# Patient Record
Sex: MALE | Race: WHITE | NOT HISPANIC OR LATINO | Employment: OTHER | URBAN - METROPOLITAN AREA
[De-identification: names, ages, dates, MRNs, and addresses within clinical notes are randomized per-mention and may not be internally consistent; named-entity substitution may affect disease eponyms.]

---

## 2017-05-28 ENCOUNTER — HOSPITAL ENCOUNTER (EMERGENCY)
Facility: HOSPITAL | Age: 67
Discharge: HOME | End: 2017-05-28
Attending: EMERGENCY MEDICINE | Admitting: EMERGENCY MEDICINE
Payer: MEDICARE

## 2017-05-28 VITALS
TEMPERATURE: 99 F | SYSTOLIC BLOOD PRESSURE: 160 MMHG | OXYGEN SATURATION: 99 % | BODY MASS INDEX: 34.16 KG/M2 | HEART RATE: 81 BPM | RESPIRATION RATE: 19 BRPM | WEIGHT: 205 LBS | HEIGHT: 65 IN | DIASTOLIC BLOOD PRESSURE: 73 MMHG

## 2017-05-28 DIAGNOSIS — Z01.30 ENCOUNTER FOR EXAMINATION OF BLOOD PRESSURE WITHOUT ABNORMAL FINDINGS: ICD-10-CM

## 2017-05-28 DIAGNOSIS — Z71.1 NO PROBLEM, FEARED COMPLAINT UNFOUNDED: Primary | ICD-10-CM

## 2017-05-28 PROCEDURE — 99283 EMERGENCY DEPT VISIT LOW MDM: CPT | Mod: 25

## 2017-05-28 PROCEDURE — 99283 EMERGENCY DEPT VISIT LOW MDM: CPT | Mod: ,,, | Performed by: EMERGENCY MEDICINE

## 2017-05-28 PROCEDURE — 93010 ELECTROCARDIOGRAM REPORT: CPT | Mod: ,,, | Performed by: INTERNAL MEDICINE

## 2017-05-28 PROCEDURE — 93005 ELECTROCARDIOGRAM TRACING: CPT

## 2017-05-28 RX ORDER — DONEPEZIL HYDROCHLORIDE 5 MG/1
10 TABLET, FILM COATED ORAL NIGHTLY
Status: ON HOLD | COMMUNITY
End: 2020-12-19 | Stop reason: HOSPADM

## 2017-05-28 RX ORDER — ALLOPURINOL 100 MG/1
100 TABLET ORAL 2 TIMES DAILY
Status: ON HOLD | COMMUNITY
End: 2020-12-07 | Stop reason: HOSPADM

## 2017-05-29 NOTE — ED PROVIDER NOTES
Encounter Date: 5/28/2017    SCRIBE #1 NOTE: I, Saadia Celaya, am scribing for, and in the presence of, Dr. Jha.       History     Chief Complaint   Patient presents with    Hypotension     pt's wife worried about diastolic BP in the 40s at home     Review of patient's allergies indicates:   Allergen Reactions    Amoxicillin Hives     Time patient was seen by the provider: 9:44 PM      The patient is a 67 y.o. male w/ hx of RCC s/p nephrectomy that presents to the ED with a complaint of hypotension at home. Pt reports diastolic of 40 with BP taken at home. He reports /38 yesterday after cutting grass. He denies palpitations or feeling like he would pass out. He has no additional complaints and denies fever, pain, lightheadedness, fatigue.             Past Medical History:   Diagnosis Date    Bladder cancer     2 times    BPH (benign prostatic hypertrophy)     Cancer of kidney     1/2    Colon polyp     Coronary artery disease     GERD (gastroesophageal reflux disease)     PAD (peripheral artery disease)     Peripheral vascular disease      Past Surgical History:   Procedure Laterality Date    abdominal aorta bypass by fem      BLADDER SURGERY      CYSTOSCOPY      multiple angiosplastia      NEPHRECTOMY      partial nephrectomy left     Family History   Problem Relation Age of Onset    Cancer Father     Cancer Maternal Uncle      Social History   Substance Use Topics    Smoking status: Former Smoker     Packs/day: 1.50     Years: 40.00     Types: Cigarettes     Quit date: 2/9/2008    Smokeless tobacco: Not on file    Alcohol use 1.2 oz/week     2 Cans of beer per week      Comment: daily     Review of Systems   Constitutional: Negative for fatigue and fever.   Cardiovascular: Negative for chest pain and palpitations.   Neurological: Negative for light-headedness.       Physical Exam     Initial Vitals [05/28/17 1916]   BP Pulse Resp Temp SpO2   (!) 127/59 77 19 97.5 °F (36.4 °C) 97 %      Physical Exam    Nursing note and vitals reviewed.  Constitutional: He appears well-developed and well-nourished. He is not diaphoretic. No distress.   HENT:   Head: Normocephalic and atraumatic.   Eyes: EOM are normal.   Neck: Normal range of motion. Neck supple.   Cardiovascular: Normal rate, regular rhythm, normal heart sounds and intact distal pulses.   Pulmonary/Chest: Breath sounds normal. No stridor. No respiratory distress. He has no wheezes. He has no rhonchi. He has no rales.   Abdominal: Soft. Bowel sounds are normal. He exhibits no distension. There is no tenderness. There is no rebound and no guarding.   Musculoskeletal: Normal range of motion. He exhibits no edema.   Neurological: He is alert and oriented to person, place, and time.   Skin: Skin is warm and dry.   Psychiatric: He has a normal mood and affect. His behavior is normal. Judgment and thought content normal.         ED Course   Procedures  Labs Reviewed - No data to display  EKG Readings: (Independently Interpreted)   NSR, 79. No ST. QT and QRS intervals normal.          Medical Decision Making:   History:   Old Medical Records: I decided to obtain old medical records.  Initial Assessment:   The pt is a 67 y.o. male that presents with low diastolic BP reading. Pt denies taking any BPs of concerning values to me as all in 110's systolic and 50's-60's on multiple readings given to me. Regardless, pt asymptomatic. BP was taken simply for monitoring purposes. BP reassuring today in ED. Will evlauate for arrythmia and if EKG is reassuring, will d/c and recommend PCP f/u.  Independently Interpreted Test(s):   I have ordered and independently interpreted EKG Reading(s) - see prior notes  Clinical Tests:   Medical Tests: Reviewed and Ordered            Scribe Attestation:   Scribe #1: I performed the above scribed service and the documentation accurately describes the services I performed. I attest to the accuracy of the note.    Attending  Attestation:           Physician Attestation for Scribe:  Physician Attestation Statement for Scribe #1: I, Dr. Jha, reviewed documentation, as scribed by Saadia Celaya in my presence, and it is both accurate and complete.                 ED Course     Clinical Impression:   The encounter diagnosis was Encounter for examination of blood pressure without abnormal findings.    Disposition:   Disposition: Discharged  Condition: Stable       Deshawn Jha MD  05/29/17 0781

## 2017-05-29 NOTE — ED NOTES
PT PRESENTS TO ED WITH WIFE. PT REPORTS HYPOTENSION AT HOME OF  110S / 50S. WIFE REPORTS SIMILAR EPISODES OF BP DIFFERENT IN RIGHT AND LEFT ARM WITH PREVIOUS INTERNAL BLEEDING EPISODE. PT DENIES PAIN, CP, SOB, H/A, N/V/D, BLOOD IN URINE AND STOOL, WEAKNESS,  LIGHTHEADEDNESS, AND DIZZINESS.

## 2017-05-29 NOTE — PROVIDER PROGRESS NOTES - EMERGENCY DEPT.
Encounter Date: 5/28/2017    ED Physician Progress Notes         EKG - STEMI Decision  Initial Reading: No STEMI present.

## 2017-06-16 ENCOUNTER — TELEPHONE (OUTPATIENT)
Dept: NEUROLOGY | Facility: CLINIC | Age: 67
End: 2017-06-16

## 2017-06-16 NOTE — TELEPHONE ENCOUNTER
----- Message from Niels Watkins sent at 6/15/2017 11:49 AM CDT -----  Contact: wife- Ele- [phone]  Patient's wife needs to schedule an appointment for the patient.  Thanks!

## 2017-06-19 ENCOUNTER — TELEPHONE (OUTPATIENT)
Dept: NEUROLOGY | Facility: CLINIC | Age: 67
End: 2017-06-19

## 2017-06-19 NOTE — TELEPHONE ENCOUNTER
Appointment scheduled with Aruna.  Patient to bring CT of brain with written report and clinic notes from prior neurologist.

## 2017-06-19 NOTE — TELEPHONE ENCOUNTER
----- Message from Lisa Lutz sent at 6/16/2017  4:46 PM CDT -----  Contact: wife  Pt wife states the dr called them and told them to schedule an appointment for the pt to come in for memory loss,would like to come to the [place] office..[phone] (home)

## 2017-07-06 ENCOUNTER — TELEPHONE (OUTPATIENT)
Dept: NEUROLOGY | Facility: CLINIC | Age: 67
End: 2017-07-06

## 2017-07-06 NOTE — TELEPHONE ENCOUNTER
----- Message from Kelly Arrieta sent at 7/6/2017 10:33 AM CDT -----  Patient has appointment for 7/10/17 that she needs to cancel. They just found out that the doctor is not on their insurance plan. Please call if there are any other questions at [phone].

## 2018-12-04 PROBLEM — E83.42 HYPOMAGNESEMIA: Status: ACTIVE | Noted: 2018-12-04

## 2018-12-05 PROBLEM — Z71.3 ENCOUNTER FOR DIETARY CONSULTATION: Chronic | Status: ACTIVE | Noted: 2018-12-05

## 2018-12-06 PROBLEM — K90.9 DIARRHEA DUE TO MALABSORPTION: Status: ACTIVE | Noted: 2018-12-06

## 2018-12-06 PROBLEM — R19.7 DIARRHEA DUE TO MALABSORPTION: Status: ACTIVE | Noted: 2018-12-06

## 2019-01-12 ENCOUNTER — HOSPITAL ENCOUNTER (INPATIENT)
Facility: HOSPITAL | Age: 69
LOS: 2 days | Discharge: HOME | DRG: 862 | End: 2019-01-14
Attending: EMERGENCY MEDICINE | Admitting: SURGERY
Payer: MEDICARE

## 2019-01-12 DIAGNOSIS — R19.7 DIARRHEA, UNSPECIFIED TYPE: ICD-10-CM

## 2019-01-12 DIAGNOSIS — E83.42 HYPOMAGNESEMIA: ICD-10-CM

## 2019-01-12 DIAGNOSIS — K91.89 ANASTOMOTIC LEAK OF INTESTINE: ICD-10-CM

## 2019-01-12 DIAGNOSIS — B99.9 INTRA-ABDOMINAL INFECTION: Primary | ICD-10-CM

## 2019-01-12 DIAGNOSIS — L02.91 PHLEGMON: ICD-10-CM

## 2019-01-12 LAB
ALBUMIN SERPL BCP-MCNC: 2.4 G/DL
ALP SERPL-CCNC: 94 U/L
ALT SERPL W/O P-5'-P-CCNC: 11 U/L
ANION GAP SERPL CALC-SCNC: 13 MMOL/L
AST SERPL-CCNC: 14 U/L
BASOPHILS # BLD AUTO: 0.07 K/UL
BASOPHILS NFR BLD: 0.4 %
BILIRUB SERPL-MCNC: 0.4 MG/DL
BILIRUB UR QL STRIP: NEGATIVE
BUN SERPL-MCNC: 8 MG/DL
CALCIUM SERPL-MCNC: 7.4 MG/DL
CHLORIDE SERPL-SCNC: 104 MMOL/L
CLARITY UR REFRACT.AUTO: CLEAR
CO2 SERPL-SCNC: 21 MMOL/L
COLOR UR AUTO: YELLOW
CREAT SERPL-MCNC: 1.2 MG/DL
CRP SERPL-MCNC: 15.2 MG/L
DIFFERENTIAL METHOD: ABNORMAL
EOSINOPHIL # BLD AUTO: 0.3 K/UL
EOSINOPHIL NFR BLD: 1.3 %
ERYTHROCYTE [DISTWIDTH] IN BLOOD BY AUTOMATED COUNT: 17.6 %
ERYTHROCYTE [SEDIMENTATION RATE] IN BLOOD BY WESTERGREN METHOD: 41 MM/HR
EST. GFR  (AFRICAN AMERICAN): >60 ML/MIN/1.73 M^2
EST. GFR  (NON AFRICAN AMERICAN): >60 ML/MIN/1.73 M^2
ESTIMATED AVG GLUCOSE: 114 MG/DL
GLUCOSE SERPL-MCNC: 160 MG/DL
GLUCOSE UR QL STRIP: NEGATIVE
HBA1C MFR BLD HPLC: 5.6 %
HCT VFR BLD AUTO: 31.5 %
HGB BLD-MCNC: 10.1 G/DL
HGB UR QL STRIP: NEGATIVE
IMM GRANULOCYTES # BLD AUTO: 0.2 K/UL
IMM GRANULOCYTES NFR BLD AUTO: 1 %
INR PPP: 1.1
KETONES UR QL STRIP: NEGATIVE
LACTATE SERPL-SCNC: 2.6 MMOL/L
LEUKOCYTE ESTERASE UR QL STRIP: NEGATIVE
LIPASE SERPL-CCNC: 20 U/L
LYMPHOCYTES # BLD AUTO: 1.3 K/UL
LYMPHOCYTES NFR BLD: 6.7 %
MAGNESIUM SERPL-MCNC: 0.7 MG/DL
MCH RBC QN AUTO: 30.6 PG
MCHC RBC AUTO-ENTMCNC: 32.1 G/DL
MCV RBC AUTO: 96 FL
MONOCYTES # BLD AUTO: 1.1 K/UL
MONOCYTES NFR BLD: 5.5 %
NEUTROPHILS # BLD AUTO: 16.3 K/UL
NEUTROPHILS NFR BLD: 85.1 %
NITRITE UR QL STRIP: NEGATIVE
NRBC BLD-RTO: 0 /100 WBC
PH UR STRIP: 5 [PH] (ref 5–8)
PHOSPHATE SERPL-MCNC: 3.8 MG/DL
PLATELET # BLD AUTO: 474 K/UL
PMV BLD AUTO: 9.2 FL
POCT GLUCOSE: 147 MG/DL (ref 70–110)
POTASSIUM SERPL-SCNC: 3.2 MMOL/L
PROT SERPL-MCNC: 6.3 G/DL
PROT UR QL STRIP: NEGATIVE
PROTHROMBIN TIME: 11.7 SEC
RBC # BLD AUTO: 3.3 M/UL
SODIUM SERPL-SCNC: 138 MMOL/L
SP GR UR STRIP: >=1.03 (ref 1–1.03)
TSH SERPL DL<=0.005 MIU/L-ACNC: 0.55 UIU/ML
URN SPEC COLLECT METH UR: ABNORMAL
WBC # BLD AUTO: 19.14 K/UL

## 2019-01-12 PROCEDURE — 84443 ASSAY THYROID STIM HORMONE: CPT

## 2019-01-12 PROCEDURE — 25500020 PHARM REV CODE 255: Performed by: EMERGENCY MEDICINE

## 2019-01-12 PROCEDURE — 81003 URINALYSIS AUTO W/O SCOPE: CPT

## 2019-01-12 PROCEDURE — 99223 PR INITIAL HOSPITAL CARE,LEVL III: ICD-10-PCS | Mod: AI,,, | Performed by: SURGERY

## 2019-01-12 PROCEDURE — G0378 HOSPITAL OBSERVATION PER HR: HCPCS

## 2019-01-12 PROCEDURE — 25000003 PHARM REV CODE 250: Performed by: EMERGENCY MEDICINE

## 2019-01-12 PROCEDURE — 86140 C-REACTIVE PROTEIN: CPT

## 2019-01-12 PROCEDURE — 25000003 PHARM REV CODE 250: Performed by: STUDENT IN AN ORGANIZED HEALTH CARE EDUCATION/TRAINING PROGRAM

## 2019-01-12 PROCEDURE — 99285 EMERGENCY DEPT VISIT HI MDM: CPT | Mod: ,,, | Performed by: EMERGENCY MEDICINE

## 2019-01-12 PROCEDURE — 85652 RBC SED RATE AUTOMATED: CPT

## 2019-01-12 PROCEDURE — 96367 TX/PROPH/DG ADDL SEQ IV INF: CPT

## 2019-01-12 PROCEDURE — 12000002 HC ACUTE/MED SURGE SEMI-PRIVATE ROOM

## 2019-01-12 PROCEDURE — 63600175 PHARM REV CODE 636 W HCPCS: Performed by: STUDENT IN AN ORGANIZED HEALTH CARE EDUCATION/TRAINING PROGRAM

## 2019-01-12 PROCEDURE — 84100 ASSAY OF PHOSPHORUS: CPT

## 2019-01-12 PROCEDURE — 99223 1ST HOSP IP/OBS HIGH 75: CPT | Mod: AI,,, | Performed by: SURGERY

## 2019-01-12 PROCEDURE — 99285 PR EMERGENCY DEPT VISIT,LEVEL V: ICD-10-PCS | Mod: ,,, | Performed by: EMERGENCY MEDICINE

## 2019-01-12 PROCEDURE — 83690 ASSAY OF LIPASE: CPT

## 2019-01-12 PROCEDURE — 96375 TX/PRO/DX INJ NEW DRUG ADDON: CPT

## 2019-01-12 PROCEDURE — 63600175 PHARM REV CODE 636 W HCPCS: Performed by: EMERGENCY MEDICINE

## 2019-01-12 PROCEDURE — 96368 THER/DIAG CONCURRENT INF: CPT

## 2019-01-12 PROCEDURE — 85025 COMPLETE CBC W/AUTO DIFF WBC: CPT

## 2019-01-12 PROCEDURE — 99285 EMERGENCY DEPT VISIT HI MDM: CPT | Mod: 25

## 2019-01-12 PROCEDURE — 87040 BLOOD CULTURE FOR BACTERIA: CPT

## 2019-01-12 PROCEDURE — 96365 THER/PROPH/DIAG IV INF INIT: CPT

## 2019-01-12 PROCEDURE — 83605 ASSAY OF LACTIC ACID: CPT

## 2019-01-12 PROCEDURE — 85610 PROTHROMBIN TIME: CPT

## 2019-01-12 PROCEDURE — 80053 COMPREHEN METABOLIC PANEL: CPT

## 2019-01-12 PROCEDURE — 96366 THER/PROPH/DIAG IV INF ADDON: CPT

## 2019-01-12 PROCEDURE — 83036 HEMOGLOBIN GLYCOSYLATED A1C: CPT

## 2019-01-12 PROCEDURE — 63600175 PHARM REV CODE 636 W HCPCS: Performed by: NURSE PRACTITIONER

## 2019-01-12 PROCEDURE — 83735 ASSAY OF MAGNESIUM: CPT

## 2019-01-12 RX ORDER — LIDOCAINE HYDROCHLORIDE 10 MG/ML
1 INJECTION, SOLUTION EPIDURAL; INFILTRATION; INTRACAUDAL; PERINEURAL ONCE
Status: DISCONTINUED | OUTPATIENT
Start: 2019-01-13 | End: 2019-01-14 | Stop reason: HOSPADM

## 2019-01-12 RX ORDER — METRONIDAZOLE 500 MG/100ML
500 INJECTION, SOLUTION INTRAVENOUS
Status: DISCONTINUED | OUTPATIENT
Start: 2019-01-13 | End: 2019-01-14

## 2019-01-12 RX ORDER — GABAPENTIN 300 MG/1
300 CAPSULE ORAL NIGHTLY
Status: DISCONTINUED | OUTPATIENT
Start: 2019-01-13 | End: 2019-01-14 | Stop reason: HOSPADM

## 2019-01-12 RX ORDER — ONDANSETRON 8 MG/1
8 TABLET, ORALLY DISINTEGRATING ORAL EVERY 8 HOURS PRN
Status: DISCONTINUED | OUTPATIENT
Start: 2019-01-13 | End: 2019-01-14 | Stop reason: HOSPADM

## 2019-01-12 RX ORDER — LANOLIN ALCOHOL/MO/W.PET/CERES
400 CREAM (GRAM) TOPICAL DAILY
Status: DISCONTINUED | OUTPATIENT
Start: 2019-01-12 | End: 2019-01-14 | Stop reason: HOSPADM

## 2019-01-12 RX ORDER — HYDROCHLOROTHIAZIDE 25 MG/1
25 TABLET ORAL EVERY MORNING
Status: DISCONTINUED | OUTPATIENT
Start: 2019-01-13 | End: 2019-01-14 | Stop reason: HOSPADM

## 2019-01-12 RX ORDER — ONDANSETRON 4 MG/1
4 TABLET, FILM COATED ORAL EVERY 8 HOURS PRN
Status: ON HOLD | COMMUNITY
End: 2020-12-07 | Stop reason: HOSPADM

## 2019-01-12 RX ORDER — HYDROCODONE BITARTRATE AND ACETAMINOPHEN 10; 325 MG/1; MG/1
1 TABLET ORAL EVERY 4 HOURS PRN
Status: DISCONTINUED | OUTPATIENT
Start: 2019-01-13 | End: 2019-01-14 | Stop reason: HOSPADM

## 2019-01-12 RX ORDER — METOPROLOL TARTRATE 50 MG/1
50 TABLET ORAL 2 TIMES DAILY
Status: DISCONTINUED | OUTPATIENT
Start: 2019-01-13 | End: 2019-01-14 | Stop reason: HOSPADM

## 2019-01-12 RX ORDER — CLOPIDOGREL BISULFATE 75 MG/1
75 TABLET ORAL NIGHTLY
Status: DISCONTINUED | OUTPATIENT
Start: 2019-01-13 | End: 2019-01-14 | Stop reason: HOSPADM

## 2019-01-12 RX ORDER — SODIUM CHLORIDE, SODIUM LACTATE, POTASSIUM CHLORIDE, CALCIUM CHLORIDE 600; 310; 30; 20 MG/100ML; MG/100ML; MG/100ML; MG/100ML
INJECTION, SOLUTION INTRAVENOUS CONTINUOUS
Status: DISCONTINUED | OUTPATIENT
Start: 2019-01-13 | End: 2019-01-13

## 2019-01-12 RX ORDER — VALSARTAN 160 MG/1
320 TABLET ORAL DAILY
Status: DISCONTINUED | OUTPATIENT
Start: 2019-01-13 | End: 2019-01-13

## 2019-01-12 RX ORDER — ASPIRIN 81 MG/1
81 TABLET ORAL DAILY
Status: DISCONTINUED | OUTPATIENT
Start: 2019-01-13 | End: 2019-01-14 | Stop reason: HOSPADM

## 2019-01-12 RX ORDER — DICYCLOMINE HYDROCHLORIDE 20 MG/1
20 TABLET ORAL EVERY 6 HOURS
Status: ON HOLD | COMMUNITY
End: 2021-09-02 | Stop reason: SDUPTHER

## 2019-01-12 RX ORDER — HYDROMORPHONE HYDROCHLORIDE 1 MG/ML
1 INJECTION, SOLUTION INTRAMUSCULAR; INTRAVENOUS; SUBCUTANEOUS
Status: DISCONTINUED | OUTPATIENT
Start: 2019-01-12 | End: 2019-01-12

## 2019-01-12 RX ORDER — VANCOMYCIN HCL IN 5 % DEXTROSE 1G/250ML
1000 PLASTIC BAG, INJECTION (ML) INTRAVENOUS
Status: COMPLETED | OUTPATIENT
Start: 2019-01-12 | End: 2019-01-12

## 2019-01-12 RX ORDER — AMLODIPINE BESYLATE 10 MG/1
10 TABLET ORAL DAILY
Status: DISCONTINUED | OUTPATIENT
Start: 2019-01-13 | End: 2019-01-14 | Stop reason: HOSPADM

## 2019-01-12 RX ORDER — HYDROMORPHONE HYDROCHLORIDE 1 MG/ML
1 INJECTION, SOLUTION INTRAMUSCULAR; INTRAVENOUS; SUBCUTANEOUS
Status: COMPLETED | OUTPATIENT
Start: 2019-01-12 | End: 2019-01-12

## 2019-01-12 RX ORDER — RAMELTEON 8 MG/1
8 TABLET ORAL NIGHTLY PRN
Status: DISCONTINUED | OUTPATIENT
Start: 2019-01-13 | End: 2019-01-14 | Stop reason: HOSPADM

## 2019-01-12 RX ORDER — ALLOPURINOL 100 MG/1
100 TABLET ORAL 2 TIMES DAILY
Status: DISCONTINUED | OUTPATIENT
Start: 2019-01-13 | End: 2019-01-14 | Stop reason: HOSPADM

## 2019-01-12 RX ORDER — ACETAMINOPHEN 325 MG/1
650 TABLET ORAL EVERY 8 HOURS PRN
Status: DISCONTINUED | OUTPATIENT
Start: 2019-01-13 | End: 2019-01-14 | Stop reason: HOSPADM

## 2019-01-12 RX ORDER — LOPERAMIDE HYDROCHLORIDE 2 MG/1
2 CAPSULE ORAL 4 TIMES DAILY PRN
Status: ON HOLD | COMMUNITY
End: 2021-09-02 | Stop reason: SDUPTHER

## 2019-01-12 RX ORDER — LEVOFLOXACIN 500 MG/1
500 TABLET, FILM COATED ORAL DAILY
Status: ON HOLD | COMMUNITY
End: 2019-01-14 | Stop reason: HOSPADM

## 2019-01-12 RX ORDER — INSULIN ASPART 100 [IU]/ML
1-10 INJECTION, SOLUTION INTRAVENOUS; SUBCUTANEOUS
Status: DISCONTINUED | OUTPATIENT
Start: 2019-01-13 | End: 2019-01-14 | Stop reason: HOSPADM

## 2019-01-12 RX ORDER — GLUCAGON 1 MG
1 KIT INJECTION
Status: DISCONTINUED | OUTPATIENT
Start: 2019-01-13 | End: 2019-01-14 | Stop reason: HOSPADM

## 2019-01-12 RX ORDER — CIPROFLOXACIN 2 MG/ML
400 INJECTION, SOLUTION INTRAVENOUS
Status: DISCONTINUED | OUTPATIENT
Start: 2019-01-13 | End: 2019-01-14

## 2019-01-12 RX ORDER — LOPERAMIDE HYDROCHLORIDE 2 MG/1
2 CAPSULE ORAL 4 TIMES DAILY PRN
Status: DISCONTINUED | OUTPATIENT
Start: 2019-01-13 | End: 2019-01-14 | Stop reason: HOSPADM

## 2019-01-12 RX ORDER — IBUPROFEN 200 MG
24 TABLET ORAL
Status: DISCONTINUED | OUTPATIENT
Start: 2019-01-13 | End: 2019-01-14 | Stop reason: HOSPADM

## 2019-01-12 RX ORDER — HYDROCODONE BITARTRATE AND ACETAMINOPHEN 5; 325 MG/1; MG/1
1 TABLET ORAL EVERY 4 HOURS PRN
Status: DISCONTINUED | OUTPATIENT
Start: 2019-01-13 | End: 2019-01-14 | Stop reason: HOSPADM

## 2019-01-12 RX ORDER — PANTOPRAZOLE SODIUM 40 MG/1
40 TABLET, DELAYED RELEASE ORAL DAILY
Status: DISCONTINUED | OUTPATIENT
Start: 2019-01-13 | End: 2019-01-14 | Stop reason: HOSPADM

## 2019-01-12 RX ORDER — IBUPROFEN 200 MG
16 TABLET ORAL
Status: DISCONTINUED | OUTPATIENT
Start: 2019-01-13 | End: 2019-01-14 | Stop reason: HOSPADM

## 2019-01-12 RX ORDER — ENOXAPARIN SODIUM 100 MG/ML
40 INJECTION SUBCUTANEOUS EVERY 24 HOURS
Status: DISCONTINUED | OUTPATIENT
Start: 2019-01-13 | End: 2019-01-14 | Stop reason: HOSPADM

## 2019-01-12 RX ORDER — DONEPEZIL HYDROCHLORIDE 5 MG/1
10 TABLET, FILM COATED ORAL NIGHTLY
Status: DISCONTINUED | OUTPATIENT
Start: 2019-01-13 | End: 2019-01-14 | Stop reason: HOSPADM

## 2019-01-12 RX ORDER — SODIUM CHLORIDE 0.9 % (FLUSH) 0.9 %
3 SYRINGE (ML) INJECTION
Status: DISCONTINUED | OUTPATIENT
Start: 2019-01-13 | End: 2019-01-14 | Stop reason: HOSPADM

## 2019-01-12 RX ORDER — ATORVASTATIN CALCIUM 20 MG/1
80 TABLET, FILM COATED ORAL DAILY
Status: DISCONTINUED | OUTPATIENT
Start: 2019-01-13 | End: 2019-01-14 | Stop reason: HOSPADM

## 2019-01-12 RX ADMIN — SODIUM CHLORIDE, SODIUM LACTATE, POTASSIUM CHLORIDE, AND CALCIUM CHLORIDE 1000 ML: .6; .31; .03; .02 INJECTION, SOLUTION INTRAVENOUS at 10:01

## 2019-01-12 RX ADMIN — IOHEXOL 75 ML: 350 INJECTION, SOLUTION INTRAVENOUS at 09:01

## 2019-01-12 RX ADMIN — VANCOMYCIN HYDROCHLORIDE 1000 MG: 1 INJECTION, POWDER, FOR SOLUTION INTRAVENOUS at 09:01

## 2019-01-12 RX ADMIN — HYDROMORPHONE HYDROCHLORIDE 1 MG: 1 INJECTION, SOLUTION INTRAMUSCULAR; INTRAVENOUS; SUBCUTANEOUS at 09:01

## 2019-01-12 RX ADMIN — MAGNESIUM SULFATE HEPTAHYDRATE 3 G: 500 INJECTION, SOLUTION INTRAMUSCULAR; INTRAVENOUS at 09:01

## 2019-01-12 RX ADMIN — MAGNESIUM OXIDE TAB 400 MG (241.3 MG ELEMENTAL MG) 400 MG: 400 (241.3 MG) TAB at 11:01

## 2019-01-12 RX ADMIN — SODIUM CHLORIDE 1000 ML: 0.9 INJECTION, SOLUTION INTRAVENOUS at 08:01

## 2019-01-12 RX ADMIN — PIPERACILLIN AND TAZOBACTAM 4.5 G: 4; .5 INJECTION, POWDER, LYOPHILIZED, FOR SOLUTION INTRAVENOUS; PARENTERAL at 08:01

## 2019-01-13 PROBLEM — B99.9 INTRA-ABDOMINAL INFECTION: Status: ACTIVE | Noted: 2019-01-13

## 2019-01-13 LAB
ALBUMIN SERPL BCP-MCNC: 1.9 G/DL
ALP SERPL-CCNC: 80 U/L
ALT SERPL W/O P-5'-P-CCNC: 9 U/L
ANION GAP SERPL CALC-SCNC: 10 MMOL/L
AST SERPL-CCNC: 10 U/L
BASOPHILS # BLD AUTO: 0.06 K/UL
BASOPHILS NFR BLD: 0.4 %
BILIRUB SERPL-MCNC: 0.5 MG/DL
BUN SERPL-MCNC: 7 MG/DL
C DIFF GDH STL QL: NEGATIVE
C DIFF TOX A+B STL QL IA: NEGATIVE
CALCIUM SERPL-MCNC: 7.4 MG/DL
CHLORIDE SERPL-SCNC: 101 MMOL/L
CO2 SERPL-SCNC: 21 MMOL/L
CREAT SERPL-MCNC: 0.8 MG/DL
DIFFERENTIAL METHOD: ABNORMAL
EOSINOPHIL # BLD AUTO: 0.2 K/UL
EOSINOPHIL NFR BLD: 1.4 %
ERYTHROCYTE [DISTWIDTH] IN BLOOD BY AUTOMATED COUNT: 17.6 %
EST. GFR  (AFRICAN AMERICAN): >60 ML/MIN/1.73 M^2
EST. GFR  (NON AFRICAN AMERICAN): >60 ML/MIN/1.73 M^2
GLUCOSE SERPL-MCNC: 149 MG/DL
HCT VFR BLD AUTO: 27 %
HGB BLD-MCNC: 8.7 G/DL
IMM GRANULOCYTES # BLD AUTO: 0.18 K/UL
IMM GRANULOCYTES NFR BLD AUTO: 1.2 %
LACTATE SERPL-SCNC: 2.4 MMOL/L
LYMPHOCYTES # BLD AUTO: 1.6 K/UL
LYMPHOCYTES NFR BLD: 11 %
MAGNESIUM SERPL-MCNC: 1.2 MG/DL
MAGNESIUM SERPL-MCNC: 1.6 MG/DL
MAGNESIUM SERPL-MCNC: 2 MG/DL
MCH RBC QN AUTO: 30.9 PG
MCHC RBC AUTO-ENTMCNC: 32.2 G/DL
MCV RBC AUTO: 96 FL
MONOCYTES # BLD AUTO: 1.1 K/UL
MONOCYTES NFR BLD: 7.7 %
NEUTROPHILS # BLD AUTO: 11.4 K/UL
NEUTROPHILS NFR BLD: 78.3 %
NRBC BLD-RTO: 0 /100 WBC
PHOSPHATE SERPL-MCNC: 3.2 MG/DL
PLATELET # BLD AUTO: 349 K/UL
PMV BLD AUTO: 9 FL
POCT GLUCOSE: 119 MG/DL (ref 70–110)
POCT GLUCOSE: 136 MG/DL (ref 70–110)
POCT GLUCOSE: 147 MG/DL (ref 70–110)
POCT GLUCOSE: 155 MG/DL (ref 70–110)
POTASSIUM SERPL-SCNC: 4 MMOL/L
PROT SERPL-MCNC: 5.2 G/DL
RBC # BLD AUTO: 2.82 M/UL
SODIUM SERPL-SCNC: 132 MMOL/L
WBC # BLD AUTO: 14.61 K/UL

## 2019-01-13 PROCEDURE — 99232 PR SUBSEQUENT HOSPITAL CARE,LEVL II: ICD-10-PCS | Mod: ,,, | Performed by: SURGERY

## 2019-01-13 PROCEDURE — 36415 COLL VENOUS BLD VENIPUNCTURE: CPT

## 2019-01-13 PROCEDURE — 99223 1ST HOSP IP/OBS HIGH 75: CPT | Mod: GC,,, | Performed by: INTERNAL MEDICINE

## 2019-01-13 PROCEDURE — 25000003 PHARM REV CODE 250: Performed by: STUDENT IN AN ORGANIZED HEALTH CARE EDUCATION/TRAINING PROGRAM

## 2019-01-13 PROCEDURE — 63600175 PHARM REV CODE 636 W HCPCS: Performed by: STUDENT IN AN ORGANIZED HEALTH CARE EDUCATION/TRAINING PROGRAM

## 2019-01-13 PROCEDURE — 83735 ASSAY OF MAGNESIUM: CPT

## 2019-01-13 PROCEDURE — 83735 ASSAY OF MAGNESIUM: CPT | Mod: 91

## 2019-01-13 PROCEDURE — 25000003 PHARM REV CODE 250: Performed by: SURGERY

## 2019-01-13 PROCEDURE — 20600001 HC STEP DOWN PRIVATE ROOM

## 2019-01-13 PROCEDURE — S0030 INJECTION, METRONIDAZOLE: HCPCS | Performed by: STUDENT IN AN ORGANIZED HEALTH CARE EDUCATION/TRAINING PROGRAM

## 2019-01-13 PROCEDURE — 99232 SBSQ HOSP IP/OBS MODERATE 35: CPT | Mod: ,,, | Performed by: SURGERY

## 2019-01-13 PROCEDURE — 87449 NOS EACH ORGANISM AG IA: CPT

## 2019-01-13 PROCEDURE — 83605 ASSAY OF LACTIC ACID: CPT

## 2019-01-13 PROCEDURE — 85025 COMPLETE CBC W/AUTO DIFF WBC: CPT

## 2019-01-13 PROCEDURE — 96366 THER/PROPH/DIAG IV INF ADDON: CPT

## 2019-01-13 PROCEDURE — 84100 ASSAY OF PHOSPHORUS: CPT

## 2019-01-13 PROCEDURE — 80053 COMPREHEN METABOLIC PANEL: CPT

## 2019-01-13 PROCEDURE — 99223 PR INITIAL HOSPITAL CARE,LEVL III: ICD-10-PCS | Mod: GC,,, | Performed by: INTERNAL MEDICINE

## 2019-01-13 RX ORDER — LOSARTAN POTASSIUM 50 MG/1
100 TABLET ORAL DAILY
Status: DISCONTINUED | OUTPATIENT
Start: 2019-01-13 | End: 2019-01-14 | Stop reason: HOSPADM

## 2019-01-13 RX ORDER — MAGNESIUM SULFATE HEPTAHYDRATE 40 MG/ML
2 INJECTION, SOLUTION INTRAVENOUS ONCE
Status: COMPLETED | OUTPATIENT
Start: 2019-01-13 | End: 2019-01-13

## 2019-01-13 RX ORDER — CHOLESTYRAMINE 4 G/9G
1 POWDER, FOR SUSPENSION ORAL 2 TIMES DAILY
Status: DISCONTINUED | OUTPATIENT
Start: 2019-01-13 | End: 2019-01-14 | Stop reason: HOSPADM

## 2019-01-13 RX ADMIN — ENOXAPARIN SODIUM 40 MG: 100 INJECTION SUBCUTANEOUS at 05:01

## 2019-01-13 RX ADMIN — PANTOPRAZOLE SODIUM 40 MG: 40 TABLET, DELAYED RELEASE ORAL at 08:01

## 2019-01-13 RX ADMIN — CLOPIDOGREL 75 MG: 75 TABLET, FILM COATED ORAL at 09:01

## 2019-01-13 RX ADMIN — METRONIDAZOLE 500 MG: 500 SOLUTION INTRAVENOUS at 04:01

## 2019-01-13 RX ADMIN — LOSARTAN POTASSIUM 100 MG: 50 TABLET, FILM COATED ORAL at 11:01

## 2019-01-13 RX ADMIN — CIPROFLOXACIN 400 MG: 2 INJECTION, SOLUTION INTRAVENOUS at 12:01

## 2019-01-13 RX ADMIN — HYDROCODONE BITARTRATE AND ACETAMINOPHEN 1 TABLET: 10; 325 TABLET ORAL at 09:01

## 2019-01-13 RX ADMIN — SODIUM CHLORIDE, SODIUM LACTATE, POTASSIUM CHLORIDE, AND CALCIUM CHLORIDE: .6; .31; .03; .02 INJECTION, SOLUTION INTRAVENOUS at 12:01

## 2019-01-13 RX ADMIN — SODIUM CHLORIDE, SODIUM LACTATE, POTASSIUM CHLORIDE, AND CALCIUM CHLORIDE 1000 ML: .6; .31; .03; .02 INJECTION, SOLUTION INTRAVENOUS at 06:01

## 2019-01-13 RX ADMIN — ASPIRIN 81 MG: 81 TABLET, COATED ORAL at 08:01

## 2019-01-13 RX ADMIN — RAMELTEON 8 MG: 8 TABLET, FILM COATED ORAL at 10:01

## 2019-01-13 RX ADMIN — AMLODIPINE BESYLATE 10 MG: 10 TABLET ORAL at 08:01

## 2019-01-13 RX ADMIN — MAGNESIUM SULFATE IN WATER 2 G: 40 INJECTION, SOLUTION INTRAVENOUS at 06:01

## 2019-01-13 RX ADMIN — METRONIDAZOLE 500 MG: 500 SOLUTION INTRAVENOUS at 08:01

## 2019-01-13 RX ADMIN — ALLOPURINOL 100 MG: 100 TABLET ORAL at 09:01

## 2019-01-13 RX ADMIN — HYDROCODONE BITARTRATE AND ACETAMINOPHEN 1 TABLET: 10; 325 TABLET ORAL at 06:01

## 2019-01-13 RX ADMIN — HYDROCODONE BITARTRATE AND ACETAMINOPHEN 1 TABLET: 10; 325 TABLET ORAL at 01:01

## 2019-01-13 RX ADMIN — DONEPEZIL HYDROCHLORIDE 10 MG: 5 TABLET, FILM COATED ORAL at 09:01

## 2019-01-13 RX ADMIN — ALLOPURINOL 100 MG: 100 TABLET ORAL at 08:01

## 2019-01-13 RX ADMIN — METRONIDAZOLE 500 MG: 500 SOLUTION INTRAVENOUS at 12:01

## 2019-01-13 RX ADMIN — GABAPENTIN 300 MG: 300 CAPSULE ORAL at 09:01

## 2019-01-13 RX ADMIN — ATORVASTATIN CALCIUM 80 MG: 20 TABLET, FILM COATED ORAL at 08:01

## 2019-01-13 RX ADMIN — HYDROCODONE BITARTRATE AND ACETAMINOPHEN 1 TABLET: 10; 325 TABLET ORAL at 03:01

## 2019-01-13 RX ADMIN — MAGNESIUM OXIDE TAB 400 MG (241.3 MG ELEMENTAL MG) 400 MG: 400 (241.3 MG) TAB at 08:01

## 2019-01-13 RX ADMIN — METOPROLOL TARTRATE 50 MG: 50 TABLET ORAL at 08:01

## 2019-01-13 RX ADMIN — CHOLESTYRAMINE 4 G: 4 POWDER, FOR SUSPENSION ORAL at 09:01

## 2019-01-13 RX ADMIN — INSULIN ASPART 2 UNITS: 100 INJECTION, SOLUTION INTRAVENOUS; SUBCUTANEOUS at 06:01

## 2019-01-13 RX ADMIN — METOPROLOL TARTRATE 50 MG: 50 TABLET ORAL at 09:01

## 2019-01-13 RX ADMIN — HYDROCHLOROTHIAZIDE 25 MG: 25 TABLET ORAL at 07:01

## 2019-01-13 RX ADMIN — CIPROFLOXACIN 400 MG: 2 INJECTION, SOLUTION INTRAVENOUS at 11:01

## 2019-01-13 NOTE — ED NOTES
Telemetry Verification   Patient placed on Telemetry Box  Verified with War Room  Box # [id number]   Monitor Tech Leanne   Rate 86   Rhythm NSR

## 2019-01-13 NOTE — PROGRESS NOTES
[facility]  General Surgery  Progress Note    Subjective:     History of Present Illness:  No notes on file    Post-Op Info:  * No surgery found *         Interval History: Feeling fine, no complaints. Denies nausea currently  Reports persistent diarrhea     Medications:  Continuous Infusions:   lactated ringers 150 mL/hr at 01/13/19 0008     Scheduled Meds:   allopurinol  100 mg Oral BID    amLODIPine  10 mg Oral Daily    aspirin  81 mg Oral Daily    atorvastatin  80 mg Oral Daily    ciprofloxacin  400 mg Intravenous Q12H    clopidogrel  75 mg Oral QHS    donepezil  10 mg Oral QHS    enoxaparin  40 mg Subcutaneous Daily    gabapentin  300 mg Oral QHS    hydroCHLOROthiazide  25 mg Oral QAM    lactated ringers  1,000 mL Intravenous Once    lidocaine (PF) 10 mg/ml (1%)  1 mL Other Once    magnesium oxide  400 mg Oral Daily    magnesium sulfate IVPB  2 g Intravenous Once    metoprolol tartrate  50 mg Oral BID    metronidazole  500 mg Intravenous Q8H    pantoprazole  40 mg Oral Daily    valsartan  320 mg Oral Daily     PRN Meds:acetaminophen, dextrose 50%, dextrose 50%, glucagon (human recombinant), glucose, glucose, HYDROcodone-acetaminophen, HYDROcodone-acetaminophen, insulin aspart U-100, loperamide, ondansetron, promethazine (PHENERGAN) IVPB, ramelteon, sodium chloride 0.9%     Review of patient's allergies indicates:   Allergen Reactions    Amoxicillin Hives    Zolpidem tartrate Hallucinations     Objective:     Vital Signs (Most Recent):  Temp: 97.6 °F (36.4 °C) (01/13/19 0202)  Pulse: 67 (01/13/19 0704)  Resp: 16 (01/13/19 0202)  BP: (!) 141/62 (01/13/19 0202)  SpO2: 99 % (01/13/19 0202) Vital Signs (24h Range):  Temp:  [97.6 °F (36.4 °C)-98.4 °F (36.9 °C)] 97.6 °F (36.4 °C)  Pulse:  [] 67  Resp:  [16-20] 16  SpO2:  [96 %-100 %] 99 %  BP: (125-144)/(57-80) 141/62     Weight: 73.9 kg (163 lb)  Body mass index is 27.12 kg/m².    Intake/Output - Last 3 Shifts        01/11 0700 - 01/12 0659 01/12 0700 - 01/13 0659 01/13 0700 - 01/14 0659    P.O.  400     IV Piggyback  2650     Total Intake(mL/kg)  3050 (41.3)     Urine (mL/kg/hr)  300     Stool  0     Total Output  300     Net  +2750            Urine Occurrence  1 x     Stool Occurrence  1 x           Physical Exam  A&O, NAD  RRR  No Resp Distress  ABD: s/nd, minimally;y TTP    Significant Labs:  CBC:   Recent Labs   Lab 01/12/19 1925   WBC 19.14*   RBC 3.30*   HGB 10.1*   HCT 31.5*   *   MCV 96   MCH 30.6   MCHC 32.1     CMP:   Recent Labs   Lab 01/12/19 1925   *   CALCIUM 7.4*   ALBUMIN 2.4*   PROT 6.3      K 3.2*   CO2 21*      BUN 8   CREATININE 1.2   ALKPHOS 94   ALT 11   AST 14   BILITOT 0.4       Significant Diagnostics:  I have reviewed all pertinent imaging results/findings within the past 24 hours.    Assessment/Plan:     * Phlegmon    69 yo male with multiple co morbidities and probable IBD-S with recent history of gangrenous bowel s/p R hemicolectomy on 10/31/18 now presents with post surgical phlegmon and electrolyte abnormalities due to excessive diarrhea. CT scan reviewed. Believe extraluminal air due to recent removal of drain as patient not peritonitic.oral contrast reaching small bowel without any extravasation seen. Ongoing post op leak in the differential.      Consult GI for help with IBS symptoms vs. Rule out other etiology of diarrhea, possible infectious or IBD?     Clear liquids okay  IV cipro/flagyl. Received vanc and zosyn in the ER  Magnesium q 8 hours. Receiving 3g of IV mag now.   Trend lactate  IVF @ 150  norco PRN for pain and gabapentin nightly   Home BP meds- lopressor, HCTZ, norvasc, valsartan   Restart anti platelets - asa and plavix   PRN imodium   Home protonix for GERD  Lovenox, SCDs     Plan: IV antibiotics, GI consult today             Sean Baez MD  General Surgery  [facility]

## 2019-01-13 NOTE — NURSING
Pt is compliant with measuring stool and urine output but is having small incontinent stools to brief about every 15 minutes. Wife assists with changing brief and is marking the occurrences by time and number. WCTM.

## 2019-01-13 NOTE — CONSULTS
Consult Note  General Surgery    CC:   Chief Complaint   Patient presents with    Abdominal Pain     abd infection, states wound drain removed yesterday, pain worsening today     HPI: 69 yo male with with hx of partial left nephrectomy for renal cancer, previous bladder cancer x2, CAD, TIA, DM (on metformin), GERD, PAD, HTN, PVD (on asa and plavix) s/p PTA L SFA and PTA and stent placement of left iliac artery, aorto-bifem bypass done in 2008 and ischemic colitis with bowel perforation s/p R colectomy by Dr. Guajardo at [facility] on 10/31/18. His course was complicated by respiratory failure, prolonged intubation and long ICU stay. He presents to the hospital today for continued diarrhea and abdominal pain. He has been to many different hospitals in the last several years including, vascular physician in [place], [facility], .     Admitted last month for electrolyte abnormalities and diarrhea at [facility]. Just recently released 5 days ago from  for diarrhea and electrolyte abnormalities as well. Also noted during that admission to have leukocytosis and IR drain placed on right side of the abdomen without return of purulence.. Received approximately 1 week of antibiotics but was discontinued on Friday along with the drain. Underwent colonoscopy on 12/06/18 for this which was unremarkable. He had a drain removed yesterday. He has been checked for c. Diff several times and been treated with flagyl.     His history of diarrhea is longstanding. He states that it initially started 2 years ago prior to his bowel perforation. He was referred to Dr. Byrne at [facility] who did extensive work up and was unable to find source, possible ulcerative colitis vs. IBS-D. He has tried may medications including his current medications - imodium. He has also been on xifaxin previously. The past several days, his diarrhea has become worse, where he is wearing a diaper and constantly having accidents and unable to function. His  rectum has become very raw requiring ale's paste and vaseline. He admits to some nausea and sporadic vomiting. No blood in stool, hematemesis, CP, SOB, lower leg swelling, dysuria, hematuria, foul smelling urine.     Denies any history of Afib. Having frequent PVCs. No previous MI.     PMH:   Past Medical History:   Diagnosis Date    Bladder cancer     2 times    BPH (benign prostatic hypertrophy)     Cancer of kidney     1/2    Colon polyp     Coronary artery disease     GERD (gastroesophageal reflux disease)     Hypertension     PAD (peripheral artery disease)     Peripheral vascular disease        PSH:   Past Surgical History:   Procedure Laterality Date    abdominal aorta bypass by fem      BIOPSY, BLADDER N/A 6/18/2013    Performed by Shyam Bucio MD at [facility] OR [address] [place] [address]    BLADDER SURGERY      COLONOSCOPY  12/06/2018    COLONOSCOPY N/A 12/6/2018    Per[facility] at [facility] ENDO    CYSTOSCOPY      CYSTOSCOPY N/A 4/17/2014    [facility]    CYSTOSCOPY N/A 6/18/2013    [facility]    DILATION, URETHRA N/A 4/17/2014    [facility]    FULGURATION, BLADDER  6/18/2013    Performed by Shyam Bucio MD at [facility] OR [address] [place] [address]    multiple angiosplastia      NEPHRECTOMY      partial nephrectomy left    PTA, PERIPHERAL VESSEL Bilateral 8/21/2018    Performed by [facility] at [facility] CATH    PYELOGRAM, RETROGRADE Bilateral 6/18/2013    Performed by Shyam Bucio MD at [facility] OR [address] [place] [address]    TURP, USING GREEN LIGHT LASER N/A 4/17/2014    Performed by Andrea Mcknight MD at [facility] OR [address] [place] [address]       Allergies:   Review of patient's allergies indicates:   Allergen Reactions    Amoxicillin Hives    Zolpidem tartrate Hallucinations     Meds:      Medication List      ASK your doctor about these medications    ACCU-CHEK CEASAR CONTROL SOLN  Soln  Generic drug:  blood glucose control high,low  USE ONE TIME DAILY     ACCU-CHEK CEASAR PLUS TEST STRP Strp  Generic drug:  blood sugar diagnostic  TEST TWO TIMES DAILY     allopurinol 100 MG tablet  Commonly known as:  ZYLOPRIM     amLODIPine 10 MG tablet  Commonly known as:  NORVASC  TAKE 1 TABLET ONE TIME DAILY     aspirin 81 MG EC tablet  Commonly known as:  ECOTRIN     atorvastatin 80 MG tablet  Commonly known as:  LIPITOR     b complex vitamins tablet     BD ALCOHOL SWABS Padm  Generic drug:  alcohol swabs  TEST TWO TIMES DAILY     clopidogrel 75 mg tablet  Commonly known as:  PLAVIX  TAKE 1 TABLET EVERY EVENING     donepezil 5 MG tablet  Commonly known as:  ARICEPT     fish oil-omega-3 fatty acids 300-1,000 mg capsule     gabapentin 300 MG capsule  Commonly known as:  NEURONTIN     hydroCHLOROthiazide 25 MG tablet  Commonly known as:  HYDRODIURIL  TAKE 1 TABLET EVERY MORNING     * lancets 33 gauge Misc  Commonly known as:  ONETOUCH DELICA LANCETS  2 lancets by Misc.(Non-Drug; Combo Route) route 2 (two) times daily.     * ACCU-CHEK SOFTCLIX LANCETS Misc  Generic drug:  lancets  TEST TWO TIMES DAILY     metFORMIN 1000 MG tablet  Commonly known as:  GLUCOPHAGE  Take 1 tablet (1,000 mg total) by mouth 2 (two) times daily with meals.     metoprolol tartrate 50 MG tablet  Commonly known as:  LOPRESSOR  TAKE 1 TABLET TWICE DAILY     mv,Ca,min-iron-FA-lycopene 8 mg iron- 200 mcg-600 mcg Tab     pantoprazole 40 MG tablet  Commonly known as:  PROTONIX     valsartan 320 MG tablet  Commonly known as:  DIOVAN         * This list has 2 medication(s) that are the same as other medications prescribed for you. Read the directions carefully, and ask your doctor or other care provider to review them with you.                Family History   Problem Relation Age of Onset    Cancer Father     Cancer Maternal Uncle        Review of Systems - For pertinent positives please see HPI   Constitutional: Negative for fever and  chills.   HENT: Negative for congestion and ear pain.   Respiratory: Negative for cough and shortness of breath.   Cardiovascular: Negative for chest pain and palpitations.   Gastrointestinal:See HPI   Genitourinary: Negative for dysuria and hematuria.   Musculoskeletal: Negative for myalgias and arthralgias.   Skin: Negative for rash and wound.   Neurological: Negative for weakness and numbness.   Psychiatric/Behavioral: Negative for confusion and dysphoric mood    OBJECTIVE:     Vital Signs (Most Recent)  Temp: 98.4 °F (36.9 °C) (01/12/19 1808)  Pulse: 66 (01/12/19 2002)  Resp: 20 (01/12/19 2003)  BP: (!) 144/64 (01/12/19 2002)  SpO2: 97 % (01/12/19 2002)    Vital Signs Range (Last 24H):  Temp:  [98.4 °F (36.9 °C)]   Pulse:  [66-80]   Resp:  [16-20]   BP: (125-144)/(57-70)   SpO2:  [97 %-98 %]     I & O (Last 24H):    Intake/Output Summary (Last 24 hours) at 1/12/2019 2157  Last data filed at 1/12/2019 2101  Gross per 24 hour   Intake 100 ml   Output --   Net 100 ml       Physical Exam:  General: well developed, well nourished, no distress  Lungs:  clear to auscultation bilaterally and normal respiratory effort  Heart: regular rate and rhythm, S1, S2 normal, no murmur, rub or gallop  Abdomen: Midline incision well healed. No hernia. Tender to palpation in RUQ and RLQ. No rebound or guarding. Old drain site is non infected, no drainage.   Bilateral groin incisions are well healed.   2+ Dp pulses bilaterally. No lower extremity edema  Neuro: A&O x3, no focal deficits       Laboratory:  CBC:   Recent Labs   Lab 01/12/19 1925   WBC 19.14*   RBC 3.30*   HGB 10.1*   HCT 31.5*   *   MCV 96   MCH 30.6   MCHC 32.1     CMP:   Recent Labs   Lab 01/12/19 1925   *   CALCIUM 7.4*   ALBUMIN 2.4*   PROT 6.3      K 3.2*   CO2 21*      BUN 8   CREATININE 1.2   ALKPHOS 94   ALT 11   AST 14   BILITOT 0.4       UA negative   TSH .553  Lactate 2.6  CRP 15.2  Sed rate 41  Lipase 20     Blood cultures pending    Labs within the past 24 hours have been reviewed.    CT abdomen/pelvis with IV and PO contrast:  Chest:  Base of the neck: There is a 0.9 cm low low-attenuation lesion in the right thyroid lobe.  This does not meet size criteria for follow-up.  Heart and great vessels: No aneurysm.  There is prominent calcification in the aortic arch and thoracic aorta.  No pericardial fluid.  Dense multi-vessel coronary artery calcification.  Adenopathy: None demonstrated.  Esophagus: Within normal limits.  Airways: Trachea and proximal airways are patent.  Lungs: Lungs are symmetrically aerated.  Mild bilateral dependent lower lung zone atelectatic changes.  Abdomen:  Liver: Normal in size without any focal lesions.  Hepatic and portal veins are patent.  Gallbladder: Gallbladder is incompletely distended without significant wall thickening.  No calcified gallstones.  Biliary system: Non-dilated.  Spleen: Within normal limits.  Pancreas: Unremarkable without focal lesion or inflammatory changes.  Adrenals: There are bilateral adrenal nodules that are stable in size when compared to CT abdomen pelvis 02/14/2014, favoring a benign process.  Kidneys: Normal in size and position.  There are scattered bilateral renal vascular calcifications as well as calcifications in the bilateral renal stanley that may represent nonobstructing nephrolithiasis.  Irregular contour of the right renal cortex, likely persistent fetal renal lobulation.  There are postoperative changes of the left kidney in this patient with a history of kidney cancer with heterogeneous fat containing and peripherally calcified exophytic lesion, likely postoperative in nature.  This is stable when compared to CT 02/14/2014.  no enhancing lesion in the operative bed.  The ureters are nondilated.  Bowel: Stomach and proximal small bowel are unremarkable.  There are postoperative changes of partial colectomy with anastomotic sutures visualized in the right hemiabdomen.  Around  this area of the anastomosis there is significant fat stranding fat stranding and mesenteric edema surrounding a hyperenhancing portion of distal small bowel.  There is several extraluminal foci of air, noting patient had a peritoneal drainage catheter removed within the last 3 days, as discussed with ordering provider.  There is no evidence of obstruction.  Liquid stool is noted in the proximal colon.  Remaining distal colon appears unremarkable with a few scattered diverticuli.  Peritoneum: No significant intraperitoneal fluid.  Abdominal Adenopathy: None.  Vasculature: Prominent dense calcified noncalcified atherosclerosis throughout the thoracic and abdominal aorta and its major branches.  There is dense calcification throughout the major branch vessels including the celiac and SMA.  There is an aortofemoral bypass graft present bilaterally, noting occlusion of the left aortofemoral graft.  The native left iliac artery is narrowed but patent.  Left femoral artery beyond the bypass graft is patent as well.  Please note that the right aortofemoral bypass graft extends beyond the field of view.  There several areas of luminal narrowing in the external iliac and femoral artery without evidence of occlusion.  Pelvis:  Urinary bladder: Decompressed, and not well evaluated.  Male reproductive: Within normal limits.  Pelvis adenopathy: None.  Bones: No acute findings.  Miscellaneous: There is an area of abnormal soft tissue density at the anterior aspect of the left thigh which has improved when compared with the prior CTU from 2013, and may represent postoperative change.  There are postoperative changes in the mid abdominal wall as well as foci of subcutaneous air overlying the right teressa abdominal wall.  There is a lipoma in the right hip soft tissues which is similar to prior exams.  Impression       Postoperative changes of partial colectomy with significant inflammatory changes and bowel wall thickening around a  portion of the proximal colon and distal small bowel.  No definite fluid collection, although there are scattered extraluminal foci of air, noting recent removal of intraperitoneal drainage catheter.  No evidence of obstruction.  Findings may represent a nonspecific enteritis, including infectious/inflammatory or ischemic etiologies.  Bowel perforation cannot entirely be excluded given the presence of extraluminal gas.    Postoperative changes of bilateral aortofemoral graft with occlusion of the left aortofemoral graft.  The bilateral native iliofemoral arteries appear grossly patent but demonstrate significant areas of stenosis.    Postoperative changes in the left kidney in this patient with a history of renal cancer.    Bilateral adrenal nodule stable when compared to CT abdomen pelvis 02/14/2014, favoring a benign process.    Nonspecific soft tissue focus in the left anterior thigh, decreased in conspicuity when compared with prior CT urogram from 2013, and likely represents postoperative change.         ASSESSMENT/PLAN:     67 yo male with multiple co morbidities and probable IBD-S with recent history of gangrenous bowel s/p R hemicolectomy on 10/31/18 now presents with post surgical phlegmon and electrolyte abnormalities due to excessive diarrhea    Admit to general surgery - Dr. Borrego secondary to post op infection, hypomagnesium and elevated wbc/lactate   CT scan reviewed. Believe extraluminal air due to recent removal of drain as patient not peritonitic.oral contrast reaching small bowel without any extravasation seen. Ongoing post op leak in the differential.   Clear liquids okay  IV cipro/flagyl. Received vanc and zosyn in the ER  Magnesium q 8 hours. Receiving 3g of IV mag now.   IV @ 150  norco PRN for pain and gabapentin nightly   Home BP meds- lopressor, HCTZ, norvasc, valsartan   Restart anti platelets - asa and plavix   Consult GI for help with IBS symptoms vs. Rule out other etiology of  diarrhea, possible infectious or IBD?  Will restart imodium   Home protonix for GERD  Lovenox, SCDs    Plan: IV antibiotics, GI consult in AM, recheck lactate, fluid resuscitate and correct magnesium     Martin Pierre MD

## 2019-01-13 NOTE — CONSULTS
[facility]  Gastroenterology  Consult Note    Patient Name: Jose English  MRN: [id number]  Admission Date: 1/12/2019  Hospital Length of Stay: 0 days  Code Status: Full Code   Attending Provider: Dwight Borrego MD   Consulting Provider: Renzo Lai MD  Primary Care Physician: Jarret Calle MD  Principal Problem:Phlegmon    Inpatient consult to Gastroenterology  Consult performed by: Renzo Lai MD  Consult ordered by: Tim Hallman MD        Subjective:     HPI:  Mr English is a 68 year old male with history of renal cancer (s/p partial nephrectomy), bladder cancer, CAD, TIA, GERD, HTN, PVD on DAPT s/p multiple stents, ischemic colitis s/p right collection (10/31/18) who is presenting to the hospital with abdominal pain and ?abdominal collection; GI consulted due to chronic diarrhea.    He reports that he has had chronic diarrhea for ~2 years. Prior to 2 years ago he would move his bowels ~2/day formed. He notes that 2 years ago however his bowels changed to 4-5/day watery bowel movements, non-bloody, non-mucus, painless. He noted he tried various things including dietary elimination without improvement. He had a workup including Egd/colonoscopy/VCE (reportedly with iron deficiency) which was negative.    He notes that on halloween (2018) he developped severe mid-epigastric pain with associated nausea and vomiting. He was seen at OSH ([facility]) and required right colectomy due to ischemia (no records available). Since then he has developed increasing diarrhea.    He reports that his current bowel frequency is between 7 and 15 bowel movements. He generally passes little amount of stool, predominantly watery, non-bloody. He notes that he has no nocturnal bowel movements. Will not have bowel movement if he does not eat. Denies any clear correlation with PO intake. He is very distressed by this change in bowel habits noting that he has lost ~45 lbs since 10/2018 and is unable to  leave the house due to frequency of bowel movements. He has been seen by Dr. Byrne (GI) for evaluation but reportedly has not improved. He reports that he has tried multiple agents including imodium, lomotil, tincture, and cholestyramine, rifaximin without effect. His last colonoscopy was 12/2018 which was negative (no biopsies taken) due to diarrhea while hospitalized at [facility].    He was recently admitted at [facility] due to leucocytosis with concern for abscess at the site of his anastomosis for which an IR drain was placed (removed 1/11). He notes that he presented to the hospital on 1/12 due to acute worsening of RLQ abdominal pain and nausea and vomiting.    On admission, workup notable for leukocytosis (19), CRP elevated (15.2), lactate 2.6. CTAP  With partial colectomy with fat stranding and edema of anastamosis and distal small bowel. Sevearl foci of extraluminal air (recent IR drain). No obstruction.    Prior Endo hx  - only one colonoscopy in [facility]    Colonoscopy. (12/6/18) Provider: Dr. BARB Dickson. Indication: Diarrhea. Extent: Ileocolonic anastamosis. Preparation:unknown.  - normal examination  - no biopsies taken      Past Medical History:   Diagnosis Date    Bladder cancer     2 times    BPH (benign prostatic hypertrophy)     Cancer of kidney     1/2    Colon polyp     Coronary artery disease     GERD (gastroesophageal reflux disease)     Hypertension     PAD (peripheral artery disease)     Peripheral vascular disease        Past Surgical History:   Procedure Laterality Date    abdominal aorta bypass by fem      BIOPSY, BLADDER N/A 6/18/2013    Performed by Shyam Bucio MD at [facility] OR [address] [place] [address]    BLADDER SURGERY      COLONOSCOPY  12/06/2018    COLONOSCOPY N/A 12/6/2018    Per[facility] ENDO    CYSTOSCOPY      CYSTOSCOPY N/A 4/17/2014    Performed by Andrea Mcknight MD at [facility] OR [facility] FLR    CYSTOSCOPY N/A 6/18/2013    Performed by Shyam GARCIA  MD Fortun[facility] at [facility] OR [facility] FLR    DILATION, URETHRA N/A 4/17/2014    Performed by Andrea Mcknight MD at [facility] OR [facility]LR    FULGURATION, BLADDER  6/18/2013    Performed by Shyam Bucio MD at [facility] OR [facility]LR    multiple angiosplastia      NEPHRECTOMY      partial nephrectomy left    PTA, PERIPHERAL VESSEL Bilateral 8/21/2018    Per[facility] by [facility] at [facility] CATH    PYELOGRAM, RETROGRADE Bilateral 6/18/2013    Performed by Shyam Bucio MD at [facility] OR [facility]LR    TURP, USING GREEN LIGHT LASER N/A 4/17/2014    Performed by Andrea Mcknight MD at [facility] OR [facility] FLR       Review of patient's allergies indicates:   Allergen Reactions    Amoxicillin Hives    Zolpidem tartrate Hallucinations     Family History     Problem Relation (Age of Onset)    Cancer Father, Maternal Uncle        Tobacco Use    Smoking status: Former Smoker     Packs/day: 1.50     Years: 40.00     Pack years: 60.00     Types: Cigarettes     Last attempt to quit: 2/9/2008     Years since quitting: 10.9   Substance and Sexual Activity    Alcohol use: Yes     Alcohol/week: 1.2 oz     Types: 2 Cans of beer per week     Comment: daily    Drug use: Not on file    Sexual activity: Yes     Partners: Female     Review of Systems   Constitutional: Positive for activity change. Negative for appetite change, chills, fatigue, fever and unexpected weight change.   HENT: Negative for sore throat and trouble swallowing.    Eyes: Negative for visual disturbance.   Respiratory: Negative for cough and shortness of breath.    Cardiovascular: Negative for chest pain.   Gastrointestinal: Positive for abdominal pain (RLQ) and diarrhea. Negative for anal bleeding, blood in stool, constipation, nausea (improved) and vomiting.   Genitourinary: Negative for dysuria.   Musculoskeletal: Negative for arthralgias and myalgias.   Skin: Negative for rash.   Neurological: Negative for dizziness, weakness and light-headedness.    Psychiatric/Behavioral: Negative for agitation and confusion.     Objective:     Vital Signs (Most Recent):  Temp: 98 °F (36.7 °C) (01/13/19 1550)  Pulse: 63 (01/13/19 1550)  Resp: 10 (01/13/19 1550)  BP: 135/62 (01/13/19 1550)  SpO2: 99 % (01/13/19 1550) Vital Signs (24h Range):  Temp:  [97.6 °F (36.4 °C)-98.4 °F (36.9 °C)] 98 °F (36.7 °C)  Pulse:  [] 63  Resp:  [10-20] 10  SpO2:  [96 %-100 %] 99 %  BP: (125-144)/(57-80) 135/62     Weight: 73.9 kg (163 lb) (01/12/19 1808)  Body mass index is 27.12 kg/m².      Intake/Output Summary (Last 24 hours) at 1/13/2019 1720  Last data filed at 1/13/2019 1707  Gross per 24 hour   Intake 7151.25 ml   Output 1115 ml   Net 6036.25 ml       Lines/Drains/Airways     Airway                 Airway - Non-Surgical 12/06/18 1134 Nasal Cannula 38 days          Peripheral Intravenous Line                 Peripheral IV - Single Lumen 01/12/19 1851 Left Antecubital less than 1 day                Physical Exam   Constitutional: He is oriented to person, place, and time. He appears well-developed and well-nourished. No distress.   Well appearing, no distress, lying in bed watching tv. Wife at bedside.   HENT:   Head: Normocephalic and atraumatic.   Mouth/Throat: Oropharynx is clear and moist. No oropharyngeal exudate.   Eyes: Conjunctivae are normal. No scleral icterus.   Cardiovascular: Normal rate, regular rhythm, normal heart sounds and intact distal pulses.   Pulmonary/Chest: Effort normal and breath sounds normal. No respiratory distress.   Abdominal: Soft. Bowel sounds are normal. He exhibits no distension and no mass. There is tenderness (mild RLQ tenderness). There is no rebound and no guarding.   Musculoskeletal: He exhibits no edema or tenderness.   Lymphadenopathy:     He has no cervical adenopathy.   Neurological: He is alert and oriented to person, place, and time.   Skin: Skin is warm. Capillary refill takes less than 2 seconds. He is not diaphoretic.   Psychiatric: He  has a normal mood and affect. His behavior is normal. Judgment and thought content normal.   Nursing note and vitals reviewed.      Significant Labs:  CBC:   Recent Labs   Lab 01/12/19 1925 01/13/19  1154   WBC 19.14* 14.61*   HGB 10.1* 8.7*   HCT 31.5* 27.0*   * 349     CMP:   Recent Labs   Lab 01/13/19  1153   *   CALCIUM 7.4*   ALBUMIN 1.9*   PROT 5.2*   *   K 4.0   CO2 21*      BUN 7*   CREATININE 0.8   ALKPHOS 80   ALT 9*   AST 10   BILITOT 0.5     Coagulation:   Recent Labs   Lab 01/12/19 1925   INR 1.1       Significant Imaging:  Imaging results within the past 24 hours have been reviewed.    Assessment/Plan:     Diarrhea    Mr English is a 68 year old male with history of renal cancer (s/p partial nephrectomy), bladder cancer, CAD, TIA, GERD, HTN, PVD on DAPT s/p multiple stents, ischemic colitis s/p right collection (10/31/18) who is presenting to the hospital with abdominal pain and ?abdominal collection; GI consulted due to chronic diarrhea.    Unclear the etiology his diarrhea and it is difficult to accurately assess currently in the setting of an acute process. He has baseline chronic diarrhea for 2 years (unclear full workup at this time but reportedly negative E/C/VCE) with acute worsening after partial right colectomy for ?ischemia. Given right colectomy and the green watery appearance of the bowel movements concerning for bile acid diarrhea, though would have expected some improvement with cholestyramine. He has imaging concerning for inflammation and ?bowel leak at site of his anastomosis so unclear if there is a super imposed element to his diarrhea due to this inflammatory process. He had a negative (reported) colonoscopy ~1 month ago though no biopsies were taken and no photos available.    Plan  - check stool studies: c.diff (negative), stool culture, O&P, giardia  - check celiac panel  - agree with antibiotics (management per surgery team)  - please obtain records  of prior Egd/colon/VCE at [facility]  - consider CT with PO contrast or rectal contrast to evaluate anastamosis ?leak as etiology to recent collection. Further evaluation and management per surgery team  - given concern for leak and inflammation on CT will defer colonoscopy to outpatient setting along with EGD to exclude celiac  - start cholestyramine 4g QID. If this fails would optomized imodium followed by lomotil (would not recommend anti-diarrheal medications at this time if concern for leak)  - will plan for outpatient follow-up to continue to evaluate diarrhea  - recommendations discussed with primary team         Thank you for your consult. I will sign off. Please contact us if you have any additional questions.    Renzo Lai MD  Gastroenterology  [facility]

## 2019-01-13 NOTE — ASSESSMENT & PLAN NOTE
Mr English is a 68 year old male with history of renal cancer (s/p partial nephrectomy), bladder cancer, CAD, TIA, GERD, HTN, PVD on DAPT s/p multiple stents, ischemic colitis s/p right collection (10/31/18) who is presenting to the hospital with abdominal pain and ?abdominal collection; GI consulted due to chronic diarrhea.    Unclear the etiology his diarrhea and it is difficult to accurately assess currently in the setting of an acute process. He has baseline chronic diarrhea for 2 years (unclear full workup at this time but reportedly negative E/C/VCE) with acute worsening after partial right colectomy for ?ischemia. Given right colectomy and the green watery appearance of the bowel movements concerning for bile acid diarrhea, though would have expected some improvement with cholestyramine. He has imaging concerning for inflammation and ?bowel leak at site of his anastomosis so unclear if there is a super imposed element to his diarrhea due to this inflammatory process. He had a negative (reported) colonoscopy ~1 month ago though no biopsies were taken and no photos available.    Plan  - check stool studies: c.diff (negative), stool culture, O&P, giardia  - check celiac panel  - agree with antibiotics (management per surgery team)  - please obtain records of prior Egd/colon/VCE at [facility]  - consider CT with PO contrast or rectal contrast to evaluate anastamosis ?leak as etiology to recent colleciton  - given concern for leak and inflammation on CT will defer colonoscopy to outpatient setting along with EGD to exclude celiac  - start cholestyramine 4g QID. If this fails would optomized imodium followed by lomotil (would not recommend anti-diarrheal medications at this time if concern for leak)  - will plan for outpatient follow-up to continue to evaluate diarrhea

## 2019-01-13 NOTE — ED TRIAGE NOTES
PT to the ED with complaints of abdominal pain. Pt states wound drain removed yesterday, pain worsening today. Hx of bowel perforation and abdominal surgery October 2018

## 2019-01-13 NOTE — ED PROVIDER NOTES
Renee Date: 1/12/2019       History     Chief Complaint   Patient presents with    Abdominal Pain     abd infection, states wound drain removed yesterday, pain worsening today       68M status post right colectomy approximately 11-12 weeks ago for ischemic colitis, Bladder Cancer (x2), Renal Cancer, HTN, BPH CAD, PAD and PVD presents to ED with Abdominal pain. Patient has 10 on 10 abdominal pain. He states that going progressively worse since yesterday.  Patient normally gets his care at [facility].  Patient has had diarrhea for a year and a half and has had extensive workup.  Has been tested for C diff multiple times including last week, which was negative. Patient has nearly a foot removed of his bowel.  Denies any current fevers chills. Had LRQ drain removed yesterday that was still producing blood per wife. They were told that the drain fluid did not look infected, and the insertion site itself did not look infected.  Patient had a difficult time waking up from a surgery the last time.  He was intubated for a couple weeks.  Now also reports difficulty swallowing, likely associated with the intubation.  Has difficulty swallowing more than 1 pill at a time.  Also reports occasional bright red blood on toilet paper that he associates with consult wiping multiple times with the secondary to diarrhea.  Denies black stool denies murmur in stool.  Has known internal hemorrhoids.  Had colonoscopy 2 weeks ago that was unremarkable. Patient denies any fevers, night sweats, n/v//c, abd pain, dysuria, hematuria, cough, wheezing, SOB, CP, leg swelling, HA, dizziness, weakness at this time.            Review of patient's allergies indicates:   Allergen Reactions    Amoxicillin Hives    Zolpidem tartrate Hallucinations     Past Medical History:   Diagnosis Date    Bladder cancer     2 times    BPH (benign prostatic hypertrophy)     Cancer of kidney     1/2    Colon polyp     Coronary artery disease     GERD  (gastroesophageal reflux disease)     Hypertension     PAD (peripheral artery disease)     Peripheral vascular disease      Past Surgical History:   Procedure Laterality Date    abdominal aorta bypass by fem      BIOPSY, BLADDER N/A 6/18/2013    Performed by [facility]am Bucio [facility] at [facility] OR [address] [place] [address]    BLADDER SURGERY      COLONOSCOPY  12/06/2018    COLONOSCOPY N/A 12/6/2018    [facility] at [facility] ENDO    CYSTOSCOPY      CYSTOSCOPY N/A 4/17/2014    [facility]    CYSTOSCOPY N/A 6/18/2013    Performed by [facility]am Bucio MD at [facility] OR [address] [place] [address]    DILATION, URETHRA N/A 4/17/2014    Performed by Andrea Mcknight MD at [facility] OR [address] [place] [address]    FULGURATION, BLADDER  6/18/2013    Performed by Sh[facility]am Bucio MD at [facility] OR [address] [place] [address]    multiple angiosplastia      NEPHRECTOMY      partial nephrectomy left    PTA, PERIPHERAL VESSEL Bilateral 8/21/2018    [facility] CATH    PYELOGRAM, RETROGRADE Bilateral 6/18/2013    Performed by Shyam Bucio MD at [facility] OR [address] [place] [address]    TURP, USING GREEN LIGHT LASER N/A 4/17/2014    Performed by Andrea Mcknight MD at [facility] OR [address] [place] [address]     Family History   Problem Relation Age of Onset    Cancer Father     Cancer Maternal Uncle      Social History     Tobacco Use    Smoking status: Former Smoker     Packs/day: 1.50     Years: 40.00     Pack years: 60.00     Types: Cigarettes     Last attempt to quit: 2/9/2008     Years since quitting: 10.9   Substance Use Topics    Alcohol use: Yes     Alcohol/week: 1.2 oz     Types: 2 Cans of beer per week     Comment: daily    Drug use: Not on file     Review of Systems   Constitutional: Negative for activity change, fever and unexpected weight change.   HENT: Negative for trouble swallowing and voice change.    Eyes: Negative for visual disturbance.   Respiratory: Negative for cough and shortness of breath.    Cardiovascular: Negative  "for chest pain, palpitations and leg swelling.   Gastrointestinal: Positive for abdominal distention ("tightening") and abdominal pain. Negative for constipation, diarrhea, nausea and vomiting.   Endocrine: Negative for polyphagia and polyuria.   Genitourinary: Negative for difficulty urinating, dysuria and hematuria.   Musculoskeletal: Negative for back pain and gait problem.   Skin: Positive for wound (Recently removed drain). Negative for rash.   Allergic/Immunologic: Negative for immunocompromised state.   Neurological: Negative for seizures, syncope, facial asymmetry and weakness.   Psychiatric/Behavioral: Negative for agitation, behavioral problems and confusion.       Physical Exam     Initial Vitals [01/12/19 1808]   BP Pulse Resp Temp SpO2   135/70 77 16 98.4 °F (36.9 °C) 98 %      MAP       --         Physical Exam    Nursing note and vitals reviewed.  Constitutional: He appears well-developed and well-nourished. He is not diaphoretic.  Non-toxic appearance. He does not appear ill.   HENT:   Head: Normocephalic and atraumatic.   Eyes: Pupils are equal, round, and reactive to light.   Cardiovascular: Normal rate and regular rhythm.   No murmur heard.  Pulmonary/Chest: Effort normal and breath sounds normal. No stridor. He has no rales.   Abdominal: Soft. He exhibits no distension, no ascites and no mass. Bowel sounds are increased. There is tenderness (R sided tenderness to palpation ) in the right upper quadrant and right lower quadrant. There is negative Woo's sign.   Large vertical scar down center of abdomen    Neurological: He is alert and oriented to person, place, and time.   Skin: Skin is warm. Capillary refill takes less than 2 seconds.   Psychiatric: He has a normal mood and affect. His behavior is normal.         ED Course   Procedures  Labs Reviewed   LACTIC ACID, PLASMA - Abnormal; Notable for the following components:       Result Value    Lactate (Lactic Acid) 2.6 (*)     All other " components within normal limits   C-REACTIVE PROTEIN - Abnormal; Notable for the following components:    CRP 15.2 (*)     All other components within normal limits   SEDIMENTATION RATE - Abnormal; Notable for the following components:    Sed Rate 41 (*)     All other components within normal limits   URINALYSIS, REFLEX TO URINE CULTURE - Abnormal; Notable for the following components:    Specific Gravity, UA >=1.030 (*)     All other components within normal limits    Narrative:     Preferred Collection Type->Urine, Clean Catch   CBC W/ AUTO DIFFERENTIAL - Abnormal; Notable for the following components:    WBC 19.14 (*)     RBC 3.30 (*)     Hemoglobin 10.1 (*)     Hematocrit 31.5 (*)     RDW 17.6 (*)     Platelets 474 (*)     Immature Granulocytes 1.0 (*)     Gran # (ANC) 16.3 (*)     Immature Grans (Abs) 0.20 (*)     Mono # 1.1 (*)     Gran% 85.1 (*)     Lymph% 6.7 (*)     All other components within normal limits   COMPREHENSIVE METABOLIC PANEL - Abnormal; Notable for the following components:    Potassium 3.2 (*)     CO2 21 (*)     Glucose 160 (*)     Calcium 7.4 (*)     Albumin 2.4 (*)     All other components within normal limits   MAGNESIUM - Abnormal; Notable for the following components:    Magnesium 0.7 (*)     All other components within normal limits   MAGNESIUM - Abnormal; Notable for the following components:    Magnesium 1.2 (*)     All other components within normal limits   LACTIC ACID, PLASMA - Abnormal; Notable for the following components:    Lactate (Lactic Acid) 2.4 (*)     All other components within normal limits   POCT GLUCOSE - Abnormal; Notable for the following components:    POCT Glucose 147 (*)     All other components within normal limits   LIPASE   PHOSPHORUS   PROTIME-INR   TSH   TSH    Narrative:     ADD-ON TSH #[id number] PER SHELL GALVAN MD 21:08 01/12/2019    HEMOGLOBIN A1C   HEMOGLOBIN A1C    Narrative:     ADD-ON TSH #[id number] PER SHELL GALVAN MD 21:08 01/12/2019    [id number]  Add on [facility] per David Ramirez MD @ 23:34  01/12/2019    STOOL EXAM-OVA,CYSTS,PARASITES   COMPREHENSIVE METABOLIC PANEL   MAGNESIUM   PHOSPHORUS   CBC W/ AUTO DIFFERENTIAL   LACTIC ACID, PLASMA   POCT GLUCOSE MONITORING CONTINUOUS          Imaging Results          CT Chest Abdoment Pelvis With Contrast (Final result)     Abnormal  Result time 01/12/19 22:51:14    Final result by Carlos Dumont MD (01/12/19 22:51:14)                 Impression:      Postoperative changes of partial colectomy with significant inflammatory changes and bowel wall thickening around a portion of the proximal colon and distal small bowel.  No definite fluid collection, although there are scattered extraluminal foci of air, noting recent removal of intraperitoneal drainage catheter.  No evidence of obstruction.  Findings may represent a nonspecific enteritis, including infectious/inflammatory or ischemic etiologies.  Bowel perforation cannot entirely be excluded given the presence of extraluminal gas.    Postoperative changes of bilateral aortofemoral graft with occlusion of the left aortofemoral graft.  The bilateral native iliofemoral arteries appear grossly patent but demonstrate significant areas of stenosis.    Postoperative changes in the left kidney in this patient with a history of renal cancer.    Bilateral adrenal nodule stable when compared to CT abdomen pelvis 02/14/2014, favoring a benign process.    Nonspecific soft tissue focus in the left anterior thigh, decreased in conspicuity when compared with prior CT urogram from 2013, and likely represents postoperative change.    This report was flagged in Epic as abnormal.    Important findings were discussed with Concepción Rodriguez, ER nurse practitioner, at 21:42 on 01/12/2019 by Dr. Uribe by telephone.    Electronically signed by resident: Renzo Uribe  Date:    01/12/2019  Time:    21:39    Electronically signed by: Carlos Dumont  MD  Date:    01/12/2019  Time:    22:51             Narrative:    EXAMINATION:  CT CHEST ABDOMEN PELVIS WITH CONTRAST (XPD)    CLINICAL HISTORY:  Acute abdominal pain    TECHNIQUE:  Low dose axial images, sagittal and coronal reformations were obtained from the thoracic inlet to the pubic symphysis following the IV administration of 75 mL of Omnipaque 350 .  Oral contrast was not administered.  Delayed phase images also obtained through the abdomen and pelvis.    COMPARISON:  CTA chest 12/04/2018.  CT abdomen pelvis 02/14/2014.    FINDINGS:  Chest:    Base of the neck: There is a 0.9 cm low low-attenuation lesion in the right thyroid lobe.  This does not meet size criteria for follow-up.    Heart and great vessels: No aneurysm.  There is prominent calcification in the aortic arch and thoracic aorta.  No pericardial fluid.  Dense multi-vessel coronary artery calcification.    Adenopathy: None demonstrated.    Esophagus: Within normal limits.    Airways: Trachea and proximal airways are patent.    Lungs: Lungs are symmetrically aerated.  Mild bilateral dependent lower lung zone atelectatic changes.    Abdomen:    Liver: Normal in size without any focal lesions.  Hepatic and portal veins are patent.    Gallbladder: Gallbladder is incompletely distended without significant wall thickening.  No calcified gallstones.    Biliary system: Non-dilated.    Spleen: Within normal limits.    Pancreas: Unremarkable without focal lesion or inflammatory changes.    Adrenals: There are bilateral adrenal nodules that are stable in size when compared to CT abdomen pelvis 02/14/2014, favoring a benign process.    Kidneys: Normal in size and position.  There are scattered bilateral renal vascular calcifications as well as calcifications in the bilateral renal stanley that may represent nonobstructing nephrolithiasis.  Irregular contour of the right renal cortex, likely persistent fetal renal lobulation.  There are postoperative changes of  the left kidney in this patient with a history of kidney cancer with heterogeneous fat containing and peripherally calcified exophytic lesion, likely postoperative in nature.  This is stable when compared to CT 02/14/2014.  no enhancing lesion in the operative bed.  The ureters are nondilated.    Bowel: Stomach and proximal small bowel are unremarkable.  There are postoperative changes of partial colectomy with anastomotic sutures visualized in the right hemiabdomen.  Around this area of the anastomosis there is significant fat stranding fat stranding and mesenteric edema surrounding a hyperenhancing portion of distal small bowel.  There is several extraluminal foci of air, noting patient had a peritoneal drainage catheter removed within the last 3 days, as discussed with ordering provider.  There is no evidence of obstruction.  Liquid stool is noted in the proximal colon.  Remaining distal colon appears unremarkable with a few scattered diverticuli.    Peritoneum: No significant intraperitoneal fluid.    Abdominal Adenopathy: None.    Vasculature: Prominent dense calcified noncalcified atherosclerosis throughout the thoracic and abdominal aorta and its major branches.  There is dense calcification throughout the major branch vessels including the celiac and SMA.  There is an aortofemoral bypass graft present bilaterally, noting occlusion of the left aortofemoral graft.  The native left iliac artery is narrowed but patent.  Left femoral artery beyond the bypass graft is patent as well.  Please note that the right aortofemoral bypass graft extends beyond the field of view.  There several areas of luminal narrowing in the external iliac and femoral artery without evidence of occlusion.    Pelvis:    Urinary bladder: Decompressed, and not well evaluated.    Male reproductive: Within normal limits.    Pelvis adenopathy: None.    Bones: No acute findings.    Miscellaneous: There is an area of abnormal soft tissue density  "at the anterior aspect of the left thigh which has improved when compared with the prior CTU from 2013, and may represent postoperative change.  There are postoperative changes in the mid abdominal wall as well as foci of subcutaneous air overlying the right teressa abdominal wall.  There is a lipoma in the right hip soft tissues which is similar to prior exams.                                 Medical Decision Making:   Initial Assessment:   Ischemic colitis   Differential Diagnosis:   Ischemic colitis, SBP, infectious colitis, Drain infection, Hemorrhage, Budd-Chiari        APC / Resident Notes:       @1900 Patient was assessed  @1915 CT abdomen and labs ordered  @2000 Patient was found to have WBC 20k and LA 2.6, . Started on Vanc and Zosyn  @2045 Patient was found to have Magnesium of 0.7, giving 3g IV mag  @2100 Patient to CT, will be admitted to Hospital medicine vs Gen sx depending on results         Attending Attestation:   Physician Attestation Statement for Resident:  As the supervising MD   Physician Attestation Statement: I have personally seen and examined this patient.   I agree with the above history. -:   As the supervising MD I agree with the above PE.    As the supervising MD I agree with the above treatment, course, plan, and disposition.  I have reviewed and agree with the residents interpretation of the following: lab data and CT scans.            Attending ED Notes:   Saw patient personally, here for abd pain. Intra abdominal infection with draingae tube removed yesterday at [facility]. Not on abx, concern for worsening pain. Will check cat scan abd pelvis to rule out abscess.             Clinical Impression:   The primary encounter diagnosis was Intra-abdominal infection. Diagnoses of Hypomagnesemia and Phlegmon were also pertinent to this visit.    Patient with abdominal pain and "tightening" with WBC 20k,  and LA 2.6. Started on broad spectrum antibiotics. Magnesium severely " low at 0.7. Given 3 mg IV mag and monitor on tele                         Andi Tao MD  Resident  01/12/19 6206       David Ramirez MD  01/13/19 0307

## 2019-01-13 NOTE — SUBJECTIVE & OBJECTIVE
Past Medical History:   Diagnosis Date    Bladder cancer     2 times    BPH (benign prostatic hypertrophy)     Cancer of kidney     1/2    Colon polyp     Coronary artery disease     GERD (gastroesophageal reflux disease)     Hypertension     PAD (peripheral artery disease)     Peripheral vascular disease        Past Surgical History:   Procedure Laterality Date    abdominal aorta bypass by fem      BIOPSY, BLADDER N/A 6/18/2013    Performed by [facility]am Bucio MD at [facility] OR [address] [place] [address]    BLADDER SURGERY      COLONOSCOPY  12/06/2018    COLONOSCOPY N/A 12/6/2018    Performed by [facility]ickson [facility] at [facility] ENDO    CYSTOSCOPY      CYSTOSCOPY N/A 4/17/2014    [facility]    CYSTOSCOPY N/A 6/18/2013    Performed by Shyam Bucio MD at [facility] OR [address] [place] [address]    DILATION, URETHRA N/A 4/17/2014    Performed by Andrea Mcknight MD at [facility] OR [address] [place] [address]    FULGURATION, BLADDER  6/18/2013    Performed by Shyam Bucio MD at [facility] OR [address] [place] [address]    multiple angiosplastia      NEPHRECTOMY      partial nephrectomy left    PTA, PERIPHERAL VESSEL Bilateral 8/21/2018    Per[facility] by [facility] CATH    PYELOGRAM, RETROGRADE Bilateral 6/18/2013    Performed by Shyam Bucio MD at [facility] OR [address] [place] [address]    TURP, USING GREEN LIGHT LASER N/A 4/17/2014    Performed by Andrea Mcknight MD at [facility] OR [address] [place] [address]       Review of patient's allergies indicates:   Allergen Reactions    Amoxicillin Hives    Zolpidem tartrate Hallucinations     Family History     Problem Relation (Age of Onset)    Cancer Father, Maternal Uncle        Tobacco Use    Smoking status: Former Smoker     Packs/day: 1.50     Years: 40.00     Pack years: 60.00     Types: Cigarettes     Last attempt to quit: 2/9/2008     Years since quitting: 10.9   Substance and Sexual Activity    Alcohol use: Yes     Alcohol/week: 1.2 oz     Types: 2 Cans of beer per week     Comment: daily    Drug use:  Not on file    Sexual activity: Yes     Partners: Female     Review of Systems   Constitutional: Positive for activity change. Negative for appetite change, chills, fatigue, fever and unexpected weight change.   HENT: Negative for sore throat and trouble swallowing.    Eyes: Negative for visual disturbance.   Respiratory: Negative for cough and shortness of breath.    Cardiovascular: Negative for chest pain.   Gastrointestinal: Positive for abdominal pain (RLQ) and diarrhea. Negative for anal bleeding, blood in stool, constipation, nausea (improved) and vomiting.   Genitourinary: Negative for dysuria.   Musculoskeletal: Negative for arthralgias and myalgias.   Skin: Negative for rash.   Neurological: Negative for dizziness, weakness and light-headedness.   Psychiatric/Behavioral: Negative for agitation and confusion.     Objective:     Vital Signs (Most Recent):  Temp: 98 °F (36.7 °C) (01/13/19 1550)  Pulse: 63 (01/13/19 1550)  Resp: 10 (01/13/19 1550)  BP: 135/62 (01/13/19 1550)  SpO2: 99 % (01/13/19 1550) Vital Signs (24h Range):  Temp:  [97.6 °F (36.4 °C)-98.4 °F (36.9 °C)] 98 °F (36.7 °C)  Pulse:  [] 63  Resp:  [10-20] 10  SpO2:  [96 %-100 %] 99 %  BP: (125-144)/(57-80) 135/62     Weight: 73.9 kg (163 lb) (01/12/19 1808)  Body mass index is 27.12 kg/m².      Intake/Output Summary (Last 24 hours) at 1/13/2019 1720  Last data filed at 1/13/2019 1707  Gross per 24 hour   Intake 7151.25 ml   Output 1115 ml   Net 6036.25 ml       Lines/Drains/Airways     Airway                 Airway - Non-Surgical 12/06/18 1134 Nasal Cannula 38 days          Peripheral Intravenous Line                 Peripheral IV - Single Lumen 01/12/19 1851 Left Antecubital less than 1 day                Physical Exam   Constitutional: He is oriented to person, place, and time. He appears well-developed and well-nourished. No distress.   Well appearing, no distress, lying in bed watching tv. Wife at bedside.   HENT:   Head: Normocephalic  and atraumatic.   Mouth/Throat: Oropharynx is clear and moist. No oropharyngeal exudate.   Eyes: Conjunctivae are normal. No scleral icterus.   Cardiovascular: Normal rate, regular rhythm, normal heart sounds and intact distal pulses.   Pulmonary/Chest: Effort normal and breath sounds normal. No respiratory distress.   Abdominal: Soft. Bowel sounds are normal. He exhibits no distension and no mass. There is tenderness (mild RLQ tenderness). There is no rebound and no guarding.   Musculoskeletal: He exhibits no edema or tenderness.   Lymphadenopathy:     He has no cervical adenopathy.   Neurological: He is alert and oriented to person, place, and time.   Skin: Skin is warm. Capillary refill takes less than 2 seconds. He is not diaphoretic.   Psychiatric: He has a normal mood and affect. His behavior is normal. Judgment and thought content normal.   Nursing note and vitals reviewed.      Significant Labs:  CBC:   Recent Labs   Lab 01/12/19 1925 01/13/19  1154   WBC 19.14* 14.61*   HGB 10.1* 8.7*   HCT 31.5* 27.0*   * 349     CMP:   Recent Labs   Lab 01/13/19  1153   *   CALCIUM 7.4*   ALBUMIN 1.9*   PROT 5.2*   *   K 4.0   CO2 21*      BUN 7*   CREATININE 0.8   ALKPHOS 80   ALT 9*   AST 10   BILITOT 0.5     Coagulation:   Recent Labs   Lab 01/12/19 1925   INR 1.1       Significant Imaging:  Imaging results within the past 24 hours have been reviewed.

## 2019-01-13 NOTE — PLAN OF CARE
Problem: Adult Inpatient Plan of Care  Goal: Plan of Care Review  Outcome: Ongoing (interventions implemented as appropriate)  POC reviewed and understood by patient. Patient's AAOx4. Pain managed by prn pain meds per md order. IV remained intact. Patient ambulated to BR. Patient had BM. Stool sample collected. Patient remains on C.diff precautions until results are finalized. Patient's VSS. Family at bedside. Call light WNR. Bed in lowest position. No acute events at this time. GURPREET.

## 2019-01-13 NOTE — HPI
Mr English is a 68 year old male with history of renal cancer (s/p partial nephrectomy), bladder cancer, CAD, TIA, GERD, HTN, PVD on DAPT s/p multiple stents, ischemic colitis s/p right collection (10/31/18) who is presenting to the hospital with abdominal pain and ?abdominal collection; GI consulted due to chronic diarrhea.    He reports that he has had chronic diarrhea for ~2 years. Prior to 2 years ago he would move his bowels ~2/day formed. He notes that 2 years ago however his bowels changed to 4-5/day watery bowel movements, non-bloody, non-mucus, painless. He noted he tried various things including dietary elimination without improvement. He had a workup including Egd/colonoscopy/VCE (reportedly with iron deficiency) which was negative.    He notes that on halloween (2018) he developped severe mid-epigastric pain with associated nausea and vomiting. He was seen at OSH ([facility]) and required right colectomy due to ischemia (no records available). Since then he has developed increasing diarrhea.    He reports that his current bowel frequency is between 7 and 15 bowel movements. He generally passes little amount of stool, predominantly watery, non-bloody. He notes that he has no nocturnal bowel movements. Will not have bowel movement if he does not eat. Denies any clear correlation with PO intake. He is very distressed by this change in bowel habits noting that he has lost ~45 lbs since 10/2018 and is unable to leave the house due to frequency of bowel movements. He has been seen by Dr. Byrne (GI) for evaluation but reportedly has not improved. He reports that he has tried multiple agents including imodium, lomotil, tincture, and cholestyramine, rifaximin without effect. His last colonoscopy was 12/2018 which was negative (no biopsies taken) due to diarrhea while hospitalized at [facility].    He was recently admitted at [facility] due to leucocytosis with concern for abscess at the site of his anastomosis for which an  IR drain was placed (removed 1/11). He notes that he presented to the hospital on 1/12 due to acute worsening of RLQ abdominal pain and nausea and vomiting.    On admission, workup notable for leukocytosis (19), CRP elevated (15.2), lactate 2.6. CTAP  With partial colectomy with fat stranding and edema of anastamosis and distal small bowel. Sevearl foci of extraluminal air (recent IR drain). No obstruction.    Prior Endo hx  - only one colonoscopy in [facility] system    Colonoscopy. (12/6/18) Provider: Dr. BARB Dickson. Indication: Diarrhea. Extent: Ileocolonic anastamosis. Preparation:unknown.  - normal examination  - no biopsies taken

## 2019-01-13 NOTE — SUBJECTIVE & OBJECTIVE
Interval History: Feeling fine, no complaints. Denies nausea currently  Reports persistent diarrhea     Medications:  Continuous Infusions:   lactated ringers 150 mL/hr at 01/13/19 0008     Scheduled Meds:   allopurinol  100 mg Oral BID    amLODIPine  10 mg Oral Daily    aspirin  81 mg Oral Daily    atorvastatin  80 mg Oral Daily    ciprofloxacin  400 mg Intravenous Q12H    clopidogrel  75 mg Oral QHS    donepezil  10 mg Oral QHS    enoxaparin  40 mg Subcutaneous Daily    gabapentin  300 mg Oral QHS    hydroCHLOROthiazide  25 mg Oral QAM    lactated ringers  1,000 mL Intravenous Once    lidocaine (PF) 10 mg/ml (1%)  1 mL Other Once    magnesium oxide  400 mg Oral Daily    magnesium sulfate IVPB  2 g Intravenous Once    metoprolol tartrate  50 mg Oral BID    metronidazole  500 mg Intravenous Q8H    pantoprazole  40 mg Oral Daily    valsartan  320 mg Oral Daily     PRN Meds:acetaminophen, dextrose 50%, dextrose 50%, glucagon (human recombinant), glucose, glucose, HYDROcodone-acetaminophen, HYDROcodone-acetaminophen, insulin aspart U-100, loperamide, ondansetron, promethazine (PHENERGAN) IVPB, ramelteon, sodium chloride 0.9%     Review of patient's allergies indicates:   Allergen Reactions    Amoxicillin Hives    Zolpidem tartrate Hallucinations     Objective:     Vital Signs (Most Recent):  Temp: 97.6 °F (36.4 °C) (01/13/19 0202)  Pulse: 67 (01/13/19 0704)  Resp: 16 (01/13/19 0202)  BP: (!) 141/62 (01/13/19 0202)  SpO2: 99 % (01/13/19 0202) Vital Signs (24h Range):  Temp:  [97.6 °F (36.4 °C)-98.4 °F (36.9 °C)] 97.6 °F (36.4 °C)  Pulse:  [] 67  Resp:  [16-20] 16  SpO2:  [96 %-100 %] 99 %  BP: (125-144)/(57-80) 141/62     Weight: 73.9 kg (163 lb)  Body mass index is 27.12 kg/m².    Intake/Output - Last 3 Shifts       01/11 0700 - 01/12 0659 01/12 0700 - 01/13 0659 01/13 0700 - 01/14 0659    P.O.  400     IV Piggyback  2650     Total Intake(mL/kg)  3050 (41.3)     Urine (mL/kg/hr)  300      Stool  0     Total Output  300     Net  +2750            Urine Occurrence  1 x     Stool Occurrence  1 x           Physical Exam  A&O, NAD  RRR  No Resp Distress  ABD: s/nd, minimally;y TTP    Significant Labs:  CBC:   Recent Labs   Lab 01/12/19 1925   WBC 19.14*   RBC 3.30*   HGB 10.1*   HCT 31.5*   *   MCV 96   MCH 30.6   MCHC 32.1     CMP:   Recent Labs   Lab 01/12/19 1925   *   CALCIUM 7.4*   ALBUMIN 2.4*   PROT 6.3      K 3.2*   CO2 21*      BUN 8   CREATININE 1.2   ALKPHOS 94   ALT 11   AST 14   BILITOT 0.4       Significant Diagnostics:  I have reviewed all pertinent imaging results/findings within the past 24 hours.

## 2019-01-13 NOTE — PROVIDER PROGRESS NOTES - EMERGENCY DEPT.
Encounter Date: 1/12/2019    ED Physician Progress Notes        Physician Note:   I assumed care of patient from off-going EDDr. Andi Tao. Briefly, Patient is a 67 yo male presenting to the ED for increased abdominal pain, distension..  At the time of signout plan was pending a CT read in surgical evaluation.  At bedside to introduce self to pt.  Updated given  Will continue to monitor.      Pt given the following medications with in the ED:     Medications  vancomycin in dextrose 5 % 1 gram/250 mL IVPB 1,000 mg (1,000 mg Intravenous New Bag 1/12/19 2142)  lactated ringers bolus 1,000 mL (not administered)  magnesium sulfate 3 g in dextrose 5 % 250 mL IVPB (3 g Intravenous New Bag 1/12/19 2142)  sodium chloride 0.9% bolus 1,000 mL (1,000 mLs Intravenous New Bag 1/12/19 2031)  piperacillin-tazobactam 4.5 g in sodium chloride 0.9% 100 mL IVPB (ready to mix system) (0 g Intravenous Stopped 1/12/19 2101)  omnipaque 350 iohexol 75 mL (75 mLs Intravenous Given 1/12/19 2132)  HYDROmorphone injection 1 mg (1 mg Intravenous Given 1/12/19 2150)    CT shows postoperative changes of partial colectomy with significant inflammatory changes and bowel wall thickening around a portion of the proximal colon and distal small bowel.  No definite fluid collection, although there are scattered extraluminal foci of air, noting recent removal of intraperitoneal drainage catheter.  No evidence of obstruction.  Findings may represent a nonspecific enteritis, including infectious/inflammatory or ischemic etiologies.  Bowel perforation cannot entirely be excluded given the presence of extraluminal gas.    A General surgery consulted.  Will be down to see patient.    General surgery to admit patient to their service.  Patient and family updated on plan of care.  All questions and concerns addressed.

## 2019-01-13 NOTE — ASSESSMENT & PLAN NOTE
67 yo male with multiple co morbidities and probable IBD-S with recent history of gangrenous bowel s/p R hemicolectomy on 10/31/18 now presents with post surgical phlegmon and electrolyte abnormalities due to excessive diarrhea. CT scan reviewed. Believe extraluminal air due to recent removal of drain as patient not peritonitic.oral contrast reaching small bowel without any extravasation seen. Ongoing post op leak in the differential.      Consult GI for help with IBS symptoms vs. Rule out other etiology of diarrhea, possible infectious or IBD?     Clear liquids okay  IV cipro/flagyl. Received vanc and zosyn in the ER  Magnesium q 8 hours. Receiving 3g of IV mag now.   Trend lactate  IVF @ 150  norco PRN for pain and gabapentin nightly   Home BP meds- lopressor, HCTZ, norvasc, valsartan   Restart anti platelets - asa and plavix   PRN imodium   Home protonix for GERD  Lovenox, SCDs     Plan: IV antibiotics, GI consult today

## 2019-01-14 ENCOUNTER — TELEPHONE (OUTPATIENT)
Dept: ENDOSCOPY | Facility: HOSPITAL | Age: 69
End: 2019-01-14

## 2019-01-14 VITALS
HEIGHT: 65 IN | RESPIRATION RATE: 18 BRPM | BODY MASS INDEX: 27.16 KG/M2 | DIASTOLIC BLOOD PRESSURE: 57 MMHG | OXYGEN SATURATION: 96 % | HEART RATE: 62 BPM | SYSTOLIC BLOOD PRESSURE: 121 MMHG | TEMPERATURE: 98 F | WEIGHT: 163 LBS

## 2019-01-14 PROBLEM — D62 ACUTE BLOOD LOSS ANEMIA: Status: ACTIVE | Noted: 2019-01-14

## 2019-01-14 PROBLEM — M10.9 GOUT: Status: ACTIVE | Noted: 2019-01-14

## 2019-01-14 PROBLEM — E87.6 HYPOKALEMIA: Status: ACTIVE | Noted: 2019-01-14

## 2019-01-14 PROBLEM — F03.90 DEMENTIA: Status: ACTIVE | Noted: 2019-01-14

## 2019-01-14 LAB
ALBUMIN SERPL BCP-MCNC: 1.7 G/DL
ALP SERPL-CCNC: 79 U/L
ALT SERPL W/O P-5'-P-CCNC: 9 U/L
ANION GAP SERPL CALC-SCNC: 9 MMOL/L
AST SERPL-CCNC: 13 U/L
BASOPHILS # BLD AUTO: 0.05 K/UL
BASOPHILS # BLD AUTO: 0.05 K/UL
BASOPHILS NFR BLD: 0.5 %
BASOPHILS NFR BLD: 0.5 %
BILIRUB SERPL-MCNC: 0.4 MG/DL
BUN SERPL-MCNC: 7 MG/DL
CALCIUM SERPL-MCNC: 7.3 MG/DL
CHLORIDE SERPL-SCNC: 101 MMOL/L
CO2 SERPL-SCNC: 23 MMOL/L
CREAT SERPL-MCNC: 0.7 MG/DL
DIFFERENTIAL METHOD: ABNORMAL
DIFFERENTIAL METHOD: ABNORMAL
EOSINOPHIL # BLD AUTO: 0.3 K/UL
EOSINOPHIL # BLD AUTO: 0.5 K/UL
EOSINOPHIL NFR BLD: 2.5 %
EOSINOPHIL NFR BLD: 4.3 %
ERYTHROCYTE [DISTWIDTH] IN BLOOD BY AUTOMATED COUNT: 17.6 %
ERYTHROCYTE [DISTWIDTH] IN BLOOD BY AUTOMATED COUNT: 17.8 %
EST. GFR  (AFRICAN AMERICAN): >60 ML/MIN/1.73 M^2
EST. GFR  (NON AFRICAN AMERICAN): >60 ML/MIN/1.73 M^2
GLUCOSE SERPL-MCNC: 112 MG/DL
HCT VFR BLD AUTO: 24.5 %
HCT VFR BLD AUTO: 25.3 %
HGB BLD-MCNC: 7.6 G/DL
HGB BLD-MCNC: 8 G/DL
IMM GRANULOCYTES # BLD AUTO: 0.11 K/UL
IMM GRANULOCYTES # BLD AUTO: 0.12 K/UL
IMM GRANULOCYTES NFR BLD AUTO: 1 %
IMM GRANULOCYTES NFR BLD AUTO: 1.2 %
LYMPHOCYTES # BLD AUTO: 0.9 K/UL
LYMPHOCYTES # BLD AUTO: 1.2 K/UL
LYMPHOCYTES NFR BLD: 10.8 %
LYMPHOCYTES NFR BLD: 8.8 %
MAGNESIUM SERPL-MCNC: 1.1 MG/DL
MAGNESIUM SERPL-MCNC: 1.2 MG/DL
MAGNESIUM SERPL-MCNC: 1.5 MG/DL
MCH RBC QN AUTO: 29.8 PG
MCH RBC QN AUTO: 30.4 PG
MCHC RBC AUTO-ENTMCNC: 31 G/DL
MCHC RBC AUTO-ENTMCNC: 31.6 G/DL
MCV RBC AUTO: 96 FL
MCV RBC AUTO: 96 FL
MONOCYTES # BLD AUTO: 0.9 K/UL
MONOCYTES # BLD AUTO: 0.9 K/UL
MONOCYTES NFR BLD: 8.5 %
MONOCYTES NFR BLD: 8.6 %
NEUTROPHILS # BLD AUTO: 8 K/UL
NEUTROPHILS # BLD AUTO: 8.3 K/UL
NEUTROPHILS NFR BLD: 76.6 %
NEUTROPHILS NFR BLD: 76.7 %
NRBC BLD-RTO: 0 /100 WBC
NRBC BLD-RTO: 0 /100 WBC
PHOSPHATE SERPL-MCNC: 3.2 MG/DL
PLATELET # BLD AUTO: 297 K/UL
PLATELET # BLD AUTO: 322 K/UL
PMV BLD AUTO: 9.4 FL
PMV BLD AUTO: 9.6 FL
POCT GLUCOSE: 129 MG/DL (ref 70–110)
POTASSIUM SERPL-SCNC: 2.9 MMOL/L
PROT SERPL-MCNC: 4.7 G/DL
RBC # BLD AUTO: 2.55 M/UL
RBC # BLD AUTO: 2.63 M/UL
SODIUM SERPL-SCNC: 133 MMOL/L
WBC # BLD AUTO: 10.4 K/UL
WBC # BLD AUTO: 10.84 K/UL

## 2019-01-14 PROCEDURE — 99232 SBSQ HOSP IP/OBS MODERATE 35: CPT | Mod: ,,, | Performed by: SURGERY

## 2019-01-14 PROCEDURE — 85025 COMPLETE CBC W/AUTO DIFF WBC: CPT | Mod: 91

## 2019-01-14 PROCEDURE — 25000003 PHARM REV CODE 250: Performed by: SURGERY

## 2019-01-14 PROCEDURE — 25000003 PHARM REV CODE 250: Performed by: STUDENT IN AN ORGANIZED HEALTH CARE EDUCATION/TRAINING PROGRAM

## 2019-01-14 PROCEDURE — 83735 ASSAY OF MAGNESIUM: CPT | Mod: 91

## 2019-01-14 PROCEDURE — 25000003 PHARM REV CODE 250: Performed by: PHYSICIAN ASSISTANT

## 2019-01-14 PROCEDURE — 36415 COLL VENOUS BLD VENIPUNCTURE: CPT

## 2019-01-14 PROCEDURE — 99232 PR SUBSEQUENT HOSPITAL CARE,LEVL II: ICD-10-PCS | Mod: ,,, | Performed by: SURGERY

## 2019-01-14 PROCEDURE — 84100 ASSAY OF PHOSPHORUS: CPT

## 2019-01-14 PROCEDURE — 63600175 PHARM REV CODE 636 W HCPCS: Performed by: STUDENT IN AN ORGANIZED HEALTH CARE EDUCATION/TRAINING PROGRAM

## 2019-01-14 PROCEDURE — 63600175 PHARM REV CODE 636 W HCPCS: Performed by: SURGERY

## 2019-01-14 PROCEDURE — 80053 COMPREHEN METABOLIC PANEL: CPT

## 2019-01-14 PROCEDURE — S0030 INJECTION, METRONIDAZOLE: HCPCS | Performed by: STUDENT IN AN ORGANIZED HEALTH CARE EDUCATION/TRAINING PROGRAM

## 2019-01-14 RX ORDER — CHOLESTYRAMINE 4 G/9G
1 POWDER, FOR SUSPENSION ORAL 2 TIMES DAILY
Qty: 180 PACKET | Refills: 3 | Status: ON HOLD | OUTPATIENT
Start: 2019-01-14 | End: 2020-12-07 | Stop reason: HOSPADM

## 2019-01-14 RX ORDER — POTASSIUM CHLORIDE 20 MEQ/1
40 TABLET, EXTENDED RELEASE ORAL ONCE
Status: COMPLETED | OUTPATIENT
Start: 2019-01-14 | End: 2019-01-14

## 2019-01-14 RX ORDER — SODIUM,POTASSIUM PHOSPHATES 280-250MG
2 POWDER IN PACKET (EA) ORAL ONCE
Status: COMPLETED | OUTPATIENT
Start: 2019-01-14 | End: 2019-01-14

## 2019-01-14 RX ORDER — HYDROCODONE BITARTRATE AND ACETAMINOPHEN 5; 325 MG/1; MG/1
1 TABLET ORAL EVERY 6 HOURS PRN
Qty: 28 TABLET | Refills: 0 | Status: SHIPPED | OUTPATIENT
Start: 2019-01-14 | End: 2020-12-02

## 2019-01-14 RX ORDER — METRONIDAZOLE 500 MG/1
500 TABLET ORAL EVERY 8 HOURS
Qty: 21 TABLET | Refills: 0 | Status: SHIPPED | OUTPATIENT
Start: 2019-01-14 | End: 2019-01-21

## 2019-01-14 RX ORDER — HYDROCODONE BITARTRATE AND ACETAMINOPHEN 5; 325 MG/1; MG/1
1 TABLET ORAL EVERY 4 HOURS PRN
Qty: 28 TABLET | Refills: 0 | Status: SHIPPED | OUTPATIENT
Start: 2019-01-14 | End: 2020-12-02 | Stop reason: HOSPADM

## 2019-01-14 RX ORDER — MAGNESIUM SULFATE HEPTAHYDRATE 40 MG/ML
2 INJECTION, SOLUTION INTRAVENOUS ONCE
Status: COMPLETED | OUTPATIENT
Start: 2019-01-14 | End: 2019-01-14

## 2019-01-14 RX ORDER — METRONIDAZOLE 500 MG/1
500 TABLET ORAL EVERY 8 HOURS
Status: DISCONTINUED | OUTPATIENT
Start: 2019-01-14 | End: 2019-01-14 | Stop reason: HOSPADM

## 2019-01-14 RX ORDER — CIPROFLOXACIN 500 MG/1
500 TABLET ORAL EVERY 12 HOURS
Status: DISCONTINUED | OUTPATIENT
Start: 2019-01-14 | End: 2019-01-14 | Stop reason: HOSPADM

## 2019-01-14 RX ORDER — MAGNESIUM SULFATE HEPTAHYDRATE 40 MG/ML
2 INJECTION, SOLUTION INTRAVENOUS
Status: DISCONTINUED | OUTPATIENT
Start: 2019-01-14 | End: 2019-01-14 | Stop reason: HOSPADM

## 2019-01-14 RX ORDER — POTASSIUM CHLORIDE 20 MEQ/1
40 TABLET, EXTENDED RELEASE ORAL
Status: DISPENSED | OUTPATIENT
Start: 2019-01-14 | End: 2019-01-14

## 2019-01-14 RX ORDER — CIPROFLOXACIN 500 MG/1
500 TABLET ORAL EVERY 12 HOURS
Qty: 14 TABLET | Refills: 0 | Status: SHIPPED | OUTPATIENT
Start: 2019-01-14 | End: 2019-01-21

## 2019-01-14 RX ADMIN — ATORVASTATIN CALCIUM 80 MG: 20 TABLET, FILM COATED ORAL at 09:01

## 2019-01-14 RX ADMIN — POTASSIUM CHLORIDE 40 MEQ: 1500 TABLET, EXTENDED RELEASE ORAL at 09:01

## 2019-01-14 RX ADMIN — MAGNESIUM OXIDE TAB 400 MG (241.3 MG ELEMENTAL MG) 400 MG: 400 (241.3 MG) TAB at 09:01

## 2019-01-14 RX ADMIN — CIPROFLOXACIN HYDROCHLORIDE 500 MG: 500 TABLET, FILM COATED ORAL at 09:01

## 2019-01-14 RX ADMIN — POTASSIUM & SODIUM PHOSPHATES POWDER PACK 280-160-250 MG 2 PACKET: 280-160-250 PACK at 09:01

## 2019-01-14 RX ADMIN — METRONIDAZOLE 500 MG: 500 TABLET ORAL at 01:01

## 2019-01-14 RX ADMIN — ALLOPURINOL 100 MG: 100 TABLET ORAL at 09:01

## 2019-01-14 RX ADMIN — PANTOPRAZOLE SODIUM 40 MG: 40 TABLET, DELAYED RELEASE ORAL at 09:01

## 2019-01-14 RX ADMIN — HYDROCHLOROTHIAZIDE 25 MG: 25 TABLET ORAL at 06:01

## 2019-01-14 RX ADMIN — ASPIRIN 81 MG: 81 TABLET, COATED ORAL at 09:01

## 2019-01-14 RX ADMIN — LOSARTAN POTASSIUM 100 MG: 50 TABLET, FILM COATED ORAL at 09:01

## 2019-01-14 RX ADMIN — METRONIDAZOLE 500 MG: 500 SOLUTION INTRAVENOUS at 01:01

## 2019-01-14 RX ADMIN — HYDROCODONE BITARTRATE AND ACETAMINOPHEN 1 TABLET: 10; 325 TABLET ORAL at 06:01

## 2019-01-14 RX ADMIN — CHOLESTYRAMINE 4 G: 4 POWDER, FOR SUSPENSION ORAL at 09:01

## 2019-01-14 RX ADMIN — MAGNESIUM SULFATE IN WATER 2 G: 40 INJECTION, SOLUTION INTRAVENOUS at 06:01

## 2019-01-14 RX ADMIN — MAGNESIUM SULFATE IN WATER 2 G: 40 INJECTION, SOLUTION INTRAVENOUS at 09:01

## 2019-01-14 RX ADMIN — AMLODIPINE BESYLATE 10 MG: 10 TABLET ORAL at 09:01

## 2019-01-14 RX ADMIN — METOPROLOL TARTRATE 50 MG: 50 TABLET ORAL at 09:01

## 2019-01-14 RX ADMIN — HYDROCODONE BITARTRATE AND ACETAMINOPHEN 1 TABLET: 10; 325 TABLET ORAL at 01:01

## 2019-01-14 RX ADMIN — CIPROFLOXACIN 400 MG: 2 INJECTION, SOLUTION INTRAVENOUS at 01:01

## 2019-01-14 NOTE — PLAN OF CARE
Patient lives in a 1 story house w/spouse. Spouse at . Patient is independent & agile. No needs determined.     [facility] Packet given to patient after informed about it;patient verbalized their understanding.        01/14/19 1400   Discharge Assessment   Assessment Type Discharge Planning Assessment   Confirmed/corrected address and phone number on facesheet? Yes   Assessment information obtained from? Patient;Medical Record   Expected Length of Stay (days) (2)   Communicated expected length of stay with patient/caregiver (Per MD)   Prior to hospitilization cognitive status: Alert/Oriented;No Deficits   Prior to hospitalization functional status: Independent   Current cognitive status: Alert/Oriented;No Deficits   Current Functional Status: Independent   Facility Arrived From: (N/A)   Lives With spouse   Able to Return to Prior Arrangements yes   Is patient able to care for self after discharge? Yes   Who are your caregiver(s) and their phone number(s)? (Ele English Spouse     [phone]   )   Patient's perception of discharge disposition home or selfcare   Readmission Within the Last 30 Days no previous admission in last 30 days   Patient currently being followed by outpatient case management? No   Patient currently receives any other outside agency services? No   Equipment Currently Used at Home bath bench  (not using now)   Do you have any problems affording any of your prescribed medications? No   Is the patient taking medications as prescribed? yes   Does the patient have transportation home? Yes   Transportation Anticipated family or friend will provide   Dialysis Name and Scheduled days (N/A)   Does the patient receive services at the [facility]mad[facility] Clinic? No   Discharge Plan A Home with family   Discharge Plan B Home with family   DME Needed Upon Discharge  none   Patient/Family in Agreement with Plan yes

## 2019-01-14 NOTE — PROGRESS NOTES
[facility]  General Surgery  Progress Note    Subjective:     Post-Op Info:  * No surgery found *         Interval History:   Patient seen and examined, no acute events overnight  Denies N/V; tolerating minimal diet  Denies further episodes of diarrhea  Denies abdominal pain   afebrile/VSS    Medications:  Continuous Infusions:  Scheduled Meds:   allopurinol  100 mg Oral BID    amLODIPine  10 mg Oral Daily    aspirin  81 mg Oral Daily    atorvastatin  80 mg Oral Daily    cholestyramine  1 packet Oral BID    ciprofloxacin  400 mg Intravenous Q12H    clopidogrel  75 mg Oral QHS    donepezil  10 mg Oral QHS    enoxaparin  40 mg Subcutaneous Daily    gabapentin  300 mg Oral QHS    hydroCHLOROthiazide  25 mg Oral QAM    lidocaine (PF) 10 mg/ml (1%)  1 mL Other Once    losartan  100 mg Oral Daily    magnesium oxide  400 mg Oral Daily    magnesium sulfate IVPB  2 g Intravenous Once    metoprolol tartrate  50 mg Oral BID    metronidazole  500 mg Intravenous Q8H    pantoprazole  40 mg Oral Daily     PRN Meds:acetaminophen, dextrose 50%, dextrose 50%, glucagon (human recombinant), glucose, glucose, HYDROcodone-acetaminophen, HYDROcodone-acetaminophen, insulin aspart U-100, loperamide, ondansetron, promethazine (PHENERGAN) IVPB, ramelteon, sodium chloride 0.9%     Review of patient's allergies indicates:   Allergen Reactions    Amoxicillin Hives    Zolpidem tartrate Hallucinations     Objective:     Vital Signs (Most Recent):  Temp: 96.1 °F (35.6 °C) (01/14/19 0446)  Pulse: 69 (01/14/19 0718)  Resp: 18 (01/14/19 0446)  BP: 131/61 (01/14/19 0446)  SpO2: 95 % (01/13/19 2331) Vital Signs (24h Range):  Temp:  [96.1 °F (35.6 °C)-98 °F (36.7 °C)] 96.1 °F (35.6 °C)  Pulse:  [55-69] 69  Resp:  [10-18] 18  SpO2:  [95 %-100 %] 95 %  BP: (107-138)/(54-64) 131/61     Weight: 73.9 kg (163 lb)  Body mass index is 27.12 kg/m².    Intake/Output - Last 3 Shifts       01/12 0700 - 01/13 0659 01/13 0700 -  01/14 0659 01/14 0700 - 01/15 0659    P.O. 400 640     I.V. (mL/kg)  2671.3 (36.1)     IV Piggyback 2650 1700     Total Intake(mL/kg) 3050 (41.3) 5011.3 (67.8)     Urine (mL/kg/hr) 300 1090 (0.6)     Stool 0 475     Total Output 300 1565     Net +2750 +3446.3            Urine Occurrence 1 x      Stool Occurrence 1 x 4 x           Physical Exam   Constitutional: He appears well-developed and well-nourished. No distress.   HENT:   Head: Normocephalic and atraumatic.   Cardiovascular: Normal rate and regular rhythm.   Pulmonary/Chest: Effort normal. No respiratory distress.   Abdominal:   Soft, mild TTP on right  Minimal distension       Significant Labs:  CBC:   Recent Labs   Lab 01/14/19  0453   WBC 10.40   RBC 2.55*   HGB 7.6*   HCT 24.5*      MCV 96   MCH 29.8   MCHC 31.0*     BMP:   Recent Labs   Lab 01/14/19  0453   *   *   K 2.9*      CO2 23   BUN 7*   CREATININE 0.7   CALCIUM 7.3*   MG 1.1*     CMP:   Recent Labs   Lab 01/14/19  0453   *   CALCIUM 7.3*   ALBUMIN 1.7*   PROT 4.7*   *   K 2.9*   CO2 23      BUN 7*   CREATININE 0.7   ALKPHOS 79   ALT 9*   AST 13   BILITOT 0.4     LFTs:   Recent Labs   Lab 01/14/19  0453   ALT 9*   AST 13   ALKPHOS 79   BILITOT 0.4   PROT 4.7*   ALBUMIN 1.7*     Coagulation:   Recent Labs   Lab 01/12/19  1925   LABPROT 11.7   INR 1.1     Assessment/Plan:     * Phlegmon    67 yo male with multiple co morbidities and probable IBD-S with recent history of gangrenous bowel s/p R hemicolectomy on 10/31/18 now presents with post surgical phlegmon and electrolyte abnormalities due to excessive diarrhea. CT scan reviewed. Believe extraluminal air due to recent removal of drain as patient not peritonitic.oral contrast reaching small bowel without any extravasation seen. Ongoing post op leak in the differential.     Low fiber diet  PO cipro/flagyl - end date 1/21  PRN pain/nasuea meds    PRN imodium  appreciate GI recs - started questran   DVT/GI  prophylaxis  F/u CBC  Plan for dc today vs tomorrow              Acute blood loss anemia    Recheck CBC at 10     Hypokalemia    Replace      Dementia    aricept      Gout    Allopurinol      Hypomagnesemia    Replace      PAD (peripheral artery disease)    asa and plavix      HTN (hypertension)    Home meds  -lopressor, HCTZ, norvasc, valsartan          Azucena Garcia PA-C   [id number]  General Surgery  [facility]

## 2019-01-14 NOTE — SUBJECTIVE & OBJECTIVE
Interval History:   Patient seen and examined, no acute events overnight  Denies N/V; tolerating minimal diet  Denies further episodes of diarrhea  Denies abdominal pain   afebrile/VSS    Medications:  Continuous Infusions:  Scheduled Meds:   allopurinol  100 mg Oral BID    amLODIPine  10 mg Oral Daily    aspirin  81 mg Oral Daily    atorvastatin  80 mg Oral Daily    cholestyramine  1 packet Oral BID    ciprofloxacin  400 mg Intravenous Q12H    clopidogrel  75 mg Oral QHS    donepezil  10 mg Oral QHS    enoxaparin  40 mg Subcutaneous Daily    gabapentin  300 mg Oral QHS    hydroCHLOROthiazide  25 mg Oral QAM    lidocaine (PF) 10 mg/ml (1%)  1 mL Other Once    losartan  100 mg Oral Daily    magnesium oxide  400 mg Oral Daily    magnesium sulfate IVPB  2 g Intravenous Once    metoprolol tartrate  50 mg Oral BID    metronidazole  500 mg Intravenous Q8H    pantoprazole  40 mg Oral Daily     PRN Meds:acetaminophen, dextrose 50%, dextrose 50%, glucagon (human recombinant), glucose, glucose, HYDROcodone-acetaminophen, HYDROcodone-acetaminophen, insulin aspart U-100, loperamide, ondansetron, promethazine (PHENERGAN) IVPB, ramelteon, sodium chloride 0.9%     Review of patient's allergies indicates:   Allergen Reactions    Amoxicillin Hives    Zolpidem tartrate Hallucinations     Objective:     Vital Signs (Most Recent):  Temp: 96.1 °F (35.6 °C) (01/14/19 0446)  Pulse: 69 (01/14/19 0718)  Resp: 18 (01/14/19 0446)  BP: 131/61 (01/14/19 0446)  SpO2: 95 % (01/13/19 2331) Vital Signs (24h Range):  Temp:  [96.1 °F (35.6 °C)-98 °F (36.7 °C)] 96.1 °F (35.6 °C)  Pulse:  [55-69] 69  Resp:  [10-18] 18  SpO2:  [95 %-100 %] 95 %  BP: (107-138)/(54-64) 131/61     Weight: 73.9 kg (163 lb)  Body mass index is 27.12 kg/m².    Intake/Output - Last 3 Shifts       01/12 0700 - 01/13 0659 01/13 0700 - 01/14 0659 01/14 0700 - 01/15 0659    P.O. 400 640     I.V. (mL/kg)  2671.3 (36.1)     IV Piggyback 2650 1700     Total  Intake(mL/kg) 3050 (41.3) 5011.3 (67.8)     Urine (mL/kg/hr) 300 1090 (0.6)     Stool 0 475     Total Output 300 1565     Net +2750 +3446.3            Urine Occurrence 1 x      Stool Occurrence 1 x 4 x           Physical Exam   Constitutional: He appears well-developed and well-nourished. No distress.   HENT:   Head: Normocephalic and atraumatic.   Cardiovascular: Normal rate and regular rhythm.   Pulmonary/Chest: Effort normal. No respiratory distress.   Abdominal:   Soft, mild TTP on right  Minimal distension       Significant Labs:  CBC:   Recent Labs   Lab 01/14/19 0453   WBC 10.40   RBC 2.55*   HGB 7.6*   HCT 24.5*      MCV 96   MCH 29.8   MCHC 31.0*     BMP:   Recent Labs   Lab 01/14/19 0453   *   *   K 2.9*      CO2 23   BUN 7*   CREATININE 0.7   CALCIUM 7.3*   MG 1.1*     CMP:   Recent Labs   Lab 01/14/19 0453   *   CALCIUM 7.3*   ALBUMIN 1.7*   PROT 4.7*   *   K 2.9*   CO2 23      BUN 7*   CREATININE 0.7   ALKPHOS 79   ALT 9*   AST 13   BILITOT 0.4     LFTs:   Recent Labs   Lab 01/14/19 0453   ALT 9*   AST 13   ALKPHOS 79   BILITOT 0.4   PROT 4.7*   ALBUMIN 1.7*     Coagulation:   Recent Labs   Lab 01/12/19  1925   LABPROT 11.7   INR 1.1

## 2019-01-14 NOTE — TELEPHONE ENCOUNTER
----- Message from Emani Mello sent at 1/14/2019 12:10 PM CST -----  Contact: [employer]- [phone]  Kathe called to schedule a 2 week hospital discharge f/u appt for the pt- this would be for ibd-s- please contact pt at [phone]

## 2019-01-14 NOTE — ASSESSMENT & PLAN NOTE
69 yo male with multiple co morbidities and probable IBD-S with recent history of gangrenous bowel s/p R hemicolectomy on 10/31/18 now presents with post surgical phlegmon and electrolyte abnormalities due to excessive diarrhea. CT scan reviewed. Believe extraluminal air due to recent removal of drain as patient not peritonitic.oral contrast reaching small bowel without any extravasation seen. Ongoing post op leak in the differential.     Low fiber diet  PO cipro/flagyl - end date 1/21  PRN pain/nasuea meds    PRN imodium  appreciate GI recs - started questran   DVT/GI prophylaxis  F/u CBC  Plan for dc today vs tomorrow

## 2019-01-14 NOTE — PLAN OF CARE
Problem: Adult Inpatient Plan of Care  Goal: Plan of Care Review  Outcome: Ongoing (interventions implemented as appropriate)  POC reviewed and understood by patient. Patient's AAOx4. Pain managed by prn pain meds per md order. IV remained intact. Patient ambulated to BR. Patient's VSS. Family at bedside. Call light WNR. Bed in lowest position. No acute events at this time. WCTM.

## 2019-01-14 NOTE — DISCHARGE SUMMARY
[facility]  General Surgery  Discharge Summary      Patient Name: Jose English  MRN: [id number]  Admission Date: 1/12/2019  Hospital Length of Stay: 1 days  Discharge Date and Time:  01/14/2019 2:27 PM  Attending Physician: Dwight Borrego MD   Discharging Provider: Katty Moreau MD  Primary Care Provider: Jarret Calle MD     HPI: 69 yo male with with hx of partial left nephrectomy for renal cancer, previous bladder cancer x2, CAD, TIA, DM (on metformin), GERD, PAD, HTN, PVD (on asa and plavix) s/p PTA L SFA and PTA and stent placement of left iliac artery, aorto-bifem bypass done in 2008 and ischemic colitis with bowel perforation s/p R colectomy by Dr. Guajardo at [facility] on 10/31/18. His course was complicated by respiratory failure, prolonged intubation and long ICU stay. He presents to the hospital today for continued diarrhea and abdominal pain. 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      Admitted last month for electrolyte abnormalities and diarrhea at [facility]. Just recently released 5 days ago from  for diarrhea and electrolyte abnormalities as well. Also noted during that admission to have leukocytosis and IR drain placed on right side of the abdomen without return of purulence.. Received approximately 1 week of antibiotics but was discontinued on Friday along with the drain. Underwent colonoscopy on 12/06/18 for this which was unremarkable. He had a drain removed yesterday. He has been checked for c. Diff several times and been treated with flagyl.      His history of diarrhea is longstanding. He states that it initially started 2 years ago prior to his bowel perforation. He was referred to Dr. Byrne at [facility] who did extensive work up and was unable to find source, possible ulcerative colitis vs. IBS-D. He has tried may medications including his current medications - imodium.  He has also been on xifaxin previously. The past several days, his diarrhea has become worse, where he is wearing a diaper and constantly having accidents and unable to function. His rectum has become very raw requiring ale's paste and vaseline. He admits to some nausea and sporadic vomiting. No blood in stool, hematemesis, CP, SOB, lower leg swelling, dysuria, hematuria, foul smelling urine.      Denies any history of Afib. Having frequent PVCs. No previous MI.       * No surgery found *     Hospital Course: The patient had leukocytosis which resolved with cipro/flagyl. He tolerated a diet. His diarrhea had improved at least transiently prior to discharge with cholestyramine. GI was consulted and deferred any inpatient colonoscopy, he will follow up with them as an outpatient. He had minimal pain on palpation. He was discharged to home.     Consults:   Consults (From admission, onward)        Status Ordering Provider     Inpatient consult to Gastroenterology  Once     Provider:  (Not yet assigned)    Completed ANAMARIA PIERCE     Inpatient consult to General surgery  Once     Provider:  (Not yet assigned)    Completed APOLINAR GONZALEZ          Significant Diagnostic Studies: Labs:   CBC   Recent Labs   Lab 01/13/19  1154 01/14/19  0453 01/14/19  0947   WBC 14.61* 10.40 10.84   HGB 8.7* 7.6* 8.0*   HCT 27.0* 24.5* 25.3*    297 322       Pending Diagnostic Studies:     Procedure Component Value Units Date/Time    Magnesium [[id number]]     Order Status:  Sent Lab Status:  No result     Specimen:  Blood         Final Active Diagnoses:    Diagnosis Date Noted POA    PRINCIPAL PROBLEM:  Phlegmon [L02.91] 01/12/2019 Yes    Gout [M10.9] 01/14/2019 Yes    Dementia [F03.90] 01/14/2019 Yes    Hypokalemia [E87.6] 01/14/2019 Yes    Acute blood loss anemia [D62] 01/14/2019 No    Intra-abdominal infection [B99.9] 01/13/2019 Yes    Anastomotic leak of intestine [K91.89]  Yes    Diarrhea [R19.7] 12/06/2018  Yes    Hypomagnesemia [E83.42] 12/04/2018 Yes    HTN (hypertension) [I10] 04/10/2014 Yes    PAD (peripheral artery disease) [I73.9] 04/10/2014 Yes      Problems Resolved During this Admission:      Discharged Condition: stable    Disposition: Home or Self Care    Follow Up:  Follow-up Information     [facility] GASTROENTEROLOGY In 2 weeks.    Specialty:  Gastroenterology  Why:  hospital follow up/Actual phone # [phone]: Office to [facility] & notify you.  Dr. Dang 2/7/19 at 1:00 PM.   Contact information:  [address]  [facility] [address]  [phone]           Dwight Borrego MD.    Specialty:  General Surgery  Why:  As needed  Contact information:  [address]  [facility] [address]  [phone]                 Patient Instructions:      Diet Adult Regular   Order Comments: Low residue, no lactose     Notify your health care provider if you experience any of the following:  temperature >100.4     Notify your health care provider if you experience any of the following:  persistent nausea and vomiting or diarrhea     Notify your health care provider if you experience any of the following:  severe uncontrolled pain     Notify your health care provider if you experience any of the following:  worsening rash     Notify your health care provider if you experience any of the following:  persistent dizziness, light-headedness, or visual disturbances     Notify your health care provider if you experience any of the following:  increased confusion or weakness     Activity as tolerated     Medications:  Reconciled Home Medications:      Medication List      START taking these medications    cholestyramine 4 gram packet  Commonly known as:  QUESTRAN  Take 1 packet (4 g total) by mouth 2 (two) times daily.     ciprofloxacin HCl 500 MG tablet  Commonly known as:  CIPRO  Take 1 tablet (500 mg total) by mouth every 12 (twelve) hours. for 7 days     HYDROcodone-acetaminophen 5-325 mg per  tablet  Commonly known as:  NORCO  Take 1 tablet by mouth every 6 (six) hours as needed.     metroNIDAZOLE 500 MG tablet  Commonly known as:  FLAGYL  Take 1 tablet (500 mg total) by mouth every 8 (eight) hours. for 7 days        CONTINUE taking these medications    ACCU-CHEK CEASAR CONTROL SOLN Soln  Generic drug:  blood glucose control high,low  USE ONE TIME DAILY     ACCU-CHEK CEASAR PLUS TEST STRP Strp  Generic drug:  blood sugar diagnostic  TEST TWO TIMES DAILY     allopurinol 100 MG tablet  Commonly known as:  ZYLOPRIM  Take 100 mg by mouth 2 (two) times daily.     amLODIPine 10 MG tablet  Commonly known as:  NORVASC  TAKE 1 TABLET ONE TIME DAILY     aspirin 81 MG EC tablet  Commonly known as:  ECOTRIN  Take 81 mg by mouth once daily.     atorvastatin 80 MG tablet  Commonly known as:  LIPITOR  Take 80 mg by mouth once daily.     b complex vitamins tablet  Take 1 tablet by mouth once daily.     BD ALCOHOL SWABS Padm  Generic drug:  alcohol swabs  TEST TWO TIMES DAILY     clopidogrel 75 mg tablet  Commonly known as:  PLAVIX  TAKE 1 TABLET EVERY EVENING     CREON ORAL  Take by mouth.     dicyclomine 20 mg tablet  Commonly known as:  BENTYL  Take 20 mg by mouth every 6 (six) hours.     donepezil 5 MG tablet  Commonly known as:  ARICEPT  Take 10 mg by mouth every evening.     fish oil-omega-3 fatty acids 300-1,000 mg capsule  Take 2 g by mouth once daily.     gabapentin 300 MG capsule  Commonly known as:  NEURONTIN  Take 300 mg by mouth every evening.     hydroCHLOROthiazide 25 MG tablet  Commonly known as:  HYDRODIURIL  TAKE 1 TABLET EVERY MORNING     KAOPECTATE (BISMUTH SUBSALICY) 262 mg Tab  Generic drug:  bismuth subsalicylate  Take by mouth.     * lancets 33 gauge Misc  Commonly known as:  ONETOUCH DELICA LANCETS  2 lancets by Misc.(Non-Drug; Combo Route) route 2 (two) times daily.     * ACCU-CHEK SOFTCLIX LANCETS Misc  Generic drug:  lancets  TEST TWO TIMES DAILY     loperamide 2 mg capsule  Commonly known as:   IMODIUM  Take 2 mg by mouth 4 (four) times daily as needed for Diarrhea.     metFORMIN 1000 MG tablet  Commonly known as:  GLUCOPHAGE  Take 1 tablet (1,000 mg total) by mouth 2 (two) times daily with meals.     metoprolol tartrate 50 MG tablet  Commonly known as:  LOPRESSOR  TAKE 1 TABLET TWICE DAILY     mv,Ca,min-iron-FA-lycopene 8 mg iron- 200 mcg-600 mcg Tab  Take 1 capsule by mouth once daily.     ondansetron 4 MG tablet  Commonly known as:  ZOFRAN  Take 4 mg by mouth every 8 (eight) hours as needed for Nausea.     pantoprazole 40 MG tablet  Commonly known as:  PROTONIX  Take 40 mg by mouth once daily.     valsartan 320 MG tablet  Commonly known as:  DIOVAN  Take 320 mg by mouth once daily.         * This list has 2 medication(s) that are the same as other medications prescribed for you. Read the directions carefully, and ask your doctor or other care provider to review them with you.            STOP taking these medications    levoFLOXacin 500 MG tablet  Commonly known as:  ISAI Moreau MD  General Surgery  [facility]

## 2019-01-14 NOTE — PLAN OF CARE
01/14/19 1405   Final Note   Assessment Type Final Discharge Note   Anticipated Discharge Disposition Home   What phone number can be called within the next 1-3 days to see how you are doing after discharge? ([phone])   Hospital Follow Up  Appt(s) scheduled? Yes   Discharge plans and expectations educations in teach back method with documentation complete? Yes   Right Care Referral Info   Post Acute Recommendation No Care

## 2019-01-16 ENCOUNTER — PATIENT OUTREACH (OUTPATIENT)
Dept: ADMINISTRATIVE | Facility: CLINIC | Age: 69
End: 2019-01-16

## 2019-01-16 NOTE — PATIENT INSTRUCTIONS
Discharge Instructions for Cellulitis  You have been diagnosed with cellulitis. This is an infection in the deepest layer of the skin. In some cases, the infection also affects the muscle. Cellulitis is caused by bacteria. The bacteria can enter the body through broken skin. This can happen with a cut, scratch, animal bite, or an insect bite that has been scratched. You may have been treated in the hospital with antibiotics and fluids. You will likely be given a prescription for antibiotics to take at home. This sheet will help you take care of yourself at home.  Home care  When you are home:  · Take the prescribed antibiotic medicine you are given as directed until it is gone. Take it even if you feel better. It treats the infection and stops it from returning. Not taking all the medicine can make future infections hard to treat.  · Keep the infected area clean.  · When possible, raise the infected area above the level of your heart. This helps keep swelling down.  · Talk with your healthcare provider if you are in pain. Ask what kind of over-the-counter medicine you can take for pain.  · Apply clean bandages as advised.  · Take your temperature once a day for a week.  · Wash your hands often to prevent spreading the infection.  In the future, wash your hands before and after you touch cuts, scratches, or bandages. This will help prevent infection.   When to call your healthcare provider  Call your healthcare provider immediately if you have any of the following:  · Difficulty or pain when moving the joints above or below the infected area  · Discharge or pus draining from the area  · Fever of 100.4°F (38°C) or higher, or as directed by your healthcare provider  · Pain that gets worse in or around the infected   · Redness that gets worse in or around the infected area, particularly if the area of redness expands to a wider area  · Shaking chills  · Swelling of the infected area  · Vomiting   Date Last Reviewed:  8/1/2016  © 5081-2948 [employer] [address] [place] [address], [place] [address]. All rights reserved. This information is not intended as a substitute for professional medical care. Always follow your healthcare professional's instructions.

## 2019-01-17 LAB
BACTERIA BLD CULT: NORMAL
BACTERIA BLD CULT: NORMAL

## 2019-01-18 NOTE — PHYSICIAN QUERY
PT Name: Jose English  MR #: [id number]     Physician Query Form - Documentation Clarification      CDS/: Mary Mittal               Contact information: [email or web address]    This form is a permanent document in the medical record.     Query Date: January 18, 2019    By submitting this query, we are merely seeking further clarification of documentation. Please utilize your independent clinical judgment when addressing the question(s) below.    The Medical record reflects the following:    Supporting Clinical Findings Location in Medical Record   Noted during that admission to have leukocytosis and IR drain placed on right side of the abdomen without return of purulence.. Received approximately 1 week of antibiotics but was discontinued on Friday along with the drain.     Admit to general surgery - Dr. Borrego secondary to post op infection, hypomagnesium and elevated wbc/lactate     IV cipro/flagyl. Received vanc and zosyn in the ER   General Surgery Consult   Phlegmon   Multiple co morbidities and probable IBD-S with recent history of gangrenous bowel s/p R hemicolectomy on 10/31/18 now presents with post surgical phlegmon and electrolyte abnormalities due to excessive diarrhea. CT scan reviewed. Believe extraluminal air due to recent removal of drain as patient not peritonitic. Oral contrast reaching small bowel without any extravasation seen. Ongoing post op leak in the differential.  PO cipro/flagyl - end date 1/21   General Surgery PN 01/14   The patient  hadleukocytosis which resolved with cipro/flagyl. He tolerated a diet. His diarrhea had improved at least transiently prior to discharge with cholestyramine.     Final Active Diagnoses:   Phlegmon  Intra-abdominal infection  Anastomotic leak of intestine   Discharge Summary   WBC= 19.14   Labs, CBC 01/12                                                                            Doctor, Please specify diagnosis or diagnoses associated with  above clinical findings.    Provider Use Only    Please clarify which of the following diagnoses were found to be active this current admission:    [ X ] Phlegmon    [  ] Intra-abdominal infection    [  ] Anastomotic leak of intestine                                                                                                               [  ] Clinically Undetermined

## 2019-01-18 NOTE — PHYSICIAN QUERY
PT Name: Jose English  MR #: [id number]     Physician Query Form - Etiology of Condition Clarification      CDS/: Mary Mittal               Contact information: [email or web address]  This form is a permanent document in the medical record.     Query Date: January 18, 2019    By submitting this query, we are merely seeking further clarification of documentation.  Please utilize your independent clinical judgment when addressing the question(s) below.     The medical record contains the following:    Findings Supporting Clinical Information Location in Medical Record    He presents to the hospital today for continued diarrhea and abdominal pain.                                     Multiple co morbidities and probable IBD-S with recent history of gangrenous bowel s/p R hemicolectomy on 10/31/18 now presents with post surgical phlegmon and electrolyte abnormalities due to excessive diarrhea     CT scan reviewed. Believe extraluminal air due to recent removal of drain as patient not peritonitic.oral contrast reaching small bowel without any extravasation seen. Ongoing post op leak in the differential.     Consult GI for help with IBS symptoms vs. Rule out other etiology of diarrhea, possible infectious or IBD?      Needs EGD/colon but unable to perform at this time due to concern for perforation.   Use cholestyramine for diarrhea  Fu in Gi clinic.    Unclear the etiology his diarrhea and it is difficult to accurately assess currently in the setting of an acute process. He has baseline chronic diarrhea for 2 years       Leukocytosis which resolved with cipro/flagyl. He tolerated a diet. His diarrhea had improved at least transiently prior to discharge with cholestyramine.    C. Diff Negative General Surgery Consult                           GI Consult Attestation          GI Consult          Discharge Summary        Stool Labs     Please document your best medical opinion regarding the etiology of  _Diarrhea and Abdominal Pain_ for which treatment is/was directed.     Provider use only  Diarrhea- clinically undetermined  Abdominal pain likely related to post-operative intra-abdominal phlegmon                 [  ] Clinically Undetermined     Please document in your progress notes daily for the duration of treatment, until resolved, and include in your discharge summary.

## 2019-01-23 ENCOUNTER — HOSPITAL ENCOUNTER (EMERGENCY)
Facility: HOSPITAL | Age: 69
Discharge: HOME | End: 2019-01-23
Attending: EMERGENCY MEDICINE
Payer: MEDICARE

## 2019-01-23 VITALS
WEIGHT: 160 LBS | SYSTOLIC BLOOD PRESSURE: 157 MMHG | HEIGHT: 65 IN | BODY MASS INDEX: 26.66 KG/M2 | TEMPERATURE: 98 F | RESPIRATION RATE: 18 BRPM | HEART RATE: 64 BPM | DIASTOLIC BLOOD PRESSURE: 68 MMHG | OXYGEN SATURATION: 99 %

## 2019-01-23 DIAGNOSIS — R19.7 DIARRHEA, UNSPECIFIED TYPE: Primary | ICD-10-CM

## 2019-01-23 DIAGNOSIS — R74.8 ELEVATED LIPASE: ICD-10-CM

## 2019-01-23 DIAGNOSIS — K55.1 MESENTERIC ARTERY STENOSIS: ICD-10-CM

## 2019-01-23 LAB
ALBUMIN SERPL BCP-MCNC: 2.1 G/DL
ALP SERPL-CCNC: 114 U/L
ALT SERPL W/O P-5'-P-CCNC: 17 U/L
ANION GAP SERPL CALC-SCNC: 10 MMOL/L
APTT BLDCRRT: 26.1 SEC
AST SERPL-CCNC: 22 U/L
BASOPHILS # BLD AUTO: 0.08 K/UL
BASOPHILS NFR BLD: 0.6 %
BILIRUB SERPL-MCNC: 0.3 MG/DL
BILIRUB UR QL STRIP: NEGATIVE
BUN SERPL-MCNC: 21 MG/DL
C DIFF GDH STL QL: NEGATIVE
C DIFF TOX A+B STL QL IA: NEGATIVE
CALCIUM SERPL-MCNC: 8.5 MG/DL
CHLORIDE SERPL-SCNC: 102 MMOL/L
CLARITY UR REFRACT.AUTO: ABNORMAL
CO2 SERPL-SCNC: 25 MMOL/L
COLOR UR AUTO: ABNORMAL
CREAT SERPL-MCNC: 1.2 MG/DL
DIFFERENTIAL METHOD: ABNORMAL
EOSINOPHIL # BLD AUTO: 0.3 K/UL
EOSINOPHIL NFR BLD: 2.2 %
ERYTHROCYTE [DISTWIDTH] IN BLOOD BY AUTOMATED COUNT: 16.6 %
EST. GFR  (AFRICAN AMERICAN): >60 ML/MIN/1.73 M^2
EST. GFR  (NON AFRICAN AMERICAN): >60 ML/MIN/1.73 M^2
GLUCOSE SERPL-MCNC: 116 MG/DL
GLUCOSE UR QL STRIP: NEGATIVE
HCT VFR BLD AUTO: 28.7 %
HGB BLD-MCNC: 9.1 G/DL
HGB UR QL STRIP: NEGATIVE
IMM GRANULOCYTES # BLD AUTO: 0.22 K/UL
IMM GRANULOCYTES NFR BLD AUTO: 1.8 %
INR PPP: 1
KETONES UR QL STRIP: NEGATIVE
LACTATE SERPL-SCNC: 2.2 MMOL/L
LEUKOCYTE ESTERASE UR QL STRIP: NEGATIVE
LIPASE SERPL-CCNC: 143 U/L
LYMPHOCYTES # BLD AUTO: 1.3 K/UL
LYMPHOCYTES NFR BLD: 10.9 %
MAGNESIUM SERPL-MCNC: 1.2 MG/DL
MCH RBC QN AUTO: 29.9 PG
MCHC RBC AUTO-ENTMCNC: 31.7 G/DL
MCV RBC AUTO: 94 FL
MONOCYTES # BLD AUTO: 0.8 K/UL
MONOCYTES NFR BLD: 6.7 %
NEUTROPHILS # BLD AUTO: 9.6 K/UL
NEUTROPHILS NFR BLD: 77.8 %
NITRITE UR QL STRIP: NEGATIVE
NRBC BLD-RTO: 0 /100 WBC
PH UR STRIP: 5 [PH] (ref 5–8)
PLATELET # BLD AUTO: 476 K/UL
PMV BLD AUTO: 8.8 FL
POTASSIUM SERPL-SCNC: 3.9 MMOL/L
PROT SERPL-MCNC: 6.4 G/DL
PROT UR QL STRIP: NEGATIVE
PROTHROMBIN TIME: 10.4 SEC
RBC # BLD AUTO: 3.04 M/UL
SODIUM SERPL-SCNC: 137 MMOL/L
SP GR UR STRIP: >=1.03 (ref 1–1.03)
URN SPEC COLLECT METH UR: ABNORMAL
WBC # BLD AUTO: 12.32 K/UL

## 2019-01-23 PROCEDURE — 85025 COMPLETE CBC W/AUTO DIFF WBC: CPT

## 2019-01-23 PROCEDURE — 25500020 PHARM REV CODE 255: Performed by: EMERGENCY MEDICINE

## 2019-01-23 PROCEDURE — 99285 EMERGENCY DEPT VISIT HI MDM: CPT | Mod: 25

## 2019-01-23 PROCEDURE — 87449 NOS EACH ORGANISM AG IA: CPT

## 2019-01-23 PROCEDURE — 80053 COMPREHEN METABOLIC PANEL: CPT

## 2019-01-23 PROCEDURE — 85610 PROTHROMBIN TIME: CPT

## 2019-01-23 PROCEDURE — 99285 EMERGENCY DEPT VISIT HI MDM: CPT | Mod: ,,, | Performed by: EMERGENCY MEDICINE

## 2019-01-23 PROCEDURE — 81003 URINALYSIS AUTO W/O SCOPE: CPT

## 2019-01-23 PROCEDURE — 25000003 PHARM REV CODE 250: Performed by: EMERGENCY MEDICINE

## 2019-01-23 PROCEDURE — 99284 EMERGENCY DEPT VISIT MOD MDM: CPT | Mod: GC,,, | Performed by: INTERNAL MEDICINE

## 2019-01-23 PROCEDURE — 83690 ASSAY OF LIPASE: CPT

## 2019-01-23 PROCEDURE — 85730 THROMBOPLASTIN TIME PARTIAL: CPT

## 2019-01-23 PROCEDURE — 83605 ASSAY OF LACTIC ACID: CPT

## 2019-01-23 PROCEDURE — 99285 PR EMERGENCY DEPT VISIT,LEVEL V: ICD-10-PCS | Mod: ,,, | Performed by: EMERGENCY MEDICINE

## 2019-01-23 PROCEDURE — 96361 HYDRATE IV INFUSION ADD-ON: CPT

## 2019-01-23 PROCEDURE — 83735 ASSAY OF MAGNESIUM: CPT

## 2019-01-23 PROCEDURE — 99284 PR EMERGENCY DEPT VISIT,LEVEL IV: ICD-10-PCS | Mod: GC,,, | Performed by: INTERNAL MEDICINE

## 2019-01-23 PROCEDURE — 96360 HYDRATION IV INFUSION INIT: CPT

## 2019-01-23 RX ADMIN — SODIUM CHLORIDE 500 ML: 0.9 INJECTION, SOLUTION INTRAVENOUS at 12:01

## 2019-01-23 RX ADMIN — SODIUM CHLORIDE 1000 ML: 0.9 INJECTION, SOLUTION INTRAVENOUS at 04:01

## 2019-01-23 RX ADMIN — IOHEXOL 75 ML: 350 INJECTION, SOLUTION INTRAVENOUS at 12:01

## 2019-01-23 NOTE — ASSESSMENT & PLAN NOTE
-Patient with known phlegmon that seems to be improved from his last imaging. Continue with antibiotics. May have worsened symptoms to non-surgical issues. Seems to be hydrated based on labs. Ok to keep follow up with both surgery and GI. Continue with anti-diarrheal medications  -May need tramadol for intermittent pain that is he getting to cause nausea and emesis  -No acute surgical intervention for now  -Surgery will sign off. Discussed with ED and general surgery staff

## 2019-01-23 NOTE — ED PROVIDER NOTES
"Encounter Date: 1/23/2019    SCRIBE #1 NOTE: I, Son Yancy, am scribing for, and in the presence of,  Dr. Pringle. I have scribed the following portions of the note - Other sections scribed: HPI ROS PE .       History     Chief Complaint   Patient presents with    Multiple Complaints     dc'd last week, having  hallucinations, blurry vision, dizziness all started on sunday, abod pain     Time patient was seen by the provider: 10:39 AM      The patient is a 68 y.o. male with multiple medical problems s/p right-sided colectomy for ischemic colitis 10/31/2018 s/p percutaneous drainage by IR early January was seen in emergency department & admitted on 1/12 for possible phlegmon presents today with diarrhea that started Sunday. Reports of multiple episodes of diarrhea which he describes as "watery." No blood. Last episode was this morning. His symptoms is associated with right lower quadrant abdominal pain. Endorses weight-loss. Patient was recently seen and evaluated for similar symptoms last week. No fever.       The history is provided by the patient and medical records.     Review of patient's allergies indicates:   Allergen Reactions    Amoxicillin Hives    Zolpidem tartrate Hallucinations     Past Medical History:   Diagnosis Date    Bladder cancer     2 times    BPH (benign prostatic hypertrophy)     Cancer of kidney     1/2    Colon polyp     Coronary artery disease     GERD (gastroesophageal reflux disease)     Hypertension     PAD (peripheral artery disease)     Peripheral vascular disease      Past Surgical History:   Procedure Laterality Date    abdominal aorta bypass by fem      BIOPSY, BLADDER N/A 6/18/2013    Performed by [facility]io [facility] at [facility] OR [address] [place] [address]    BLADDER SURGERY      COLONOSCOPY  12/06/2018    COLONOSCOPY N/A 12/6/2018    Performed by [facility]ilia [facility]on [facility] at [facility] ENDO    CYSTOSCOPY      CYSTOSCOPY N/A 4/17/2014    Performed by [facility]night [facility] at [facility]    CYSTOSCOPY N/A 6/18/2013    [facility]formed by [facility] at [facility] OR [facility]    DILATION, URETHRA N/A 4/17/2014    Performed by Andrea [facility]cknight [facility] at [facility] OR [facility]    FULGURATION, BLADDER  6/18/2013    Performed by [facility] at [facility] OR [facility]    multiple angiosplastia      NEPHRECTOMY      partial nephrectomy left    PTA, PERIPHERAL VESSEL Bilateral 8/21/2018    [facility]    PYELOGRAM, RETROGRADE Bilateral 6/18/2013    Performed by [facility] at [facility] OR [facility]    TURP, USING GREEN LIGHT LASER N/A 4/17/2014    Performed by Andrea Mcknight [facility] at [facility] OR [facility]     Family History   Problem Relation Age of Onset    Cancer Father     Cancer Maternal Uncle      Social History     Tobacco Use    Smoking status: Former Smoker     Packs/day: 1.50     Years: 40.00     Pack years: 60.00     Types: Cigarettes     Last attempt to quit: 2/9/2008     Years since quitting: 10.9   Substance Use Topics    Alcohol use: Yes     Alcohol/week: 1.2 oz     Types: 2 Cans of beer per week     Comment: daily    Drug use: Not on file     Review of Systems   Constitutional: Negative for chills and fever.   HENT: Negative for congestion.    Eyes: Negative for pain.   Respiratory: Negative for shortness of breath.    Cardiovascular: Negative for chest pain.   Gastrointestinal: Positive for abdominal pain and diarrhea.   Genitourinary: Negative for dysuria.   Musculoskeletal: Negative for back pain.   Skin: Negative for rash.   Neurological: Negative for headaches.       Physical Exam     Initial Vitals [01/23/19 1029]   BP Pulse Resp Temp SpO2   107/68 68 18 98 °F (36.7 °C) 99 %      MAP       --         Physical Exam    Nursing note and vitals reviewed.  Constitutional: He appears well-developed and well-nourished.   HENT:   Head: Normocephalic and atraumatic.   Eyes: EOM are normal. Pupils are equal, round, and reactive to light.   Neck: Normal range  of motion.   Cardiovascular: Normal rate, regular rhythm and normal heart sounds.   Pulmonary/Chest: Breath sounds normal. No respiratory distress.   Abdominal: Soft. There is tenderness. There is guarding. There is no rebound.   Right lower quadrant tenderness, good bowel sounds, mild guarding, no rebound.   Musculoskeletal: Normal range of motion.   Neurological: He is alert.   Skin: Skin is warm.         ED Course   Procedures  Labs Reviewed   CBC W/ AUTO DIFFERENTIAL - Abnormal; Notable for the following components:       Result Value    RBC 3.04 (*)     Hemoglobin 9.1 (*)     Hematocrit 28.7 (*)     MCHC 31.7 (*)     RDW 16.6 (*)     Platelets 476 (*)     MPV 8.8 (*)     Immature Granulocytes 1.8 (*)     Gran # (ANC) 9.6 (*)     Immature Grans (Abs) 0.22 (*)     Gran% 77.8 (*)     Lymph% 10.9 (*)     All other components within normal limits   COMPREHENSIVE METABOLIC PANEL - Abnormal; Notable for the following components:    Glucose 116 (*)     Calcium 8.5 (*)     Albumin 2.1 (*)     All other components within normal limits   LIPASE - Abnormal; Notable for the following components:    Lipase 143 (*)     All other components within normal limits   MAGNESIUM - Abnormal; Notable for the following components:    Magnesium 1.2 (*)     All other components within normal limits   URINALYSIS, REFLEX TO URINE CULTURE - Abnormal; Notable for the following components:    Appearance, UA Hazy (*)     Specific Gravity, UA >=1.030 (*)     All other components within normal limits    Narrative:     Preferred Collection Type->Urine, Clean Catch   CLOSTRIDIUM DIFFICILE   APTT   LACTIC ACID, PLASMA   PROTIME-INR          Imaging Results          CT Abdomen Pelvis With Contrast (Final result)  Result time 01/23/19 13:19:35    Final result by Nader Hodge MD (01/23/19 13:19:35)                 Impression:      1. Continued inflammation about the right colonic anastomosis without obstruction.  There has been some interval  resolution of punctate foci of air in the region, no findings to suggest abscess.  Continued follow-up is advised.  Please see above for complete description.  2. Findings suggesting hepatic steatosis, correlation with LFTs advised.  3. Stable bilateral adrenal nodules.  4. Stable left renal lesion.  5. Surgical changes of aortic bypass, noting suspected occlusion of the distal left graft, similar to the previous exam.  6. Several additional findings above.      Electronically signed by: Nader Hodge MD  Date:    01/23/2019  Time:    13:19             Narrative:    EXAMINATION:  CT ABDOMEN PELVIS WITH CONTRAST    CLINICAL HISTORY:  Abd pain, fever, abscess suspected;    TECHNIQUE:  Low dose axial images, sagittal and coronal reformations were obtained from the lung bases to the pubic symphysis following the IV administration of Omnipaque 350 .  Oral contrast was not given.    COMPARISON:  01/12/2019    FINDINGS:  Images of the lower thorax are remarkable for dependent atelectasis.  There is calcification in the distribution of the coronary arteries.  No significant pericardial effusion.    The liver is hypoattenuating, suggesting steatosis, correlation with LFTs recommended.  There is a punctate hypoattenuating lesion within the left hepatic dome, too small for characterization.  The spleen, pancreas, and gallbladder are grossly unremarkable noting the gallbladder is decompressed.  There is no biliary dilation or abdominal ascites.  The pancreatic duct is not dilated.  There are bilateral adrenal nodules, measuring 1.7 cm on the right and 2.0 cm on the left, grossly stable.  The portal vein, splenic vein, and SMV are patent.  There is significant atherosclerotic calcification of the aorta and its branches, noting probable stenosis at the origin of the celiac artery and SMA, similar to the previous examinations.  Aortic bypass graft noted bilaterally.  Overall configuration is stable as compared to the previous  examination noting possible partial occlusion of the distal left graft.  Postsurgical changes are noted of the left groin, and right groin, similar in appearance to the previous examination.    The kidneys enhance symmetrically and excrete contrast appropriately without hydronephrosis or nephrolithiasis noting renal vascular calcification.  There is an exophytic focus projecting from the lower pole of the left kidney, with mixed density including fat, soft tissue, and it calcification measuring 3.7 x 2.3 cm, unchanged since the previous examination.  The bilateral ureters are unremarkable without calculi seen.  The urinary bladder is decompressed limiting evaluation.  The prostate is not enlarged.    There is moderate stool in the distal colon.  There are scattered colonic diverticula.  Postsurgical changes are noted of partial right colectomy.  The anastomosis appears patent.  Again, there is diffuse inflammation about the ileal anastomosis loops, with some stricturing however no findings to suggest high-grade obstruction.  There are a few punctate foci of air in the operative bed, however this has improved somewhat since the previous exam suggesting resolution of air or possibly related to contained perforation or fistula as several bowel loops are tethered in the region.  No convincing discrete focal organized fluid collection in the region.  The small bowel is otherwise grossly unremarkable.  Postsurgical changes are noted of the anterior abdominal wall.    Degenerative changes are noted of the sacroiliac joints and spine.  No new acute osseous abnormality.                                 Medical Decision Making:   History:   Old Medical Records: I decided to obtain old medical records.  Clinical Tests:   Lab Tests: Ordered and Reviewed  Radiological Study: Ordered and Reviewed            Scribe Attestation:   Scribe #1: I performed the above scribed service and the documentation accurately describes the  services I performed. I attest to the accuracy of the note.    Attending Attestation:             Attending ED Notes:   I, Dr. Vasquez Pringle, personally performed the services described in this documentation. All medical record entries made by the scribe were at my direction and in my presence.  I have reviewed the chart and agree that the record reflects my personal performance and is accurate and complete. Vasquez Pringle MD.  4:46 PM 01/23/2019    General surgical and gastroenterology consult general surgery has signed off no immediate operative intervention GI residence went to the bedside again discuss with General staff patient is already on multiple anti diarrheal medications no evidence of dehydration             Clinical Impression:   The encounter diagnosis was Diarrhea, unspecified type.      Disposition:   Disposition: Discharged  Condition: Stable                        Vasquez Bhardwaj MD  02/05/19 1517       Vasquez Bhardwaj MD  02/05/19 1517

## 2019-01-23 NOTE — ED TRIAGE NOTES
Pt arrived to ED with CC of N/V and right lower abdominal pain 8/10 with diarrhea 7-15 times a day since Sunday. Denies blood in stool. Denies fever and chills. Pt. Reports was on Flagyl  And the diarrhea was before the antibiotic.     Patient identifiers verified and correct for Jose English.    LOC: The patient is awake, alert and oriented x 4. Pt is speaking appropriately, no slurred speech.  APPEARANCE: Patient resting comfortably and in no acute distress. Pt is clean and well groomed. No JVD visible. Pt reports pain level of 8/10.  SKIN: Skin is warm dry and intact, and color is consistent with ethnicity. No tenting observed and capillary refill <3 seconds. No clubbing noted to nail beds. No breakdown or brusing visible and mucus membranes moist and acyanotic.  MUSCULOSKELETAL: Full range of motion present in all extremities. Hand  equal and leg strength strong +5 bilaterally.  RESPIRATORY: Airway is open and patent. Respirations-unlabored, regular rate, equal bilaterally on inspiration and expiration. No accessory muscle use noted. Lungs clear to auscultation in all fields bilaterally anterior and posterior.   CARDIAC: Patient has regular heart rate and rhythm.  No peripheral edema noted, and patient has no c/o chest pain.  ABDOMEN: Soft and tender to RLQ with palpation with no distention noted. Normoactive bowel sounds X4 quadrants. Pt has  complaints of abnormal bowel movements. Pt reports abnormal appetite.  Surgical scar to abdominal area intact.   NEUROLOGIC: Eyes open spontaneously and facial expression symmetrical. Pt behavior appropriate to situation, and pt follows commands.  Pt reports sensation present in all extremities when touched with a finger. NPERRLA  : No complaints of frequency, burning, urgency or blood in the urine.

## 2019-01-23 NOTE — SUBJECTIVE & OBJECTIVE
Current Facility-Administered Medications on File Prior to Encounter   Medication    lidocaine HCl 2% urojet     Current Outpatient Medications on File Prior to Encounter   Medication Sig    ACCU-CHEK CEASAR CONTROL SOLN Soln USE ONE TIME DAILY    ACCU-CHEK CEASAR PLUS TEST STRP Strp TEST TWO TIMES DAILY    ACCU-CHEK SOFTCLIX LANCETS Misc TEST TWO TIMES DAILY    allopurinol (ZYLOPRIM) 100 MG tablet Take 100 mg by mouth 2 (two) times daily.    amlodipine (NORVASC) 10 MG tablet TAKE 1 TABLET ONE TIME DAILY    aspirin (ECOTRIN) 81 MG EC tablet Take 81 mg by mouth once daily.    atorvastatin (LIPITOR) 80 MG tablet Take 80 mg by mouth once daily.    b complex vitamins tablet Take 1 tablet by mouth once daily.    BD ALCOHOL SWABS PadM TEST TWO TIMES DAILY    bismuth subsalicylate (KAOPECTATE, BISMUTH SUBSALICY,) 262 mg Tab Take by mouth.    cholestyramine (QUESTRAN) 4 gram packet Take 1 packet (4 g total) by mouth 2 (two) times daily.    clopidogrel (PLAVIX) 75 mg tablet TAKE 1 TABLET EVERY EVENING    dicyclomine (BENTYL) 20 mg tablet Take 20 mg by mouth every 6 (six) hours.    donepezil (ARICEPT) 5 MG tablet Take 10 mg by mouth every evening.    fish oil-omega-3 fatty acids 300-1,000 mg capsule Take 2 g by mouth once daily.    gabapentin (NEURONTIN) 300 MG capsule Take 300 mg by mouth every evening.    hydrochlorothiazide (HYDRODIURIL) 25 MG tablet TAKE 1 TABLET EVERY MORNING    HYDROcodone-acetaminophen (NORCO) 5-325 mg per tablet Take 1 tablet by mouth every 6 (six) hours as needed.    HYDROcodone-acetaminophen (NORCO) 5-325 mg per tablet Take 1 tablet by mouth every 4 (four) hours as needed for Pain.    lancets (ONE TOUCH DELICA LANCETS) 33 gauge Misc 2 lancets by Misc.(Non-Drug; Combo Route) route 2 (two) times daily.    lipase/protease/amylase (CREON ORAL) Take by mouth once daily.     loperamide (IMODIUM) 2 mg capsule Take 2 mg by mouth 4 (four) times daily as needed for Diarrhea.    metFORMIN  (GLUCOPHAGE) 1000 MG tablet Take 1 tablet (1,000 mg total) by mouth 2 (two) times daily with meals.    metoprolol tartrate (LOPRESSOR) 50 MG tablet TAKE 1 TABLET TWICE DAILY    mv,Ca,min-iron-FA-lycopene 8 mg iron- 200 mcg-600 mcg Tab Take 1 capsule by mouth once daily.    ondansetron (ZOFRAN) 4 MG tablet Take 4 mg by mouth every 8 (eight) hours as needed for Nausea.    pantoprazole (PROTONIX) 40 MG tablet Take 40 mg by mouth once daily.    valsartan (DIOVAN) 320 MG tablet Take 320 mg by mouth once daily.       Review of patient's allergies indicates:   Allergen Reactions    Amoxicillin Hives    Zolpidem tartrate Hallucinations       Past Medical History:   Diagnosis Date    Bladder cancer     2 times    BPH (benign prostatic hypertrophy)     Cancer of kidney     1/2    Colon polyp     Coronary artery disease     GERD (gastroesophageal reflux disease)     Hypertension     PAD (peripheral artery disease)     Peripheral vascular disease      Past Surgical History:   Procedure Laterality Date    abdominal aorta bypass by fem      BIOPSY, BLADDER N/A 6/18/2013    Performed by [facility] at [facility] OR [address] [place] [address]    BLADDER SURGERY      COLONOSCOPY  12/06/2018    COLONOSCOPY N/A 12/6/2018    [facility]    CYSTOSCOPY      CYSTOSCOPY N/A 4/17/2014    [facility]    CYSTOSCOPY N/A 6/18/2013    [facility]formed by [facility] at [facility] OR [address] [place] [address]    DILATION, URETHRA N/A 4/17/2014    [facility]    FULGURATION, BLADDER  6/18/2013    [facility]    multiple angiosplastia      NEPHRECTOMY      partial nephrectomy left    PTA, PERIPHERAL VESSEL Bilateral 8/21/2018    [facility] CATH    PYELOGRAM, RETROGRADE Bilateral 6/18/2013    [facility]    TURP, USING  GREEN LIGHT LASER N/A 4/17/2014    Performed by [facility] at [facility] OR [address] [place] [address]     Family History     Problem Relation (Age of Onset)    Cancer Father, Maternal Uncle        Tobacco Use    Smoking status: Former Smoker     Packs/day: 1.50     Years: 40.00     Pack years: 60.00     Types: Cigarettes     Last attempt to quit: 2/9/2008     Years since quitting: 10.9   Substance and Sexual Activity    Alcohol use: Yes     Alcohol/week: 1.2 oz     Types: 2 Cans of beer per week     Comment: daily    Drug use: Not on file    Sexual activity: Yes     Partners: Female     Review of Systems   Constitutional: Negative for activity change, appetite change, fever and unexpected weight change.   Respiratory: Negative.    Gastrointestinal: Positive for abdominal pain, diarrhea, nausea and vomiting. Negative for abdominal distention and blood in stool.   Genitourinary: Negative.    Musculoskeletal: Negative.    Skin: Negative.    Neurological: Positive for dizziness. Negative for light-headedness and numbness.   Hematological: Negative for adenopathy. Does not bruise/bleed easily.   Psychiatric/Behavioral: Negative.      Objective:     Vital Signs (Most Recent):  Temp: 98 °F (36.7 °C) (01/23/19 1029)  Pulse: 64 (01/23/19 1424)  Resp: 18 (01/23/19 1424)  BP: (!) 146/66 (01/23/19 1424)  SpO2: 100 % (01/23/19 1424) Vital Signs (24h Range):  Temp:  [98 °F (36.7 °C)] 98 °F (36.7 °C)  Pulse:  [62-68] 64  Resp:  [18-20] 18  SpO2:  [97 %-100 %] 100 %  BP: (107-146)/(60-68) 146/66     Weight: 72.6 kg (160 lb)  Body mass index is 26.63 kg/m².    Physical Exam   Constitutional: He is oriented to person, place, and time. He appears well-developed and well-nourished. No distress.   HENT:   Head: Normocephalic and atraumatic.   Neck: Normal range of motion. Neck supple.   Cardiovascular: Normal rate and regular rhythm.   Pulmonary/Chest: Effort normal and breath sounds normal.   Abdominal: Soft. Bowel sounds are normal. He  exhibits no distension. There is tenderness in the right upper quadrant and right lower quadrant.       Well-healed midline incision   Musculoskeletal: Normal range of motion. He exhibits no edema.   Neurological: He is alert and oriented to person, place, and time.   Skin: Skin is warm and dry. He is not diaphoretic.       Significant Labs:  CBC:   Recent Labs   Lab 01/23/19  1052   WBC 12.32   RBC 3.04*   HGB 9.1*   HCT 28.7*   *   MCV 94   MCH 29.9   MCHC 31.7*     CMP:   Recent Labs   Lab 01/23/19  1052   *   CALCIUM 8.5*   ALBUMIN 2.1*   PROT 6.4      K 3.9   CO2 25      BUN 21   CREATININE 1.2   ALKPHOS 114   ALT 17   AST 22   BILITOT 0.3       Significant Diagnostics:  CT abd/pelvis 1/23/19:     1. Continued inflammation about the right colonic anastomosis without obstruction.  There has been some interval resolution of punctate foci of air in the region, no findings to suggest abscess.  Continued follow-up is advised.  Please see above for complete description.  2. Findings suggesting hepatic steatosis, correlation with LFTs advised.  3. Stable bilateral adrenal nodules.  4. Stable left renal lesion.  5. Surgical changes of aortic bypass, noting suspected occlusion of the distal left graft, similar to the previous exam.  6. Several additional findings above.

## 2019-01-23 NOTE — HPI
Mr English is a 68 year old male with history of renal cancer (s/p partial nephrectomy), bladder cancer, CAD, TIA, DM GERD, HTN, PVD on DAPT s/p multiple stents, ischemic colitis s/p right colectomy (10/31/18 at [facility]) who is presenting to the hospital with abdominal pain and diarrhea. He reports that he has had chronic diarrhea for ~2 years. Prior to 2 years ago he would move his bowels ~2/day formed. He notes that 2 years ago however his bowels changed to 4-5/day watery bowel movements, non-bloody, non-mucus, painless. He noted he tried various things including dietary elimination without improvement. He had a workup including Egd/colonoscopy/VCE (reportedly with iron deficiency) which was negative.He notes that on halloween (2018) he developped severe mid-epigastric pain with associated nausea and vomiting. He was seen at OSH ([facility]) and required right colectomy due to ischemia which was followed by a prolonged ICU stay with prolonged intubation for respiratory failure. Since then he has developed increasing diarrhea.     He reports that his current bowel frequency is between ~10 bowel movements/24 hours. He generally passes little amount of stool, predominantly watery, non-bloody. He notes that he has no nocturnal bowel movements. Will not have bowel movement if he does not eat but denies any clear correlation with PO intake. He is very distressed by this change in bowel habits noting that he has lost ~45 lbs since 10/2018 and is unable to leave the house due to frequency of bowel movements. He has been seen by Dr. Byrne (GI) for evaluation but reportedly has not improved. He reports that he has tried multiple agents including imodium, lomotil, tincture, and cholestyramine, rifaximin without effect. His last colonoscopy was 12/2018 which was negative (no biopsies taken) due to diarrhea while hospitalized at [facility].     He was recently admitted at [facility] due to leucocytosis with concern for abscess at the site  of his anastomosis for which an IR drain was placed (removed 1/11). He notes that he presented to the hospital on 1/12 due to acute worsening of RLQ abdominal pain and nausea and vomiting. He was admitted to general surgery's service on 1/12 and discharged 1/14 with 7 day course of cipro/flagyl. He was additionally seen by GI at that time for his diarrhea and cholestyramine was added which has not shown any benefit. Plan was for GI follow up and has appointment in [facility] - GI suggested CT in 10-12 weeks and then EGD for duodenal bx, colonoscopy with R/I/TI bx if CT negative.     At the time of consult labs were remarkable for Hb 9.1, WBC 12, Cr 1.2 from 0.7, low mag, lipase 143 and Lactic 2.2.     Prior Endo hx  - only one colonoscopy in [facility]     Colonoscopy. (12/6/18) Provider: Dr. BARB Dickson. Indication: Diarrhea. Extent: Ileocolonic anastamosis. Preparation:unknown.  - normal examination  - no biopsies taken

## 2019-01-23 NOTE — SUBJECTIVE & OBJECTIVE
Past Medical History:   Diagnosis Date    Bladder cancer     2 times    BPH (benign prostatic hypertrophy)     Cancer of kidney     1/2    Colon polyp     Coronary artery disease     GERD (gastroesophageal reflux disease)     Hypertension     PAD (peripheral artery disease)     Peripheral vascular disease        Past Surgical History:   Procedure Laterality Date    abdominal aorta bypass by fem      BIOPSY, BLADDER N/A 6/18/2013    Performed by Shyam Bucio MD at [facility] OR [address] [place] [address]    BLADDER SURGERY      COLONOSCOPY  12/06/2018    COLONOSCOPY N/A 12/6/2018    Per[facility] by [facility] at [facility] ENDO    CYSTOSCOPY      CYSTOSCOPY N/A 4/17/2014    [facility]    CYSTOSCOPY N/A 6/18/2013    Performed by Shyam Bucio MD at [facility] OR [address] [place] [address]    DILATION, URETHRA N/A 4/17/2014    Performed by Andrea Mcknight MD at [facility] OR [address] [place] [address]    FULGURATION, BLADDER  6/18/2013    Performed by Shyam Bucio MD at [facility] OR [address] [place] [address]    multiple angiosplastia      NEPHRECTOMY      partial nephrectomy left    PTA, PERIPHERAL VESSEL Bilateral 8/21/2018    [facility] CATH    PYELOGRAM, RETROGRADE Bilateral 6/18/2013    Performed by Shyam Bucio MD at [facility] OR [address] [place] [address]    TURP, USING GREEN LIGHT LASER N/A 4/17/2014    Performed by Andrea Mcknight MD at [facility] OR [address] [place] [address]       Review of patient's allergies indicates:   Allergen Reactions    Amoxicillin Hives    Zolpidem tartrate Hallucinations     Family History     Problem Relation (Age of Onset)    Cancer Father, Maternal Uncle        Tobacco Use    Smoking status: Former Smoker     Packs/day: 1.50     Years: 40.00     Pack years: 60.00     Types: Cigarettes     Last attempt to quit: 2/9/2008     Years since quitting: 10.9   Substance and Sexual Activity    Alcohol use: Yes     Alcohol/week: 1.2 oz     Types: 2 Cans of beer per week     Comment: daily    Drug use:  Not on file    Sexual activity: Yes     Partners: Female     Review of Systems   Constitutional: Positive for fatigue. Negative for chills and fever.   HENT: Negative for postnasal drip and sore throat.         Difficulty swallowing solids and liquids (since extubation in November) with regurgitation   Eyes: Negative.    Respiratory: Negative for chest tightness and shortness of breath.    Cardiovascular: Negative for chest pain, palpitations and leg swelling.   Gastrointestinal: Positive for abdominal pain, diarrhea and nausea. Negative for blood in stool and constipation.   Endocrine: Negative.    Genitourinary: Negative for dysuria.   Musculoskeletal: Negative for arthralgias, back pain and joint swelling.   Skin: Negative.    Allergic/Immunologic: Negative.    Neurological: Negative for light-headedness and headaches.   Psychiatric/Behavioral: Positive for confusion and hallucinations. Negative for dysphoric mood. The patient is not nervous/anxious and is not hyperactive.      Objective:     Vital Signs (Most Recent):  Temp: 98 °F (36.7 °C) (01/23/19 1029)  Pulse: 64 (01/23/19 1424)  Resp: 18 (01/23/19 1424)  BP: (!) 146/66 (01/23/19 1424)  SpO2: 100 % (01/23/19 1424) Vital Signs (24h Range):  Temp:  [98 °F (36.7 °C)] 98 °F (36.7 °C)  Pulse:  [62-68] 64  Resp:  [18-20] 18  SpO2:  [97 %-100 %] 100 %  BP: (107-146)/(60-68) 146/66     Weight: 72.6 kg (160 lb) (01/23/19 1029)  Body mass index is 26.63 kg/m².      Intake/Output Summary (Last 24 hours) at 1/23/2019 1547  Last data filed at 1/23/2019 1326  Gross per 24 hour   Intake 500 ml   Output --   Net 500 ml       Lines/Drains/Airways     Airway                 Airway - Non-Surgical 12/06/18 1134 Nasal Cannula 48 days          Peripheral Intravenous Line                 Peripheral IV - Single Lumen 01/23/19 1050 Left Antecubital less than 1 day                Physical Exam   Constitutional: He is oriented to person, place, and time. Vital signs are normal. He  appears well-developed and well-nourished.   HENT:   Head: Normocephalic and atraumatic.   Mouth/Throat: Abnormal dentition.   Eyes: EOM are normal. Pupils are equal, round, and reactive to light.   Neck:   Scars from bilateral CEA   Cardiovascular: Normal rate, S1 normal, S2 normal, normal heart sounds and intact distal pulses.   No murmur heard.  Pulses:       Radial pulses are 2+ on the right side, and 2+ on the left side.        Dorsalis pedis pulses are 2+ on the right side, and 2+ on the left side.   Pulmonary/Chest: Effort normal and breath sounds normal.   Abdominal: Soft. Bowel sounds are normal. There is tenderness in the right upper quadrant and right lower quadrant.       Musculoskeletal: Normal range of motion. He exhibits no edema.   Lymphadenopathy:        Head (right side): No submental, no submandibular and no tonsillar adenopathy present.        Head (left side): No submental, no submandibular and no tonsillar adenopathy present.   Neurological: He is alert and oriented to person, place, and time. He has normal strength.   Skin: Skin is warm and dry.   Psychiatric: He has a normal mood and affect. His speech is normal.   Nursing note and vitals reviewed.      Significant Labs:  All pertinent lab results from the last 24 hours have been reviewed.    Significant Imaging:  Imaging results within the past 24 hours have been reviewed.

## 2019-01-23 NOTE — CONSULTS
[facility]  General Surgery  Consult Note    Patient Name: Jose English  MRN: [id number]  Code Status: Prior  Admission Date: 1/23/2019  Hospital Length of Stay: 0 days  Attending Physician: Vasquez Bhardwaj, *  Primary Care Provider: Jarret Calle MD    Patient information was obtained from patient, spouse/SO, past medical records and ER records.     Inpatient consult to General surgery  Consult performed by: Tim Hallman MD  Consult ordered by: Vasquez Bhardwaj MD        Subjective:     Principal Problem: <principal problem not specified>    History of Present Illness: 69 yo male with with hx of partial left nephrectomy for renal cancer, previous bladder cancer x2, CAD, TIA, DM (on metformin), GERD, PAD, HTN, PVD (on asa and plavix) s/p PTA L SFA and PTA and stent placement of left iliac artery, aorto-bifem bypass done in 2008 and ischemic colitis with bowel perforation s/p R colectomy by Dr. Guajardo at [facility] on 10/31/18. His course was complicated by respiratory failure, prolonged intubation and long ICU stay. He presented to the hospital 2 weeks ago for continued diarrhea and abdominal pain. 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      He was admitted about a month ago for electrolyte abnormalities and diarrhea at [facility]. Just recently released 5 days ago from  for diarrhea and electrolyte abnormalities as well. Also noted during that admission to have leukocytosis and IR drain placed on right side of the abdomen without return of purulence. Received approximately 1 week of antibiotics but was discontinued on Friday along with the drain. Underwent colonoscopy on 12/06/18 for this which was unremarkable. He had a drain removed yesterday. He has been checked for c diff several times and been treated with flagyl.      His history of diarrhea is longstanding. He states that it  initially started 2 years ago prior to his bowel perforation. He was referred to Dr. Byrne at [facility] who did extensive work up and was unable to find source, possible ulcerative colitis vs. IBS-D. He has tried may medications including his current medications - imodium. He has also been on xifaxin previously.  At the time, he denied any history of Afib. Having frequent PVCs. No previous MI.    He was admitted on 1/13/19 after abdominal CT revealed a phlegmon in the R mid abdomen where is anastomosis is. He was started on IV antibiotics and a diet. His pain and symptoms improved. GI saw him and set up outpatient follow up. He was also started on cholestyramine. He presents today with worsened symptoms starting Sunday. He has noticed intermittent sharp pain on the R lateral abdomen that is not associated with meals. When the pain becomes severe, he becomes nauseated and has non-bloody and non-bilious emesis. He has not had any new food nor has had any sick contacts. He denies fevers, chills, dysuria, chest pain or dyspnea. He is still on antibiotics.    Current Facility-Administered Medications on File Prior to Encounter   Medication    lidocaine HCl 2% urojet     Current Outpatient Medications on File Prior to Encounter   Medication Sig    ACCU-CHEK CEASAR CONTROL SOLN Soln USE ONE TIME DAILY    ACCU-CHEK CEASAR PLUS TEST STRP Strp TEST TWO TIMES DAILY    ACCU-CHEK SOFTCLIX LANCETS Misc TEST TWO TIMES DAILY    allopurinol (ZYLOPRIM) 100 MG tablet Take 100 mg by mouth 2 (two) times daily.    amlodipine (NORVASC) 10 MG tablet TAKE 1 TABLET ONE TIME DAILY    aspirin (ECOTRIN) 81 MG EC tablet Take 81 mg by mouth once daily.    atorvastatin (LIPITOR) 80 MG tablet Take 80 mg by mouth once daily.    b complex vitamins tablet Take 1 tablet by mouth once daily.    BD ALCOHOL SWABS PadM TEST TWO TIMES DAILY    bismuth subsalicylate (KAOPECTATE, BISMUTH SUBSALICY,) 262 mg Tab Take by mouth.    cholestyramine  (QUESTRAN) 4 gram packet Take 1 packet (4 g total) by mouth 2 (two) times daily.    clopidogrel (PLAVIX) 75 mg tablet TAKE 1 TABLET EVERY EVENING    dicyclomine (BENTYL) 20 mg tablet Take 20 mg by mouth every 6 (six) hours.    donepezil (ARICEPT) 5 MG tablet Take 10 mg by mouth every evening.    fish oil-omega-3 fatty acids 300-1,000 mg capsule Take 2 g by mouth once daily.    gabapentin (NEURONTIN) 300 MG capsule Take 300 mg by mouth every evening.    hydrochlorothiazide (HYDRODIURIL) 25 MG tablet TAKE 1 TABLET EVERY MORNING    HYDROcodone-acetaminophen (NORCO) 5-325 mg per tablet Take 1 tablet by mouth every 6 (six) hours as needed.    HYDROcodone-acetaminophen (NORCO) 5-325 mg per tablet Take 1 tablet by mouth every 4 (four) hours as needed for Pain.    lancets (ONE TOUCH DELICA LANCETS) 33 gauge Misc 2 lancets by Misc.(Non-Drug; Combo Route) route 2 (two) times daily.    lipase/protease/amylase (CREON ORAL) Take by mouth once daily.     loperamide (IMODIUM) 2 mg capsule Take 2 mg by mouth 4 (four) times daily as needed for Diarrhea.    metFORMIN (GLUCOPHAGE) 1000 MG tablet Take 1 tablet (1,000 mg total) by mouth 2 (two) times daily with meals.    metoprolol tartrate (LOPRESSOR) 50 MG tablet TAKE 1 TABLET TWICE DAILY    mv,Ca,min-iron-FA-lycopene 8 mg iron- 200 mcg-600 mcg Tab Take 1 capsule by mouth once daily.    ondansetron (ZOFRAN) 4 MG tablet Take 4 mg by mouth every 8 (eight) hours as needed for Nausea.    pantoprazole (PROTONIX) 40 MG tablet Take 40 mg by mouth once daily.    valsartan (DIOVAN) 320 MG tablet Take 320 mg by mouth once daily.       Review of patient's allergies indicates:   Allergen Reactions    Amoxicillin Hives    Zolpidem tartrate Hallucinations       Past Medical History:   Diagnosis Date    Bladder cancer     2 times    BPH (benign prostatic hypertrophy)     Cancer of kidney     1/2    Colon polyp     Coronary artery disease     GERD (gastroesophageal reflux  disease)     Hypertension     PAD (peripheral artery disease)     Peripheral vascular disease      Past Surgical History:   Procedure Laterality Date    abdominal aorta bypass by fem      BIOPSY, BLADDER N/A 6/18/2013    Performed by Shyam Bucio MD at [facility] OR [address] [place] [address]    BLADDER SURGERY      COLONOSCOPY  12/06/2018    COLONOSCOPY N/A 12/6/2018    [facility] ENDO    CYSTOSCOPY      CYSTOSCOPY N/A 4/17/2014    [facility]    CYSTOSCOPY N/A 6/18/2013    Performed by Shyam Bucio MD at [facility] OR [address] [place] [address]    DILATION, URETHRA N/A 4/17/2014    Performed by Andrea Mcknight MD at [facility] OR [address] [place] [address]    FULGURATION, BLADDER  6/18/2013    Performed by Shyam Bucio MD at [facility] OR [address] [place] [address]    multiple angiosplastia      NEPHRECTOMY      partial nephrectomy left    PTA, PERIPHERAL VESSEL Bilateral 8/21/2018    [facility] CATH    PYELOGRAM, RETROGRADE Bilateral 6/18/2013    Performed by Shyam Bucio MD at [facility] OR [address] [place] [address]    TURP, USING GREEN LIGHT LASER N/A 4/17/2014    Performed by Andrea Mcknight MD at [facility] OR [address] [place] [address]     Family History     Problem Relation (Age of Onset)    Cancer Father, Maternal Uncle        Tobacco Use    Smoking status: Former Smoker     Packs/day: 1.50     Years: 40.00     Pack years: 60.00     Types: Cigarettes     Last attempt to quit: 2/9/2008     Years since quitting: 10.9   Substance and Sexual Activity    Alcohol use: Yes     Alcohol/week: 1.2 oz     Types: 2 Cans of beer per week     Comment: daily    Drug use: Not on file    Sexual activity: Yes     Partners: Female     Review of Systems   Constitutional: Negative for activity change, appetite change, fever and unexpected weight change.   Respiratory: Negative.    Gastrointestinal: Positive for abdominal pain, diarrhea, nausea and vomiting. Negative for abdominal distention and blood in stool.    Genitourinary: Negative.    Musculoskeletal: Negative.    Skin: Negative.    Neurological: Positive for dizziness. Negative for light-headedness and numbness.   Hematological: Negative for adenopathy. Does not bruise/bleed easily.   Psychiatric/Behavioral: Negative.      Objective:     Vital Signs (Most Recent):  Temp: 98 °F (36.7 °C) (01/23/19 1029)  Pulse: 64 (01/23/19 1424)  Resp: 18 (01/23/19 1424)  BP: (!) 146/66 (01/23/19 1424)  SpO2: 100 % (01/23/19 1424) Vital Signs (24h Range):  Temp:  [98 °F (36.7 °C)] 98 °F (36.7 °C)  Pulse:  [62-68] 64  Resp:  [18-20] 18  SpO2:  [97 %-100 %] 100 %  BP: (107-146)/(60-68) 146/66     Weight: 72.6 kg (160 lb)  Body mass index is 26.63 kg/m².    Physical Exam   Constitutional: He is oriented to person, place, and time. He appears well-developed and well-nourished. No distress.   HENT:   Head: Normocephalic and atraumatic.   Neck: Normal range of motion. Neck supple.   Cardiovascular: Normal rate and regular rhythm.   Pulmonary/Chest: Effort normal and breath sounds normal.   Abdominal: Soft. Bowel sounds are normal. He exhibits no distension. There is tenderness in the right upper quadrant and right lower quadrant.       Well-healed midline incision   Musculoskeletal: Normal range of motion. He exhibits no edema.   Neurological: He is alert and oriented to person, place, and time.   Skin: Skin is warm and dry. He is not diaphoretic.       Significant Labs:  CBC:   Recent Labs   Lab 01/23/19  1052   WBC 12.32   RBC 3.04*   HGB 9.1*   HCT 28.7*   *   MCV 94   MCH 29.9   MCHC 31.7*     CMP:   Recent Labs   Lab 01/23/19  1052   *   CALCIUM 8.5*   ALBUMIN 2.1*   PROT 6.4      K 3.9   CO2 25      BUN 21   CREATININE 1.2   ALKPHOS 114   ALT 17   AST 22   BILITOT 0.3       Significant Diagnostics:  CT abd/pelvis 1/23/19:     1. Continued inflammation about the right colonic anastomosis without obstruction.  There has been some interval resolution of  punctate foci of air in the region, no findings to suggest abscess.  Continued follow-up is advised.  Please see above for complete description.  2. Findings suggesting hepatic steatosis, correlation with LFTs advised.  3. Stable bilateral adrenal nodules.  4. Stable left renal lesion.  5. Surgical changes of aortic bypass, noting suspected occlusion of the distal left graft, similar to the previous exam.  6. Several additional findings above.    Assessment/Plan:     Phlegmon    -Patient with known phlegmon that seems to be improved from his last imaging. Continue with antibiotics. May have worsened symptoms to non-surgical issues. Seems to be hydrated based on labs. Ok to keep follow up with both surgery and GI. Continue with anti-diarrheal medications  -May need tramadol for intermittent pain that is he getting to cause nausea and emesis  -No acute surgical intervention for now  -Surgery will sign off. Discussed with ED and general surgery staff       VTE Risk Mitigation (From admission, onward)    None          Thank you for your consult. I will sign off. Please contact us if you have any additional questions.    Tim Hallman MD  General Surgery  [facility]

## 2019-01-23 NOTE — CONSULTS
[facility]  Gastroenterology  Consult Note    Patient Name: Jose English  MRN: [id number]  Admission Date: 1/23/2019  Hospital Length of Stay: 0 days  Code Status: Prior   Attending Provider: Vasquez Bhardwaj, *   Consulting Provider: Oswaldo Marion MD  Primary Care Physician: Jarret Calle MD  Principal Problem:<principal problem not specified>    Consults  Subjective:     HPI:  Mr English is a 68 year old male with history of renal cancer (s/p partial nephrectomy), bladder cancer, CAD, TIA, DM GERD, HTN, PVD on DAPT s/p multiple stents, ischemic colitis s/p right colectomy (10/31/18 at [facility]) who is presenting to the hospital with abdominal pain and diarrhea. He reports that he has had chronic diarrhea for ~2 years. Prior to 2 years ago he would move his bowels ~2/day formed. He notes that 2 years ago however his bowels changed to 4-5/day watery bowel movements, non-bloody, non-mucus, painless. He noted he tried various things including dietary elimination without improvement. He had a workup including Egd/colonoscopy/VCE (reportedly with iron deficiency) which was negative.He notes that on halloween (2018) he developped severe mid-epigastric pain with associated nausea and vomiting. He was seen at OSH ([facility]) and required right colectomy due to ischemia which was followed by a prolonged ICU stay with prolonged intubation for respiratory failure. Since then he has developed increasing diarrhea.     He reports that his current bowel frequency is between ~10 bowel movements/24 hours. He generally passes little amount of stool, predominantly watery, non-bloody. He notes that he has no nocturnal bowel movements. Will not have bowel movement if he does not eat but denies any clear correlation with PO intake. He is very distressed by this change in bowel habits noting that he has lost ~45 lbs since 10/2018 and is unable to leave the house due to frequency of bowel movements. He has been seen  by Dr. Byrne (GI) for evaluation but reportedly has not improved. He reports that he has tried multiple agents including imodium, lomotil, tincture, and cholestyramine, rifaximin without effect. His last colonoscopy was 12/2018 which was negative (no biopsies taken) due to diarrhea while hospitalized at [facility].     He was recently admitted at [facility] due to leucocytosis with concern for abscess at the site of his anastomosis for which an IR drain was placed (removed 1/11). He notes that he presented to the hospital on 1/12 due to acute worsening of RLQ abdominal pain and nausea and vomiting. He was admitted to general surgery's service on 1/12 and discharged 1/14 with 7 day course of cipro/flagyl. He was additionally seen by GI at that time for his diarrhea and cholestyramine was added which has not shown any benefit. Plan was for GI follow up and has appointment in [facility] - GI suggested CT in 10-12 weeks and then EGD for duodenal bx, colonoscopy with R/I/TI bx if CT negative.     At the time of consult labs were remarkable for Hb 9.1, WBC 12, Cr 1.2 from 0.7, low mag, lipase 143 and Lactic 2.2.     Prior Endo hx  - [facility] one colonoscopy [facility]     Colonoscopy. (12/6/18) Provider: Dr. BARB Dickson. Indication: Diarrhea. Extent: Ileocolonic anastamosis. Preparation:unknown.  - normal examination  - no biopsies taken             Past Medical History:   Diagnosis Date    Bladder cancer     2 times    BPH (benign prostatic hypertrophy)     Cancer of kidney     1/2    Colon polyp     Coronary artery disease     GERD (gastroesophageal reflux disease)     Hypertension     PAD (peripheral artery disease)     Peripheral vascular disease        Past Surgical History:   Procedure Laterality Date    abdominal aorta bypass by fem      BIOPSY, BLADDER N/A 6/18/2013    Per[facility] at [facility] OR [address] [place] [address]    BLADDER SURGERY      COLONOSCOPY  12/06/2018    COLONOSCOPY N/A 12/6/2018    Performed  [facility]on [facility] at [facility] ENDO    CYSTOSCOPY      CYSTOSCOPY N/A 4/17/2014    Performed by [facility] at [facility] OR [facility]    CYSTOSCOPY N/A 6/18/2013    [facility]    DILATION, URETHRA N/A 4/17/2014    [facility]    FULGURATION, BLADDER  6/18/2013    Performed by Sh[facility]am Bucio MD at [facility] OR [facility]    multiple angiosplastia      NEPHRECTOMY      partial nephrectomy left    PTA, PERIPHERAL VESSEL Bilateral 8/21/2018    Per[facility] at [facility] CATH    PYELOGRAM, RETROGRADE Bilateral 6/18/2013    Performed by Shyam Bucio MD at [facility] OR [address] [place] [address]    TURP, USING GREEN LIGHT LASER N/A 4/17/2014    Performed by Andrea Mcknight MD at [facility] OR [facility]       Review of patient's allergies indicates:   Allergen Reactions    Amoxicillin Hives    Zolpidem tartrate Hallucinations     Family History     Problem Relation (Age of Onset)    Cancer Father, Maternal Uncle        Tobacco Use    Smoking status: Former Smoker     Packs/day: 1.50     Years: 40.00     Pack years: 60.00     Types: Cigarettes     Last attempt to quit: 2/9/2008     Years since quitting: 10.9   Substance and Sexual Activity    Alcohol use: Yes     Alcohol/week: 1.2 oz     Types: 2 Cans of beer per week     Comment: daily    Drug use: Not on file    Sexual activity: Yes     Partners: Female     Review of Systems   Constitutional: Positive for fatigue. Negative for chills and fever.   HENT: Negative for postnasal drip and sore throat.         Difficulty swallowing solids and liquids (since extubation in November) with regurgitation   Eyes: Negative.    Respiratory: Negative for chest tightness and shortness of breath.    Cardiovascular: Negative for chest pain, palpitations and leg swelling.   Gastrointestinal: Positive for abdominal pain, diarrhea and nausea. Negative for blood in stool and constipation.   Endocrine:  Negative.    Genitourinary: Negative for dysuria.   Musculoskeletal: Negative for arthralgias, back pain and joint swelling.   Skin: Negative.    Allergic/Immunologic: Negative.    Neurological: Negative for light-headedness and headaches.   Psychiatric/Behavioral: Positive for confusion and hallucinations. Negative for dysphoric mood. The patient is not nervous/anxious and is not hyperactive.      Objective:     Vital Signs (Most Recent):  Temp: 98 °F (36.7 °C) (01/23/19 1029)  Pulse: 64 (01/23/19 1424)  Resp: 18 (01/23/19 1424)  BP: (!) 146/66 (01/23/19 1424)  SpO2: 100 % (01/23/19 1424) Vital Signs (24h Range):  Temp:  [98 °F (36.7 °C)] 98 °F (36.7 °C)  Pulse:  [62-68] 64  Resp:  [18-20] 18  SpO2:  [97 %-100 %] 100 %  BP: (107-146)/(60-68) 146/66     Weight: 72.6 kg (160 lb) (01/23/19 1029)  Body mass index is 26.63 kg/m².      Intake/Output Summary (Last 24 hours) at 1/23/2019 1547  Last data filed at 1/23/2019 1326  Gross per 24 hour   Intake 500 ml   Output --   Net 500 ml       Lines/Drains/Airways     Airway                 Airway - Non-Surgical 12/06/18 1134 Nasal Cannula 48 days          Peripheral Intravenous Line                 Peripheral IV - Single Lumen 01/23/19 1050 Left Antecubital less than 1 day                Physical Exam   Constitutional: He is oriented to person, place, and time. Vital signs are normal. He appears well-developed and well-nourished.   HENT:   Head: Normocephalic and atraumatic.   Mouth/Throat: Abnormal dentition.   Eyes: EOM are normal. Pupils are equal, round, and reactive to light.   Neck:   Scars from bilateral CEA   Cardiovascular: Normal rate, S1 normal, S2 normal, normal heart sounds and intact distal pulses.   No murmur heard.  Pulses:       Radial pulses are 2+ on the right side, and 2+ on the left side.        Dorsalis pedis pulses are 2+ on the right side, and 2+ on the left side.   Pulmonary/Chest: Effort normal and breath sounds normal.   Abdominal: Soft. Bowel  sounds are normal. There is tenderness in the right upper quadrant and right lower quadrant.       Musculoskeletal: Normal range of motion. He exhibits no edema.   Lymphadenopathy:        Head (right side): No submental, no submandibular and no tonsillar adenopathy present.        Head (left side): No submental, no submandibular and no tonsillar adenopathy present.   Neurological: He is alert and oriented to person, place, and time. He has normal strength.   Skin: Skin is warm and dry.   Psychiatric: He has a normal mood and affect. His speech is normal.   Nursing note and vitals reviewed.      Significant Labs:  All pertinent lab results from the last 24 hours have been reviewed.    Significant Imaging:  Imaging results within the past 24 hours have been reviewed.    Assessment/Plan:     Diarrhea    68 year old male with severe vascular disease (PAD/PVD/CAD/TIA) s/p PTA L SFA and PTA and stent placement of left iliac artery, aorto-bifem bypass in 2008 and ischemic colitis with bowel perf s/p R colectomy at  10/31/2018 c/b phlegmon s/p IR drain and recurrent antibiotics. Prior to these more recent issues he has had chronic diarrhea (2 years +) and has had workup with Dr. Byrne at [facility] having tried and had minimal response to imodium, rifaximin, lomotil, cholestyramine - his diarrhea has worsened since his colectomy to ~10 BM/day. Patient had EGD/colon, VCE in 2017 but is not clear on results. Had c-scope 12/2018 from the rectum to the ileocolonic anastomosis with the entire examined colon appeared normal.       Plan:    · Maintain GI follow up 2/7 with Dr. Dang - order placed for US abdo limited for evaluation of biliary disease in the setting of elevated lipase, mesenteric US in vascular lab for ischemic evaluation  · EGD outpatient  · Labs placed for further evaluation of diarrhea - GI to follow as part of outpatient evaluation. Patient will be instructed to  container for stool sample for additional  stool studies.   · Maintain proper hydration and electrolyte replacement - if patient needs suggest PCP to manage close lab follow up and infusions for electrolytes   · No current inpatient evaluation necessary     Case discussed with Dr. Boyce and Dr. Dang.          Thank you for your consult. I will sign off. Please contact us if you have any additional questions.    Oswadlo Marion MD  Gastroenterology  [facility]

## 2019-01-23 NOTE — HPI
67 yo male with with hx of partial left nephrectomy for renal cancer, previous bladder cancer x2, CAD, TIA, DM (on metformin), GERD, PAD, HTN, PVD (on asa and plavix) s/p PTA L SFA and PTA and stent placement of left iliac artery, aorto-bifem bypass done in 2008 and ischemic colitis with bowel perforation s/p R colectomy by Dr. Guajardo at [facility] on 10/31/18. His course was complicated by respiratory failure, prolonged intubation and long ICU stay. He presented to the hospital 2 weeks ago for continued diarrhea and abdominal pain. He has been to many different hospitals in the last several years including, vascular physician in [place], [place], .      He was admitted about a month ago for electrolyte abnormalities and diarrhea at [place]. Just recently released 5 days ago from  for diarrhea and electrolyte abnormalities as well. Also noted during that admission to have leukocytosis and IR drain placed on right side of the abdomen without return of purulence. Received approximately 1 week of antibiotics but was discontinued on Friday along with the drain. Underwent colonoscopy on 12/06/18 for this which was unremarkable. He had a drain removed yesterday. He has been checked for c diff several times and been treated with flagyl.      His history of diarrhea is longstanding. He states that it initially started 2 years ago prior to his bowel perforation. He was referred to Dr. Byrne at [facility] who did extensive work up and was unable to find source, possible ulcerative colitis vs. IBS-D. He has tried may medications including his current medications - imodium. He has also been on xifaxin previously.  At the time, he denied any history of Afib. Having frequent PVCs. No previous MI.    He was admitted on 1/13/19 after abdominal CT revealed a phlegmon in the R mid abdomen where is anastomosis is. He was started on IV antibiotics and a diet. His pain and symptoms improved. GI saw him and set up outpatient  follow up. He was also started on cholestyramine. He presents today with worsened symptoms starting Sunday. He has noticed intermittent sharp pain on the R lateral abdomen that is not associated with meals. When the pain becomes severe, he becomes nauseated and has non-bloody and non-bilious emesis. He has not had any new food nor has had any sick contacts. He denies fevers, chills, dysuria, chest pain or dyspnea. He is still on antibiotics.

## 2019-01-23 NOTE — ASSESSMENT & PLAN NOTE
68 year old male with severe vascular disease (PAD/PVD/CAD/TIA) s/p PTA L SFA and PTA and stent placement of left iliac artery, aorto-bifem bypass in 2008 and ischemic colitis with bowel perf s/p R colectomy at  10/31/2018 c/b phlegmon s/p IR drain and recurrent antibiotics. Prior to these more recent issues he has had chronic diarrhea (2 years +) and has had workup with Dr. Byrne at [facility] having tried and had minimal response to imodium, rifaximin, lomotil, cholestyramine - his diarrhea has worsened since his colectomy to ~10 BM/day. Patient had EGD/colon, VCE in 2017 but is not clear on results. Had c-scope 12/2018 from the rectum to the ileocolonic anastomosis with the entire examined colon appeared normal.       Plan:    · Maintain GI follow up 2/7 with Dr. Dang - order placed for US abdo limited for evaluation of biliary disease in the setting of elevated lipase, mesenteric US in vascular lab for ischemic evaluation  · EGD outpatient  · Labs placed for further evaluation of diarrhea - GI to follow. Patient will be instructed to  container for stool sample for additional stool studies.   · Maintain proper hydration and electrolyte replacement - if patient needs suggest PCP to manage close lab follow up and infusions for electrolytes   · No current inpatient evaluation necessary     Case discussed with Dr. Boyce and Dr. Dang.

## 2019-01-24 ENCOUNTER — TELEPHONE (OUTPATIENT)
Dept: GASTROENTEROLOGY | Facility: CLINIC | Age: 69
End: 2019-01-24

## 2019-01-24 NOTE — TELEPHONE ENCOUNTER
----- Message from Emani Riaz sent at 1/24/2019  9:20 AM CST -----  Contact: Wife- Ele- [phone]  Laurence- pts wife called to discuss getting stool sample for the pt- was seen by Dr. Dang in the hospital yesterday and was told to call in- please contact Ele at [phone]

## 2019-01-28 ENCOUNTER — TELEPHONE (OUTPATIENT)
Dept: ENDOSCOPY | Facility: HOSPITAL | Age: 69
End: 2019-01-28

## 2019-01-28 NOTE — TELEPHONE ENCOUNTER
"Attempted contact to schedule EGD.  Phone just rang-no voicemail available.  "Unable to reach" letter sent to address in chart.    "

## 2019-02-01 ENCOUNTER — HOSPITAL ENCOUNTER (OUTPATIENT)
Dept: VASCULAR SURGERY | Facility: CLINIC | Age: 69
Discharge: HOME | End: 2019-02-01
Attending: STUDENT IN AN ORGANIZED HEALTH CARE EDUCATION/TRAINING PROGRAM
Payer: MEDICARE

## 2019-02-01 DIAGNOSIS — R19.7 DIARRHEA, UNSPECIFIED TYPE: ICD-10-CM

## 2019-02-01 PROCEDURE — 93975 PR DUPLEX ABD/PEL VASC STUDY,COMPLETE: ICD-10-PCS | Mod: S$GLB,,, | Performed by: SURGERY

## 2019-02-01 PROCEDURE — 93975 VASCULAR STUDY: CPT | Mod: S$GLB,,, | Performed by: SURGERY

## 2019-02-06 ENCOUNTER — OFFICE VISIT (OUTPATIENT)
Dept: SURGERY | Facility: CLINIC | Age: 69
End: 2019-02-06
Payer: MEDICARE

## 2019-02-06 VITALS
WEIGHT: 159.06 LBS | SYSTOLIC BLOOD PRESSURE: 116 MMHG | BODY MASS INDEX: 26.5 KG/M2 | HEIGHT: 65 IN | TEMPERATURE: 98 F | HEART RATE: 72 BPM | DIASTOLIC BLOOD PRESSURE: 57 MMHG

## 2019-02-06 DIAGNOSIS — R19.7 DIARRHEA, UNSPECIFIED TYPE: Primary | ICD-10-CM

## 2019-02-06 PROCEDURE — 99999 PR PBB SHADOW E&M-EST. PATIENT-LVL III: ICD-10-PCS | Mod: PBBFAC,,, | Performed by: SURGERY

## 2019-02-06 PROCEDURE — 99212 OFFICE O/P EST SF 10 MIN: CPT | Mod: S$GLB,,, | Performed by: SURGERY

## 2019-02-06 PROCEDURE — 99999 PR PBB SHADOW E&M-EST. PATIENT-LVL III: CPT | Mod: PBBFAC,,, | Performed by: SURGERY

## 2019-02-06 PROCEDURE — 3078F DIAST BP <80 MM HG: CPT | Mod: CPTII,S$GLB,, | Performed by: SURGERY

## 2019-02-06 PROCEDURE — 3078F PR MOST RECENT DIASTOLIC BLOOD PRESSURE < 80 MM HG: ICD-10-PCS | Mod: CPTII,S$GLB,, | Performed by: SURGERY

## 2019-02-06 PROCEDURE — 99212 PR OFFICE/OUTPT VISIT, EST, LEVL II, 10-19 MIN: ICD-10-PCS | Mod: S$GLB,,, | Performed by: SURGERY

## 2019-02-06 PROCEDURE — 3074F PR MOST RECENT SYSTOLIC BLOOD PRESSURE < 130 MM HG: ICD-10-PCS | Mod: CPTII,S$GLB,, | Performed by: SURGERY

## 2019-02-06 PROCEDURE — 3074F SYST BP LT 130 MM HG: CPT | Mod: CPTII,S$GLB,, | Performed by: SURGERY

## 2019-02-06 NOTE — PROGRESS NOTES
Patient improving    Has completed abx course    Abdomen soft and non-tender    Will observe for now, as diarrhea and cramps are improving

## 2019-02-07 ENCOUNTER — OFFICE VISIT (OUTPATIENT)
Dept: GASTROENTEROLOGY | Facility: CLINIC | Age: 69
End: 2019-02-07
Payer: MEDICARE

## 2019-02-07 VITALS
DIASTOLIC BLOOD PRESSURE: 62 MMHG | WEIGHT: 159.19 LBS | HEART RATE: 71 BPM | HEIGHT: 65 IN | BODY MASS INDEX: 26.52 KG/M2 | SYSTOLIC BLOOD PRESSURE: 108 MMHG

## 2019-02-07 DIAGNOSIS — K86.0 ALCOHOL-INDUCED CHRONIC PANCREATITIS: Primary | ICD-10-CM

## 2019-02-07 PROCEDURE — 3074F PR MOST RECENT SYSTOLIC BLOOD PRESSURE < 130 MM HG: ICD-10-PCS | Mod: CPTII,GC,S$GLB, | Performed by: STUDENT IN AN ORGANIZED HEALTH CARE EDUCATION/TRAINING PROGRAM

## 2019-02-07 PROCEDURE — 3078F PR MOST RECENT DIASTOLIC BLOOD PRESSURE < 80 MM HG: ICD-10-PCS | Mod: CPTII,GC,S$GLB, | Performed by: STUDENT IN AN ORGANIZED HEALTH CARE EDUCATION/TRAINING PROGRAM

## 2019-02-07 PROCEDURE — 3078F DIAST BP <80 MM HG: CPT | Mod: CPTII,GC,S$GLB, | Performed by: STUDENT IN AN ORGANIZED HEALTH CARE EDUCATION/TRAINING PROGRAM

## 2019-02-07 PROCEDURE — 99213 PR OFFICE/OUTPT VISIT, EST, LEVL III, 20-29 MIN: ICD-10-PCS | Mod: GC,S$GLB,, | Performed by: STUDENT IN AN ORGANIZED HEALTH CARE EDUCATION/TRAINING PROGRAM

## 2019-02-07 PROCEDURE — 99999 PR PBB SHADOW E&M-EST. PATIENT-LVL II: CPT | Mod: PBBFAC,GC,, | Performed by: STUDENT IN AN ORGANIZED HEALTH CARE EDUCATION/TRAINING PROGRAM

## 2019-02-07 PROCEDURE — 99213 OFFICE O/P EST LOW 20 MIN: CPT | Mod: GC,S$GLB,, | Performed by: STUDENT IN AN ORGANIZED HEALTH CARE EDUCATION/TRAINING PROGRAM

## 2019-02-07 PROCEDURE — 3074F SYST BP LT 130 MM HG: CPT | Mod: CPTII,GC,S$GLB, | Performed by: STUDENT IN AN ORGANIZED HEALTH CARE EDUCATION/TRAINING PROGRAM

## 2019-02-07 PROCEDURE — 99999 PR PBB SHADOW E&M-EST. PATIENT-LVL II: ICD-10-PCS | Mod: PBBFAC,GC,, | Performed by: STUDENT IN AN ORGANIZED HEALTH CARE EDUCATION/TRAINING PROGRAM

## 2019-02-07 NOTE — PROGRESS NOTES
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  Referring Provider/PCP: Jarret Calle MD    History of Present Illness:  Jose English is a 69 y.o. male with a history of history of renal cancer (s/p partial nephrectomy), bladder cancer, CAD, TIA, DM GERD, HTN, PVD on DAPT s/p multiple stents, ischemic colitis s/p right colectomy (10/31/18 at [facility]) who is presenting for follow-up evaluation of diarrhea    The patient presented to the ED on 1/23 due to increased diarrhea and progressive weight loss. The patient reports that he had chronic diarrhea for ~2 years. He notes that for the past 2 years he had ~5BMs daily, watery, non-bloody with floating stools that looked pale yellow. He did not have significant abdominal pain initially, but he progressively had worsening lower abdominal pain that is non-radiating. He denies blood associated with BM. He was followed at OSH for this and underwent and EGD, report is not available at this time,and unclear if he was evaluated for celiac disease. Patient also reports having a colonoscopy, and a VCE, all unrevealing for cause of diarrhea. Multiple treatments including imodium, Lomotil, questran and rifaximin were tried with no benefit. Also he tried altering diet, and eating more healthy, but with no benefit.    Patient presented around end of Oct 2018 with acute right lower abdominal pain to [facility]. He was diagnosed with ischemic colitis, and underwent a right teressa-colectomy, with a complicated inpatient course. Following recovery, his bowel movements increased to ~10BM daily with the same characteristic, and described as small amounts of stool at the bottom of the toilet, and "greasy" pale looking stool floating in the toilet bowel. He states that this is intermittent, and seems to follow meals. Also since then, he noted significantintermittent RLQ abdominal pain. Post-prandially, he had significant " lower abdominal sharp pain. He notes intermittent nausea and vomiting. Reports weight loss of ~50lbs since October 2018 surgery. He endorses a good appetite.  In 12/2018, patient underwent a colonoscopy, reported as normal, with no biopsies obtained.  Beginning of January, he presented to [facility] with leukocytosis and concern for abscess at site of anastomosis, underwent IR drainage.     Of note, patient states that he is an ex-smoker for ~40 years, and quit ~10-15 years ago. He endorses using alcohol for a long time (>50 years), an average of a case of beer/week, and occasionally drinks more than that. He drinks hard liquor as well, but more occasional.    When seen in the ED on 1/23, patient underwent a CT scan with IV contrast which was unrevealing. Lab workup was unrevealing. Lab and imaging studies were obtained since then.       Review of Systems:   Constitutional: no fever, chills. Weight loss. General fatigue  Eyes: no visual changes   ENT: no sore throat or dysphagia  Respiratory: no cough or shortness of breath   Cardiovascular: no chest pain or palpitations   Gastrointestinal: as per HPI  Hematologic/Lymphatic: no easy bruising or lymphadenopathy   Musculoskeletal: no arthralgias or myalgias   Neurological: no change in mental status  Behavioral/Psych: no change in mood    Medical History:  Past Medical History:   Diagnosis Date    Bladder cancer     2 times    BPH (benign prostatic hypertrophy)     Cancer of kidney     1/2    Colon polyp     Coronary artery disease     GERD (gastroesophageal reflux disease)     Hypertension     PAD (peripheral artery disease)     Peripheral vascular disease        Past Surgical History:   Procedure Laterality Date    abdominal aorta bypass by fem      BIOPSY, BLADDER N/A 6/18/2013    Performed by [facility] at [facility] OR [address] [place] [address]    BLADDER SURGERY      COLONOSCOPY  12/06/2018    COLONOSCOPY N/A 12/6/2018    Performed by [facility]on [facility] at [facility]  "ENDO    CYSTOSCOPY      CYSTOSCOPY N/A 2014    [facility] at [facility] OR [facility]    CYSTOSCOPY N/A 2013    [facility]    DILATION, URETHRA N/A 2014    [facility]    FULGURATION, BLADDER  2013    [facility]    multiple angiosplastia      NEPHRECTOMY      partial nephrectomy left    PTA, PERIPHERAL VESSEL Bilateral 2018    [facility] CATH    PYELOGRAM, RETROGRADE Bilateral 2013    Performed by [facility]am Bucio MD at [facility] OR [address] [place] [address]    TURP, USING GREEN LIGHT LASER N/A 2014    Performed by Andrea Mcknight MD at [facility] OR [address] [place] [address]       Family History   Problem Relation Age of Onset    Cancer Father     Cancer Maternal Uncle        Social History     Socioeconomic History    Marital status:      Spouse name: Not on file    Number of children: Not on file    Years of education: Not on file    Highest education level: Not on file   Social Needs    Financial resource strain: Not on file    Food insecurity - worry: Not on file    Food insecurity - inability: Not on file    Transportation needs - medical: Not on file    Transportation needs - non-medical: Not on file   Occupational History    Not on file   Tobacco Use    Smoking status: Former Smoker     Packs/day: 1.50     Years: 40.00     Pack years: 60.00     Types: Cigarettes     Last attempt to quit: 2008     Years since quittin.0   Substance and Sexual Activity    Alcohol use: Yes     Alcohol/week: 1.2 oz     Types: 2 Cans of beer per week     Comment: daily    Drug use: Not on file    Sexual activity: Yes     Partners: Female   Other Topics Concern    Not on file   Social History Narrative    SOCIAL HISTORY:    And he goes by "Jorge".    2014.    The patient lives with his wife.  Recently his daughter moved " in with her 4 children, 2 of whom have pervasive developmental delay and it seems as though his daughter will not be able to reconcile with her .    He is taking the children to school in the morning, picking him up from school in the afternoon.    He quit cigarette smoking in 2008 when he came down with bladder cancer.    He likes to drink beer.       Current Outpatient Medications on File Prior to Visit   Medication Sig Dispense Refill    ACCU-CHEK CEASAR CONTROL SOLN Soln USE ONE TIME DAILY 1 each 3    ACCU-CHEK CEASAR PLUS TEST STRP Strp TEST TWO TIMES DAILY 200 strip 3    ACCU-CHEK SOFTCLIX LANCETS Misc TEST TWO TIMES DAILY 200 each 3    allopurinol (ZYLOPRIM) 100 MG tablet Take 100 mg by mouth 2 (two) times daily.      amlodipine (NORVASC) 10 MG tablet TAKE 1 TABLET ONE TIME DAILY 90 tablet 3    aspirin (ECOTRIN) 81 MG EC tablet Take 81 mg by mouth once daily.      atorvastatin (LIPITOR) 80 MG tablet Take 80 mg by mouth once daily.      b complex vitamins tablet Take 1 tablet by mouth once daily.      BD ALCOHOL SWABS PadM TEST TWO TIMES DAILY 200 each 4    bismuth subsalicylate (KAOPECTATE, BISMUTH SUBSALICY,) 262 mg Tab Take by mouth.      cholestyramine (QUESTRAN) 4 gram packet Take 1 packet (4 g total) by mouth 2 (two) times daily. 180 packet 3    clopidogrel (PLAVIX) 75 mg tablet TAKE 1 TABLET EVERY EVENING 90 tablet 3    dicyclomine (BENTYL) 20 mg tablet Take 20 mg by mouth every 6 (six) hours.      donepezil (ARICEPT) 5 MG tablet Take 10 mg by mouth every evening.      fish oil-omega-3 fatty acids 300-1,000 mg capsule Take 2 g by mouth once daily.      gabapentin (NEURONTIN) 300 MG capsule Take 300 mg by mouth every evening.      hydrochlorothiazide (HYDRODIURIL) 25 MG tablet TAKE 1 TABLET EVERY MORNING 90 tablet 3    HYDROcodone-acetaminophen (NORCO) 5-325 mg per tablet Take 1 tablet by mouth every 6 (six) hours as needed. 28 tablet 0    HYDROcodone-acetaminophen (NORCO) 5-325  mg per tablet Take 1 tablet by mouth every 4 (four) hours as needed for Pain. 28 tablet 0    lancets (ONE TOUCH DELICA LANCETS) 33 gauge Misc 2 lancets by Misc.(Non-Drug; Combo Route) route 2 (two) times daily. 600 each 4    lipase/protease/amylase (CREON ORAL) Take by mouth once daily.       loperamide (IMODIUM) 2 mg capsule Take 2 mg by mouth 4 (four) times daily as needed for Diarrhea.      metFORMIN (GLUCOPHAGE) 1000 MG tablet Take 1 tablet (1,000 mg total) by mouth 2 (two) times daily with meals. 180 tablet 1    metoprolol tartrate (LOPRESSOR) 50 MG tablet TAKE 1 TABLET TWICE DAILY 180 tablet 3    mv,Ca,min-iron-FA-lycopene 8 mg iron- 200 mcg-600 mcg Tab Take 1 capsule by mouth once daily.      ondansetron (ZOFRAN) 4 MG tablet Take 4 mg by mouth every 8 (eight) hours as needed for Nausea.      pantoprazole (PROTONIX) 40 MG tablet Take 40 mg by mouth once daily.      valsartan (DIOVAN) 320 MG tablet Take 320 mg by mouth once daily.       Current Facility-Administered Medications on File Prior to Visit   Medication Dose Route Frequency Provider Last Rate Last Dose    lidocaine HCl 2% urojet   Urethral Once Shyam Bucio MD           Review of patient's allergies indicates:   Allergen Reactions    Amoxicillin Hives    Zolpidem tartrate Hallucinations       Physical Exam:  General: Alert and Oriented x3, no distress   Vitals:    02/07/19 1306   BP: 108/62   Pulse: 71     HEENT: Normocephalic, Atraumatic. No scleral icterus.  Lymph: No cervical lymphadenopathy  Resp: Good air entry bilaterally, no adventitious sounds.  Cardiac: S1 and S2 normal.  Abdomen: Normoactive bowel sounds. Non-distended. Normal tympany. Soft. Tender to palpation mild over the lower abdomen and RLQ. No peritoneal signs.  Extremities: No peripheral edema. Normal bilateral pedal and radial pulses.  Neurologic: No gross neurological Deficits  Psych: Calm, cooperative. Normal mood and affect.    Laboratory:  Lab Results    Component Value Date     01/23/2019    K 3.9 01/23/2019     01/23/2019    CO2 25 01/23/2019    BUN 21 01/23/2019    CREATININE 1.2 01/23/2019    CALCIUM 8.5 (L) 01/23/2019    ANIONGAP 10 01/23/2019    ESTGFRAFRICA >60.0 01/23/2019    EGFRNONAA >60.0 01/23/2019       Lab Results   Component Value Date    ALT 17 01/23/2019    AST 22 01/23/2019    ALKPHOS 114 01/23/2019    BILITOT 0.3 01/23/2019       Lab Results   Component Value Date    WBC 12.32 01/23/2019    HGB 9.1 (L) 01/23/2019    HCT 28.7 (L) 01/23/2019    MCV 94 01/23/2019     (H) 01/23/2019       Microbiology:  No Pertinent Microbiology    Imaging:  as in HPI    Assessment:  Jose English is a 69 y.o. male who is presenting for follow-up evaluation of diarrhea    Patient's clinical history of chronic alcohol use, post-prandial abdominal pain, diarrhea with steatorrhea, weight loss with mildly elevated lipase, and U/S findings suggestive of pancreatic edema/pancreatitis, all are concerning for chronic pancreatitis. CT scan with IV contrast with no findings to suggest pancreatitis. Fecal elastase is pending, but based on suggestive clinical presentation, will start treatment for chronic pancreatitis. Given significant weight loss, and tobacco use history, would recommend evaluation with EUS to rule out underlying malignancy. The patient was scheduled to obtain an EGD for duodenal biopsies, will request adding an EUS with advanced endoscopy.  Patient educated that with diagnosis of chronic pancreatitis, alcohol should be strictly avoided to decrease progression. Explained the nature of the disease. We will initiate a trial of Creon and assess for symptoms.      Plan:  1. Schedule EGD for duodenal biopsies to r/o celiac, with EUS to evaluate underlying pancreatic pathology/subtle lesions.  2. Patient instructed to avoid alcoholic beverages.  3. Initiate Creon 3 tablet (72,000 units) with every meal (and not to take if not planning to have  meal), and 1 tablet (24,000 units) for snacks. Prescription sent  4. Follow-up in about 6-8 weeks. Will contact patient in the next few weeks to inquire about symptoms.    Praneeth Dang MD  Gastroenterology Fellow (PGY IV)  Phone: [phone]    No orders of the defined types were placed in this encounter.

## 2019-02-08 ENCOUNTER — TELEPHONE (OUTPATIENT)
Dept: ENDOSCOPY | Facility: HOSPITAL | Age: 69
End: 2019-02-08

## 2019-02-08 ENCOUNTER — TELEPHONE (OUTPATIENT)
Dept: GASTROENTEROLOGY | Facility: CLINIC | Age: 69
End: 2019-02-08

## 2019-02-08 NOTE — TELEPHONE ENCOUNTER
Spoke with patient's wife. EGD/EUS scheduled for 2/20 at 1p pending ok to hold Plavix. Reviewed prep instructions. Ms Barger verbalized understanding.

## 2019-02-08 NOTE — TELEPHONE ENCOUNTER
----- Message from Doris Morin sent at 2/8/2019 11:22 AM CST -----  Contact: Ele Huynhm (Spouse)[phone]  Patient Returning Call from [facility]    Who Left Message for Patient:[id number]   Communication Preference:CALLBACK   Additional Information:

## 2019-02-11 ENCOUNTER — TELEPHONE (OUTPATIENT)
Dept: ENDOSCOPY | Facility: HOSPITAL | Age: 69
End: 2019-02-11

## 2019-02-11 NOTE — TELEPHONE ENCOUNTER
Spoke with patient's wife about EGD/UEUS tentatively scheduled 2/20/19.  Faxed Dr Jarret Calle (ph [phone]/fx [phone]) requesting permission to hold Plavix for 5 days prior to procedure.  Will f/u w/patient's wife after response.

## 2019-02-13 ENCOUNTER — TELEPHONE (OUTPATIENT)
Dept: ENDOSCOPY | Facility: HOSPITAL | Age: 69
End: 2019-02-13

## 2019-02-13 NOTE — TELEPHONE ENCOUNTER
Fax received from Dr Jarret Calle giving permission to hold Plavix for 5 days prior to EGD/UEUS scheduled 2/20/19.  Scanned to media tab.

## 2019-02-13 NOTE — TELEPHONE ENCOUNTER
Spoke with nurse w/Dr Jarret Calle (ph [phone]) and refaxed request to hold Plavix to a new fax number ([phone]).  She states Dr Calle says ok to hold Plavix for 5 days prior to EGD/UEUS scheduled 2/20/19 at 1300 and she will fax document today.  Spoke with patient's wife about instructions for procedure.  Instructions mailed.

## 2019-02-20 ENCOUNTER — ANESTHESIA (OUTPATIENT)
Dept: ENDOSCOPY | Facility: HOSPITAL | Age: 69
End: 2019-02-20
Payer: MEDICARE

## 2019-02-20 ENCOUNTER — HOSPITAL ENCOUNTER (OUTPATIENT)
Facility: HOSPITAL | Age: 69
Discharge: HOME | End: 2019-02-20
Attending: INTERNAL MEDICINE | Admitting: INTERNAL MEDICINE
Payer: MEDICARE

## 2019-02-20 ENCOUNTER — ANESTHESIA EVENT (OUTPATIENT)
Dept: ENDOSCOPY | Facility: HOSPITAL | Age: 69
End: 2019-02-20
Payer: MEDICARE

## 2019-02-20 VITALS
HEIGHT: 65 IN | RESPIRATION RATE: 16 BRPM | HEART RATE: 65 BPM | OXYGEN SATURATION: 100 % | WEIGHT: 160 LBS | DIASTOLIC BLOOD PRESSURE: 66 MMHG | SYSTOLIC BLOOD PRESSURE: 140 MMHG | TEMPERATURE: 98 F | BODY MASS INDEX: 26.66 KG/M2

## 2019-02-20 DIAGNOSIS — R10.13 DYSPEPSIA: ICD-10-CM

## 2019-02-20 LAB
POCT GLUCOSE: 105 MG/DL (ref 70–110)
POCT GLUCOSE: 116 MG/DL (ref 70–110)

## 2019-02-20 PROCEDURE — 43239 EGD BIOPSY SINGLE/MULTIPLE: CPT | Performed by: INTERNAL MEDICINE

## 2019-02-20 PROCEDURE — D9220A PRA ANESTHESIA: Mod: CRNA,,, | Performed by: NURSE ANESTHETIST, CERTIFIED REGISTERED

## 2019-02-20 PROCEDURE — 43259 EGD US EXAM DUODENUM/JEJUNUM: CPT | Performed by: INTERNAL MEDICINE

## 2019-02-20 PROCEDURE — 43259 PR ENDOSCOPIC ULTRASOUND EXAM: ICD-10-PCS | Mod: ,,, | Performed by: INTERNAL MEDICINE

## 2019-02-20 PROCEDURE — 63600175 PHARM REV CODE 636 W HCPCS: Performed by: NURSE ANESTHETIST, CERTIFIED REGISTERED

## 2019-02-20 PROCEDURE — 88305 TISSUE EXAM BY PATHOLOGIST: CPT | Mod: 26,,, | Performed by: PATHOLOGY

## 2019-02-20 PROCEDURE — 37000008 HC ANESTHESIA 1ST 15 MINUTES: Performed by: INTERNAL MEDICINE

## 2019-02-20 PROCEDURE — 37000009 HC ANESTHESIA EA ADD 15 MINS: Performed by: INTERNAL MEDICINE

## 2019-02-20 PROCEDURE — 82962 GLUCOSE BLOOD TEST: CPT | Performed by: INTERNAL MEDICINE

## 2019-02-20 PROCEDURE — 27201012 HC FORCEPS, HOT/COLD, DISP: Performed by: INTERNAL MEDICINE

## 2019-02-20 PROCEDURE — 43239 EGD BIOPSY SINGLE/MULTIPLE: CPT | Mod: 59,,, | Performed by: INTERNAL MEDICINE

## 2019-02-20 PROCEDURE — D9220A PRA ANESTHESIA: ICD-10-PCS | Mod: CRNA,,, | Performed by: NURSE ANESTHETIST, CERTIFIED REGISTERED

## 2019-02-20 PROCEDURE — 43259 EGD US EXAM DUODENUM/JEJUNUM: CPT | Mod: ,,, | Performed by: INTERNAL MEDICINE

## 2019-02-20 PROCEDURE — 88305 TISSUE EXAM BY PATHOLOGIST: CPT | Performed by: PATHOLOGY

## 2019-02-20 PROCEDURE — D9220A PRA ANESTHESIA: Mod: ANES,,, | Performed by: ANESTHESIOLOGY

## 2019-02-20 PROCEDURE — 25000003 PHARM REV CODE 250: Performed by: NURSE ANESTHETIST, CERTIFIED REGISTERED

## 2019-02-20 PROCEDURE — 88305 TISSUE SPECIMEN TO PATHOLOGY - SURGERY: ICD-10-PCS | Mod: 26,,, | Performed by: PATHOLOGY

## 2019-02-20 PROCEDURE — 25000003 PHARM REV CODE 250: Performed by: INTERNAL MEDICINE

## 2019-02-20 PROCEDURE — D9220A PRA ANESTHESIA: ICD-10-PCS | Mod: ANES,,, | Performed by: ANESTHESIOLOGY

## 2019-02-20 PROCEDURE — 43239 PR EGD, FLEX, W/BIOPSY, SGL/MULTI: ICD-10-PCS | Mod: 59,,, | Performed by: INTERNAL MEDICINE

## 2019-02-20 RX ORDER — PROPOFOL 10 MG/ML
VIAL (ML) INTRAVENOUS
Status: DISCONTINUED | OUTPATIENT
Start: 2019-02-20 | End: 2019-02-20

## 2019-02-20 RX ORDER — PROPOFOL 10 MG/ML
VIAL (ML) INTRAVENOUS CONTINUOUS PRN
Status: DISCONTINUED | OUTPATIENT
Start: 2019-02-20 | End: 2019-02-20

## 2019-02-20 RX ORDER — EPHEDRINE SULFATE 50 MG/ML
INJECTION, SOLUTION INTRAVENOUS
Status: DISCONTINUED | OUTPATIENT
Start: 2019-02-20 | End: 2019-02-20

## 2019-02-20 RX ORDER — PHENYLEPHRINE HCL IN 0.9% NACL 1 MG/10 ML
SYRINGE (ML) INTRAVENOUS
Status: DISCONTINUED | OUTPATIENT
Start: 2019-02-20 | End: 2019-02-20

## 2019-02-20 RX ORDER — SODIUM CHLORIDE 9 MG/ML
INJECTION, SOLUTION INTRAVENOUS CONTINUOUS
Status: DISCONTINUED | OUTPATIENT
Start: 2019-02-20 | End: 2019-02-20 | Stop reason: HOSPADM

## 2019-02-20 RX ORDER — LIDOCAINE HCL/PF 100 MG/5ML
SYRINGE (ML) INTRAVENOUS
Status: DISCONTINUED | OUTPATIENT
Start: 2019-02-20 | End: 2019-02-20

## 2019-02-20 RX ORDER — OXYCODONE HYDROCHLORIDE 5 MG/1
5 TABLET ORAL
Status: DISCONTINUED | OUTPATIENT
Start: 2019-02-20 | End: 2019-02-20 | Stop reason: HOSPADM

## 2019-02-20 RX ORDER — SODIUM CHLORIDE 0.9 % (FLUSH) 0.9 %
3 SYRINGE (ML) INJECTION
Status: DISCONTINUED | OUTPATIENT
Start: 2019-02-20 | End: 2019-02-20 | Stop reason: HOSPADM

## 2019-02-20 RX ORDER — MIDAZOLAM HYDROCHLORIDE 1 MG/ML
INJECTION, SOLUTION INTRAMUSCULAR; INTRAVENOUS
Status: DISCONTINUED | OUTPATIENT
Start: 2019-02-20 | End: 2019-02-20

## 2019-02-20 RX ORDER — FENTANYL CITRATE 50 UG/ML
INJECTION, SOLUTION INTRAMUSCULAR; INTRAVENOUS
Status: DISCONTINUED | OUTPATIENT
Start: 2019-02-20 | End: 2019-02-20

## 2019-02-20 RX ORDER — FENTANYL CITRATE 50 UG/ML
25 INJECTION, SOLUTION INTRAMUSCULAR; INTRAVENOUS EVERY 5 MIN PRN
Status: DISCONTINUED | OUTPATIENT
Start: 2019-02-20 | End: 2019-02-20 | Stop reason: HOSPADM

## 2019-02-20 RX ADMIN — FENTANYL CITRATE 50 MCG: 50 INJECTION, SOLUTION INTRAMUSCULAR; INTRAVENOUS at 12:02

## 2019-02-20 RX ADMIN — PROPOFOL 175 MCG/KG/MIN: 10 INJECTION, EMULSION INTRAVENOUS at 12:02

## 2019-02-20 RX ADMIN — EPHEDRINE SULFATE 15 MG: 50 INJECTION, SOLUTION INTRAMUSCULAR; INTRAVENOUS; SUBCUTANEOUS at 12:02

## 2019-02-20 RX ADMIN — TOPICAL ANESTHETIC 1 EACH: 200 SPRAY DENTAL; PERIODONTAL at 12:02

## 2019-02-20 RX ADMIN — MIDAZOLAM HYDROCHLORIDE 2 MG: 1 INJECTION, SOLUTION INTRAMUSCULAR; INTRAVENOUS at 12:02

## 2019-02-20 RX ADMIN — LIDOCAINE HYDROCHLORIDE 100 MG: 20 INJECTION, SOLUTION INTRAVENOUS at 12:02

## 2019-02-20 RX ADMIN — PROPOFOL 20 MG: 10 INJECTION, EMULSION INTRAVENOUS at 12:02

## 2019-02-20 RX ADMIN — Medication 200 MCG: at 12:02

## 2019-02-20 RX ADMIN — SODIUM CHLORIDE: 0.9 INJECTION, SOLUTION INTRAVENOUS at 11:02

## 2019-02-20 NOTE — ADDENDUM NOTE
Addendum  created 02/20/19 1317 by Xiomy Owens, CRNA    Intraprocedure Meds edited, Orders acknowledged in Narrator

## 2019-02-20 NOTE — PLAN OF CARE
Discharge instructions given and explained to patient and family with verbalization of understanding all instructions.  . Patients v/s stable, denies n/v and tolerating po, rates pain level tolerable, IV removed, and family at bedside for patient discharge home.

## 2019-02-20 NOTE — ANESTHESIA PREPROCEDURE EVALUATION
02/20/2019  Jose English is a 69 y.o., male   Pre-operative evaluation for Procedure(s) (LRB):  EGD (ESOPHAGOGASTRODUODENOSCOPY) (N/A)  ULTRASOUND, UPPER GI TRACT, ENDOSCOPIC (N/A)    Jose English is a 69 y.o. male       1. Active problems:EGD for duodenal biopsies to r/o celiac, with EUS to evaluate underlying pancreatic pathology/subtle lesions.  Patient instructed to avoid alcoholic beverages.  Patient Active Problem List    Diagnosis Date Noted    Gout 01/14/2019    Dementia 01/14/2019    Hypokalemia 01/14/2019    Acute blood loss anemia 01/14/2019    Intra-abdominal infection 01/13/2019    Anastomotic leak of intestine     Phlegmon 01/12/2019    Diarrhea 12/06/2018    Encounter for dietary consultation 12/05/2018    Hypomagnesemia 12/04/2018    Pre-op evaluation 12/18/2014    Medically noncompliant 10/24/2014    Diabetes mellitus with peripheral vascular disease 10/24/2014    Mixed hyperlipidemia 10/21/2014    CHRISTY (obstructive sleep apnea) 09/09/2014    Hyperuricemia without signs of inflammatory arthritis and tophaceous disease 07/17/2014    Insomnia 07/17/2014    Sleep disturbance 07/17/2014    Snoring 07/17/2014    BPH (benign prostatic hyperplasia) 04/17/2014    HTN (hypertension) 04/10/2014    PAD (peripheral artery disease) 04/10/2014    Lower urinary tract symptoms (LUTS) 03/25/2014    Bladder cancer, 2008. 04/09/2013    Cancer of kidney, clear cell 2008. 04/09/2013           Prev airway:       Review of patient's allergies indicates:   Allergen Reactions    Amoxicillin Hives    Zolpidem tartrate Hallucinations        No current facility-administered medications on file prior to encounter.      Current Outpatient Medications on File Prior to Encounter   Medication Sig Dispense Refill    ACCU-CHEK CEASAR CONTROL SOLN Soln USE ONE TIME DAILY 1 each 3    ACCU-CHEK  CEASAR PLUS TEST STRP Strp TEST TWO TIMES DAILY 200 strip 3    ACCU-CHEK SOFTCLIX LANCETS Misc TEST TWO TIMES DAILY 200 each 3    allopurinol (ZYLOPRIM) 100 MG tablet Take 100 mg by mouth 2 (two) times daily.      aspirin (ECOTRIN) 81 MG EC tablet Take 81 mg by mouth once daily.      atorvastatin (LIPITOR) 80 MG tablet Take 80 mg by mouth once daily.      b complex vitamins tablet Take 1 tablet by mouth once daily.      BD ALCOHOL SWABS PadM TEST TWO TIMES DAILY 200 each 4    clopidogrel (PLAVIX) 75 mg tablet TAKE 1 TABLET EVERY EVENING 90 tablet 3    donepezil (ARICEPT) 5 MG tablet Take 10 mg by mouth every evening.      fish oil-omega-3 fatty acids 300-1,000 mg capsule Take 2 g by mouth once daily.      gabapentin (NEURONTIN) 300 MG capsule Take 300 mg by mouth every evening.      hydrochlorothiazide (HYDRODIURIL) 25 MG tablet TAKE 1 TABLET EVERY MORNING 90 tablet 3    HYDROcodone-acetaminophen (NORCO) 5-325 mg per tablet Take 1 tablet by mouth every 6 (six) hours as needed. 28 tablet 0    HYDROcodone-acetaminophen (NORCO) 5-325 mg per tablet Take 1 tablet by mouth every 4 (four) hours as needed for Pain. 28 tablet 0    lancets (ONE TOUCH DELICA LANCETS) 33 gauge Misc 2 lancets by Misc.(Non-Drug; Combo Route) route 2 (two) times daily. 600 each 4    lipase-protease-amylase 24,000-76,000-120,000 units (PANLIPASE) 24,000-76,000 -120,000 unit capsule Take 3 capsules by mouth 3 (three) times daily with meals. Take an extra capsule for each snack 270 capsule 11    metoprolol tartrate (LOPRESSOR) 50 MG tablet TAKE 1 TABLET TWICE DAILY 180 tablet 3    mv,Ca,min-iron-FA-lycopene 8 mg iron- 200 mcg-600 mcg Tab Take 1 capsule by mouth once daily.      amlodipine (NORVASC) 10 MG tablet TAKE 1 TABLET ONE TIME DAILY 90 tablet 3    bismuth subsalicylate (KAOPECTATE, BISMUTH SUBSALICY,) 262 mg Tab Take by mouth.      cholestyramine (QUESTRAN) 4 gram packet Take 1 packet (4 g total) by mouth 2 (two) times  daily. 180 packet 3    dicyclomine (BENTYL) 20 mg tablet Take 20 mg by mouth every 6 (six) hours.      lipase/protease/amylase (CREON ORAL) Take by mouth once daily.       loperamide (IMODIUM) 2 mg capsule Take 2 mg by mouth 4 (four) times daily as needed for Diarrhea.      metFORMIN (GLUCOPHAGE) 1000 MG tablet Take 1 tablet (1,000 mg total) by mouth 2 (two) times daily with meals. 180 tablet 1    ondansetron (ZOFRAN) 4 MG tablet Take 4 mg by mouth every 8 (eight) hours as needed for Nausea.      pantoprazole (PROTONIX) 40 MG tablet Take 40 mg by mouth once daily.      valsartan (DIOVAN) 320 MG tablet Take 320 mg by mouth once daily.         Past Surgical History:   Procedure Laterality Date    abdominal aorta bypass by fem      BIOPSY, BLADDER N/A 6/18/2013    Performed by [facility]am Bucio [facility] at [facility] OR [address] [place] [address]    BLADDER SURGERY      COLONOSCOPY  12/06/2018    COLONOSCOPY N/A 12/6/2018    Performed by F[facility]ilia [facility]ickson [facility] at [facility] ENDO    CYSTOSCOPY      CYSTOSCOPY N/A 4/17/2014    Performed by Andrea Mcknight [facility] at [facility] OR [facility]    CYSTOSCOPY N/A 6/18/2013    [facility]    DILATION, URETHRA N/A 4/17/2014    [facility]    FULGURATION, BLADDER  6/18/2013    Performed by Shyam Bucio MD at [facility] OR [address] [place] [address]    multiple angiosplastia      NEPHRECTOMY      partial nephrectomy left    PTA, PERIPHERAL VESSEL Bilateral 8/21/2018    Performed by Patrick [facility] at [facility] CATH    PYELOGRAM, RETROGRADE Bilateral 6/18/2013    Performed by Shyam Bucio MD at [facility] OR [address] [place] [address]    TURP, USING GREEN LIGHT LASER N/A 4/17/2014    Performed by Andrea Mcknight MD at [facility] OR [address] [place] [address]       Social History     Socioeconomic History    Marital status:      Spouse name: Not on file    Number of children: Not on file    Years of education: Not on file    Highest education level: Not on file   Social  "Needs    Financial resource strain: Not on file    Food insecurity - worry: Not on file    Food insecurity - inability: Not on file    Transportation needs - medical: Not on file    Transportation needs - non-medical: Not on file   Occupational History    Not on file   Tobacco Use    Smoking status: Former Smoker     Packs/day: 1.50     Years: 40.00     Pack years: 60.00     Types: Cigarettes     Last attempt to quit: 2008     Years since quittin.0   Substance and Sexual Activity    Alcohol use: Yes     Alcohol/week: 1.2 oz     Types: 2 Cans of beer per week     Comment: daily    Drug use: Not on file    Sexual activity: Yes     Partners: Female   Other Topics Concern    Not on file   Social History Narrative    SOCIAL HISTORY:    And he goes by "Jorge".    2014.    The patient lives with his wife.  Recently his daughter moved in with her 4 children, 2 of whom have pervasive developmental delay and it seems as though his daughter will not be able to reconcile with her .    He is taking the children to school in the morning, picking him up from school in the afternoon.    He quit cigarette smoking in  when he came down with bladder cancer.    He likes to drink beer.         Vital Signs Range (Last 24H):  Wt Readings from Last 3 Encounters:   19 72.2 kg (159 lb 2.8 oz)   19 72.1 kg (159 lb 1 oz)   19 72.6 kg (160 lb)     Temp Readings from Last 3 Encounters:   19 36.7 °C (98.1 °F)   19 36.7 °C (98 °F) (Oral)   19 36.6 °C (97.8 °F) (Oral)     BP Readings from Last 3 Encounters:   19 108/62   19 (!) 116/57   19 (!) 157/68     Pulse Readings from Last 3 Encounters:   19 71   19 72   19 64         CBC:   Lab Results   Component Value Date    WBC 12.32 2019    HGB 9.1 (L) 2019    HCT 28.7 (L) 2019    MCV 94 2019     (H) 2019       CMP: CMP  Sodium   Date Value Ref Range Status "   01/23/2019 137 136 - 145 mmol/L Final     Potassium   Date Value Ref Range Status   01/23/2019 3.9 3.5 - 5.1 mmol/L Final     Chloride   Date Value Ref Range Status   01/23/2019 102 95 - 110 mmol/L Final     CO2   Date Value Ref Range Status   01/23/2019 25 23 - 29 mmol/L Final     Glucose   Date Value Ref Range Status   01/23/2019 116 (H) 70 - 110 mg/dL Final     BUN, Bld   Date Value Ref Range Status   01/23/2019 21 8 - 23 mg/dL Final     Creatinine   Date Value Ref Range Status   01/23/2019 1.2 0.5 - 1.4 mg/dL Final     Calcium   Date Value Ref Range Status   01/23/2019 8.5 (L) 8.7 - 10.5 mg/dL Final     Total Protein   Date Value Ref Range Status   01/23/2019 6.4 6.0 - 8.4 g/dL Final     Albumin   Date Value Ref Range Status   01/23/2019 2.1 (L) 3.5 - 5.2 g/dL Final     Total Bilirubin   Date Value Ref Range Status   01/23/2019 0.3 0.1 - 1.0 mg/dL Final     Comment:     For infants and newborns, interpretation of results should be based  on gestational age, weight and in agreement with clinical  observations.  Premature Infant recommended reference ranges:  Up to 24 hours.............<8.0 mg/dL  Up to 48 hours............<12.0 mg/dL  3-5 days..................<15.0 mg/dL  6-29 days.................<15.0 mg/dL       Alkaline Phosphatase   Date Value Ref Range Status   01/23/2019 114 55 - 135 U/L Final     AST   Date Value Ref Range Status   01/23/2019 22 10 - 40 U/L Final     ALT   Date Value Ref Range Status   01/23/2019 17 10 - 44 U/L Final     Anion Gap   Date Value Ref Range Status   01/23/2019 10 8 - 16 mmol/L Final     eGFR if    Date Value Ref Range Status   01/23/2019 >60.0 >60 mL/min/1.73 m^2 Final     eGFR if non    Date Value Ref Range Status   01/23/2019 >60.0 >60 mL/min/1.73 m^2 Final     Comment:     Calculation used to obtain the estimated glomerular filtration  rate (eGFR) is the CKD-EPI equation.          INR  Lab Results   Component Value Date    INR 1.0  2019    INR 1.1 2019    INR 1.1 2018           Diagnostic Studies:      EKD Echo:        .    Anesthesia Evaluation         Review of Systems  Anesthesia Hx:  History of prior surgery of interest to airway management or planning: Denies Family Hx of Anesthesia complications.  Denies Personal Hx of Anesthesia complications.   Social:  Alcohol Use    Cardiovascular:   Hypertension Denies CAD.    Denies CABG/stent.  PVD Recent stents for PVD   Pulmonary:   Sleep Apnea, CPAP    Hepatic/GI:   GERD    Endocrine:   Diabetes        Physical Exam  General:  Well nourished    Airway/Jaw/Neck:  Airway Findings: Mouth Opening: Normal Tongue: Normal  General Airway Assessment: Adult  Mallampati: I  Jaw/Neck Findings:  Neck ROM: Normal ROM      Dental:  Dental Findings: In tact   Chest/Lungs:  Chest/Lungs Findings: Clear to auscultation     Heart/Vascular:  Heart Findings: Rate: Normal  Rhythm: Regular Rhythm  Sounds: Normal        Mental Status:  Mental Status Findings:  Cooperative         Anesthesia Plan  Type of Anesthesia, risks & benefits discussed:  Anesthesia Type:  general  Patient's Preference:   Intra-op Monitoring Plan:   Intra-op Monitoring Plan Comments:   Post Op Pain Control Plan:   Post Op Pain Control Plan Comments:   Induction:   IV  Beta Blocker:  Patient is on a Beta-Blocker and has received one dose within the past 24 hours (No further documentation required).       Informed Consent: Patient understands risks and agrees with Anesthesia plan.  Questions answered. Anesthesia consent signed with patient.  ASA Score: 2     Day of Surgery Review of History & Physical:    H&P update referred to the surgeon.         Ready For Surgery From Anesthesia Perspective.

## 2019-02-20 NOTE — TRANSFER OF CARE
"Anesthesia Transfer of Care Note    Patient: Jose English    Procedure(s) Performed: Procedure(s) (LRB):  EGD (ESOPHAGOGASTRODUODENOSCOPY) (N/A)  ULTRASOUND, UPPER GI TRACT, ENDOSCOPIC (N/A)    Patient location: [facility]    Anesthesia Type: general    Transport from OR: Transported from OR on 2-3 L/min O2 by NC with adequate spontaneous ventilation    Post pain: adequate analgesia    Post assessment: no apparent anesthetic complications and tolerated procedure well    Post vital signs: stable    Level of consciousness: awake    Nausea/Vomiting: no nausea/vomiting    Complications: none    Transfer of care protocol was followed      Last vitals:   Visit Vitals  BP (!) 113/53 (BP Location: Left arm, Patient Position: Sitting)   Pulse 62   Temp 36.3 °C (97.3 °F) (Temporal)   Resp 18   Ht 5' 5" (1.651 m)   Wt 72.6 kg (160 lb)   SpO2 98%   BMI 26.63 kg/m²     "

## 2019-02-20 NOTE — [employer] PATIENT INSTRUCTIONS
Discharge Summary/Instructions after an Endoscopic Procedure  Patient Name: Jose English  Patient MRN: [id number]  Patient YOB: 1950 Wednesday, February 20, 2019  Govind Hassan MD  RESTRICTIONS:  During your procedure today, you received medications for sedation.  These   medications may affect your judgment, balance and coordination.  Therefore,   for 24 hours, you have the following restrictions:   - DO NOT drive a car, operate machinery, make legal/financial decisions,   sign important papers or drink alcohol.    ACTIVITY:  Today: no heavy lifting, straining or running due to procedural   sedation/anesthesia.  The following day: return to full activity including work.  DIET:  Eat and drink normally unless instructed otherwise.     TREATMENT FOR COMMON SIDE EFFECTS:  - Mild abdominal pain, nausea, belching, bloating or excessive gas:  rest,   eat lightly and use a heating pad.  - Sore Throat: treat with throat lozenges and/or gargle with warm salt   water.  - Because air was used during the procedure, expelling large amounts of air   from your rectum or belching is normal.  - If a bowel prep was taken, you may not have a bowel movement for 1-3 days.    This is normal.  SYMPTOMS TO WATCH FOR AND REPORT TO YOUR PHYSICIAN:  1. Abdominal pain or bloating, other than gas cramps.  2. Chest pain.  3. Back pain.  4. Signs of infection such as: chills or fever occurring within 24 hours   after the procedure.  5. Rectal bleeding, which would show as bright red, maroon, or black stools.   (A tablespoon of blood from the rectum is not serious, especially if   hemorrhoids are present.)  6. Vomiting.  7. Weakness or dizziness.  GO DIRECTLY TO THE NEAREST EMERGENCY ROOM IF YOU HAVE ANY OF THE FOLLOWING:      Difficulty breathing              Chills and/or fever over 101 F   Persistent vomiting and/or vomiting blood   Severe abdominal pain   Severe chest pain   Black, tarry stools   Bleeding- more than one  tablespoon   Any other symptom or condition that you feel may need urgent attention  Your doctor recommends these additional instructions:  If any biopsies were taken, your doctors clinic will contact you in 1 to 2   weeks with any results.  - Discharge patient to home.   - Resume previous diet.   - Await path results.   - Continue present medications.   - Return to referring physician as previously scheduled.  For questions, problems or results please call your physician - Govind Hassan MD at Work:  [phone].  [facility], EMERGENCY ROOM PHONE NUMBER: [phone]  IF A COMPLICATION OR EMERGENCY SITUATION ARISES AND YOU ARE UNABLE TO REACH   YOUR PHYSICIAN - GO DIRECTLY TO THE EMERGENCY ROOM.  Govind Hassan MD  2/20/2019 12:54:10 PM  This report has been verified and signed electronically.  [employer]

## 2019-02-20 NOTE — H&P
Short Stay Endoscopy History and Physical    PCP - Jarret Calle MD  Referring Physician - Praneeth Dang MD  [id number] [place] [address]    Procedure - eus  ASA - per anesthesia  Mallampati - per anesthesia  History of Anesthesia problems - no  Family history Anesthesia problems -  no   Plan of anesthesia - General    HPI:  This is a 69 y.o. male here for evaluation of: pancreas    Reflux - no  Dysphagia - no  Abdominal pain - no  Diarrhea - no    ROS:  Constitutional: No fevers, chills, No weight loss  CV: No chest pain  Pulm: No cough, No shortness of breath  Ophtho: No vision changes  GI: see HPI  Derm: No rash    Medical History:  has a past medical history of Bladder cancer, BPH (benign prostatic hypertrophy), Cancer of kidney, Colon polyp, Coronary artery disease, DM (diabetes mellitus), GERD (gastroesophageal reflux disease), Hypertension, PAD (peripheral artery disease), and Peripheral vascular disease.    Surgical History:  has a past surgical history that includes multiple angiosplastia; abdominal aorta bypass by fem; Cystoscopy; Bladder surgery; Nephrectomy; Percutaneous transluminal angioplasty (PTA) of peripheral vessel (Bilateral, 8/21/2018); Colonoscopy (12/06/2018); and Colonoscopy (N/A, 12/6/2018).    Family History: family history includes Cancer in his father and maternal uncle..    Social History:  reports that he quit smoking about 11 years ago. His smoking use included cigarettes. He has a 60.00 pack-year smoking history. he has never used smokeless tobacco. He reports that he drinks about 1.2 oz of alcohol per week.    Review of patient's allergies indicates:   Allergen Reactions    Amoxicillin Hives    Zolpidem tartrate Hallucinations       Medications:   Facility-Administered Medications Prior to Admission   Medication Dose Route Frequency Provider Last Rate Last Dose    lidocaine HCl 2% urojet   Urethral Once Shyam Bucio MD         Medications Prior to  Admission   Medication Sig Dispense Refill Last Dose    ACCU-CHEK CEASAR CONTROL SOLN Soln USE ONE TIME DAILY 1 each 3 Past Week at Unknown time    ACCU-CHEK CEASAR PLUS TEST STRP Strp TEST TWO TIMES DAILY 200 strip 3 Past Week at Unknown time    ACCU-CHEK SOFTCLIX LANCETS Misc TEST TWO TIMES DAILY 200 each 3 Past Week at Unknown time    allopurinol (ZYLOPRIM) 100 MG tablet Take 100 mg by mouth 2 (two) times daily.   2/19/2019 at Unknown time    aspirin (ECOTRIN) 81 MG EC tablet Take 81 mg by mouth once daily.   2/20/2019 at Unknown time    atorvastatin (LIPITOR) 80 MG tablet Take 80 mg by mouth once daily.   2/19/2019 at Unknown time    b complex vitamins tablet Take 1 tablet by mouth once daily.   2/20/2019 at Unknown time    BD ALCOHOL SWABS PadM TEST TWO TIMES DAILY 200 each 4 2/20/2019 at Unknown time    clopidogrel (PLAVIX) 75 mg tablet TAKE 1 TABLET EVERY EVENING 90 tablet 3 Past Week at Unknown time    donepezil (ARICEPT) 5 MG tablet Take 10 mg by mouth every evening.   2/19/2019 at Unknown time    fish oil-omega-3 fatty acids 300-1,000 mg capsule Take 2 g by mouth once daily.   2/20/2019 at Unknown time    gabapentin (NEURONTIN) 300 MG capsule Take 300 mg by mouth every evening.   2/19/2019 at Unknown time    hydrochlorothiazide (HYDRODIURIL) 25 MG tablet TAKE 1 TABLET EVERY MORNING 90 tablet 3 2/20/2019 at Unknown time    HYDROcodone-acetaminophen (NORCO) 5-325 mg per tablet Take 1 tablet by mouth every 6 (six) hours as needed. 28 tablet 0 2/19/2019 at Unknown time    HYDROcodone-acetaminophen (NORCO) 5-325 mg per tablet Take 1 tablet by mouth every 4 (four) hours as needed for Pain. 28 tablet 0 2/19/2019 at Unknown time    lancets (ONE TOUCH DELICA LANCETS) 33 gauge Misc 2 lancets by Misc.(Non-Drug; Combo Route) route 2 (two) times daily. 600 each 4 Past Week at Unknown time    lipase-protease-amylase 24,000-76,000-120,000 units (PANLIPASE) 24,000-76,000 -120,000 unit capsule Take 3  capsules by mouth 3 (three) times daily with meals. Take an extra capsule for each snack 270 capsule 11 2/19/2019 at Unknown time    metoprolol tartrate (LOPRESSOR) 50 MG tablet TAKE 1 TABLET TWICE DAILY 180 tablet 3 2/20/2019 at Unknown time    mv,Ca,min-iron-FA-lycopene 8 mg iron- 200 mcg-600 mcg Tab Take 1 capsule by mouth once daily.   2/20/2019 at Unknown time    amlodipine (NORVASC) 10 MG tablet TAKE 1 TABLET ONE TIME DAILY 90 tablet 3 More than a month at Unknown time    bismuth subsalicylate (KAOPECTATE, BISMUTH SUBSALICY,) 262 mg Tab Take by mouth.   More than a month at Unknown time    cholestyramine (QUESTRAN) 4 gram packet Take 1 packet (4 g total) by mouth 2 (two) times daily. 180 packet 3 More than a month at Unknown time    dicyclomine (BENTYL) 20 mg tablet Take 20 mg by mouth every 6 (six) hours.   More than a month at Unknown time    lipase/protease/amylase (CREON ORAL) Take by mouth once daily.    More than a month at Unknown time    loperamide (IMODIUM) 2 mg capsule Take 2 mg by mouth 4 (four) times daily as needed for Diarrhea.   More than a month at Unknown time    metFORMIN (GLUCOPHAGE) 1000 MG tablet Take 1 tablet (1,000 mg total) by mouth 2 (two) times daily with meals. 180 tablet 1 More than a month at Unknown time    ondansetron (ZOFRAN) 4 MG tablet Take 4 mg by mouth every 8 (eight) hours as needed for Nausea.   More than a month at Unknown time    pantoprazole (PROTONIX) 40 MG tablet Take 40 mg by mouth once daily.   More than a month at Unknown time    valsartan (DIOVAN) 320 MG tablet Take 320 mg by mouth once daily.   More than a month at Unknown time       Physical Exam:    Vital Signs:   Vitals:    02/20/19 1128   BP: (!) 113/53   Pulse: 62   Resp: 18   Temp: 97.3 °F (36.3 °C)       General Appearance: Well appearing in no acute distress    Labs:  Lab Results   Component Value Date    WBC 12.32 01/23/2019    HGB 9.1 (L) 01/23/2019    HCT 28.7 (L) 01/23/2019     (H)  01/23/2019    CHOL 169 07/11/2014    TRIG 208 (H) 07/11/2014    HDL 37 (L) 07/11/2014    ALT 17 01/23/2019    AST 22 01/23/2019     01/23/2019    K 3.9 01/23/2019     01/23/2019    CREATININE 1.2 01/23/2019    BUN 21 01/23/2019    CO2 25 01/23/2019    TSH 0.553 01/12/2019    PSA 0.68 03/25/2014    INR 1.0 01/23/2019    HGBA1C 5.6 01/12/2019       I have explained the risks and benefits of this endoscopic procedure to the patient including but not limited to bleeding, inflammation, infection, perforation, and death.      Govind Hassan MD

## 2019-02-20 NOTE — ANESTHESIA POSTPROCEDURE EVALUATION
"Anesthesia Post Evaluation    Patient: Jose English    Procedure(s) Performed: Procedure(s) (LRB):  EGD (ESOPHAGOGASTRODUODENOSCOPY) (N/A)  ULTRASOUND, UPPER GI TRACT, ENDOSCOPIC (N/A)    Final Anesthesia Type: general  Patient location during evaluation: PACU  Patient participation: Yes- Able to Participate  Level of consciousness: awake and alert  Post-procedure vital signs: reviewed and stable  Pain management: adequate  Airway patency: patent  PONV status at discharge: No PONV  Anesthetic complications: no      Cardiovascular status: blood pressure returned to baseline  Respiratory status: unassisted  Hydration status: euvolemic  Follow-up not needed.        Visit Vitals  BP (!) 99/50 (BP Location: Right arm, Patient Position: Lying)   Pulse 67   Temp 36.8 °C (98.2 °F)   Resp 18   Ht 5' 5" (1.651 m)   Wt 72.6 kg (160 lb)   SpO2 100%   BMI 26.63 kg/m²       Pain/Kermit Score: No Data Recorded      "

## 2019-03-01 ENCOUNTER — TELEPHONE (OUTPATIENT)
Dept: HEPATOLOGY | Facility: HOSPITAL | Age: 69
End: 2019-03-01

## 2019-03-01 NOTE — TELEPHONE ENCOUNTER
I contacted Mr. English.    He endorses general improvement of diarrhea after starting the Creon, although still frequent. His abdominal pain completely resolved. He is down from 13-15BM/day to 7-8 BMs. I informed him the fecal elastase was normal however. I informed him of the results of the EUS with some features suggestive of chronic pancreatitis; but not fully compatible. His biopsies from the duodenum are negative for celiac disease. The studies are not completely suggestive of chronic pancreatitis, but since his diarrhea and pain improved with the Creon, will continue at this time.     He inquired about follow up appointment. We will try to arrange at next available appointment.     [place], can we schedule [place] at next available appt with me and Dr. Boyce?

## 2020-06-09 ENCOUNTER — LAB VISIT (OUTPATIENT)
Dept: PRIMARY CARE CLINIC | Facility: OTHER | Age: 70
End: 2020-06-09
Payer: MEDICARE

## 2020-06-09 DIAGNOSIS — Z03.818 ENCOUNTER FOR OBSERVATION FOR SUSPECTED EXPOSURE TO OTHER BIOLOGICAL AGENTS RULED OUT: ICD-10-CM

## 2020-06-09 LAB — SARS-COV-2 RNA RESP QL NAA+PROBE: NOT DETECTED

## 2020-06-09 PROCEDURE — U0003 INFECTIOUS AGENT DETECTION BY NUCLEIC ACID (DNA OR RNA); SEVERE ACUTE RESPIRATORY SYNDROME CORONAVIRUS 2 (SARS-COV-2) (CORONAVIRUS DISEASE [COVID-19]), AMPLIFIED PROBE TECHNIQUE, MAKING USE OF HIGH THROUGHPUT TECHNOLOGIES AS DESCRIBED BY CMS-2020-01-R: HCPCS

## 2020-08-10 NOTE — ED NOTES
Patient placed on continuous cardiac monitor, automatic blood pressure cuff and continuous pulse oximeter.   Isotretinoin Counseling: Patient should get monthly blood tests, not donate blood, not drive at night if vision affected, not share medication, and not undergo elective surgery for 6 months after tx completed. Side effects reviewed, pt to contact office should one occur.

## 2020-12-02 ENCOUNTER — HOSPITAL ENCOUNTER (INPATIENT)
Facility: HOSPITAL | Age: 70
LOS: 5 days | Discharge: HOME | DRG: 683 | End: 2020-12-07
Attending: EMERGENCY MEDICINE | Admitting: INTERNAL MEDICINE
Payer: MEDICARE

## 2020-12-02 DIAGNOSIS — R19.7 VOMITING AND DIARRHEA: ICD-10-CM

## 2020-12-02 DIAGNOSIS — R10.9 ABDOMINAL PAIN: ICD-10-CM

## 2020-12-02 DIAGNOSIS — N17.9 AKI (ACUTE KIDNEY INJURY): ICD-10-CM

## 2020-12-02 DIAGNOSIS — N17.9 ACUTE KIDNEY INJURY: ICD-10-CM

## 2020-12-02 DIAGNOSIS — I95.9 HYPOTENSION, UNSPECIFIED HYPOTENSION TYPE: Primary | ICD-10-CM

## 2020-12-02 DIAGNOSIS — R11.10 VOMITING AND DIARRHEA: ICD-10-CM

## 2020-12-02 DIAGNOSIS — R19.7 DIARRHEA, UNSPECIFIED TYPE: ICD-10-CM

## 2020-12-02 DIAGNOSIS — E86.9 VOLUME DEPLETION: ICD-10-CM

## 2020-12-02 LAB
ALBUMIN SERPL BCP-MCNC: 4.2 G/DL (ref 3.5–5.2)
ALP SERPL-CCNC: 66 U/L (ref 55–135)
ALT SERPL W/O P-5'-P-CCNC: 18 U/L (ref 10–44)
ANION GAP SERPL CALC-SCNC: 20 MMOL/L (ref 8–16)
AST SERPL-CCNC: 11 U/L (ref 10–40)
BACTERIA #/AREA URNS HPF: NEGATIVE /HPF
BASOPHILS # BLD AUTO: 0.05 K/UL (ref 0–0.2)
BASOPHILS NFR BLD: 0.4 % (ref 0–1.9)
BILIRUB SERPL-MCNC: 0.7 MG/DL (ref 0.1–1)
BILIRUB UR QL STRIP: NEGATIVE
BUN SERPL-MCNC: 150 MG/DL (ref 8–23)
CALCIUM SERPL-MCNC: 8.9 MG/DL (ref 8.7–10.5)
CHLORIDE SERPL-SCNC: 102 MMOL/L (ref 95–110)
CK MB SERPL-MCNC: 1.9 NG/ML (ref 0.1–6.5)
CK SERPL-CCNC: 28 U/L (ref 20–200)
CLARITY UR: ABNORMAL
CO2 SERPL-SCNC: 14 MMOL/L (ref 23–29)
COLOR UR: YELLOW
CREAT SERPL-MCNC: 6.2 MG/DL (ref 0.5–1.4)
DIFFERENTIAL METHOD: ABNORMAL
EOSINOPHIL # BLD AUTO: 0.2 K/UL (ref 0–0.5)
EOSINOPHIL NFR BLD: 1.3 % (ref 0–8)
ERYTHROCYTE [DISTWIDTH] IN BLOOD BY AUTOMATED COUNT: 13.8 % (ref 11.5–14.5)
EST. GFR  (AFRICAN AMERICAN): 9.8 ML/MIN/1.73 M^2
EST. GFR  (NON AFRICAN AMERICAN): 8.5 ML/MIN/1.73 M^2
FERRITIN SERPL-MCNC: 514 NG/ML (ref 20–300)
GLUCOSE SERPL-MCNC: 115 MG/DL (ref 70–110)
GLUCOSE SERPL-MCNC: 149 MG/DL (ref 70–110)
GLUCOSE UR QL STRIP: NEGATIVE
HCT VFR BLD AUTO: 37.3 % (ref 40–54)
HGB BLD-MCNC: 12.8 G/DL (ref 14–18)
HGB UR QL STRIP: NEGATIVE
HYALINE CASTS #/AREA URNS LPF: 72 /LPF
IMM GRANULOCYTES # BLD AUTO: 0.12 K/UL (ref 0–0.04)
IMM GRANULOCYTES NFR BLD AUTO: 1 % (ref 0–0.5)
IRON SERPL-MCNC: 108 UG/DL (ref 45–160)
KETONES UR QL STRIP: NEGATIVE
LACTATE SERPL-SCNC: 1.6 MMOL/L (ref 0.5–1.9)
LEUKOCYTE ESTERASE UR QL STRIP: NEGATIVE
LIPASE SERPL-CCNC: 67 U/L (ref 4–60)
LYMPHOCYTES # BLD AUTO: 1.2 K/UL (ref 1–4.8)
LYMPHOCYTES NFR BLD: 10.1 % (ref 18–48)
MCH RBC QN AUTO: 30.6 PG (ref 27–31)
MCHC RBC AUTO-ENTMCNC: 34.3 G/DL (ref 32–36)
MCV RBC AUTO: 89 FL (ref 82–98)
MICROSCOPIC COMMENT: ABNORMAL
MONOCYTES # BLD AUTO: 1.3 K/UL (ref 0.3–1)
MONOCYTES NFR BLD: 10.9 % (ref 4–15)
NEUTROPHILS # BLD AUTO: 9.1 K/UL (ref 1.8–7.7)
NEUTROPHILS NFR BLD: 76.3 % (ref 38–73)
NITRITE UR QL STRIP: NEGATIVE
NRBC BLD-RTO: 0 /100 WBC
PH UR STRIP: 6 [PH] (ref 5–8)
PLATELET # BLD AUTO: 317 K/UL (ref 150–350)
PMV BLD AUTO: 10.3 FL (ref 9.2–12.9)
POTASSIUM SERPL-SCNC: 3.5 MMOL/L (ref 3.5–5.1)
POTASSIUM UR-SCNC: 14 MMOL/L (ref 15–95)
PROT SERPL-MCNC: 7.6 G/DL (ref 6–8.4)
PROT UR QL STRIP: ABNORMAL
RBC # BLD AUTO: 4.18 M/UL (ref 4.6–6.2)
RBC #/AREA URNS HPF: 1 /HPF (ref 0–4)
SARS-COV-2 RDRP RESP QL NAA+PROBE: NEGATIVE
SATURATED IRON: 35 % (ref 20–50)
SODIUM SERPL-SCNC: 136 MMOL/L (ref 136–145)
SODIUM UR-SCNC: <10 MMOL/L (ref 20–250)
SP GR UR STRIP: 1.02 (ref 1–1.03)
SQUAMOUS #/AREA URNS HPF: 8 /HPF
TOTAL IRON BINDING CAPACITY: 309 UG/DL (ref 250–450)
TRANSFERRIN SERPL-MCNC: 221 MG/DL (ref 200–375)
URN SPEC COLLECT METH UR: ABNORMAL
UROBILINOGEN UR STRIP-ACNC: NEGATIVE EU/DL
WBC # BLD AUTO: 11.93 K/UL (ref 3.9–12.7)
WBC #/AREA URNS HPF: 2 /HPF (ref 0–5)

## 2020-12-02 PROCEDURE — 83605 ASSAY OF LACTIC ACID: CPT

## 2020-12-02 PROCEDURE — 93010 EKG 12-LEAD: ICD-10-PCS | Mod: ,,, | Performed by: INTERNAL MEDICINE

## 2020-12-02 PROCEDURE — 93010 ELECTROCARDIOGRAM REPORT: CPT | Mod: ,,, | Performed by: INTERNAL MEDICINE

## 2020-12-02 PROCEDURE — 99900031 HC PATIENT EDUCATION (STAT)

## 2020-12-02 PROCEDURE — 81001 URINALYSIS AUTO W/SCOPE: CPT

## 2020-12-02 PROCEDURE — 80053 COMPREHEN METABOLIC PANEL: CPT

## 2020-12-02 PROCEDURE — 87040 BLOOD CULTURE FOR BACTERIA: CPT

## 2020-12-02 PROCEDURE — 99900035 HC TECH TIME PER 15 MIN (STAT)

## 2020-12-02 PROCEDURE — 96361 HYDRATE IV INFUSION ADD-ON: CPT

## 2020-12-02 PROCEDURE — 36415 COLL VENOUS BLD VENIPUNCTURE: CPT

## 2020-12-02 PROCEDURE — 82550 ASSAY OF CK (CPK): CPT

## 2020-12-02 PROCEDURE — 84300 ASSAY OF URINE SODIUM: CPT

## 2020-12-02 PROCEDURE — 82553 CREATINE MB FRACTION: CPT

## 2020-12-02 PROCEDURE — 51702 INSERT TEMP BLADDER CATH: CPT

## 2020-12-02 PROCEDURE — U0002 COVID-19 LAB TEST NON-CDC: HCPCS

## 2020-12-02 PROCEDURE — 25000003 PHARM REV CODE 250: Performed by: NURSE PRACTITIONER

## 2020-12-02 PROCEDURE — 96374 THER/PROPH/DIAG INJ IV PUSH: CPT

## 2020-12-02 PROCEDURE — 51798 US URINE CAPACITY MEASURE: CPT

## 2020-12-02 PROCEDURE — 93005 ELECTROCARDIOGRAM TRACING: CPT | Performed by: INTERNAL MEDICINE

## 2020-12-02 PROCEDURE — 82962 GLUCOSE BLOOD TEST: CPT

## 2020-12-02 PROCEDURE — 20000000 HC ICU ROOM

## 2020-12-02 PROCEDURE — 84133 ASSAY OF URINE POTASSIUM: CPT

## 2020-12-02 PROCEDURE — 82728 ASSAY OF FERRITIN: CPT

## 2020-12-02 PROCEDURE — 63600175 PHARM REV CODE 636 W HCPCS: Performed by: NURSE PRACTITIONER

## 2020-12-02 PROCEDURE — 85025 COMPLETE CBC W/AUTO DIFF WBC: CPT

## 2020-12-02 PROCEDURE — 83540 ASSAY OF IRON: CPT

## 2020-12-02 PROCEDURE — 99285 EMERGENCY DEPT VISIT HI MDM: CPT | Mod: 25

## 2020-12-02 PROCEDURE — C9113 INJ PANTOPRAZOLE SODIUM, VIA: HCPCS | Performed by: NURSE PRACTITIONER

## 2020-12-02 PROCEDURE — 63600175 PHARM REV CODE 636 W HCPCS: Performed by: EMERGENCY MEDICINE

## 2020-12-02 PROCEDURE — C9113 INJ PANTOPRAZOLE SODIUM, VIA: HCPCS | Performed by: EMERGENCY MEDICINE

## 2020-12-02 PROCEDURE — 83690 ASSAY OF LIPASE: CPT

## 2020-12-02 PROCEDURE — 94761 N-INVAS EAR/PLS OXIMETRY MLT: CPT

## 2020-12-02 PROCEDURE — 96375 TX/PRO/DX INJ NEW DRUG ADDON: CPT

## 2020-12-02 RX ORDER — CIPROFLOXACIN 500 MG/1
TABLET ORAL
COMMUNITY
End: 2020-12-02

## 2020-12-02 RX ORDER — SODIUM CHLORIDE 9 MG/ML
INJECTION, SOLUTION INTRAVENOUS CONTINUOUS
Status: DISCONTINUED | OUTPATIENT
Start: 2020-12-02 | End: 2020-12-03

## 2020-12-02 RX ORDER — ACETAMINOPHEN 325 MG/1
650 TABLET ORAL EVERY 8 HOURS PRN
Status: DISCONTINUED | OUTPATIENT
Start: 2020-12-02 | End: 2020-12-07 | Stop reason: HOSPADM

## 2020-12-02 RX ORDER — CHOLESTYRAMINE 4 G/9G
1 POWDER, FOR SUSPENSION ORAL 2 TIMES DAILY
Status: DISCONTINUED | OUTPATIENT
Start: 2020-12-02 | End: 2020-12-02

## 2020-12-02 RX ORDER — HYDROCODONE BITARTRATE AND ACETAMINOPHEN 5; 325 MG/1; MG/1
1 TABLET ORAL EVERY 6 HOURS PRN
Status: DISCONTINUED | OUTPATIENT
Start: 2020-12-02 | End: 2020-12-07 | Stop reason: HOSPADM

## 2020-12-02 RX ORDER — TEMAZEPAM 30 MG/1
30 CAPSULE ORAL NIGHTLY PRN
COMMUNITY
End: 2024-01-08

## 2020-12-02 RX ORDER — PNEUMOCOCCAL VACCINE POLYVALENT 25; 25; 25; 25; 25; 25; 25; 25; 25; 25; 25; 25; 25; 25; 25; 25; 25; 25; 25; 25; 25; 25; 25 UG/.5ML; UG/.5ML; UG/.5ML; UG/.5ML; UG/.5ML; UG/.5ML; UG/.5ML; UG/.5ML; UG/.5ML; UG/.5ML; UG/.5ML; UG/.5ML; UG/.5ML; UG/.5ML; UG/.5ML; UG/.5ML; UG/.5ML; UG/.5ML; UG/.5ML; UG/.5ML; UG/.5ML; UG/.5ML; UG/.5ML
INJECTION, SOLUTION INTRAMUSCULAR; SUBCUTANEOUS
Status: ON HOLD | COMMUNITY
End: 2020-12-07 | Stop reason: HOSPADM

## 2020-12-02 RX ORDER — LANOLIN ALCOHOL/MO/W.PET/CERES
1000 CREAM (GRAM) TOPICAL DAILY
COMMUNITY
Start: 2014-11-13 | End: 2024-01-08

## 2020-12-02 RX ORDER — DICYCLOMINE HYDROCHLORIDE 10 MG/1
20 CAPSULE ORAL EVERY 6 HOURS
Status: DISCONTINUED | OUTPATIENT
Start: 2020-12-03 | End: 2020-12-07 | Stop reason: HOSPADM

## 2020-12-02 RX ORDER — ONDANSETRON 2 MG/ML
4 INJECTION INTRAMUSCULAR; INTRAVENOUS EVERY 8 HOURS PRN
Status: DISCONTINUED | OUTPATIENT
Start: 2020-12-02 | End: 2020-12-07 | Stop reason: HOSPADM

## 2020-12-02 RX ORDER — ACETAMINOPHEN 325 MG/1
650 TABLET ORAL EVERY 4 HOURS PRN
Status: DISCONTINUED | OUTPATIENT
Start: 2020-12-02 | End: 2020-12-07 | Stop reason: HOSPADM

## 2020-12-02 RX ORDER — OMEGA-3 FATTY ACIDS 1000 MG
2 CAPSULE ORAL
COMMUNITY
End: 2020-12-02

## 2020-12-02 RX ORDER — INSULIN ASPART 100 [IU]/ML
0-5 INJECTION, SOLUTION INTRAVENOUS; SUBCUTANEOUS
Status: DISCONTINUED | OUTPATIENT
Start: 2020-12-02 | End: 2020-12-07 | Stop reason: HOSPADM

## 2020-12-02 RX ORDER — DUREZOL 0.5 MG/ML
EMULSION OPHTHALMIC
COMMUNITY
End: 2020-12-02

## 2020-12-02 RX ORDER — SODIUM CHLORIDE, SODIUM LACTATE, POTASSIUM CHLORIDE, CALCIUM CHLORIDE 600; 310; 30; 20 MG/100ML; MG/100ML; MG/100ML; MG/100ML
1000 INJECTION, SOLUTION INTRAVENOUS
Status: COMPLETED | OUTPATIENT
Start: 2020-12-02 | End: 2020-12-02

## 2020-12-02 RX ORDER — DIFLUPREDNATE OPHTHALMIC 0.5 MG/ML
1 EMULSION OPHTHALMIC 2 TIMES DAILY
Status: DISCONTINUED | OUTPATIENT
Start: 2020-12-02 | End: 2020-12-03

## 2020-12-02 RX ORDER — ALPRAZOLAM 0.5 MG/1
0.5 TABLET ORAL
COMMUNITY
Start: 2018-12-11 | End: 2020-12-02

## 2020-12-02 RX ORDER — IBUPROFEN 200 MG
16 TABLET ORAL
Status: DISCONTINUED | OUTPATIENT
Start: 2020-12-02 | End: 2020-12-07 | Stop reason: HOSPADM

## 2020-12-02 RX ORDER — OFLOXACIN 3 MG/ML
SOLUTION/ DROPS OPHTHALMIC
COMMUNITY
End: 2020-12-02

## 2020-12-02 RX ORDER — LORAZEPAM 0.5 MG/1
TABLET ORAL
COMMUNITY
End: 2020-12-02

## 2020-12-02 RX ORDER — PANTOPRAZOLE SODIUM 40 MG/10ML
40 INJECTION, POWDER, LYOPHILIZED, FOR SOLUTION INTRAVENOUS
Status: COMPLETED | OUTPATIENT
Start: 2020-12-02 | End: 2020-12-02

## 2020-12-02 RX ORDER — DIPHENOXYLATE HYDROCHLORIDE AND ATROPINE SULFATE 2.5; .025 MG/1; MG/1
1 TABLET ORAL 4 TIMES DAILY PRN
Status: DISCONTINUED | OUTPATIENT
Start: 2020-12-02 | End: 2020-12-02

## 2020-12-02 RX ORDER — DONEPEZIL HYDROCHLORIDE 5 MG/1
10 TABLET, FILM COATED ORAL NIGHTLY
Status: DISCONTINUED | OUTPATIENT
Start: 2020-12-02 | End: 2020-12-07 | Stop reason: HOSPADM

## 2020-12-02 RX ORDER — SODIUM CHLORIDE 0.9 % (FLUSH) 0.9 %
10 SYRINGE (ML) INJECTION
Status: DISCONTINUED | OUTPATIENT
Start: 2020-12-02 | End: 2020-12-07 | Stop reason: HOSPADM

## 2020-12-02 RX ORDER — LOPERAMIDE HYDROCHLORIDE 2 MG/1
2 CAPSULE ORAL 4 TIMES DAILY PRN
Status: DISCONTINUED | OUTPATIENT
Start: 2020-12-02 | End: 2020-12-02

## 2020-12-02 RX ORDER — ASPIRIN 81 MG/1
81 TABLET ORAL DAILY
Status: DISCONTINUED | OUTPATIENT
Start: 2020-12-03 | End: 2020-12-07 | Stop reason: HOSPADM

## 2020-12-02 RX ORDER — IBUPROFEN 200 MG
24 TABLET ORAL
Status: DISCONTINUED | OUTPATIENT
Start: 2020-12-02 | End: 2020-12-07 | Stop reason: HOSPADM

## 2020-12-02 RX ORDER — METOPROLOL SUCCINATE 50 MG/1
TABLET, EXTENDED RELEASE ORAL
Status: ON HOLD | COMMUNITY
End: 2020-12-07 | Stop reason: SDUPTHER

## 2020-12-02 RX ORDER — GLUCAGON 1 MG
1 KIT INJECTION
Status: DISCONTINUED | OUTPATIENT
Start: 2020-12-02 | End: 2020-12-07 | Stop reason: HOSPADM

## 2020-12-02 RX ORDER — GLIPIZIDE 5 MG/1
5 TABLET, FILM COATED, EXTENDED RELEASE ORAL 2 TIMES DAILY
COMMUNITY
Start: 2020-11-04 | End: 2024-01-08

## 2020-12-02 RX ORDER — CLOPIDOGREL BISULFATE 75 MG/1
75 TABLET ORAL NIGHTLY
Status: DISCONTINUED | OUTPATIENT
Start: 2020-12-02 | End: 2020-12-06

## 2020-12-02 RX ORDER — GABAPENTIN 300 MG/1
300 CAPSULE ORAL NIGHTLY
Status: DISCONTINUED | OUTPATIENT
Start: 2020-12-02 | End: 2020-12-07 | Stop reason: HOSPADM

## 2020-12-02 RX ORDER — ALLOPURINOL 100 MG/1
100 TABLET ORAL 2 TIMES DAILY
Status: DISCONTINUED | OUTPATIENT
Start: 2020-12-02 | End: 2020-12-03

## 2020-12-02 RX ORDER — ATORVASTATIN CALCIUM 40 MG/1
80 TABLET, FILM COATED ORAL DAILY
Status: DISCONTINUED | OUTPATIENT
Start: 2020-12-03 | End: 2020-12-07 | Stop reason: HOSPADM

## 2020-12-02 RX ORDER — ONDANSETRON 2 MG/ML
4 INJECTION INTRAMUSCULAR; INTRAVENOUS
Status: COMPLETED | OUTPATIENT
Start: 2020-12-02 | End: 2020-12-02

## 2020-12-02 RX ORDER — PANTOPRAZOLE SODIUM 40 MG/10ML
40 INJECTION, POWDER, LYOPHILIZED, FOR SOLUTION INTRAVENOUS 2 TIMES DAILY
Status: DISCONTINUED | OUTPATIENT
Start: 2020-12-02 | End: 2020-12-03

## 2020-12-02 RX ORDER — DIPHENOXYLATE HYDROCHLORIDE AND ATROPINE SULFATE 2.5; .025 MG/1; MG/1
TABLET ORAL
Status: ON HOLD | COMMUNITY
End: 2020-12-07 | Stop reason: HOSPADM

## 2020-12-02 RX ADMIN — ONDANSETRON 4 MG: 2 INJECTION INTRAMUSCULAR; INTRAVENOUS at 04:12

## 2020-12-02 RX ADMIN — SODIUM CHLORIDE, SODIUM LACTATE, POTASSIUM CHLORIDE, AND CALCIUM CHLORIDE 1000 ML: .6; .31; .03; .02 INJECTION, SOLUTION INTRAVENOUS at 05:12

## 2020-12-02 RX ADMIN — SODIUM CHLORIDE 125 ML/HR: 0.9 INJECTION, SOLUTION INTRAVENOUS at 08:12

## 2020-12-02 RX ADMIN — PANTOPRAZOLE SODIUM 40 MG: 40 INJECTION, POWDER, LYOPHILIZED, FOR SOLUTION INTRAVENOUS at 04:12

## 2020-12-02 RX ADMIN — PANTOPRAZOLE SODIUM 40 MG: 40 INJECTION, POWDER, LYOPHILIZED, FOR SOLUTION INTRAVENOUS at 09:12

## 2020-12-02 RX ADMIN — SODIUM CHLORIDE, SODIUM LACTATE, POTASSIUM CHLORIDE, AND CALCIUM CHLORIDE 1000 ML: .6; .31; .03; .02 INJECTION, SOLUTION INTRAVENOUS at 06:12

## 2020-12-02 NOTE — ED PROVIDER NOTES
Encounter Date: 12/2/2020       History     Chief Complaint   Patient presents with    Abdominal Pain     X SEVERAL WEEKS, SENT FROM PCP FOR EVAL OF LOW BP    Vomiting    Diarrhea     70 y.o. male with multiple medical problems including right-sided colectomy for ischemic colitis 10/31/2018, BPH, bladder and renal cancer, coronary artery disease, gastroesophageal reflux, hypertension, peripheral artery disease, chronic kidney disease patient status post mesenteric artery stenting approximately 1 week ago performed at [facility] for suspected recurrent ischemic colitis.  Patient was seen by Dr. Garcia earlier today for follow-up.  Patient states has had 3 week history of persistent nausea vomiting diarrhea which is had multiple episodes of diarrhea daily.  According to white patient has had approximately 20-30 lose large diarrheal stools per day.  Patient denies fever, no hematemesis, no melena, no fever, no other constitutional symptoms.  While being seen in the GI office earlier patient found with hypotension blood pressure 80/40 and complaint of near syncopal episode.  Patient was transferred to the emergency department for further workup and evaluation.        Review of patient's allergies indicates:   Allergen Reactions    Amoxicillin Hives    Zolpidem tartrate Hallucinations     Past Medical History:   Diagnosis Date    Bladder cancer     2 times    BPH (benign prostatic hypertrophy)     Cancer of kidney     1/2    Colon polyp     Coronary artery disease     DM (diabetes mellitus)     GERD (gastroesophageal reflux disease)     Hypertension     PAD (peripheral artery disease)     Peripheral vascular disease      Past Surgical History:   Procedure Laterality Date    abdominal aorta bypass by fem      BLADDER SURGERY      COLONOSCOPY  12/06/2018    COLONOSCOPY N/A 12/6/2018    Procedure: COLONOSCOPY;  Surgeon: Familia Dickson MD;  Location: [facility];  Service: General;   Laterality: N/A;    CYSTOSCOPY      ENDOSCOPIC ULTRASOUND OF UPPER GASTROINTESTINAL TRACT N/A 2019    Procedure: ULTRASOUND, UPPER GI TRACT, ENDOSCOPIC;  Surgeon: Govind Hassan MD;  Location: [address]);  Service: Endoscopy;  Laterality: N/A;  Please schedule EGD and EUS, for celiac sprue biopsies, and evaluation of pancreatitis.  5 day hold Plavix, Dr Jarret Calle  PM prep    ESOPHAGOGASTRODUODENOSCOPY N/A 2019    Procedure: EGD (ESOPHAGOGASTRODUODENOSCOPY);  Surgeon: Govind Hassan MD;  Location: [facility] ([address] [place] [address]);  Service: Endoscopy;  Laterality: N/A;  Please schedule EGD and EUS, for celiac sprue biopsies, and evaluation of pancreatitis.  5 day hold Plavix, Dr Jarret Calle  PM prep    multiple angiosplastia      NEPHRECTOMY      partial nephrectomy left    PERCUTANEOUS TRANSLUMINAL ANGIOPLASTY (PTA) OF PERIPHERAL VESSEL Bilateral 2018    Procedure: PTA, PERIPHERAL VESSEL;  Surgeon: Patrick Love MD;  Location: [facility] CATH;  Service: Cardiology;  Laterality: Bilateral;  Please schedule Left common iliac artery intervention and b/l LE angio via left brachial on      Family History   Problem Relation Age of Onset    Cancer Father     Cancer Maternal Uncle      Social History     Tobacco Use    Smoking status: Former Smoker     Packs/day: 1.50     Years: 40.00     Pack years: 60.00     Types: Cigarettes     Quit date: 2008     Years since quittin.8    Smokeless tobacco: Never Used   Substance Use Topics    Alcohol use: Yes     Alcohol/week: 2.0 standard drinks     Types: 2 Cans of beer per week     Comment: daily    Drug use: Not on file     Review of Systems   Constitutional: Negative for fever.   HENT: Negative for sore throat.    Respiratory: Negative for shortness of breath.    Cardiovascular: Negative for chest pain.   Gastrointestinal: Positive for diarrhea, nausea and vomiting.   Genitourinary: Negative for dysuria, flank pain and hematuria.    Musculoskeletal: Negative for back pain.   Skin: Negative for rash.   Neurological: Positive for dizziness and light-headedness. Negative for weakness.   Hematological: Does not bruise/bleed easily.   Psychiatric/Behavioral: The patient is not nervous/anxious.        Physical Exam     Initial Vitals [12/02/20 1535]   BP Pulse Resp Temp SpO2   (!) 132/96 60 18 97.6 °F (36.4 °C) 99 %      MAP       --         Physical Exam    Nursing note and vitals reviewed.  Constitutional: He appears well-developed and well-nourished.   HENT:   Head: Normocephalic and atraumatic.   Nose: Nose normal.   Mouth/Throat: Oropharynx is clear and moist.   Eyes: Conjunctivae and EOM are normal. Pupils are equal, round, and reactive to light. No scleral icterus.   Neck: Normal range of motion. Neck supple.   Cardiovascular: Normal rate, regular rhythm, normal heart sounds and intact distal pulses. Exam reveals no gallop and no friction rub.    No murmur heard.  Pulmonary/Chest: No stridor. No respiratory distress.   Course bilateral breath sounds no adventitious sounds   Abdominal: Soft. Bowel sounds are normal. He exhibits no mass. There is no abdominal tenderness. There is no rebound and no guarding.   Musculoskeletal: Normal range of motion. No edema.   Lymphadenopathy:     He has no cervical adenopathy.   Neurological: He is alert and oriented to person, place, and time. He has normal strength and normal reflexes. No cranial nerve deficit or sensory deficit. GCS score is 15. GCS eye subscore is 4. GCS verbal subscore is 5. GCS motor subscore is 6.   Skin: Skin is warm and dry. Capillary refill takes less than 2 seconds. No rash noted.   Psychiatric: He has a normal mood and affect. His behavior is normal. Judgment and thought content normal.         ED Course   Procedures  Labs Reviewed   CBC W/ AUTO DIFFERENTIAL - Abnormal; Notable for the following components:       Result Value    RBC 4.18 (*)     Hemoglobin 12.8 (*)     Hematocrit  37.3 (*)     Immature Granulocytes 1.0 (*)     Gran # (ANC) 9.1 (*)     Immature Grans (Abs) 0.12 (*)     Mono # 1.3 (*)     Gran % 76.3 (*)     Lymph % 10.1 (*)     All other components within normal limits    Narrative:     For upper or mid abdominal pain.   COMPREHENSIVE METABOLIC PANEL - Abnormal; Notable for the following components:    CO2 14 (*)     Glucose 149 (*)      (*)     Creatinine 6.2 (*)     Anion Gap 20 (*)     eGFR if  9.8 (*)     eGFR if non  8.5 (*)     All other components within normal limits    Narrative:     For upper or mid abdominal pain.   LIPASE - Abnormal; Notable for the following components:    Lipase 67 (*)     All other components within normal limits    Narrative:     For upper or mid abdominal pain.   CULTURE, STOOL   CLOSTRIDIUM DIFFICILE   CULTURE, BLOOD   CULTURE, BLOOD   LACTIC ACID, PLASMA   CK-MB   CK   TROPONIN I   SARS-COV-2 RNA AMPLIFICATION, QUAL   FERRITIN   IRON AND TIBC   URINALYSIS, REFLEX TO URINE CULTURE   STOOL EXAM-OVA,CYSTS,PARASITES   WBC, STOOL        ECG Results          EKG 12-lead (In process)  Result time 12/02/20 16:18:08    In process by Interface, Lab In [facility] (12/02/20 16:18:08)                 Narrative:    Test Reason : R10.9,    Vent. Rate : 056 BPM     Atrial Rate : 056 BPM     P-R Int : 172 ms          QRS Dur : 086 ms      QT Int : 450 ms       P-R-T Axes : 067 072 082 degrees     QTc Int : 434 ms    Sinus bradycardia  Otherwise normal ECG  When compared with ECG of 12-JAN-2019 18:49,  ST elevation now present in Inferior leads    Referred By: [email or web address]   SELF           Confirmed By:                             Imaging Results          CT Abdomen Pelvis  Without Contrast (Final result)  Result time 12/02/20 18:19:49    Final result by Jesus Galeano MD (12/02/20 18:19:49)                 Impression:      No noncontrast CT evidence of acute pathology involving the abdomen or  pelvis.    Cholelithiasis.    Exophytic lesion arising from the lower pole the left kidney demonstrating both soft tissue attenuation as well as fatty elements, with peripheral calcification.  A combination of fat necrosis/scarring, or angiomyolipoma are possibilities.  Correlation with surgical history as well as follow-up renal protocol CT is recommended.    Extensive arterial vascular disease, status post aortic bypass.    Additional incidental findings as above including postoperative changes of the colon and bilateral adrenal adenomas.      Electronically signed by: Jesus Galeano MD  Date:    12/02/2020  Time:    18:19             Narrative:    EXAMINATION:  CT ABDOMEN PELVIS WITHOUT CONTRAST    CLINICAL HISTORY:  Abdominal pain, acute, nonlocalized;    TECHNIQUE:  CMS Mandated Quality Data-CT Radiation Dose-436    All CT scans at this facility dose modulation, iterative reconstruction, and or weight-based dosing when appropriate to reduce radiation dose to as low as reasonably achievable.    COMPARISON:  CT abdomen and pelvis 01/23/2019    FINDINGS:  Lung bases are clear.  Bone window images demonstrate lumbar spondylosis.  No acute or aggressive osseous abnormality.    On this unenhanced exam, no focal hepatic lesion.  Small calcified gallstone appears to be present within the gallbladder neck.  The gallbladder is collapsed.  No bile duct dilation.  Spleen and pancreas are unremarkable.  Stable bilateral adrenal nodules consistent with benign adenomas.  Bilateral perinephric stranding.  Bilateral renal vascular calcifications.  No hydronephrosis.  At the lower pole the left kidney there is a calcified lesion demonstrating both soft tissue and fatty attenuation which has been present on multiple prior exams.  Ureters are normal in caliber.  Urinary bladder is collapsed.    Stomach is unremarkable.  No evidence small-bowel obstruction.  Postoperative changes of the colon.  No evidence of acute  colitis.    Extensive atherosclerotic calcification of the abdominal aorta and its branch vessels.  A stent is present within the celiac artery.  Aortic bypass.  No free fluid or free air within the abdomen or pelvis.  No pathologically enlarged abdominopelvic lymph nodes.  Intramuscular lipoma involving the right gluteal musculature.  Soft tissue mass of the anterior left thigh demonstrating fatty elements is also stable from prior.                               X-Ray Chest AP Portable (Final result)  Result time 12/02/20 17:05:12    Final result by Jesus Galeano MD (12/02/20 17:05:12)                 Narrative:    XR CHEST AP PORTABLE    CLINICAL HISTORY:  70 years Male Chest Pain    COMPARISON: None    FINDINGS: Cardiac silhouette size is within normal limits.  Atherosclerotic calcification of the liver.    No confluent airspace disease. No pleural fluid or pneumothorax. No  acute osseous abnormality. Surgical clips within the neck.    IMPRESSION: No acute pulmonary process.    Electronically Signed by Jesus MARTINEZ on 12/2/2020 5:07 PM                               Medical Decision Making:   Initial Assessment:   70 y.o. male with multiple medical problems including right-sided colectomy for ischemic colitis 10/31/2018, BPH, bladder and renal cancer, coronary artery disease, gastroesophageal reflux, hypertension, peripheral artery disease, chronic kidney disease patient status post mesenteric artery stenting approximately 1 week ago performed at [facility] sent to emergency department with near syncopal episode and hypotension        Differential Diagnosis:   Volume depletion, dehydration, acute kidney injury, GI bleed, sepsis, ischemic colitis  ED Management:  Patient seen evaluated emergency department.  Patient case was discussed with Dr. Loya gastroenterology.  Per GI states that plan on having the patient get a EGD in the morning however patient found with acute kidney injury with  BUN of 150 creatinine 6.8 bicarb 14.  Bladder ultrasound showed approximately 37 cc fluid in the bladder.  Currently at this time patient most likely has pre renal hypovolemia acute kidney injury.  Secondary to profound dehydration.  Patient will be given IV hydration at normal saline at 125 cc an hour.  Patient also will undergo renal ultrasound, obtain urine electrolytes, be maintain on strict eyes and nose.  Nephrology was consulted  will follow-up patient concurrently.  Patient will be admitted to the intensive care unit for close monitoring.  Patient currently awaiting admission to hospitalist service.                             Clinical Impression:       ICD-10-CM ICD-9-CM   1. Hypotension, unspecified hypotension type  I95.9 458.9   2. Abdominal pain  R10.9 789.00   3. Volume depletion  E86.9 276.50   4. Acute kidney injury  N17.9 584.9   5. Vomiting and diarrhea  R11.10 787.03    R19.7 787.91                          ED Disposition Condition    Admit                             Andrea Brewster MD  12/02/20 [id number]

## 2020-12-02 NOTE — ED NOTES
Pt presents to the ED with c/o abdominal pain for the past several weeks. Pt with c/o diarrhea, nausea, vomiting, and abdominal pain. Pt states he was sent by his pcp.

## 2020-12-03 PROBLEM — E66.9 OBESITY: Status: ACTIVE | Noted: 2020-12-03

## 2020-12-03 PROBLEM — E87.20 METABOLIC ACIDOSIS: Status: ACTIVE | Noted: 2020-12-03

## 2020-12-03 LAB
ALBUMIN SERPL BCP-MCNC: 3 G/DL (ref 3.5–5.2)
ALBUMIN SERPL BCP-MCNC: 3.1 G/DL (ref 3.5–5.2)
ALLENS TEST: ABNORMAL
ANION GAP SERPL CALC-SCNC: 15 MMOL/L (ref 8–16)
ANION GAP SERPL CALC-SCNC: 16 MMOL/L (ref 8–16)
ANION GAP SERPL CALC-SCNC: 17 MMOL/L (ref 8–16)
ANION GAP SERPL CALC-SCNC: 17 MMOL/L (ref 8–16)
ANION GAP SERPL CALC-SCNC: 19 MMOL/L (ref 8–16)
BASOPHILS # BLD AUTO: 0.05 K/UL (ref 0–0.2)
BASOPHILS NFR BLD: 0.5 % (ref 0–1.9)
BUN SERPL-MCNC: 136 MG/DL (ref 8–23)
BUN SERPL-MCNC: 138 MG/DL (ref 8–23)
BUN SERPL-MCNC: 142 MG/DL (ref 8–23)
BUN SERPL-MCNC: 142 MG/DL (ref 8–23)
BUN SERPL-MCNC: 143 MG/DL (ref 8–23)
CALCIUM SERPL-MCNC: 7.7 MG/DL (ref 8.7–10.5)
CALCIUM SERPL-MCNC: 7.8 MG/DL (ref 8.7–10.5)
CALCIUM SERPL-MCNC: 7.9 MG/DL (ref 8.7–10.5)
CALCIUM SERPL-MCNC: 8.2 MG/DL (ref 8.7–10.5)
CALCIUM SERPL-MCNC: 8.4 MG/DL (ref 8.7–10.5)
CHLORIDE SERPL-SCNC: 102 MMOL/L (ref 95–110)
CHLORIDE SERPL-SCNC: 104 MMOL/L (ref 95–110)
CHLORIDE SERPL-SCNC: 104 MMOL/L (ref 95–110)
CHLORIDE SERPL-SCNC: 107 MMOL/L (ref 95–110)
CHLORIDE SERPL-SCNC: 108 MMOL/L (ref 95–110)
CO2 SERPL-SCNC: 14 MMOL/L (ref 23–29)
CO2 SERPL-SCNC: 14 MMOL/L (ref 23–29)
CO2 SERPL-SCNC: 17 MMOL/L (ref 23–29)
CO2 SERPL-SCNC: 18 MMOL/L (ref 23–29)
CO2 SERPL-SCNC: 9 MMOL/L (ref 23–29)
CREAT SERPL-MCNC: 3.5 MG/DL (ref 0.5–1.4)
CREAT SERPL-MCNC: 3.8 MG/DL (ref 0.5–1.4)
CREAT SERPL-MCNC: 4.4 MG/DL (ref 0.5–1.4)
CREAT SERPL-MCNC: 4.5 MG/DL (ref 0.5–1.4)
CREAT SERPL-MCNC: 5.4 MG/DL (ref 0.5–1.4)
DELSYS: ABNORMAL
DIFFERENTIAL METHOD: ABNORMAL
EOSINOPHIL # BLD AUTO: 0.2 K/UL (ref 0–0.5)
EOSINOPHIL NFR BLD: 2.3 % (ref 0–8)
ERYTHROCYTE [DISTWIDTH] IN BLOOD BY AUTOMATED COUNT: 13.8 % (ref 11.5–14.5)
EST. GFR  (AFRICAN AMERICAN): 11.6 ML/MIN/1.73 M^2
EST. GFR  (AFRICAN AMERICAN): 14.4 ML/MIN/1.73 M^2
EST. GFR  (AFRICAN AMERICAN): 14.8 ML/MIN/1.73 M^2
EST. GFR  (AFRICAN AMERICAN): 17.7 ML/MIN/1.73 M^2
EST. GFR  (AFRICAN AMERICAN): 19.6 ML/MIN/1.73 M^2
EST. GFR  (NON AFRICAN AMERICAN): 10 ML/MIN/1.73 M^2
EST. GFR  (NON AFRICAN AMERICAN): 12.5 ML/MIN/1.73 M^2
EST. GFR  (NON AFRICAN AMERICAN): 12.8 ML/MIN/1.73 M^2
EST. GFR  (NON AFRICAN AMERICAN): 15.3 ML/MIN/1.73 M^2
EST. GFR  (NON AFRICAN AMERICAN): 16.9 ML/MIN/1.73 M^2
FIO2: 0.21
GLUCOSE SERPL-MCNC: 122 MG/DL (ref 70–110)
GLUCOSE SERPL-MCNC: 127 MG/DL (ref 70–110)
GLUCOSE SERPL-MCNC: 127 MG/DL (ref 70–110)
GLUCOSE SERPL-MCNC: 132 MG/DL (ref 70–110)
GLUCOSE SERPL-MCNC: 143 MG/DL (ref 70–110)
GLUCOSE SERPL-MCNC: 149 MG/DL (ref 70–110)
GLUCOSE SERPL-MCNC: 180 MG/DL (ref 70–110)
GLUCOSE SERPL-MCNC: 181 MG/DL (ref 70–110)
GLUCOSE SERPL-MCNC: 52 MG/DL (ref 70–110)
HCO3 UR-SCNC: 14.5 MMOL/L (ref 24–28)
HCT VFR BLD AUTO: 31.9 % (ref 40–54)
HGB BLD-MCNC: 10.6 G/DL (ref 14–18)
IMM GRANULOCYTES # BLD AUTO: 0.07 K/UL (ref 0–0.04)
IMM GRANULOCYTES NFR BLD AUTO: 0.7 % (ref 0–0.5)
LYMPHOCYTES # BLD AUTO: 1.7 K/UL (ref 1–4.8)
LYMPHOCYTES NFR BLD: 15.9 % (ref 18–48)
MAGNESIUM SERPL-MCNC: 1.4 MG/DL (ref 1.6–2.6)
MAGNESIUM SERPL-MCNC: 1.6 MG/DL (ref 1.6–2.6)
MAGNESIUM SERPL-MCNC: 2.2 MG/DL (ref 1.6–2.6)
MCH RBC QN AUTO: 30.5 PG (ref 27–31)
MCHC RBC AUTO-ENTMCNC: 33.2 G/DL (ref 32–36)
MCV RBC AUTO: 92 FL (ref 82–98)
MODE: ABNORMAL
MONOCYTES # BLD AUTO: 1.2 K/UL (ref 0.3–1)
MONOCYTES NFR BLD: 11.4 % (ref 4–15)
NEUTROPHILS # BLD AUTO: 7.4 K/UL (ref 1.8–7.7)
NEUTROPHILS NFR BLD: 69.2 % (ref 38–73)
NRBC BLD-RTO: 0 /100 WBC
PCO2 BLDA: 26.4 MMHG (ref 35–45)
PH SMN: 7.35 [PH] (ref 7.35–7.45)
PHOSPHATE SERPL-MCNC: 6.6 MG/DL (ref 2.7–4.5)
PLATELET # BLD AUTO: 234 K/UL (ref 150–350)
PMV BLD AUTO: 10.5 FL (ref 9.2–12.9)
PO2 BLDA: 112 MMHG (ref 80–100)
POC BE: -11 MMOL/L
POC SATURATED O2: 98 % (ref 95–100)
POC TCO2: 15 MMOL/L (ref 23–27)
POTASSIUM SERPL-SCNC: 2.7 MMOL/L (ref 3.5–5.1)
POTASSIUM SERPL-SCNC: 2.8 MMOL/L (ref 3.5–5.1)
POTASSIUM SERPL-SCNC: 2.9 MMOL/L (ref 3.5–5.1)
POTASSIUM SERPL-SCNC: 3.1 MMOL/L (ref 3.5–5.1)
POTASSIUM SERPL-SCNC: 3.2 MMOL/L (ref 3.5–5.1)
RBC # BLD AUTO: 3.48 M/UL (ref 4.6–6.2)
SAMPLE: ABNORMAL
SITE: ABNORMAL
SODIUM SERPL-SCNC: 135 MMOL/L (ref 136–145)
SODIUM SERPL-SCNC: 135 MMOL/L (ref 136–145)
SODIUM SERPL-SCNC: 136 MMOL/L (ref 136–145)
SODIUM SERPL-SCNC: 137 MMOL/L (ref 136–145)
SODIUM SERPL-SCNC: 138 MMOL/L (ref 136–145)
WBC # BLD AUTO: 10.61 K/UL (ref 3.9–12.7)
WBC #/AREA STL HPF: NORMAL /[HPF]

## 2020-12-03 PROCEDURE — 36600 WITHDRAWAL OF ARTERIAL BLOOD: CPT

## 2020-12-03 PROCEDURE — 83735 ASSAY OF MAGNESIUM: CPT

## 2020-12-03 PROCEDURE — 99900035 HC TECH TIME PER 15 MIN (STAT)

## 2020-12-03 PROCEDURE — 83516 IMMUNOASSAY NONANTIBODY: CPT

## 2020-12-03 PROCEDURE — 25000003 PHARM REV CODE 250: Performed by: NURSE PRACTITIONER

## 2020-12-03 PROCEDURE — 85025 COMPLETE CBC W/AUTO DIFF WBC: CPT

## 2020-12-03 PROCEDURE — 82040 ASSAY OF SERUM ALBUMIN: CPT

## 2020-12-03 PROCEDURE — 80048 BASIC METABOLIC PNL TOTAL CA: CPT | Mod: 91

## 2020-12-03 PROCEDURE — C9113 INJ PANTOPRAZOLE SODIUM, VIA: HCPCS | Performed by: NURSE PRACTITIONER

## 2020-12-03 PROCEDURE — 82803 BLOOD GASES ANY COMBINATION: CPT

## 2020-12-03 PROCEDURE — 94761 N-INVAS EAR/PLS OXIMETRY MLT: CPT

## 2020-12-03 PROCEDURE — 87015 SPECIMEN INFECT AGNT CONCNTJ: CPT

## 2020-12-03 PROCEDURE — 87045 FECES CULTURE AEROBIC BACT: CPT

## 2020-12-03 PROCEDURE — 80069 RENAL FUNCTION PANEL: CPT

## 2020-12-03 PROCEDURE — 83735 ASSAY OF MAGNESIUM: CPT | Mod: 91

## 2020-12-03 PROCEDURE — 25000003 PHARM REV CODE 250

## 2020-12-03 PROCEDURE — 20000000 HC ICU ROOM

## 2020-12-03 PROCEDURE — 25000003 PHARM REV CODE 250: Performed by: INTERNAL MEDICINE

## 2020-12-03 PROCEDURE — 63600175 PHARM REV CODE 636 W HCPCS: Performed by: INTERNAL MEDICINE

## 2020-12-03 PROCEDURE — 80048 BASIC METABOLIC PNL TOTAL CA: CPT

## 2020-12-03 PROCEDURE — 89055 LEUKOCYTE ASSESSMENT FECAL: CPT

## 2020-12-03 PROCEDURE — 36415 COLL VENOUS BLD VENIPUNCTURE: CPT

## 2020-12-03 PROCEDURE — 87209 SMEAR COMPLEX STAIN: CPT

## 2020-12-03 PROCEDURE — 87507 IADNA-DNA/RNA PROBE TQ 12-25: CPT

## 2020-12-03 PROCEDURE — 63600175 PHARM REV CODE 636 W HCPCS: Performed by: NURSE PRACTITIONER

## 2020-12-03 PROCEDURE — 87046 STOOL CULTR AEROBIC BACT EA: CPT

## 2020-12-03 RX ORDER — POTASSIUM CHLORIDE 7.45 MG/ML
20 INJECTION INTRAVENOUS
Status: DISCONTINUED | OUTPATIENT
Start: 2020-12-03 | End: 2020-12-07 | Stop reason: HOSPADM

## 2020-12-03 RX ORDER — CHOLESTYRAMINE 4 G/4.8G
4 POWDER, FOR SUSPENSION ORAL 3 TIMES DAILY
Status: DISCONTINUED | OUTPATIENT
Start: 2020-12-03 | End: 2020-12-07 | Stop reason: HOSPADM

## 2020-12-03 RX ORDER — CHLORHEXIDINE GLUCONATE ORAL RINSE 1.2 MG/ML
15 SOLUTION DENTAL 2 TIMES DAILY
Status: DISCONTINUED | OUTPATIENT
Start: 2020-12-03 | End: 2020-12-07 | Stop reason: HOSPADM

## 2020-12-03 RX ORDER — MAGNESIUM SULFATE HEPTAHYDRATE 40 MG/ML
2 INJECTION, SOLUTION INTRAVENOUS
Status: DISCONTINUED | OUTPATIENT
Start: 2020-12-03 | End: 2020-12-07 | Stop reason: HOSPADM

## 2020-12-03 RX ORDER — POTASSIUM CHLORIDE 20 MEQ/1
40 TABLET, EXTENDED RELEASE ORAL
Status: DISCONTINUED | OUTPATIENT
Start: 2020-12-03 | End: 2020-12-07 | Stop reason: HOSPADM

## 2020-12-03 RX ORDER — MUPIROCIN 20 MG/G
OINTMENT TOPICAL 2 TIMES DAILY
Status: DISCONTINUED | OUTPATIENT
Start: 2020-12-03 | End: 2020-12-07 | Stop reason: HOSPADM

## 2020-12-03 RX ORDER — PANTOPRAZOLE SODIUM 40 MG/1
40 TABLET, DELAYED RELEASE ORAL 2 TIMES DAILY
Status: DISCONTINUED | OUTPATIENT
Start: 2020-12-03 | End: 2020-12-07 | Stop reason: HOSPADM

## 2020-12-03 RX ORDER — MAGNESIUM SULFATE 1 G/100ML
1 INJECTION INTRAVENOUS
Status: DISCONTINUED | OUTPATIENT
Start: 2020-12-03 | End: 2020-12-07 | Stop reason: HOSPADM

## 2020-12-03 RX ORDER — MAGNESIUM SULFATE HEPTAHYDRATE 40 MG/ML
4 INJECTION, SOLUTION INTRAVENOUS
Status: DISCONTINUED | OUTPATIENT
Start: 2020-12-03 | End: 2020-12-07 | Stop reason: HOSPADM

## 2020-12-03 RX ORDER — POTASSIUM CHLORIDE 20 MEQ/1
20 TABLET, EXTENDED RELEASE ORAL
Status: DISCONTINUED | OUTPATIENT
Start: 2020-12-03 | End: 2020-12-07 | Stop reason: HOSPADM

## 2020-12-03 RX ORDER — CHOLESTYRAMINE 4 G/4.8G
4 POWDER, FOR SUSPENSION ORAL 2 TIMES DAILY
Status: DISCONTINUED | OUTPATIENT
Start: 2020-12-03 | End: 2020-12-03

## 2020-12-03 RX ORDER — LANOLIN ALCOHOL/MO/W.PET/CERES
800 CREAM (GRAM) TOPICAL
Status: DISCONTINUED | OUTPATIENT
Start: 2020-12-03 | End: 2020-12-07 | Stop reason: HOSPADM

## 2020-12-03 RX ORDER — SODIUM BICARBONATE 1 MEQ/ML
100 SYRINGE (ML) INTRAVENOUS ONCE
Status: COMPLETED | OUTPATIENT
Start: 2020-12-03 | End: 2020-12-03

## 2020-12-03 RX ORDER — POTASSIUM CHLORIDE 7.45 MG/ML
40 INJECTION INTRAVENOUS
Status: DISCONTINUED | OUTPATIENT
Start: 2020-12-03 | End: 2020-12-07 | Stop reason: HOSPADM

## 2020-12-03 RX ADMIN — MUPIROCIN: 20 OINTMENT TOPICAL at 12:12

## 2020-12-03 RX ADMIN — CHOLESTYRAMINE 4 G: 4 POWDER, FOR SUSPENSION ORAL at 10:12

## 2020-12-03 RX ADMIN — RIFAXIMIN 550 MG: 550 TABLET ORAL at 01:12

## 2020-12-03 RX ADMIN — GABAPENTIN 300 MG: 300 CAPSULE ORAL at 12:12

## 2020-12-03 RX ADMIN — PANTOPRAZOLE SODIUM 40 MG: 40 TABLET, DELAYED RELEASE ORAL at 10:12

## 2020-12-03 RX ADMIN — DICYCLOMINE HYDROCHLORIDE 20 MG: 10 CAPSULE ORAL at 06:12

## 2020-12-03 RX ADMIN — MAGNESIUM SULFATE 2 G: 2 INJECTION INTRAVENOUS at 05:12

## 2020-12-03 RX ADMIN — PANTOPRAZOLE SODIUM 40 MG: 40 INJECTION, POWDER, LYOPHILIZED, FOR SOLUTION INTRAVENOUS at 09:12

## 2020-12-03 RX ADMIN — RIFAXIMIN 550 MG: 550 TABLET ORAL at 10:12

## 2020-12-03 RX ADMIN — PANTOPRAZOLE SODIUM 40 MG: 40 TABLET, DELAYED RELEASE ORAL at 01:12

## 2020-12-03 RX ADMIN — DONEPEZIL HYDROCHLORIDE 10 MG: 5 TABLET, FILM COATED ORAL at 10:12

## 2020-12-03 RX ADMIN — DICYCLOMINE HYDROCHLORIDE 20 MG: 10 CAPSULE ORAL at 12:12

## 2020-12-03 RX ADMIN — ASPIRIN 81 MG: 81 TABLET, DELAYED RELEASE ORAL at 09:12

## 2020-12-03 RX ADMIN — SODIUM BICARBONATE: 84 INJECTION, SOLUTION INTRAVENOUS at 05:12

## 2020-12-03 RX ADMIN — ALLOPURINOL 100 MG: 100 TABLET ORAL at 12:12

## 2020-12-03 RX ADMIN — ATORVASTATIN CALCIUM 80 MG: 40 TABLET, FILM COATED ORAL at 09:12

## 2020-12-03 RX ADMIN — Medication 16 G: at 06:12

## 2020-12-03 RX ADMIN — POTASSIUM CHLORIDE 40 MEQ: 7.46 INJECTION, SOLUTION INTRAVENOUS at 07:12

## 2020-12-03 RX ADMIN — CLOPIDOGREL BISULFATE 75 MG: 75 TABLET, FILM COATED ORAL at 12:12

## 2020-12-03 RX ADMIN — DICYCLOMINE HYDROCHLORIDE 20 MG: 10 CAPSULE ORAL at 01:12

## 2020-12-03 RX ADMIN — SODIUM BICARBONATE 100 MEQ: 84 INJECTION INTRAVENOUS at 06:12

## 2020-12-03 RX ADMIN — CLOPIDOGREL BISULFATE 75 MG: 75 TABLET, FILM COATED ORAL at 10:12

## 2020-12-03 RX ADMIN — ALLOPURINOL 100 MG: 100 TABLET ORAL at 09:12

## 2020-12-03 RX ADMIN — GABAPENTIN 300 MG: 300 CAPSULE ORAL at 10:12

## 2020-12-03 RX ADMIN — CALCIUM CHLORIDE 1 G: 100 INJECTION, SOLUTION INTRAVENOUS at 07:12

## 2020-12-03 RX ADMIN — SODIUM BICARBONATE: 84 INJECTION, SOLUTION INTRAVENOUS at 06:12

## 2020-12-03 RX ADMIN — CHOLESTYRAMINE 4 G: 4 POWDER, FOR SUSPENSION ORAL at 03:12

## 2020-12-03 RX ADMIN — CHLORHEXIDINE GLUCONATE 15 ML: 1.2 RINSE ORAL at 01:12

## 2020-12-03 RX ADMIN — CHLORHEXIDINE GLUCONATE 15 ML: 1.2 RINSE ORAL at 10:12

## 2020-12-03 RX ADMIN — HYDROCODONE BITARTRATE AND ACETAMINOPHEN 1 TABLET: 5; 325 TABLET ORAL at 12:12

## 2020-12-03 RX ADMIN — MUPIROCIN: 20 OINTMENT TOPICAL at 10:12

## 2020-12-03 RX ADMIN — DONEPEZIL HYDROCHLORIDE 10 MG: 5 TABLET, FILM COATED ORAL at 12:12

## 2020-12-03 RX ADMIN — POTASSIUM CHLORIDE 40 MEQ: 20 TABLET, EXTENDED RELEASE ORAL at 01:12

## 2020-12-03 RX ADMIN — POTASSIUM CHLORIDE 40 MEQ: 7.46 INJECTION, SOLUTION INTRAVENOUS at 01:12

## 2020-12-03 RX ADMIN — DICYCLOMINE HYDROCHLORIDE 20 MG: 10 CAPSULE ORAL at 05:12

## 2020-12-03 NOTE — SUBJECTIVE & OBJECTIVE
Past Medical History:   Diagnosis Date    Bladder cancer     2 times    BPH (benign prostatic hypertrophy)     Cancer of kidney     1/2    Colon polyp     Coronary artery disease     DM (diabetes mellitus)     GERD (gastroesophageal reflux disease)     Hypertension     PAD (peripheral artery disease)     Peripheral vascular disease        Past Surgical History:   Procedure Laterality Date    abdominal aorta bypass by fem      BLADDER SURGERY      COLONOSCOPY  12/06/2018    COLONOSCOPY N/A 12/6/2018    Procedure: COLONOSCOPY;  Surgeon: Familia Dickson MD;  Location: [facility];  Service: General;  Laterality: N/A;    CYSTOSCOPY      ENDOSCOPIC ULTRASOUND OF UPPER GASTROINTESTINAL TRACT N/A 2/20/2019    Procedure: ULTRASOUND, UPPER GI TRACT, ENDOSCOPIC;  Surgeon: Govind Hassan MD;  Location: [facility]  Service: Endoscopy;  Laterality: N/A;  Please schedule EGD and EUS, for celiac sprue biopsies, and evaluation of pancreatitis.  5 day hold Plavix, Dr Jarret Calle  PM prep    ESOPHAGOGASTRODUODENOSCOPY N/A 2/20/2019    Procedure: EGD (ESOPHAGOGASTRODUODENOSCOPY);  Surgeon: Govind Hassan MD;  Location: [facility] ([address] [place] [address]);  Service: Endoscopy;  Laterality: N/A;  Please schedule EGD and EUS, for celiac sprue biopsies, and evaluation of pancreatitis.  5 day hold Plavix, Dr Jarret Calle  PM prep    multiple angiosplastia      NEPHRECTOMY      partial nephrectomy left    PERCUTANEOUS TRANSLUMINAL ANGIOPLASTY (PTA) OF PERIPHERAL VESSEL Bilateral 8/21/2018    Procedure: PTA, PERIPHERAL VESSEL;  Surgeon: Patrick Love MD;  Location: [facility] CATH;  Service: Cardiology;  Laterality: Bilateral;  Please schedule Left common iliac artery intervention and b/l LE angio via left brachial on August 21       Review of patient's allergies indicates:   Allergen Reactions    Amoxicillin Hives    Zolpidem tartrate Hallucinations       Current Facility-Administered Medications on File Prior to  Encounter   Medication    lidocaine HCl 2% urojet     Current Outpatient Medications on File Prior to Encounter   Medication Sig    ALPRAZolam (XANAX) 0.5 MG tablet Take 0.5 mg by mouth.    cyanocobalamin (VITAMIN B-12) 1000 MCG tablet Take 1,000 mcg by mouth.    glipiZIDE (GLUCOTROL) 5 MG TR24 glipizide ER 5 mg tablet, extended release 24 hr   TK 1 T PO BID    ACCU-CHEK CEASAR CONTROL SOLN Soln USE ONE TIME DAILY    ACCU-CHEK CEASAR PLUS TEST STRP Strp TEST TWO TIMES DAILY    ACCU-CHEK SOFTCLIX LANCETS Misc TEST TWO TIMES DAILY    allopurinol (ZYLOPRIM) 100 MG tablet Take 100 mg by mouth 2 (two) times daily.    amlodipine (NORVASC) 10 MG tablet TAKE 1 TABLET ONE TIME DAILY    aspirin (ECOTRIN) 81 MG EC tablet Take 81 mg by mouth once daily.    atorvastatin (LIPITOR) 80 MG tablet Take 80 mg by mouth once daily.    b complex vitamins tablet Take 1 tablet by mouth once daily.    BD ALCOHOL SWABS PadM TEST TWO TIMES DAILY    bismuth subsalicylate (KAOPECTATE, BISMUTH SUBSALICY,) 262 mg Tab Take by mouth.    cholestyramine (QUESTRAN) 4 gram packet Take 1 packet (4 g total) by mouth 2 (two) times daily.    ciprofloxacin HCl (CIPRO) 500 MG tablet ciprofloxacin 500 mg tablet    clopidogrel (PLAVIX) 75 mg tablet TAKE 1 TABLET EVERY EVENING    dicyclomine (BENTYL) 20 mg tablet Take 20 mg by mouth every 6 (six) hours.    difluprednate (DUREZOL) 0.05 % Drop ophthalmic solution Durezol 0.05 % eye drops   INSTILL 1 DROP INTO AFFECTED EYE BID. HOLD UNTIL AFTER SURGERY    diphenoxylate-atropine 2.5-0.025 mg (LOMOTIL) 2.5-0.025 mg per tablet diphenoxylate-atropine 2.5 mg-0.025 mg tablet    donepezil (ARICEPT) 5 MG tablet Take 10 mg by mouth every evening.    fish oil-omega-3 fatty acids 300-1,000 mg capsule Take 2 g by mouth once daily.    flu vacc [id number],65yr up,PF (FLUZONE HIGH-DOSE 2019-20, PF,) 180 mcg/0.5 mL Syrg Fluzone High-Dose 2019-20 (PF) 180 mcg/0.5 mL intramuscular syringe    gabapentin  (NEURONTIN) 300 MG capsule Take 300 mg by mouth every evening.    hydrochlorothiazide (HYDRODIURIL) 25 MG tablet TAKE 1 TABLET EVERY MORNING    HYDROcodone-acetaminophen (NORCO) 5-325 mg per tablet Take 1 tablet by mouth every 6 (six) hours as needed.    HYDROcodone-acetaminophen (NORCO) 5-325 mg per tablet Take 1 tablet by mouth every 4 (four) hours as needed for Pain.    lancets (ONE TOUCH DELICA LANCETS) 33 gauge Misc 2 lancets by Misc.(Non-Drug; Combo Route) route 2 (two) times daily.    lipase/protease/amylase (CREON ORAL) Take by mouth once daily.     loperamide (IMODIUM) 2 mg capsule Take 2 mg by mouth 4 (four) times daily as needed for Diarrhea.    LORazepam (ATIVAN) 0.5 MG tablet lorazepam 0.5 mg tablet    metFORMIN (GLUCOPHAGE) 1000 MG tablet Take 1 tablet (1,000 mg total) by mouth 2 (two) times daily with meals.    metoprolol succinate (TOPROL-XL) 50 MG 24 hr tablet metoprolol succinate ER 50 mg tablet,extended release 24 hr   TK 2 TS PO QD    metoprolol tartrate (LOPRESSOR) 50 MG tablet TAKE 1 TABLET TWICE DAILY    mv,Ca,min-iron-FA-lycopene 8 mg iron- 200 mcg-600 mcg Tab Take 1 capsule by mouth once daily.    ofloxacin (OCUFLOX) 0.3 % ophthalmic solution ofloxacin 0.3 % eye drops   INSTILL 4 DROPS INTO BOTH EYES STARTING DAY BEFORE SURGERY AND THE MORNING OF SURGERY    omega-3 fatty acids 1,000 mg Cap Take 2 g by mouth.    ondansetron (ZOFRAN) 4 MG tablet Take 4 mg by mouth every 8 (eight) hours as needed for Nausea.    pantoprazole (PROTONIX) 40 MG tablet Take 40 mg by mouth once daily.    pneumococcal 23-jose ps (PNEUMOVAX-23) 25 mcg/0.5 mL vaccine Pneumovax-23 25 mcg/0.5 mL injection syringe    temazepam (RESTORIL) 30 mg capsule temazepam 30 mg capsule    valsartan (DIOVAN) 320 MG tablet Take 320 mg by mouth once daily.     Family History     Problem Relation (Age of Onset)    Cancer Father, Maternal Uncle        Tobacco Use    Smoking status: Former Smoker     Packs/day: 1.50      Years: 40.00     Pack years: 60.00     Types: Cigarettes     Quit date: 2008     Years since quittin.8    Smokeless tobacco: Never Used   Substance and Sexual Activity    Alcohol use: Not Currently     Alcohol/week: 2.0 standard drinks     Types: 2 Cans of beer per week     Comment: quit 2 years ago    Drug use: Not on file    Sexual activity: Yes     Partners: Female     Review of Systems   Constitutional: Positive for appetite change, chills, fatigue and unexpected weight change.   Genitourinary: Positive for decreased urine volume.   Neurological: Positive for weakness (Generalized).   All other systems reviewed and are negative.    Objective:     Vital Signs (Most Recent):  Temp: 97.6 °F (36.4 °C) (20 1535)  Pulse: 64 (20 1730)  Resp: 18 (20 1730)  BP: (!) 94/53 (20 1730)  SpO2: 99 % (20 173) Vital Signs (24h Range):  Temp:  [97.6 °F (36.4 °C)] 97.6 °F (36.4 °C)  Pulse:  [60-65] 64  Resp:  [18] 18  SpO2:  [99 %] 99 %  BP: ()/(53-96) 94/53     Weight: 79.8 kg (176 lb)  Body mass index is 29.29 kg/m².    Physical Exam  Vitals signs reviewed.   Constitutional:       Appearance: He is ill-appearing.   HENT:      Head: Normocephalic and atraumatic.      Right Ear: External ear normal.      Left Ear: External ear normal.      Nose: Nose normal.      Mouth/Throat:      Mouth: Mucous membranes are dry.   Eyes:      General: No scleral icterus.        Right eye: No discharge.         Left eye: No discharge.   Neck:      Musculoskeletal: Normal range of motion and neck supple.   Cardiovascular:      Rate and Rhythm: Normal rate and regular rhythm.      Heart sounds: No murmur. No gallop.    Pulmonary:      Breath sounds: Normal breath sounds. No wheezing or rales.   Abdominal:      Tenderness: There is abdominal tenderness.      Comments: Hyperactive bowel sounds   Genitourinary:     Penis: Normal.    Musculoskeletal:         General: No tenderness.      Right lower leg:  No edema.      Left lower leg: No edema.   Skin:     General: Skin is warm and dry.      Capillary Refill: Capillary refill takes 2 to 3 seconds.   Neurological:      General: No focal deficit present.      Mental Status: He is oriented to person, place, and time.   Psychiatric:         Mood and Affect: Mood normal.         Behavior: Behavior normal.             Significant Labs:   CBC:   Recent Labs   Lab 12/02/20  1617   WBC 11.93   HGB 12.8*   HCT 37.3*        CMP:   Recent Labs   Lab 12/02/20  1617      K 3.5      CO2 14*   *   *   CREATININE 6.2*   CALCIUM 8.9   PROT 7.6   ALBUMIN 4.2   BILITOT 0.7   ALKPHOS 66   AST 11   ALT 18   ANIONGAP 20*   EGFRNONAA 8.5*     Lactic Acid:   Recent Labs   Lab 12/02/20  1721   LACTATE 1.6     Lipase:   Recent Labs   Lab 12/02/20  1617   LIPASE 67*   Significant Imaging: I have reviewed all pertinent imaging results/findings within the past 24 hours.   X-ray Chest Ap Portable    Result Date: 12/2/2020  XR CHEST AP PORTABLE CLINICAL HISTORY: 70 years Male Chest Pain COMPARISON: None FINDINGS: Cardiac silhouette size is within normal limits. Atherosclerotic calcification of the liver. No confluent airspace disease. No pleural fluid or pneumothorax. No acute osseous abnormality. Surgical clips within the neck. IMPRESSION: No acute pulmonary process. Electronically Signed by Jesus MARTINEZ on 12/2/2020 5:07 PM    Ct Abdomen Pelvis  Without Contrast    Result Date: 12/2/2020  EXAMINATION: CT ABDOMEN PELVIS WITHOUT CONTRAST CLINICAL HISTORY: Abdominal pain, acute, nonlocalized; TECHNIQUE: CMS Mandated Quality Data-CT Radiation Dose-436 All CT scans at this facility dose modulation, iterative reconstruction, and or weight-based dosing when appropriate to reduce radiation dose to as low as reasonably achievable. COMPARISON: CT abdomen and pelvis 01/23/2019 FINDINGS: Lung bases are clear.  Bone window images demonstrate lumbar spondylosis.  No  acute or aggressive osseous abnormality. On this unenhanced exam, no focal hepatic lesion.  Small calcified gallstone appears to be present within the gallbladder neck.  The gallbladder is collapsed.  No bile duct dilation.  Spleen and pancreas are unremarkable.  Stable bilateral adrenal nodules consistent with benign adenomas.  Bilateral perinephric stranding.  Bilateral renal vascular calcifications.  No hydronephrosis.  At the lower pole the left kidney there is a calcified lesion demonstrating both soft tissue and fatty attenuation which has been present on multiple prior exams.  Ureters are normal in caliber.  Urinary bladder is collapsed. Stomach is unremarkable.  No evidence small-bowel obstruction.  Postoperative changes of the colon.  No evidence of acute colitis. Extensive atherosclerotic calcification of the abdominal aorta and its branch vessels.  A stent is present within the celiac artery.  Aortic bypass.  No free fluid or free air within the abdomen or pelvis.  No pathologically enlarged abdominopelvic lymph nodes.  Intramuscular lipoma involving the right gluteal musculature.  Soft tissue mass of the anterior left thigh demonstrating fatty elements is also stable from prior.     No noncontrast CT evidence of acute pathology involving the abdomen or pelvis. Cholelithiasis. Exophytic lesion arising from the lower pole the left kidney demonstrating both soft tissue attenuation as well as fatty elements, with peripheral calcification.  A combination of fat necrosis/scarring, or angiomyolipoma are possibilities.  Correlation with surgical history as well as follow-up renal protocol CT is recommended. Extensive arterial vascular disease, status post aortic bypass. Additional incidental findings as above including postoperative changes of the colon and bilateral adrenal adenomas. Electronically signed by: Jesus Galeano MD Date:    12/02/2020 Time:    18:19  EKG, personally reviewed, sinus bradycardia, no  ST elevation

## 2020-12-03 NOTE — PLAN OF CARE
12/03/20 0732   Patient Assessment/Suction   Level of Consciousness (AVPU) alert   Respiratory Effort Normal;Unlabored   Expansion/Accessory Muscles/Retractions no use of accessory muscles;no retractions   PRE-TX-O2   O2 Device (Oxygen Therapy) room air   SpO2 100 %   Pulse Oximetry Type Continuous   $ Pulse Oximetry - Multiple Charge Pulse Oximetry - Multiple   Pulse 75   Resp (!) 26   Respiratory Evaluation   $ Care Plan Tech Time 15 min

## 2020-12-03 NOTE — ASSESSMENT & PLAN NOTE
Due to baseline CKD, severe dehydration, hypotension, and contrast induced nephopathy  Consult renal  The ED physician had discussed case with Dr. Martínez  Ringer lactate 1 L bolus given in the ED  Continue IV hydration, normal saline at 125 mL/hr  Renal ultrasound  Urine electrolyte  Philip catheter  Strict I&O  No NSAIDs, IV contrast  BMP daily  Hold Metformin, valsartan, and hydrochlorothiazide

## 2020-12-03 NOTE — HPI
70 year old male multiple medical problems including CAD, PAD, DM,  right-sided colectomy for ischemic colitis 10/31/2018, BPH, bladder and renal cancer, gastroesophageal reflux, hypertension, disease, chronic kidney disease, dementia  Underwent  mesenteric artery stenting a week ago at [facility] for suspected recurrent ischemic colitis.   Resented to the ED after being seen by Dr. Loya earlier today  Reports persistent nausea, vomiting, diarrhea for the past 3 weeks.  His wife reports that he had approximately 22-30 loose large diarrhea stool per day  Denies fever but have been having chills  Lost 28 lb since October this year  Found to be hyportensive with systolic blood pressures in the 80s and near syncopal episode at gastroenterologist's office  Denies shortness of breath or chest pain  Reports epigastric discomfort with retching/vomiting and moderate to severe diffuse abdominal pain  Has not been urinating much    In the ED  Afebrile  Hypertensive  WBC 11.98, hemoglobin 12.8, hematocrit 37.3, platelets 317  , creatinine 6.2, CO2 14, anion gap 20, potassium 3.5  Lactic acid 1.6

## 2020-12-03 NOTE — ASSESSMENT & PLAN NOTE
Hemoglobin A1c 7.2 about 5 months ago  Monitor blood glucose  Low-dose sliding scale insulin  Hold metformin due to acute kidney injury  Hold glipizide for now

## 2020-12-03 NOTE — PLAN OF CARE
12/02/20 6741   Patient Assessment/Suction   Level of Consciousness (AVPU) alert   Respiratory Effort Unlabored   Expansion/Accessory Muscles/Retractions expansion symmetric   All Lung Fields Breath Sounds clear   Rhythm/Pattern, Respiratory depth regular;pattern regular   Cough Frequency infrequent   Cough Type good;nonproductive   PRE-TX-O2   O2 Device (Oxygen Therapy) room air   SpO2 100 %   Pulse Oximetry Type Continuous   $ Pulse Oximetry - Multiple Charge Pulse Oximetry - Multiple   Pulse 62   Resp 16   Education   $ Education 15 min  (PULSE OXIMETRY)   Respiratory Evaluation   $ Care Plan Tech Time 15 min   Evaluation For New Orders   Admitting Diagnosis [id number]

## 2020-12-03 NOTE — PLAN OF CARE
Pt should have no needs from cm unless pt needs dialysis. Pt uses [facility] in [place] [address]. Pt has a PCP that he is suppose to start to see on the 8th of Dec but cannot remember name. Cm to follow until discharge from hospital. If pt needs home health his physical address is [address], [place]. [address]     12/03/20 1024   Discharge Assessment   Assessment Type Discharge Planning Assessment   Confirmed/corrected address and phone number on facesheet? Yes   Assessment information obtained from? Patient   Communicated expected length of stay with patient/caregiver no   Prior to hospitilization cognitive status: Alert/Oriented   Prior to hospitalization functional status: Independent   Current cognitive status: Alert/Oriented   Current Functional Status: Needs Assistance   Lives With spouse   Able to Return to Prior Arrangements yes   Is patient able to care for self after discharge? No;Yes   Who are your caregiver(s) and their phone number(s)? Ele English  wife  [phone]   Patient's perception of discharge disposition home or selfcare   Readmission Within the Last 30 Days no previous admission in last 30 days   Patient currently being followed by outpatient case management? No   Patient currently receives any other outside agency services? No   Equipment Currently Used at Home bath bench;walker, rolling;glucometer   Part D Coverage PHN   Do you have any problems affording any of your prescribed medications? No   Is the patient taking medications as prescribed? yes   Does the patient have transportation home? Yes   Transportation Anticipated family or friend will provide   Dialysis Name and Scheduled days n/a   Does the patient receive services at the [facility]? No   Discharge Plan A Home with family   Discharge Plan B Home Health   DME Needed Upon Discharge  none   Patient/Family in Agreement with Plan yes

## 2020-12-03 NOTE — CONSULTS
INPATIENT NEPHROLOGY CONSULT   [facility] NEPHROLOGY    Patient Name: Jose English  Date: 12/03/2020    Reason for consultation: JEREMIAH    Chief Complaint:   Chief Complaint   Patient presents with    Abdominal Pain     X SEVERAL WEEKS, SENT FROM PCP FOR EVAL OF LOW BP    Vomiting    Diarrhea       History of Present Illness:  70 year old male multiple medical problems including CAD, PAD, DM,  right-sided colectomy for ischemic colitis 10/31/2018, BPH, bladder and renal cancer, gastroesophageal reflux, hypertension, disease, chronic kidney disease, dementia who underwent  mesenteric artery stenting a week ago at [facility] for suspected recurrent ischemic colitis.  Represented to the ED after being seen by Dr. Loya earlier today  Reports persistent nausea, vomiting, diarrhea for the past 3 weeks. His wife reports that he had approximately 22-30 loose large diarrhea stool per day. Denies fever but has been having chills. Lost 28 lb since October this year. Found to be hyportensive with systolic blood pressures in the 80s and near syncopal episode at gastroenterologist's office. Denies shortness of breath or chest pain. Reports epigastric discomfort with retching/vomiting and moderate to severe diffuse abdominal pain. Has not been urinating much. We are consulted for JEREMIAH.    Home meds: metoprolol, norvasc, valsartan, HCTZ,     Renal u/s:  Right kidney measures 10.9 cm in length. No cystic or solid right renal lesion. No hydronephrosis.  IVC is unremarkable. Aorta was not visualized due to bowel gas. Left kidney measures 8.3 cm in length. There is an echogenic focus at the lower pole the left kidney, corresponding to a rim calcified lesion described on the reference CT.  This lesion is not well evaluated sonographically due to calcification. There is no  hydronephrosis of the left kidney. Urinary bladder is incompletely distended and otherwise unremarkable.     12/3- hypotensive, on RA, with cash; had  BM this AM, denies cp/dyspnea/edema, UA + hyaline casts, urine Na < 10    Plan of Care:    1. Severe JEREMIAH secondary to intravascular volume depletion in setting of profound GI losses (supported by hypotension, urine studies); baseline serum Cr 1.2 (CKD II)  - will do aggressive volume repletion and assess response; agree with cash for strict ins/outs  - he denies uremic symptoms; no evidence of volume overload (clearly)  - he has no acute RRT needs unless we cannot correct metabolic derangements  - no nsaids or IV contrast  - hold ALL BP meds/diuretics  - imaging reviewed    2. Hypokalemia  - looks like 2/2 GI losses  - ordered q4 BMP- will monitor closely as this will worsen as we alkalinize blood    3. Metabolic acidosis (combined AG 2/2 JEREMIAH and NAG 2/2 diarrhea)  - continue bicarb gtt (3 amps in D5 at 125cc/hr)    4. HypoMg/Hypocalcemia  - will replete IV PRN    5. Anemia  - trend Hb- no acute transfusion needs    6. Baseline HTN- currently hypotensive  - hold all meds while we volume resuscitate     Thank you for allowing us to participate in this patient's care. We will continue to follow.    Vital Signs:  Temp Readings from Last 3 Encounters:   12/03/20 98.8 °F (37.1 °C) (Oral)   02/20/19 98.2 °F (36.8 °C)   02/06/19 98.1 °F (36.7 °C)       Pulse Readings from Last 3 Encounters:   12/03/20 75   02/20/19 65   02/07/19 71       BP Readings from Last 3 Encounters:   12/03/20 (!) 124/51   02/20/19 (!) 140/66   02/07/19 108/62       Weight:  Wt Readings from Last 3 Encounters:   12/02/20 84.7 kg (186 lb 11.7 oz)   02/20/19 72.6 kg (160 lb)   02/07/19 72.2 kg (159 lb 2.8 oz)       Past Medical & Surgical History:  Past Medical History:   Diagnosis Date    Bladder cancer     2 times    BPH (benign prostatic hypertrophy)     Cancer of kidney     1/2    Colon polyp     Coronary artery disease     DM (diabetes mellitus)     GERD (gastroesophageal reflux disease)     Hypertension     PAD (peripheral artery  disease)     Peripheral vascular disease        Past Surgical History:   Procedure Laterality Date    abdominal aorta bypass by fem      BLADDER SURGERY      COLONOSCOPY  12/06/2018    COLONOSCOPY N/A 12/6/2018    Procedure: COLONOSCOPY;  Surgeon: Familia Dickson MD;  Location: [facility];  Service: General;  Laterality: N/A;    CYSTOSCOPY      ENDOSCOPIC ULTRASOUND OF UPPER GASTROINTESTINAL TRACT N/A 2/20/2019    Procedure: ULTRASOUND, UPPER GI TRACT, ENDOSCOPIC;  Surgeon: Govind Hassan MD;  [facility]  Service: Endoscopy;  Laterality: N/A;  Please schedule EGD and EUS, for celiac sprue biopsies, and evaluation of pancreatitis.  5 day hold Plavix, Dr Jarret Calle  PM prep    ESOPHAGOGASTRODUODENOSCOPY N/A 2/20/2019    Procedure: EGD (ESOPHAGOGASTRODUODENOSCOPY);  Surgeon: Govind Hassan MD;  Location: [facility] ([address] [place] [address]);  Service: Endoscopy;  Laterality: N/A;  Please schedule EGD and EUS, for celiac sprue biopsies, and evaluation of pancreatitis.  5 day hold Plavix, Dr Jarret Calle  PM prep    multiple angiosplastia      NEPHRECTOMY      partial nephrectomy left    PERCUTANEOUS TRANSLUMINAL ANGIOPLASTY (PTA) OF PERIPHERAL VESSEL Bilateral 8/21/2018    Procedure: PTA, PERIPHERAL VESSEL;  Surgeon: Patrick Love MD;  Location: [facility] CATH;  Service: Cardiology;  Laterality: Bilateral;  Please schedule Left common iliac artery intervention and b/l LE angio via left brachial on August 21       Past Social History:  Social History     Socioeconomic History    Marital status:      Spouse name: Not on file    Number of children: Not on file    Years of education: Not on file    Highest education level: Not on file   Occupational History    Not on file   Social Needs    Financial resource strain: Not on file    Food insecurity     Worry: Not on file     Inability: Not on file    Transportation needs     Medical: Not on file     Non-medical: Not on file   Tobacco Use  "   Smoking status: Former Smoker     Packs/day: 1.50     Years: 40.00     Pack years: 60.00     Types: Cigarettes     Quit date: 2008     Years since quittin.8    Smokeless tobacco: Never Used   Substance and Sexual Activity    Alcohol use: Not Currently     Alcohol/week: 2.0 standard drinks     Types: 2 Cans of beer per week     Comment: quit 2 years ago    Drug use: Not on file    Sexual activity: Yes     Partners: Female   Lifestyle    Physical activity     Days per week: Not on file     Minutes per session: Not on file    Stress: Not on file   Relationships    Social connections     Talks on phone: Not on file     Gets together: Not on file     Attends Yazidism service: Not on file     Active member of club or organization: Not on file     Attends meetings of clubs or organizations: Not on file     Relationship status: Not on file   Other Topics Concern    Not on file   Social History Narrative    SOCIAL HISTORY:    And he goes by "Jorge".    2014.    The patient lives with his wife.  Recently his daughter moved in with her 4 children, 2 of whom have pervasive developmental delay and it seems as though his daughter will not be able to reconcile with her .    He is taking the children to school in the morning, picking him up from school in the afternoon.    He quit cigarette smoking in  when he came down with bladder cancer.    He likes to drink beer.       Medications:  Scheduled Meds:   aspirin  81 mg Oral Daily    atorvastatin  80 mg Oral Daily    chlorhexidine  15 mL Mouth/Throat BID    cholestyramine-aspartame  4 g Oral BID    clopidogreL  75 mg Oral QHS    dicyclomine  20 mg Oral Q6H    donepeziL  10 mg Oral QHS    gabapentin  300 mg Oral QHS    mupirocin   Nasal BID    pantoprazole  40 mg Oral BID    rifAXImin  550 mg Oral TID     Continuous Infusions:   sodium bicarbonate drip 125 mL/hr at 20 0646     PRN Meds:.acetaminophen, acetaminophen, dextrose " 50%, dextrose 50%, glucagon (human recombinant), glucose, glucose, HYDROcodone-acetaminophen, insulin aspart U-100, ondansetron, promethazine (PHENERGAN) IVPB, sodium chloride 0.9%  No current facility-administered medications on file prior to encounter.      Current Outpatient Medications on File Prior to Encounter   Medication Sig Dispense Refill    allopurinol (ZYLOPRIM) 100 MG tablet Take 100 mg by mouth 2 (two) times daily.      amlodipine (NORVASC) 10 MG tablet TAKE 1 TABLET ONE TIME DAILY 90 tablet 3    aspirin (ECOTRIN) 81 MG EC tablet Take 81 mg by mouth once daily.      atorvastatin (LIPITOR) 80 MG tablet Take 80 mg by mouth once daily.      b complex vitamins tablet Take 1 tablet by mouth once daily.      clopidogrel (PLAVIX) 75 mg tablet TAKE 1 TABLET EVERY EVENING 90 tablet 3    cyanocobalamin (VITAMIN B-12) 1000 MCG tablet Take 1,000 mcg by mouth.      dicyclomine (BENTYL) 20 mg tablet Take 20 mg by mouth every 6 (six) hours.      diphenoxylate-atropine 2.5-0.025 mg (LOMOTIL) 2.5-0.025 mg per tablet diphenoxylate-atropine 2.5 mg-0.025 mg tablet      donepezil (ARICEPT) 5 MG tablet Take 10 mg by mouth every evening.      gabapentin (NEURONTIN) 300 MG capsule Take 300 mg by mouth every evening.      glipiZIDE (GLUCOTROL) 5 MG TR24 glipizide ER 5 mg tablet, extended release 24 hr   TK 1 T PO BID      hydrochlorothiazide (HYDRODIURIL) 25 MG tablet TAKE 1 TABLET EVERY MORNING 90 tablet 3    loperamide (IMODIUM) 2 mg capsule Take 2 mg by mouth 4 (four) times daily as needed for Diarrhea.      metoprolol succinate (TOPROL-XL) 50 MG 24 hr tablet metoprolol succinate ER 50 mg tablet,extended release 24 hr   TK 2 TS PO QD      metoprolol tartrate (LOPRESSOR) 50 MG tablet TAKE 1 TABLET TWICE DAILY 180 tablet 3    mv,Ca,min-iron-FA-lycopene 8 mg iron- 200 mcg-600 mcg Tab Take 1 capsule by mouth once daily.      ondansetron (ZOFRAN) 4 MG tablet Take 4 mg by mouth every 8 (eight) hours as needed  "for Nausea.      pantoprazole (PROTONIX) 40 MG tablet Take 40 mg by mouth once daily.      temazepam (RESTORIL) 30 mg capsule temazepam 30 mg capsule      valsartan (DIOVAN) 320 MG tablet Take 320 mg by mouth once daily.      ACCU-CHEK CEASAR CONTROL SOLN Soln USE ONE TIME DAILY 1 each 3    ACCU-CHEK CEASAR PLUS TEST STRP Strp TEST TWO TIMES DAILY 200 strip 3    ACCU-CHEK SOFTCLIX LANCETS Misc TEST TWO TIMES DAILY 200 each 3    BD ALCOHOL SWABS PadM TEST TWO TIMES DAILY 200 each 4    bismuth subsalicylate (KAOPECTATE, BISMUTH SUBSALICY,) 262 mg Tab Take by mouth.      cholestyramine (QUESTRAN) 4 gram packet Take 1 packet (4 g total) by mouth 2 (two) times daily. 180 packet 3    fish oil-omega-3 fatty acids 300-1,000 mg capsule Take 2 g by mouth once daily.      flu vacc [id number],65yr up,PF (FLUZONE HIGH-DOSE , PF,) 180 mcg/0.5 mL Syrg Fluzone High-Dose  (PF) 180 mcg/0.5 mL intramuscular syringe      lancets (ONE TOUCH DELICA LANCETS) 33 gauge Misc 2 lancets by Misc.(Non-Drug; Combo Route) route 2 (two) times daily. 600 each 4    [] pneumococcal 23-jose ps (PNEUMOVAX-23) 25 mcg/0.5 mL vaccine Pneumovax-23 25 mcg/0.5 mL injection syringe         Allergies:  Amoxicillin and Zolpidem tartrate    Past Family History:  Reviewed; refer to Hospitalist Admission Note    Review of Systems:  Review of Systems - All 14 systems reviewed and negative, except as noted in HPI    Physical Exam:  BP (!) 124/51   Pulse 75   Temp 98.8 °F (37.1 °C) (Oral)   Resp (!) 26   Ht 5' 5" (1.651 m)   Wt 84.7 kg (186 lb 11.7 oz)   SpO2 100%   BMI 31.07 kg/m²     INS/OUTS:  I/O last 3 completed shifts:  In: 2000 [I.V.:1000; IV Piggyback:1000]  Out: 120 [Urine:120]  No intake/output data recorded.    General Appearance:    NAD, AAO x 3, cooperative, appears stated age   Head:    Normocephalic, atraumatic   Eyes:    PER, EOMI, and conjunctiva/sclera clear bilaterally        Mouth:   Moist mucus membranes, no " thrush or oral lesions, normal      dentition   Back:     No CVA tenderness   Lungs:     Clear to auscultation bilaterally, no wheeze, crackles, rales      or rhonchi, symmetric air movement, respirations unlabored   Heart:    Regular rate and rhythm, S1 and S2 normal, no murmur, rub   or gallop   Abdomen:     Soft, non-tender, non-distended, bowel sounds active all four   quadrants, no RT or guarding, no masses, no organomegaly   Extremities:   Warm and well perfused, distal pulses intact, no cyanosis or    peripheral edema   MSK:   No joint or muscle swelling, tenderness or deformity   Skin:   Skin color, texture, turgor normal, no rashes or lesions   Neurologic/Psychiatric:   CNII-XII intact, normal strength and sensation       throughout, no asterixis; normal affect, memory, judgement     and insight     Results:  Lab Results   Component Value Date     12/03/2020    K 3.2 (L) 12/03/2020     12/03/2020    CO2 9 (LL) 12/03/2020     (H) 12/03/2020    CREATININE 5.4 (H) 12/03/2020    CALCIUM 8.2 (L) 12/03/2020    ANIONGAP 19 (H) 12/03/2020    ESTGFRAFRICA 11.6 (A) 12/03/2020    EGFRNONAA 10.0 (A) 12/03/2020       Lab Results   Component Value Date    CALCIUM 8.2 (L) 12/03/2020    PHOS 3.2 01/14/2019       Recent Labs   Lab 12/03/20  0309   WBC 10.61   RBC 3.48*   HGB 10.6*   HCT 31.9*      MCV 92   MCH 30.5   MCHC 33.2       I have personally reviewed pertinent radiological imaging and reports.    I have spent > 70 minutes providing care for this patient for the above diagnoses. These services have included chart/data/imaging review, evaluation, exam, formulation of plan, , note preparation, and discussions with staff involved in this patient's care.    Paddy Lieberman MD MPH  [facility]  [address] [place] [id number]  [phone] (p)  [phone] (f)

## 2020-12-03 NOTE — ASSESSMENT & PLAN NOTE
With persistent nausea and vomiting  Had mesenteric artery stent last week  Consult GI  The ED physician had discussed case with Dr. Loya  IV PPI  Antiemetic PRN  Continue Uday whiting

## 2020-12-03 NOTE — PROGRESS NOTES
[facility]  Adult Nutrition   Progress Note (Initial Assessment)     SUMMARY     Recommendations/Interventions:  Recommendation/Intervention:   1. Recommend advancing diet >NPO as medically appropriate.  2. Recommend Banatrol to aid with diarrhea when diet progress >NPO.  3. Recommend continued monitoring and management of blood glucose and electrolytes.   4. Recommend that a medical diagnosis of malnutrition be added to patient's problem list if physician agrees with dietitian's Nutrition Focused Physical Exam (NFPE) findings that support medical diagnosis    Goals: 1. Diet to progress >NPO within 24-48 hours.   2. Blood glucose and electrolytes to trend towards normal limits/ target ranges.    3. Malnutrition be added to patient problem list.  Nutrition Goal Status: new    Dietitian Rounds Brief:  Seen 2' to ICU status. Patient admitted with renal failure and chronic N/V/D. On C-diff isolation. Reports persistent nausea, vomiting, diarrhea for the past 3 weeks. Per H&P, his wife reports that he had approximately 22-30 loose large diarrhea stool per day. Patient with a hx of gallstones. Will continue to monitor diet order, labs, and plan of care.    Malnutrition Assessment  RD suspects malnutrition. RD noted clinical characteristics that support a diagnosis of malnutrition. Assessment is based on medical records, interview with patient, and visible physical findings. For patient and clinician safety, physical contact (palpation) was not utilized during assessment due to departmental decision to stop performing NFPE during the COVID-19 pandemic.    Nutrition Diagnoses (PES Statement)  Severe malnutrition related to inadequate protein and energy intake as evidenced by decreased appetite and decreased oral intake <50% 2' to chronic nausea, vomiting and diarrhea over the past 3 weeks with noticeable wt loss of 28 lbs per H&P, patient has overall weakness, and generalized muscle and fat wasting.    Reason  "for Assessment  Reason For Assessment: other (see comments)(ICU Status)  Diagnosis: other (see comments)(JEREMIAH)  Relevant Medical History: Bladder cancer; BPH; cancer of kidney, 1/2; colon polyp; CAD; DM; GERD; HTN; PAD; peripheral vascular disease  Interdisciplinary Rounds: did not attend    Nutrition Risk Screen  Nutrition Risk Screen: no indicators present  MST Score: 1  Have you recently lost weight without trying?: No  Weight loss score: 0  Have you been eating poorly because of a decreased appetite?: Yes  Appetite score: 1     Nutrition/Diet History  Spiritual, Cultural Beliefs, Islam Practices, Values that Affect Care: no  Food Allergies: NKFA  Factors Affecting Nutritional Intake: diarrhea    Anthropometrics  Temp: 98.8 °F (37.1 °C)  Height Method: Stated  Height: 5' 5" (165.1 cm)  Height (inches): 65 in  Weight Method: Bed Scale  Weight: 84.7 kg (186 lb 11.7 oz)  Weight (lb): 186.73 lb  Ideal Body Weight (IBW), Male: 136 lb  % Ideal Body Weight, Male (lb): 137.3 %  BMI (Calculated): 31.1  BMI Grade: 30 - 34.9- obesity - grade I  Weight Loss: unintentional  Usual Body Weight (UBW), k.2 kg  Weight Change Amount: 28 lb  % Usual Body Weight: 87.32  % Weight Change From Usual Weight: -12.86 %  Weight Loss Since Admission: 0 lb  % Weight Change Since Admission: 0       Weight History:  Wt Readings from Last 10 Encounters:   20 84.7 kg (186 lb 11.7 oz)   19 72.6 kg (160 lb)   19 72.2 kg (159 lb 2.8 oz)   19 72.1 kg (159 lb 1 oz)   19 72.6 kg (160 lb)   19 73.9 kg (163 lb)   18 78.9 kg (174 lb)   18 88.9 kg (196 lb)   17 93 kg (205 lb)   10/20/15 94.8 kg (209 lb)     Lab/Procedures/Meds: Pertinent Labs Reviewed  Clinical Chemistry:  Recent Labs   Lab 20  1617 20  0309    136   K 3.5 3.2*    108   CO2 14* 9*   * 52*   * 143*   CREATININE 6.2* 5.4*   CALCIUM 8.9 8.2*   PROT 7.6  --    ALBUMIN 4.2  --    BILITOT 0.7  --  "   ALKPHOS 66  --    AST 11  --    ALT 18  --    ANIONGAP 20* 19*   ESTGFRAFRICA 9.8* 11.6*   EGFRNONAA 8.5* 10.0*   MG  --  1.6   LIPASE 67*  --      CBC:   Recent Labs   Lab 12/03/20  0309   WBC 10.61   RBC 3.48*   HGB 10.6*   HCT 31.9*      MCV 92   MCH 30.5   MCHC 33.2     Cardiac Profile:  Recent Labs   Lab 12/02/20  1617   CPK 28   CPKMB 1.9       Medications: Pertinent Medications reviewed  Scheduled Meds:   aspirin  81 mg Oral Daily    atorvastatin  80 mg Oral Daily    chlorhexidine  15 mL Mouth/Throat BID    cholestyramine-aspartame  4 g Oral TID    clopidogreL  75 mg Oral QHS    dicyclomine  20 mg Oral Q6H    donepeziL  10 mg Oral QHS    gabapentin  300 mg Oral QHS    mupirocin   Nasal BID    pantoprazole  40 mg Oral BID    rifAXImin  550 mg Oral TID     Continuous Infusions:   sodium bicarbonate drip 125 mL/hr at 12/03/20 0646     PRN Meds:.acetaminophen, acetaminophen, dextrose 50%, dextrose 50%, glucagon (human recombinant), glucose, glucose, HYDROcodone-acetaminophen, insulin aspart U-100, ondansetron, promethazine (PHENERGAN) IVPB, sodium chloride 0.9%    Antibiotics (From admission, onward)    Start     Stop Route Frequency Ordered    12/03/20 1500  rifAXIMin tablet 550 mg      -- Oral 3 times daily 12/03/20 0923    12/03/20 1030  mupirocin 2 % ointment  (MRSA Decolonization Orders ST)      12/08 0859 Nasl 2 times daily 12/03/20 0917           Estimated/Assessed Needs  Weight Used For Calorie Calculations: 84.7 kg (186 lb 11.7 oz)  Energy Calorie Requirements (kcal): 3101-5422 kcal/day (25-30 kcal/kg)  Energy Need Method: Kcal/kg  Protein Requirements: 74-93 g/day (1.2-1.5 g/kg IBW)  Weight Used For Protein Calculations: 61.8 kg (136 lb 3.9 oz)  Estimated Fluid Requirement Method: RDA Method  RDA Method (mL): 2117    Nutrition Prescription Ordered  Current Diet Order: NPO    Evaluation of Received Nutrient/Fluid Intake  Energy Calories Required: not meeting needs(NPO)  Protein  Required: not meeting needs(NPO)  Fluid Required: meeting needs  % Intake of Estimated Energy Needs: 0%  % Meal Intake: NPO    Intake/Output Summary (Last 24 hours) at 12/3/2020 1104  Last data filed at 12/2/2020 2300  Gross per 24 hour   Intake 2000 ml   Output 120 ml   Net 1880 ml      Nutrition Risk  Level of Risk/Frequency of Follow-up: high     Monitor and Evaluation  Food and Nutrient Adminstration: diet order  Physical Activity and Function: nutrition-related ADLs and IADLs  Anthropometric Measurements: weight, weight change, body mass index  Biochemical Data, Medical Tests and Procedures: electrolyte and renal panel, gastrointestinal profile, glucose/endocrine profile, inflammatory profile, lipid profile  Nutrition-Focused Physical Findings: overall appearance     Nutrition Follow-Up  RD Follow-up?: Yes    Micheline Corbin, 12/03/2020, 2:52 PM

## 2020-12-03 NOTE — H&P
Former[facility]  History & Physical    Patient Name: Jose English  MRN: [id number]  Admission Date: 12/2/2020  Attending Physician: Liborio Choe MD   Primary Care Provider: Primary Doctor No         Patient information was obtained from patient, spouse/SO, past medical records and ER records.     Subjective:     Principal Problem:JEREMIAH (acute kidney injury)    Chief Complaint:   Chief Complaint   Patient presents with    Abdominal Pain     X SEVERAL WEEKS, SENT FROM PCP FOR EVAL OF LOW BP    Vomiting    Diarrhea        HPI: 70 year old male multiple medical problems including CAD, PAD, DM,  right-sided colectomy for ischemic colitis 10/31/2018, BPH, bladder and renal cancer, gastroesophageal reflux, hypertension, disease, chronic kidney disease, dementia  Underwent  mesenteric artery stenting a week ago at [facility] [address] [facility] for suspected recurrent ischemic colitis.   Resented to the ED after being seen by Dr. Loya earlier today  Reports persistent nausea, vomiting, diarrhea for the past 3 weeks.  His wife reports that he had approximately 22-30 loose large diarrhea stool per day  Denies fever but have been having chills  Lost 28 lb since October this year  Found to be hyportensive with systolic blood pressures in the 80s and near syncopal episode at gastroenterologist's office  Denies shortness of breath or chest pain  Reports epigastric discomfort with retching/vomiting and moderate to severe diffuse abdominal pain  Has not been urinating much    In the ED  Afebrile  Hypertensive  WBC 11.98, hemoglobin 12.8, hematocrit 37.3, platelets 317  , creatinine 6.2, CO2 14, anion gap 20, potassium 3.5  Lactic acid 1.6             Past Medical History:   Diagnosis Date    Bladder cancer     2 times    BPH (benign prostatic hypertrophy)     Cancer of kidney     1/2    Colon polyp     Coronary artery disease     DM (diabetes mellitus)     GERD (gastroesophageal reflux  disease)     Hypertension     PAD (peripheral artery disease)     Peripheral vascular disease        Past Surgical History:   Procedure Laterality Date    abdominal aorta bypass by fem      BLADDER SURGERY      COLONOSCOPY  12/06/2018    COLONOSCOPY N/A 12/6/2018    Procedure: COLONOSCOPY;  Surgeon: Familia Dickson MD;  Location: [facility];  Service: General;  Laterality: N/A;    CYSTOSCOPY      ENDOSCOPIC ULTRASOUND OF UPPER GASTROINTESTINAL TRACT N/A 2/20/2019    Procedure: ULTRASOUND, UPPER GI TRACT, ENDOSCOPIC;  Surgeon: Govind Hassan MD;  [facility]  Service: Endoscopy;  Laterality: N/A;  Please schedule EGD and EUS, for celiac sprue biopsies, and evaluation of pancreatitis.  5 day hold Plavix, Dr Jarret Calle  PM prep    ESOPHAGOGASTRODUODENOSCOPY N/A 2/20/2019    Procedure: EGD (ESOPHAGOGASTRODUODENOSCOPY);  Surgeon: Govind Hassan MD;  [facility]  Service: Endoscopy;  Laterality: N/A;  Please schedule EGD and EUS, for celiac sprue biopsies, and evaluation of pancreatitis.  5 day hold Plavix, Dr Jarret Calle  PM prep    multiple angiosplastia      NEPHRECTOMY      partial nephrectomy left    PERCUTANEOUS TRANSLUMINAL ANGIOPLASTY (PTA) OF PERIPHERAL VESSEL Bilateral 8/21/2018    Procedure: PTA, PERIPHERAL VESSEL;  Surgeon: Patrick Love MD;  Location: [facility] C[facility];  Service: Cardiology;  Laterality: Bilateral;  Please schedule Left common iliac artery intervention and b/l LE angio via left brachial on August 21       Review of patient's allergies indicates:   Allergen Reactions    Amoxicillin Hives    Zolpidem tartrate Hallucinations       Current Facility-Administered Medications on File Prior to Encounter   Medication    lidocaine HCl 2% urojet     Current Outpatient Medications on File Prior to Encounter   Medication Sig    ALPRAZolam (XANAX) 0.5 MG tablet Take 0.5 mg by mouth.    cyanocobalamin (VITAMIN B-12) 1000 MCG tablet Take 1,000 mcg  by mouth.    glipiZIDE (GLUCOTROL) 5 MG TR24 glipizide ER 5 mg tablet, extended release 24 hr   TK 1 T PO BID    ACCU-CHEK CEASAR CONTROL SOLN Soln USE ONE TIME DAILY    ACCU-CHEK CEASAR PLUS TEST STRP Strp TEST TWO TIMES DAILY    ACCU-CHEK SOFTCLIX LANCETS Misc TEST TWO TIMES DAILY    allopurinol (ZYLOPRIM) 100 MG tablet Take 100 mg by mouth 2 (two) times daily.    amlodipine (NORVASC) 10 MG tablet TAKE 1 TABLET ONE TIME DAILY    aspirin (ECOTRIN) 81 MG EC tablet Take 81 mg by mouth once daily.    atorvastatin (LIPITOR) 80 MG tablet Take 80 mg by mouth once daily.    b complex vitamins tablet Take 1 tablet by mouth once daily.    BD ALCOHOL SWABS PadM TEST TWO TIMES DAILY    bismuth subsalicylate (KAOPECTATE, BISMUTH SUBSALICY,) 262 mg Tab Take by mouth.    cholestyramine (QUESTRAN) 4 gram packet Take 1 packet (4 g total) by mouth 2 (two) times daily.    ciprofloxacin HCl (CIPRO) 500 MG tablet ciprofloxacin 500 mg tablet    clopidogrel (PLAVIX) 75 mg tablet TAKE 1 TABLET EVERY EVENING    dicyclomine (BENTYL) 20 mg tablet Take 20 mg by mouth every 6 (six) hours.    difluprednate (DUREZOL) 0.05 % Drop ophthalmic solution Durezol 0.05 % eye drops   INSTILL 1 DROP INTO AFFECTED EYE BID. HOLD UNTIL AFTER SURGERY    diphenoxylate-atropine 2.5-0.025 mg (LOMOTIL) 2.5-0.025 mg per tablet diphenoxylate-atropine 2.5 mg-0.025 mg tablet    donepezil (ARICEPT) 5 MG tablet Take 10 mg by mouth every evening.    fish oil-omega-3 fatty acids 300-1,000 mg capsule Take 2 g by mouth once daily.    flu vacc [id number],65yr up,PF (FLUZONE HIGH-DOSE 2019-20, PF,) 180 mcg/0.5 mL Syrg Fluzone High-Dose 2019-20 (PF) 180 mcg/0.5 mL intramuscular syringe    gabapentin (NEURONTIN) 300 MG capsule Take 300 mg by mouth every evening.    hydrochlorothiazide (HYDRODIURIL) 25 MG tablet TAKE 1 TABLET EVERY MORNING    HYDROcodone-acetaminophen (NORCO) 5-325 mg per tablet Take 1 tablet by mouth every 6 (six) hours as needed.     HYDROcodone-acetaminophen (NORCO) 5-325 mg per tablet Take 1 tablet by mouth every 4 (four) hours as needed for Pain.    lancets (ONE TOUCH DELICA LANCETS) 33 gauge Misc 2 lancets by Misc.(Non-Drug; Combo Route) route 2 (two) times daily.    lipase/protease/amylase (CREON ORAL) Take by mouth once daily.     loperamide (IMODIUM) 2 mg capsule Take 2 mg by mouth 4 (four) times daily as needed for Diarrhea.    LORazepam (ATIVAN) 0.5 MG tablet lorazepam 0.5 mg tablet    metFORMIN (GLUCOPHAGE) 1000 MG tablet Take 1 tablet (1,000 mg total) by mouth 2 (two) times daily with meals.    metoprolol succinate (TOPROL-XL) 50 MG 24 hr tablet metoprolol succinate ER 50 mg tablet,extended release 24 hr   TK 2 TS PO QD    metoprolol tartrate (LOPRESSOR) 50 MG tablet TAKE 1 TABLET TWICE DAILY    mv,Ca,min-iron-FA-lycopene 8 mg iron- 200 mcg-600 mcg Tab Take 1 capsule by mouth once daily.    ofloxacin (OCUFLOX) 0.3 % ophthalmic solution ofloxacin 0.3 % eye drops   INSTILL 4 DROPS INTO BOTH EYES STARTING DAY BEFORE SURGERY AND THE MORNING OF SURGERY    omega-3 fatty acids 1,000 mg Cap Take 2 g by mouth.    ondansetron (ZOFRAN) 4 MG tablet Take 4 mg by mouth every 8 (eight) hours as needed for Nausea.    pantoprazole (PROTONIX) 40 MG tablet Take 40 mg by mouth once daily.    pneumococcal 23-jose ps (PNEUMOVAX-23) 25 mcg/0.5 mL vaccine Pneumovax-23 25 mcg/0.5 mL injection syringe    temazepam (RESTORIL) 30 mg capsule temazepam 30 mg capsule    valsartan (DIOVAN) 320 MG tablet Take 320 mg by mouth once daily.     Family History     Problem Relation (Age of Onset)    Cancer Father, Maternal Uncle        Tobacco Use    Smoking status: Former Smoker     Packs/day: 1.50     Years: 40.00     Pack years: 60.00     Types: Cigarettes     Quit date: 2008     Years since quittin.8    Smokeless tobacco: Never Used   Substance and Sexual Activity    Alcohol use: Not Currently     Alcohol/week: 2.0 standard drinks     Types: 2  Cans of beer per week     Comment: quit 2 years ago    Drug use: Not on file    Sexual activity: Yes     Partners: Female     Review of Systems   Constitutional: Positive for appetite change, chills, fatigue and unexpected weight change.   Genitourinary: Positive for decreased urine volume.   Neurological: Positive for weakness (Generalized).   All other systems reviewed and are negative.    Objective:     Vital Signs (Most Recent):  Temp: 97.6 °F (36.4 °C) (12/02/20 1535)  Pulse: 64 (12/02/20 1730)  Resp: 18 (12/02/20 1730)  BP: (!) 94/53 (12/02/20 1730)  SpO2: 99 % (12/02/20 1730) Vital Signs (24h Range):  Temp:  [97.6 °F (36.4 °C)] 97.6 °F (36.4 °C)  Pulse:  [60-65] 64  Resp:  [18] 18  SpO2:  [99 %] 99 %  BP: ()/(53-96) 94/53     Weight: 79.8 kg (176 lb)  Body mass index is 29.29 kg/m².    Physical Exam  Vitals signs reviewed.   Constitutional:       Appearance: He is ill-appearing.   HENT:      Head: Normocephalic and atraumatic.      Right Ear: External ear normal.      Left Ear: External ear normal.      Nose: Nose normal.      Mouth/Throat:      Mouth: Mucous membranes are dry.   Eyes:      General: No scleral icterus.        Right eye: No discharge.         Left eye: No discharge.   Neck:      Musculoskeletal: Normal range of motion and neck supple.   Cardiovascular:      Rate and Rhythm: Normal rate and regular rhythm.      Heart sounds: No murmur. No gallop.    Pulmonary:      Breath sounds: Normal breath sounds. No wheezing or rales.   Abdominal:      Tenderness: There is abdominal tenderness.      Comments: Hyperactive bowel sounds   Genitourinary:     Penis: Normal.    Musculoskeletal:         General: No tenderness.      Right lower leg: No edema.      Left lower leg: No edema.   Skin:     General: Skin is warm and dry.      Capillary Refill: Capillary refill takes 2 to 3 seconds.   Neurological:      General: No focal deficit present.      Mental Status: He is oriented to person, place, and  time.   Psychiatric:         Mood and Affect: Mood normal.         Behavior: Behavior normal.             Significant Labs:   CBC:   Recent Labs   Lab 12/02/20  1617   WBC 11.93   HGB 12.8*   HCT 37.3*        CMP:   Recent Labs   Lab 12/02/20  1617      K 3.5      CO2 14*   *   *   CREATININE 6.2*   CALCIUM 8.9   PROT 7.6   ALBUMIN 4.2   BILITOT 0.7   ALKPHOS 66   AST 11   ALT 18   ANIONGAP 20*   EGFRNONAA 8.5*     Lactic Acid:   Recent Labs   Lab 12/02/20  1721   LACTATE 1.6     Lipase:   Recent Labs   Lab 12/02/20  1617   LIPASE 67*   Significant Imaging: I have reviewed all pertinent imaging results/findings within the past 24 hours.   X-ray Chest Ap Portable    Result Date: 12/2/2020  XR CHEST AP PORTABLE CLINICAL HISTORY: 70 years Male Chest Pain COMPARISON: None FINDINGS: Cardiac silhouette size is within normal limits. Atherosclerotic calcification of the liver. No confluent airspace disease. No pleural fluid or pneumothorax. No acute osseous abnormality. Surgical clips within the neck. IMPRESSION: No acute pulmonary process. Electronically Signed by Jesus MARTINEZ on 12/2/2020 5:07 PM    Ct Abdomen Pelvis  Without Contrast    Result Date: 12/2/2020  EXAMINATION: CT ABDOMEN PELVIS WITHOUT CONTRAST CLINICAL HISTORY: Abdominal pain, acute, nonlocalized; TECHNIQUE: CMS Mandated Quality Data-CT Radiation Dose-436 All CT scans at this facility dose modulation, iterative reconstruction, and or weight-based dosing when appropriate to reduce radiation dose to as low as reasonably achievable. COMPARISON: CT abdomen and pelvis 01/23/2019 FINDINGS: Lung bases are clear.  Bone window images demonstrate lumbar spondylosis.  No acute or aggressive osseous abnormality. On this unenhanced exam, no focal hepatic lesion.  Small calcified gallstone appears to be present within the gallbladder neck.  The gallbladder is collapsed.  No bile duct dilation.  Spleen and pancreas are  unremarkable.  Stable bilateral adrenal nodules consistent with benign adenomas.  Bilateral perinephric stranding.  Bilateral renal vascular calcifications.  No hydronephrosis.  At the lower pole the left kidney there is a calcified lesion demonstrating both soft tissue and fatty attenuation which has been present on multiple prior exams.  Ureters are normal in caliber.  Urinary bladder is collapsed. Stomach is unremarkable.  No evidence small-bowel obstruction.  Postoperative changes of the colon.  No evidence of acute colitis. Extensive atherosclerotic calcification of the abdominal aorta and its branch vessels.  A stent is present within the celiac artery.  Aortic bypass.  No free fluid or free air within the abdomen or pelvis.  No pathologically enlarged abdominopelvic lymph nodes.  Intramuscular lipoma involving the right gluteal musculature.  Soft tissue mass of the anterior left thigh demonstrating fatty elements is also stable from prior.     No noncontrast CT evidence of acute pathology involving the abdomen or pelvis. Cholelithiasis. Exophytic lesion arising from the lower pole the left kidney demonstrating both soft tissue attenuation as well as fatty elements, with peripheral calcification.  A combination of fat necrosis/scarring, or angiomyolipoma are possibilities.  Correlation with surgical history as well as follow-up renal protocol CT is recommended. Extensive arterial vascular disease, status post aortic bypass. Additional incidental findings as above including postoperative changes of the colon and bilateral adrenal adenomas. Electronically signed by: Jesus Galeano MD Date:    12/02/2020 Time:    18:19  EKG, personally reviewed, sinus bradycardia, no ST elevation    Assessment/Plan:     * JEREMIAH (acute kidney injury)  Due to baseline CKD, severe dehydration, hypotension, and contrast induced nephopathy  Consult renal  The ED physician had discussed case with Dr. Martínez  Ringer lactate 1 L bolus  given in the ED  Continue IV hydration, normal saline at 125 mL/hr  Renal ultrasound  Urine electrolyte  Philip catheter  Strict I&O  No NSAIDs, IV contrast  BMP daily  Hold Metformin, valsartan, and hydrochlorothiazide      Hypotension  Likely due to severe dehydration  Hold home medication:  Amlodipine, valsartan, metoprolol      Abdominal pain  With persistent nausea and vomiting  Had mesenteric artery stent last week  Consult GI  The ED physician had discussed case with Dr. Loya  IV PPI  Antiemetic PRN  Continue Bentyl p.r.n.      Diarrhea  Stool studies and C difficile  IV hydration as above  Consult GI      Dementia  Resume home medication      Diabetes mellitus with peripheral vascular disease  Hemoglobin A1c 7.2 about 5 months ago  Monitor blood glucose  Low-dose sliding scale insulin  Hold metformin due to acute kidney injury  Hold glipizide for now      PAD (peripheral artery disease)  Status post mesenteric arteries stent  Continue dual anti-platelet therapy and statins        VTE Risk Mitigation (From admission, onward)         Ordered     IP VTE HIGH RISK PATIENT  Once      12/02/20 1914     Place sequential compression device  Until discontinued      12/02/20 1914                   ANI Jenkins  Department of Hospital Medicine   [facility]

## 2020-12-03 NOTE — PROGRESS NOTES
"Gastroenterology    CC:  diarrhea    S:  Pt admitted with renal failure and chronic diarrhea. Seen in my clinic yesterday and sent to ER for admit.  Pt looks better today.  Still with loose stools.  Studies pending    Past Medical History:   Diagnosis Date    Bladder cancer     2 times    BPH (benign prostatic hypertrophy)     Cancer of kidney     1/2    Colon polyp     Coronary artery disease     DM (diabetes mellitus)     GERD (gastroesophageal reflux disease)     Hypertension     PAD (peripheral artery disease)     Peripheral vascular disease        Review of Systems  General ROS: negative for chills, fever or weight loss  Cardiovascular ROS: no chest pain or dyspnea on exertion  Gastrointestinal ROS: no abdominal pain, +change in bowel habits     Physical Examination  /60 mmHg  Pulse 67  Temp(Src) 97.9 °F (36.6 °C) (Oral)  Resp 15  Ht 5' 8" (1.727 m)  Wt 74.9 kg (165 lb 2 oz)  BMI 25.11 kg/m2  SpO2 100%  LMP  (LMP Unknown)  Breastfeeding? No  General appearance: alert, cooperative, no distress  HENT: Normocephalic, atraumatic, neck symmetrical, no nasal discharge   Lungs: clear to auscultation bilaterally, no dullness to percussion bilaterally  Heart: regular rate and rhythm without rub; no displacement of the PMI   Abdomen: soft, non-tender; bowel sounds normoactive; no organomegaly  Extremities: extremities symmetric; no clubbing, cyanosis, or edema  Neurologic: Alert and oriented X 3, normal strength, normal coordination and gait    Labs:  Lab Results   Component Value Date    WBC 10.61 12/03/2020    HGB 10.6 (L) 12/03/2020    HCT 31.9 (L) 12/03/2020    MCV 92 12/03/2020     12/03/2020       BMP  Lab Results   Component Value Date     12/03/2020    K 3.2 (L) 12/03/2020     12/03/2020    CO2 9 (LL) 12/03/2020     (H) 12/03/2020    CREATININE 5.4 (H) 12/03/2020    CALCIUM 8.2 (L) 12/03/2020    ANIONGAP 19 (H) 12/03/2020    ESTGFRAFRICA 11.6 (A) 12/03/2020    " EGFRNONAA 10.0 (A) 12/03/2020     Lab Results   Component Value Date    ALT 18 12/02/2020    AST 11 12/02/2020    ALKPHOS 66 12/02/2020    BILITOT 0.7 12/02/2020      Assessment:   70 y.o. male with CAD, PAD, mesenteric ischemia post stenting of celiac with h/o hemicolectomy due to severe ischemic colitis resulting in chronic diarrhea along with possible chronic panc on EUS but normal elastase in the past.  Prior CT imaging suggestive of inflammation near anastomosis.  WIll need cscope when renal function normalizes.  Awaiting stool studies    Plan:  cscope in future  Stool studies  Recommend fiber bulking and cholestyramine tid    Andi Loya  [facility]  Cell: [phone]

## 2020-12-04 LAB
25(OH)D3+25(OH)D2 SERPL-MCNC: 24 NG/ML (ref 30–96)
ANION GAP SERPL CALC-SCNC: 10 MMOL/L (ref 8–16)
ANION GAP SERPL CALC-SCNC: 12 MMOL/L (ref 8–16)
ANION GAP SERPL CALC-SCNC: 13 MMOL/L (ref 8–16)
ANION GAP SERPL CALC-SCNC: 14 MMOL/L (ref 8–16)
BASOPHILS # BLD AUTO: 0.04 K/UL (ref 0–0.2)
BASOPHILS NFR BLD: 0.5 % (ref 0–1.9)
BUN SERPL-MCNC: 103 MG/DL (ref 8–23)
BUN SERPL-MCNC: 118 MG/DL (ref 8–23)
BUN SERPL-MCNC: 124 MG/DL (ref 8–23)
BUN SERPL-MCNC: 86 MG/DL (ref 8–23)
C DIFF GDH STL QL: NEGATIVE
C DIFF TOX A+B STL QL IA: NEGATIVE
CA-I BLDV-SCNC: 1.13 MMOL/L (ref 1.06–1.42)
CALCIUM SERPL-MCNC: 8.1 MG/DL (ref 8.7–10.5)
CALCIUM SERPL-MCNC: 8.4 MG/DL (ref 8.7–10.5)
CALCIUM SERPL-MCNC: 8.5 MG/DL (ref 8.7–10.5)
CALCIUM SERPL-MCNC: 8.6 MG/DL (ref 8.7–10.5)
CHLORIDE SERPL-SCNC: 100 MMOL/L (ref 95–110)
CHLORIDE SERPL-SCNC: 101 MMOL/L (ref 95–110)
CHLORIDE SERPL-SCNC: 102 MMOL/L (ref 95–110)
CHLORIDE SERPL-SCNC: 106 MMOL/L (ref 95–110)
CO2 SERPL-SCNC: 20 MMOL/L (ref 23–29)
CO2 SERPL-SCNC: 22 MMOL/L (ref 23–29)
CO2 SERPL-SCNC: 25 MMOL/L (ref 23–29)
CO2 SERPL-SCNC: 25 MMOL/L (ref 23–29)
CREAT SERPL-MCNC: 1.8 MG/DL (ref 0.5–1.4)
CREAT SERPL-MCNC: 2.1 MG/DL (ref 0.5–1.4)
CREAT SERPL-MCNC: 2.6 MG/DL (ref 0.5–1.4)
CREAT SERPL-MCNC: 2.9 MG/DL (ref 0.5–1.4)
DIFFERENTIAL METHOD: ABNORMAL
EOSINOPHIL # BLD AUTO: 0.3 K/UL (ref 0–0.5)
EOSINOPHIL NFR BLD: 3.7 % (ref 0–8)
ERYTHROCYTE [DISTWIDTH] IN BLOOD BY AUTOMATED COUNT: 13.2 % (ref 11.5–14.5)
EST. GFR  (AFRICAN AMERICAN): 24.6 ML/MIN/1.73 M^2
EST. GFR  (AFRICAN AMERICAN): 28 ML/MIN/1.73 M^2
EST. GFR  (AFRICAN AMERICAN): 36.3 ML/MIN/1.73 M^2
EST. GFR  (AFRICAN AMERICAN): 43.7 ML/MIN/1.73 M^2
EST. GFR  (NON AFRICAN AMERICAN): 21.3 ML/MIN/1.73 M^2
EST. GFR  (NON AFRICAN AMERICAN): 24.3 ML/MIN/1.73 M^2
EST. GFR  (NON AFRICAN AMERICAN): 31.4 ML/MIN/1.73 M^2
EST. GFR  (NON AFRICAN AMERICAN): 37.8 ML/MIN/1.73 M^2
GLUCOSE SERPL-MCNC: 138 MG/DL (ref 70–110)
GLUCOSE SERPL-MCNC: 155 MG/DL (ref 70–110)
GLUCOSE SERPL-MCNC: 164 MG/DL (ref 70–110)
GLUCOSE SERPL-MCNC: 164 MG/DL (ref 70–110)
GLUCOSE SERPL-MCNC: 175 MG/DL (ref 70–110)
GLUCOSE SERPL-MCNC: 198 MG/DL (ref 70–110)
GLUCOSE SERPL-MCNC: 224 MG/DL (ref 70–110)
HCT VFR BLD AUTO: 26.5 % (ref 40–54)
HGB BLD-MCNC: 9.6 G/DL (ref 14–18)
IMM GRANULOCYTES # BLD AUTO: 0.05 K/UL (ref 0–0.04)
IMM GRANULOCYTES NFR BLD AUTO: 0.7 % (ref 0–0.5)
LYMPHOCYTES # BLD AUTO: 1.1 K/UL (ref 1–4.8)
LYMPHOCYTES NFR BLD: 14.7 % (ref 18–48)
MAGNESIUM SERPL-MCNC: 1.9 MG/DL (ref 1.6–2.6)
MCH RBC QN AUTO: 32 PG (ref 27–31)
MCHC RBC AUTO-ENTMCNC: 36.2 G/DL (ref 32–36)
MCV RBC AUTO: 88 FL (ref 82–98)
MONOCYTES # BLD AUTO: 0.8 K/UL (ref 0.3–1)
MONOCYTES NFR BLD: 10.7 % (ref 4–15)
NEUTROPHILS # BLD AUTO: 5.3 K/UL (ref 1.8–7.7)
NEUTROPHILS NFR BLD: 69.7 % (ref 38–73)
NRBC BLD-RTO: 0 /100 WBC
PLATELET # BLD AUTO: 205 K/UL (ref 150–350)
PMV BLD AUTO: 10.3 FL (ref 9.2–12.9)
POTASSIUM SERPL-SCNC: 2.9 MMOL/L (ref 3.5–5.1)
POTASSIUM SERPL-SCNC: 3.2 MMOL/L (ref 3.5–5.1)
POTASSIUM SERPL-SCNC: 3.4 MMOL/L (ref 3.5–5.1)
POTASSIUM SERPL-SCNC: 3.8 MMOL/L (ref 3.5–5.1)
RBC # BLD AUTO: 3 M/UL (ref 4.6–6.2)
SODIUM SERPL-SCNC: 136 MMOL/L (ref 136–145)
SODIUM SERPL-SCNC: 136 MMOL/L (ref 136–145)
SODIUM SERPL-SCNC: 137 MMOL/L (ref 136–145)
SODIUM SERPL-SCNC: 141 MMOL/L (ref 136–145)
WBC # BLD AUTO: 7.54 K/UL (ref 3.9–12.7)

## 2020-12-04 PROCEDURE — 25000003 PHARM REV CODE 250: Performed by: INTERNAL MEDICINE

## 2020-12-04 PROCEDURE — 25000003 PHARM REV CODE 250

## 2020-12-04 PROCEDURE — 36415 COLL VENOUS BLD VENIPUNCTURE: CPT

## 2020-12-04 PROCEDURE — 80048 BASIC METABOLIC PNL TOTAL CA: CPT | Mod: 91

## 2020-12-04 PROCEDURE — 82330 ASSAY OF CALCIUM: CPT

## 2020-12-04 PROCEDURE — 25000003 PHARM REV CODE 250: Performed by: NURSE PRACTITIONER

## 2020-12-04 PROCEDURE — 87324 CLOSTRIDIUM AG IA: CPT

## 2020-12-04 PROCEDURE — 99900035 HC TECH TIME PER 15 MIN (STAT)

## 2020-12-04 PROCEDURE — 94761 N-INVAS EAR/PLS OXIMETRY MLT: CPT

## 2020-12-04 PROCEDURE — 12000002 HC ACUTE/MED SURGE SEMI-PRIVATE ROOM

## 2020-12-04 PROCEDURE — 83735 ASSAY OF MAGNESIUM: CPT

## 2020-12-04 PROCEDURE — 87449 NOS EACH ORGANISM AG IA: CPT

## 2020-12-04 PROCEDURE — 85025 COMPLETE CBC W/AUTO DIFF WBC: CPT

## 2020-12-04 PROCEDURE — 82306 VITAMIN D 25 HYDROXY: CPT

## 2020-12-04 PROCEDURE — 82656 EL-1 FECAL QUAL/SEMIQ: CPT

## 2020-12-04 RX ORDER — TRAZODONE HYDROCHLORIDE 50 MG/1
50 TABLET ORAL NIGHTLY
Status: DISCONTINUED | OUTPATIENT
Start: 2020-12-04 | End: 2020-12-06

## 2020-12-04 RX ORDER — POTASSIUM CHLORIDE 20 MEQ/1
20 TABLET, EXTENDED RELEASE ORAL DAILY
Status: DISCONTINUED | OUTPATIENT
Start: 2020-12-05 | End: 2020-12-07 | Stop reason: HOSPADM

## 2020-12-04 RX ORDER — POTASSIUM CHLORIDE 20 MEQ/1
40 TABLET, EXTENDED RELEASE ORAL ONCE
Status: COMPLETED | OUTPATIENT
Start: 2020-12-04 | End: 2020-12-04

## 2020-12-04 RX ORDER — SODIUM BICARBONATE 650 MG/1
650 TABLET ORAL 2 TIMES DAILY
Status: DISCONTINUED | OUTPATIENT
Start: 2020-12-04 | End: 2020-12-05

## 2020-12-04 RX ADMIN — POTASSIUM CHLORIDE 20 MEQ: 20 TABLET, EXTENDED RELEASE ORAL at 06:12

## 2020-12-04 RX ADMIN — POTASSIUM CHLORIDE 40 MEQ: 20 TABLET, EXTENDED RELEASE ORAL at 11:12

## 2020-12-04 RX ADMIN — MUPIROCIN: 20 OINTMENT TOPICAL at 09:12

## 2020-12-04 RX ADMIN — SODIUM BICARBONATE 650 MG TABLET 650 MG: at 03:12

## 2020-12-04 RX ADMIN — DICYCLOMINE HYDROCHLORIDE 20 MG: 10 CAPSULE ORAL at 01:12

## 2020-12-04 RX ADMIN — CHOLESTYRAMINE 4 G: 4 POWDER, FOR SUSPENSION ORAL at 03:12

## 2020-12-04 RX ADMIN — DICYCLOMINE HYDROCHLORIDE 20 MG: 10 CAPSULE ORAL at 06:12

## 2020-12-04 RX ADMIN — SODIUM BICARBONATE: 84 INJECTION, SOLUTION INTRAVENOUS at 03:12

## 2020-12-04 RX ADMIN — DONEPEZIL HYDROCHLORIDE 10 MG: 5 TABLET, FILM COATED ORAL at 09:12

## 2020-12-04 RX ADMIN — ATORVASTATIN CALCIUM 80 MG: 40 TABLET, FILM COATED ORAL at 09:12

## 2020-12-04 RX ADMIN — PANTOPRAZOLE SODIUM 40 MG: 40 TABLET, DELAYED RELEASE ORAL at 09:12

## 2020-12-04 RX ADMIN — TRAZODONE HYDROCHLORIDE 50 MG: 50 TABLET ORAL at 09:12

## 2020-12-04 RX ADMIN — CHLORHEXIDINE GLUCONATE 15 ML: 1.2 RINSE ORAL at 09:12

## 2020-12-04 RX ADMIN — RIFAXIMIN 550 MG: 550 TABLET ORAL at 09:12

## 2020-12-04 RX ADMIN — CHOLESTYRAMINE 4 G: 4 POWDER, FOR SUSPENSION ORAL at 09:12

## 2020-12-04 RX ADMIN — CLOPIDOGREL BISULFATE 75 MG: 75 TABLET, FILM COATED ORAL at 09:12

## 2020-12-04 RX ADMIN — POTASSIUM CHLORIDE 40 MEQ: 20 TABLET, EXTENDED RELEASE ORAL at 01:12

## 2020-12-04 RX ADMIN — DICYCLOMINE HYDROCHLORIDE 20 MG: 10 CAPSULE ORAL at 12:12

## 2020-12-04 RX ADMIN — GABAPENTIN 300 MG: 300 CAPSULE ORAL at 09:12

## 2020-12-04 RX ADMIN — ASPIRIN 81 MG: 81 TABLET, DELAYED RELEASE ORAL at 09:12

## 2020-12-04 RX ADMIN — SODIUM BICARBONATE 650 MG TABLET 650 MG: at 09:12

## 2020-12-04 RX ADMIN — DICYCLOMINE HYDROCHLORIDE 20 MG: 10 CAPSULE ORAL at 05:12

## 2020-12-04 NOTE — NURSING TRANSFER
Nursing Transfer Note      12/4/2020     Transfer 1114    Transfer via wc  Transfer with cardiac monitoring    Transported by tcb      Medicines sent: yes    Chart send with patient: Yes    Notified: spouse    Patient reassessed at:12/4/2020    Upon arrival to floor: cardiac monitor applied, patient oriented to room, call bell in reach and bed in lowest position

## 2020-12-04 NOTE — SUBJECTIVE & OBJECTIVE
Interval History:     Review of Systems   Constitutional: Negative for activity change and appetite change.   HENT: Negative for congestion and dental problem.    Eyes: Negative for discharge and itching.   Respiratory: Negative for shortness of breath.    Cardiovascular: Negative for chest pain.   Gastrointestinal: Negative for abdominal distention and abdominal pain.   Endocrine: Negative for cold intolerance.   Genitourinary: Negative for difficulty urinating and dysuria.   Musculoskeletal: Negative for arthralgias and back pain.   Skin: Negative for color change.   Neurological: Positive for weakness. Negative for dizziness and facial asymmetry.   Hematological: Negative for adenopathy.   Psychiatric/Behavioral: Negative for agitation and behavioral problems.     Objective:     Vital Signs (Most Recent):  Temp: 98.8 °F (37.1 °C) (12/03/20 1700)  Pulse: 67 (12/03/20 1800)  Resp: 17 (12/03/20 1800)  BP: (!) 109/49 (12/03/20 1800)  SpO2: 100 % (12/03/20 1800) Vital Signs (24h Range):  Temp:  [97.3 °F (36.3 °C)-98.8 °F (37.1 °C)] 98.8 °F (37.1 °C)  Pulse:  [55-75] 67  Resp:  [11-26] 17  SpO2:  [96 %-100 %] 100 %  BP: ()/(49-78) 109/49     Weight: 84.7 kg (186 lb 11.7 oz)  Body mass index is 31.07 kg/m².    Intake/Output Summary (Last 24 hours) at 12/3/2020 1927  Last data filed at 12/3/2020 1800  Gross per 24 hour   Intake 2104.17 ml   Output 1320 ml   Net 784.17 ml      Physical Exam  Vitals signs and nursing note reviewed.   Constitutional:       Appearance: He is well-developed.   HENT:      Head: Normocephalic.      Right Ear: External ear normal.      Left Ear: External ear normal.      Nose: Nose normal.      Mouth/Throat:      Mouth: Mucous membranes are moist.   Eyes:      Conjunctiva/sclera: Conjunctivae normal.   Neck:      Musculoskeletal: Full passive range of motion without pain and normal range of motion.   Cardiovascular:      Rate and Rhythm: Normal rate.   Pulmonary:      Effort: Pulmonary  effort is normal.   Abdominal:      Palpations: Abdomen is soft.   Musculoskeletal: Normal range of motion.   Skin:     General: Skin is warm.   Neurological:      Mental Status: He is alert and oriented to person, place, and time.   Psychiatric:         Behavior: Behavior normal.         Significant Labs:   CBC:   Recent Labs   Lab 12/02/20  1617 12/03/20  0309   WBC 11.93 10.61   HGB 12.8* 10.6*   HCT 37.3* 31.9*    234     CMP:   Recent Labs   Lab 12/02/20  1617 12/03/20  0309 12/03/20  1041 12/03/20  1559    136 138  135* 135*   K 3.5 3.2* 2.7*  2.8* 2.9*    108 107  104 102   CO2 14* 9* 14*  14* 17*   * 52* 132*  127* 122*   * 143* 142*  142* 138*   CREATININE 6.2* 5.4* 4.5*  4.4* 3.8*   CALCIUM 8.9 8.2* 7.9*  7.8* 7.7*   PROT 7.6  --   --   --    ALBUMIN 4.2  --  3.0*  --    BILITOT 0.7  --   --   --    ALKPHOS 66  --   --   --    AST 11  --   --   --    ALT 18  --   --   --    ANIONGAP 20* 19* 17*  17* 16   EGFRNONAA 8.5* 10.0* 12.5*  12.8* 15.3*       Significant Imaging: I have reviewed all pertinent imaging results/findings within the past 24 hours.

## 2020-12-04 NOTE — PROGRESS NOTES
"[facility]  Adult Nutrition   Progress Note (Follow-Up)    SUMMARY     Recommendations  Recommendation/Intervention: 1. Continue cardiac diet. 2. RD added ONS, Ensure Enlive TID (to provide 1050 kcal/day and 60 g/day protein)  3. RD obtained patient's food preferences, notified kitchen, and added preferences to [facility].  Goals: 1. Patient to meet at least 75% of estimated energy and protein needs.  Nutrition Goal Status: new  Communication of RD Recs: reviewed with RN    Dietitian Rounds Brief  Follow up. PO intake fair. Tolerating full liquid diet and diet advanced to cardiac. Patient states he is a picky eater. RD offered supplements, pt accepted. RD obtained food prefs.      Reason for Assessment   Reason For Assessment: RD follow-up  Diagnosis: other (see comments)(JEREMIAH)  Relevant Medical History: Bladder cancer; BPH; cancer of kidney, 1/2; colon polyp; CAD; DM; GERD; HTN; PAD; peripheral vascular disease  Interdisciplinary Rounds: attended    Nutrition Risk Screen  Nutrition Risk Screen: no indicators present     MST Score: 1  Have you recently lost weight without trying?: No  Weight loss score: 0  Have you been eating poorly because of a decreased appetite?: Yes  Appetite score: 1       Nutrition/Diet History  Spiritual, Cultural Beliefs, [employer]al Practices, Values that Affect Care: no  Food Allergies: NKFA  Factors Affecting Nutritional Intake: decreased appetite    Anthropometrics  Temp: 98.5 °F (36.9 °C)  Height Method: Stated  Height: 5' 5" (165.1 cm)  Height (inches): 65 in  Weight Method: Bed Scale  Weight: 84.7 kg (186 lb 11.7 oz)  Weight (lb): 186.73 lb  Ideal Body Weight (IBW), Male: 136 lb  % Ideal Body Weight, Male (lb): 137.3 %  BMI (Calculated): 31.1  BMI Grade: 30 - 34.9- obesity - grade I  Weight Loss: unintentional  Usual Body Weight (UBW), k.2 kg  Weight Change Amount: 28 lb  % Usual Body Weight: 87.32  % Weight Change From Usual Weight: -12.86 %  Weight Loss " Since Admission: 0 lb  % Weight Change Since Admission: 0       Weight History:  Wt Readings from Last 10 Encounters:   12/03/20 84.7 kg (186 lb 11.7 oz)   02/20/19 72.6 kg (160 lb)   02/07/19 72.2 kg (159 lb 2.8 oz)   02/06/19 72.1 kg (159 lb 1 oz)   01/23/19 72.6 kg (160 lb)   01/12/19 73.9 kg (163 lb)   12/05/18 78.9 kg (174 lb)   08/21/18 88.9 kg (196 lb)   05/28/17 93 kg (205 lb)   10/20/15 94.8 kg (209 lb)       Lab/Procedures/Meds: Pertinent Labs Reviewed    Clinical Chemistry:  Recent Labs   Lab 12/02/20  1617  12/03/20  1041  12/03/20  2000  12/04/20  0401 12/04/20  0833      < > 138  135*   < > 137   < > 141 136   K 3.5   < > 2.7*  2.8*   < > 3.1*   < > 3.4* 3.2*      < > 107  104   < > 104   < > 106 101   CO2 14*   < > 14*  14*   < > 18*   < > 25 22*   *   < > 132*  127*   < > 149*   < > 138* 224*   *   < > 142*  142*   < > 136*   < > 118* 103*   CREATININE 6.2*   < > 4.5*  4.4*   < > 3.5*   < > 2.6* 2.1*   CALCIUM 8.9   < > 7.9*  7.8*   < > 8.4*   < > 8.5* 8.1*   PROT 7.6  --   --   --   --   --   --   --    ALBUMIN 4.2  --  3.0*  --  3.1*  --   --   --    BILITOT 0.7  --   --   --   --   --   --   --    ALKPHOS 66  --   --   --   --   --   --   --    AST 11  --   --   --   --   --   --   --    ALT 18  --   --   --   --   --   --   --    ANIONGAP 20*   < > 17*  17*   < > 15   < > 10 13   ESTGFRAFRICA 9.8*   < > 14.4*  14.8*   < > 19.6*   < > 28.0* 36.3*   EGFRNONAA 8.5*   < > 12.5*  12.8*   < > 16.9*   < > 24.3* 31.4*   MG  --    < >  --   --  2.2  --  1.9  --    PHOS  --   --  6.6*  --   --   --   --   --    LIPASE 67*  --   --   --   --   --   --   --     < > = values in this interval not displayed.       CBC:   Recent Labs   Lab 12/04/20  0401   WBC 7.54   RBC 3.00*   HGB 9.6*   HCT 26.5*      MCV 88   MCH 32.0*   MCHC 36.2*       Cardiac Profile:  Recent Labs   Lab 12/02/20  1617   CPK 28   CPKMB 1.9       Medications: Pertinent Medications  reviewed    Scheduled Meds:   aspirin  81 mg Oral Daily    atorvastatin  80 mg Oral Daily    chlorhexidine  15 mL Mouth/Throat BID    cholestyramine-aspartame  4 g Oral TID    clopidogreL  75 mg Oral QHS    dicyclomine  20 mg Oral Q6H    donepeziL  10 mg Oral QHS    gabapentin  300 mg Oral QHS    mupirocin   Nasal BID    pantoprazole  40 mg Oral BID    [START ON 12/5/2020] potassium chloride  20 mEq Oral Daily    rifAXImin  550 mg Oral BID    sodium bicarbonate  650 mg Oral BID       PRN Meds:.acetaminophen, acetaminophen, calcium chloride IVPB, calcium chloride IVPB, calcium chloride IVPB, dextrose 50%, dextrose 50%, glucagon (human recombinant), glucose, glucose, HYDROcodone-acetaminophen, insulin aspart U-100, magnesium oxide, magnesium sulfate IVPB, magnesium sulfate IVPB, magnesium sulfate IVPB, magnesium sulfate IVPB, ondansetron, potassium chloride in water, potassium chloride in water, potassium chloride in water, potassium chloride in water, potassium chloride, potassium chloride, potassium chloride, potassium chloride, promethazine (PHENERGAN) IVPB, sodium chloride 0.9%, sodium phosphate IVPB, sodium phosphate IVPB, sodium phosphate IVPB, sodium phosphate IVPB, sodium phosphate IVPB    Estimated/Assessed Needs  Weight Used For Calorie Calculations: 84.7 kg (186 lb 11.7 oz)  Energy Calorie Requirements (kcal): 4320-5780 kcal/day (25-30 kcal/kg)  Energy Need Method: Kcal/kg  Protein Requirements: 74-93 g/day (1.2-1.5 g/kg IBW)  Weight Used For Protein Calculations: 61.8 kg (136 lb 3.9 oz)     Estimated Fluid Requirement Method: RDA Method  RDA Method (mL): 2117       Nutrition Prescription Ordered  Current Diet Order: Full Liquid    Evaluation of Received Nutrient/Fluid Intake  Energy Calories Required: not meeting needs  Protein Required: not meeting needs  Fluid Required: meeting needs  Tolerance: tolerating     Intake/Output Summary (Last 24 hours) at 12/4/2020 1302  Last data filed at  12/4/2020 0830  Gross per 24 hour   Intake 2824.17 ml   Output 3425 ml   Net -600.83 ml      % Intake of Estimated Energy Needs: 25 - 50 %  % Meal Intake: 25 - 50 %    Nutrition Risk  Level of Risk/Frequency of Follow-up: high     Monitor and Evaluation  Food and Nutrient Intake: energy intake, food and beverage intake  Food and Nutrient Adminstration: diet order  Physical Activity and Function: nutrition-related ADLs and IADLs, factors affecting access to physical activity  Anthropometric Measurements: weight, weight change, body mass index  Biochemical Data, Medical Tests and Procedures: electrolyte and renal panel, inflammatory profile, gastrointestinal profile, lipid profile, glucose/endocrine profile  Nutrition-Focused Physical Findings: overall appearance     Nutrition Follow-Up  RD Follow-up?: Yes    Tami Finley RD 12/04/2020 1:07 PM

## 2020-12-04 NOTE — PLAN OF CARE
Problem: Oral Intake Inadequate  Goal: Improved Oral Intake  Outcome: Ongoing, Progressing  Intervention: Promote and Optimize Oral Intake  Flowsheets (Taken 12/4/2020 1302)  Oral Nutrition Promotion: calorie dense liquids provided

## 2020-12-04 NOTE — PLAN OF CARE
This note also relates to the following rows which could not be included:  SpO2 - Cannot attach notes to unvalidated device data  Pulse - Cannot attach notes to unvalidated device data  Resp - Cannot attach notes to unvalidated device data       12/03/20 2036   Patient Assessment/Suction   Level of Consciousness (AVPU) alert   Respiratory Effort Normal;Unlabored   Expansion/Accessory Muscles/Retractions no use of accessory muscles   PRE-TX-O2   O2 Device (Oxygen Therapy) room air   Pulse Oximetry Type Continuous   $ Pulse Oximetry - Multiple Charge Pulse Oximetry - Multiple   maintain adequate oxygenation

## 2020-12-04 NOTE — PLAN OF CARE
12/04/20 0813   Patient Assessment/Suction   Level of Consciousness (AVPU) alert   Respiratory Effort Unlabored   PRE-TX-O2   O2 Device (Oxygen Therapy) room air   Pulse Oximetry Type Continuous   $ Pulse Oximetry - Multiple Charge Pulse Oximetry - Multiple   Pulse 92   Resp 20   Respiratory Evaluation   $ Care Plan Tech Time 15 min

## 2020-12-04 NOTE — ASSESSMENT & PLAN NOTE
Hemoglobin A1c 7.2 about 5 months ago  Monitor blood glucose  Low-dose sliding scale insulin  Hold metformin due to acute kidney injury  Hold glipizide also

## 2020-12-04 NOTE — PROGRESS NOTES
INPATIENT NEPHROLOGY PROGRESS NOTE   [facility] NEPHROLOGY    Patient Name: Jose English  Date: 12/04/2020    Reason for consultation: JEREMIAH    Chief Complaint:   Chief Complaint   Patient presents with    Abdominal Pain     X SEVERAL WEEKS, SENT FROM PCP FOR EVAL OF LOW BP    Vomiting    Diarrhea       History of Present Illness:  70 year old male multiple medical problems including CAD, PAD, DM,  right-sided colectomy for ischemic colitis 10/31/2018, BPH, bladder and renal cancer, gastroesophageal reflux, hypertension, disease, chronic kidney disease, dementia who underwent  mesenteric artery stenting a week ago at [facility] for suspected recurrent ischemic colitis.  Represented to the ED after being seen by Dr. Loya earlier today  Reports persistent nausea, vomiting, diarrhea for the past 3 weeks. His wife reports that he had approximately 22-30 loose large diarrhea stool per day. Denies fever but has been having chills. Lost 28 lb since October this year. Found to be hyportensive with systolic blood pressures in the 80s and near syncopal episode at gastroenterologist's office. Denies shortness of breath or chest pain. Reports epigastric discomfort with retching/vomiting and moderate to severe diffuse abdominal pain. Has not been urinating much. We are consulted for JEREMIAH.    Home meds: metoprolol, norvasc, valsartan, HCTZ    Renal u/s:  Right kidney measures 10.9 cm in length. No cystic or solid right renal lesion. No hydronephrosis.  IVC is unremarkable. Aorta was not visualized due to bowel gas. Left kidney measures 8.3 cm in length. There is an echogenic focus at the lower pole the left kidney, corresponding to a rim calcified lesion described on the reference CT.  This lesion is not well evaluated sonographically due to calcification. There is no  hydronephrosis of the left kidney. Urinary bladder is incompletely distended and otherwise unremarkable.     Interval History:  12/3- hypotensive,  on RA, with cash; had BM this AM, denies cp/dyspnea/edema, UA + hyaline casts, urine Na < 10  12/4- BP is better, on RA, UOP 3.4L, feels much better, eating well- still with some lower abd pain, no cp/dyspnea/edema    Plan of Care:    1. Severe JEREMIAH secondary to intravascular volume depletion in setting of profound GI losses (supported by hypotension, urine studies); baseline serum Cr 1.2 (CKD II)  - renal function is improving steadily with volume repletion  - he is nonoliguric  - he has no RRT needs  - change to q8 renal panel   - hold ALL BP meds/diuretics again today  - no nsaids or IV contrast  - ok with transfer to the floor    2. Hypokalemia  - looks like 2/2 GI losses  - given repletion this AM  - recheck at noon and 8pm  - start standing repletion of KCl 20mEq daily to start tomorrow    3. Metabolic acidosis (combined AG 2/2 JEREMIAH and NAG 2/2 diarrhea)  - resolved  - d/c bicarb gtt  - start PO bicarb 650mg BID given chronic diarrhea issues    4. HypoMg/Hypocalcemia  - resolving  - replete PRN via sliding scale  - doesn't need standing supplement of Mg; check 25 vitamin D to see if he needs a standing supplement which would help with hypocalcemia    5. Anemia  - stabilizing within baseline range    6. Baseline HTN  - hold all meds until BP > 150/90 persistently- will then decide what is safe to resume (can start with metoprolol, norvasc)  - euvolemic  - no acute diuresis needs     Thank you for allowing us to participate in this patient's care. We will continue to follow.    Vital Signs:  Temp Readings from Last 3 Encounters:   12/04/20 98.8 °F (37.1 °C) (Oral)   02/20/19 98.2 °F (36.8 °C)   02/06/19 98.1 °F (36.7 °C)       Pulse Readings from Last 3 Encounters:   12/04/20 79   02/20/19 65   02/07/19 71       BP Readings from Last 3 Encounters:   12/04/20 (!) 106/56   02/20/19 (!) 140/66   02/07/19 108/62       Weight:  Wt Readings from Last 3 Encounters:   12/03/20 84.7 kg (186 lb 11.7 oz)   02/20/19 72.6 kg  (160 lb)   02/07/19 72.2 kg (159 lb 2.8 oz)     Medications:  Scheduled Meds:   aspirin  81 mg Oral Daily    atorvastatin  80 mg Oral Daily    chlorhexidine  15 mL Mouth/Throat BID    cholestyramine-aspartame  4 g Oral TID    clopidogreL  75 mg Oral QHS    dicyclomine  20 mg Oral Q6H    donepeziL  10 mg Oral QHS    gabapentin  300 mg Oral QHS    mupirocin   Nasal BID    pantoprazole  40 mg Oral BID    rifAXImin  550 mg Oral BID     Continuous Infusions:    PRN Meds:.acetaminophen, acetaminophen, calcium chloride IVPB, calcium chloride IVPB, calcium chloride IVPB, dextrose 50%, dextrose 50%, glucagon (human recombinant), glucose, glucose, HYDROcodone-acetaminophen, insulin aspart U-100, magnesium oxide, magnesium sulfate IVPB, magnesium sulfate IVPB, magnesium sulfate IVPB, magnesium sulfate IVPB, ondansetron, potassium chloride in water, potassium chloride in water, potassium chloride in water, potassium chloride in water, potassium chloride, potassium chloride, potassium chloride, potassium chloride, promethazine (PHENERGAN) IVPB, sodium chloride 0.9%, sodium phosphate IVPB, sodium phosphate IVPB, sodium phosphate IVPB, sodium phosphate IVPB, sodium phosphate IVPB  No current facility-administered medications on file prior to encounter.      Current Outpatient Medications on File Prior to Encounter   Medication Sig Dispense Refill    allopurinol (ZYLOPRIM) 100 MG tablet Take 100 mg by mouth 2 (two) times daily.      amlodipine (NORVASC) 10 MG tablet TAKE 1 TABLET ONE TIME DAILY 90 tablet 3    aspirin (ECOTRIN) 81 MG EC tablet Take 81 mg by mouth once daily.      atorvastatin (LIPITOR) 80 MG tablet Take 80 mg by mouth once daily.      b complex vitamins tablet Take 1 tablet by mouth once daily.      clopidogrel (PLAVIX) 75 mg tablet TAKE 1 TABLET EVERY EVENING 90 tablet 3    cyanocobalamin (VITAMIN B-12) 1000 MCG tablet Take 1,000 mcg by mouth.      dicyclomine (BENTYL) 20 mg tablet Take 20 mg by  mouth every 6 (six) hours.      diphenoxylate-atropine 2.5-0.025 mg (LOMOTIL) 2.5-0.025 mg per tablet diphenoxylate-atropine 2.5 mg-0.025 mg tablet      donepezil (ARICEPT) 5 MG tablet Take 10 mg by mouth every evening.      gabapentin (NEURONTIN) 300 MG capsule Take 300 mg by mouth every evening.      glipiZIDE (GLUCOTROL) 5 MG TR24 glipizide ER 5 mg tablet, extended release 24 hr   TK 1 T PO BID      hydrochlorothiazide (HYDRODIURIL) 25 MG tablet TAKE 1 TABLET EVERY MORNING 90 tablet 3    loperamide (IMODIUM) 2 mg capsule Take 2 mg by mouth 4 (four) times daily as needed for Diarrhea.      metoprolol succinate (TOPROL-XL) 50 MG 24 hr tablet metoprolol succinate ER 50 mg tablet,extended release 24 hr   TK 2 TS PO QD      metoprolol tartrate (LOPRESSOR) 50 MG tablet TAKE 1 TABLET TWICE DAILY 180 tablet 3    mv,Ca,min-iron-FA-lycopene 8 mg iron- 200 mcg-600 mcg Tab Take 1 capsule by mouth once daily.      ondansetron (ZOFRAN) 4 MG tablet Take 4 mg by mouth every 8 (eight) hours as needed for Nausea.      pantoprazole (PROTONIX) 40 MG tablet Take 40 mg by mouth once daily.      temazepam (RESTORIL) 30 mg capsule temazepam 30 mg capsule      valsartan (DIOVAN) 320 MG tablet Take 320 mg by mouth once daily.      ACCU-CHEK CEASAR CONTROL SOLN Soln USE ONE TIME DAILY 1 each 3    ACCU-CHEK CEASAR PLUS TEST STRP Strp TEST TWO TIMES DAILY 200 strip 3    ACCU-CHEK SOFTCLIX LANCETS Misc TEST TWO TIMES DAILY 200 each 3    BD ALCOHOL SWABS PadM TEST TWO TIMES DAILY 200 each 4    bismuth subsalicylate (KAOPECTATE, BISMUTH SUBSALICY,) 262 mg Tab Take by mouth.      cholestyramine (QUESTRAN) 4 gram packet Take 1 packet (4 g total) by mouth 2 (two) times daily. 180 packet 3    fish oil-omega-3 fatty acids 300-1,000 mg capsule Take 2 g by mouth once daily.      flu vacc [id number],65yr up,PF (FLUZONE HIGH-DOSE 2019-20, PF,) 180 mcg/0.5 mL Syrg Fluzone High-Dose 2019-20 (PF) 180 mcg/0.5 mL intramuscular syringe       "lancets (ONE TOUCH DELICA LANCETS) 33 gauge Misc 2 lancets by Misc.(Non-Drug; Combo Route) route 2 (two) times daily. 600 each 4       Review of Systems:  Review of Systems - All 14 systems reviewed and negative, except as noted in HPI    Physical Exam:  BP (!) 106/56   Pulse 79   Temp 98.8 °F (37.1 °C) (Oral)   Resp (!) 29   Ht 5' 5" (1.651 m)   Wt 84.7 kg (186 lb 11.7 oz)   SpO2 100%   BMI 31.07 kg/m²     INS/OUTS:  I/O last 3 completed shifts:  In: 2464.2 [P.O.:660; I.V.:1404.2; IV Piggyback:400]  Out: 3545 [Urine:3545]  I/O this shift:  In: 360 [P.O.:360]  Out: -     Constitutional: nad, aao x 3  Heart: rrr, no m/r/g, wwp, no edema  Lungs: ctab, no w/r/r/c, no lb  Abdomen: s/nt/nd, +BS    Results:  Lab Results   Component Value Date     12/04/2020    K 3.2 (L) 12/04/2020     12/04/2020    CO2 22 (L) 12/04/2020     (H) 12/04/2020    CREATININE 2.1 (H) 12/04/2020    CALCIUM 8.1 (L) 12/04/2020    ANIONGAP 13 12/04/2020    ESTGFRAFRICA 36.3 (A) 12/04/2020    EGFRNONAA 31.4 (A) 12/04/2020       Lab Results   Component Value Date    CALCIUM 8.1 (L) 12/04/2020    PHOS 6.6 (H) 12/03/2020       Recent Labs   Lab 12/04/20  0401   WBC 7.54   RBC 3.00*   HGB 9.6*   HCT 26.5*      MCV 88   MCH 32.0*   MCHC 36.2*       I have personally reviewed pertinent radiological imaging and reports.    I have spent > 35 minutes providing care for this patient for the above diagnoses. These services have included chart/data/imaging review, evaluation, exam, formulation of plan, , note preparation, and discussions with staff involved in this patient's care.    Paddy Lieberman MD MPH  [facility] [address] [address]  [phone] (p)  [phone] (f)  "

## 2020-12-04 NOTE — HOSPITAL COURSE
Patient admitted with acute on chronic diarrhea and associated hypovolemia/dehydration with JEREMIAH AGMA/NAGMA  Patient was treated with iv fluids and Rifaximin/Cholestyramine and diarrhea improved  Pt underwent colonoscopy .Patient has significant PAD and associated Ischemic colitis  Also H/o R Colectomy and associated diarrhea a possibility  H/o Recent Mesenteric artery stenting a week ago before this admission  H/o PTA L SFA and PTA and stent placement of left iliac artery, aorto-bifem bypass done in 2008   Also ischemic colitis with bowel perforation s/p R colectomy at [facility] on 10/31/18.  Patient was discharged to home with meds and he will follow up with GI MD as an OP

## 2020-12-04 NOTE — PROGRESS NOTES
"Gastroenterology    CC: diarrhea    S: Pt and nursing report no diarrhea since yesterday.  Tolerating diet.  No abd pain. Some bloating.      Past Medical History:   Diagnosis Date    Bladder cancer     2 times    BPH (benign prostatic hypertrophy)     Cancer of kidney     1/2    Colon polyp     Coronary artery disease     DM (diabetes mellitus)     GERD (gastroesophageal reflux disease)     Hypertension     PAD (peripheral artery disease)     Peripheral vascular disease        Review of Systems  General ROS: negative for chills, fever or weight loss  Cardiovascular ROS: no chest pain or dyspnea on exertion  Gastrointestinal ROS: no abdominal pain, change in bowel habits, or black/ bloody stools    Physical Examination  /60 mmHg  Pulse 67  Temp(Src) 97.9 °F (36.6 °C) (Oral)  Resp 15  Ht 5' 8" (1.727 m)  Wt 74.9 kg (165 lb 2 oz)  BMI 25.11 kg/m2  SpO2 100%  LMP  (LMP Unknown)  Breastfeeding? No  General appearance: alert, cooperative, no distress  HENT: Normocephalic, atraumatic, neck symmetrical, no nasal discharge   Lungs: clear to auscultation bilaterally, no dullness to percussion bilaterally  Heart: regular rate and rhythm without rub; no displacement of the PMI   Abdomen: soft, non-tender; bowel sounds normoactive; no organomegaly  Extremities: extremities symmetric; no clubbing, cyanosis, or edema  Neurologic: Alert and oriented X 3, normal strength, normal coordination and gait    Labs:  Lab Results   Component Value Date    WBC 7.54 12/04/2020    HGB 9.6 (L) 12/04/2020    HCT 26.5 (L) 12/04/2020    MCV 88 12/04/2020     12/04/2020       BMP  Lab Results   Component Value Date     12/04/2020    K 3.4 (L) 12/04/2020     12/04/2020    CO2 25 12/04/2020     (H) 12/04/2020    CREATININE 2.6 (H) 12/04/2020    CALCIUM 8.5 (L) 12/04/2020    ANIONGAP 10 12/04/2020    ESTGFRAFRICA 28.0 (A) 12/04/2020    EGFRNONAA 24.3 (A) 12/04/2020     Lab Results   Component Value " Date    INR 1.0 01/23/2019    INR 1.1 01/12/2019    INR 1.1 08/21/2018          Assessment:   70 y.o. male with diarrhea NOS    Plan:  cscope when okay from renal  Likely cscope on Monday with MAC  Will follow up Sunday to see what renal has to say      Andi Loya  [facility]  Cell: [phone]

## 2020-12-04 NOTE — ASSESSMENT & PLAN NOTE
Recent Mesenteric artery stenting a week ago   Continue aspirin/statin/plavix      H/o PTA L SFA and PTA and stent placement of left iliac artery, aorto-bifem bypass done in 2008   Also ischemic colitis with bowel perforation s/p R colectomy at [facility] on 10/31/18.

## 2020-12-04 NOTE — PROGRESS NOTES
[facility] Medicine  Progress Note    Patient Name: Jose English  MRN: [id number]  Patient Class: IP- Inpatient   Admission Date: 12/2/2020  Length of Stay: 1 days  Attending Physician: Gerardo Truong MD  Primary Care Provider: Primary Doctor No        Subjective:     Principal Problem:JEREMIAH (acute kidney injury)        HPI:  70 year old male multiple medical problems including CAD, PAD, DM,  right-sided colectomy for ischemic colitis 10/31/2018, BPH, bladder and renal cancer, gastroesophageal reflux, hypertension, disease, chronic kidney disease, dementia  Underwent  mesenteric artery stenting a week ago at [facility] for suspected recurrent ischemic colitis.   Resented to the ED after being seen by Dr. Loya earlier today  Reports persistent nausea, vomiting, diarrhea for the past 3 weeks.  His wife reports that he had approximately 22-30 loose large diarrhea stool per day  Denies fever but have been having chills  Lost 28 lb since October this year  Found to be hyportensive with systolic blood pressures in the 80s and near syncopal episode at gastroenterologist's office  Denies shortness of breath or chest pain  Reports epigastric discomfort with retching/vomiting and moderate to severe diffuse abdominal pain  Has not been urinating much    In the ED  Afebrile  Hypertensive  WBC 11.98, hemoglobin 12.8, hematocrit 37.3, platelets 317  , creatinine 6.2, CO2 14, anion gap 20, potassium 3.5  Lactic acid 1.6             Overview/Hospital Course:  12/03  Still having significant diarrhea  JEREMIAH getting better  No new issues    Interval History:     Review of Systems   Constitutional: Negative for activity change and appetite change.   HENT: Negative for congestion and dental problem.    Eyes: Negative for discharge and itching.   Respiratory: Negative for shortness of breath.    Cardiovascular: Negative for chest pain.   Gastrointestinal: Negative for abdominal distention and  abdominal pain.   Endocrine: Negative for cold intolerance.   Genitourinary: Negative for difficulty urinating and dysuria.   Musculoskeletal: Negative for arthralgias and back pain.   Skin: Negative for color change.   Neurological: Positive for weakness. Negative for dizziness and facial asymmetry.   Hematological: Negative for adenopathy.   Psychiatric/Behavioral: Negative for agitation and behavioral problems.     Objective:     Vital Signs (Most Recent):  Temp: 98.8 °F (37.1 °C) (12/03/20 1700)  Pulse: 67 (12/03/20 1800)  Resp: 17 (12/03/20 1800)  BP: (!) 109/49 (12/03/20 1800)  SpO2: 100 % (12/03/20 1800) Vital Signs (24h Range):  Temp:  [97.3 °F (36.3 °C)-98.8 °F (37.1 °C)] 98.8 °F (37.1 °C)  Pulse:  [55-75] 67  Resp:  [11-26] 17  SpO2:  [96 %-100 %] 100 %  BP: ()/(49-78) 109/49     Weight: 84.7 kg (186 lb 11.7 oz)  Body mass index is 31.07 kg/m².    Intake/Output Summary (Last 24 hours) at 12/3/2020 1927  Last data filed at 12/3/2020 1800  Gross per 24 hour   Intake 2104.17 ml   Output 1320 ml   Net 784.17 ml      Physical Exam  Vitals signs and nursing note reviewed.   Constitutional:       Appearance: He is well-developed.   HENT:      Head: Normocephalic.      Right Ear: External ear normal.      Left Ear: External ear normal.      Nose: Nose normal.      Mouth/Throat:      Mouth: Mucous membranes are moist.   Eyes:      Conjunctiva/sclera: Conjunctivae normal.   Neck:      Musculoskeletal: Full passive range of motion without pain and normal range of motion.   Cardiovascular:      Rate and Rhythm: Normal rate.   Pulmonary:      Effort: Pulmonary effort is normal.   Abdominal:      Palpations: Abdomen is soft.   Musculoskeletal: Normal range of motion.   Skin:     General: Skin is warm.   Neurological:      Mental Status: He is alert and oriented to person, place, and time.   Psychiatric:         Behavior: Behavior normal.         Significant Labs:   CBC:   Recent Labs   Lab 12/02/20  1617  12/03/20  0309   WBC 11.93 10.61   HGB 12.8* 10.6*   HCT 37.3* 31.9*    234     CMP:   Recent Labs   Lab 12/02/20  1617 12/03/20  0309 12/03/20  1041 12/03/20  1559    136 138  135* 135*   K 3.5 3.2* 2.7*  2.8* 2.9*    108 107  104 102   CO2 14* 9* 14*  14* 17*   * 52* 132*  127* 122*   * 143* 142*  142* 138*   CREATININE 6.2* 5.4* 4.5*  4.4* 3.8*   CALCIUM 8.9 8.2* 7.9*  7.8* 7.7*   PROT 7.6  --   --   --    ALBUMIN 4.2  --  3.0*  --    BILITOT 0.7  --   --   --    ALKPHOS 66  --   --   --    AST 11  --   --   --    ALT 18  --   --   --    ANIONGAP 20* 19* 17*  17* 16   EGFRNONAA 8.5* 10.0* 12.5*  12.8* 15.3*       Significant Imaging: I have reviewed all pertinent imaging results/findings within the past 24 hours.      Assessment/Plan:      * JEREMIAH (acute kidney injury)  Due to severe dehydration from chronic diarrhoea, hypotension, and contrast induced nephropathy in the background of CKD 2  ATN/JEREMIAH  Continue iv fluids        Diarrhea  This is a chronic ongoing issue for pt ,for years  He has significant PAD and associated Ischemic colitis  Also H/o R Colectomy and associated diarrhea a possibility  Added Cholestyramine . Will also add rifaximin(Recieved this before)  No need for any other  abx      PAD (peripheral artery disease)  Recent Mesenteric artery stenting a week ago   Continue aspirin/statin/plavix      H/o PTA L SFA and PTA and stent placement of left iliac artery, aorto-bifem bypass done in 2008   Also ischemic colitis with bowel perforation s/p R colectomy at [facility] on 10/31/18.    Metabolic acidosis  Combination of AGMA/NAGMA  Continue iv fluids with HCO3-      Obesity  Need Therapeutic life style measures  Recently lose significant amount of weight      Hypotension  Due to severe dehydration  Continue iv fluids      Hypokalemia  Monitor and replete      Dementia  Stable      Hypomagnesemia  Monitor and replete      Diabetes mellitus with peripheral vascular  disease  Hemoglobin A1c 7.2 about 5 months ago  Monitor blood glucose  Low-dose sliding scale insulin  Hold metformin due to acute kidney injury  Hold glipizide also      Medically noncompliant  Per Chart review      Cancer of kidney, clear cell 2008.  H/o Partial L Nephrectomy      Bladder cancer, 2008.  X 2  Stable issue now        VTE Risk Mitigation (From admission, onward)         Ordered     IP VTE HIGH RISK PATIENT  Once      12/02/20 1914     Place sequential compression device  Until discontinued      12/02/20 1914                Discharge Planning   UMANG:      Code Status: Full Code   Is the patient medically ready for discharge?:     Reason for patient still in hospital (select all that apply): Treatment  Discharge Plan A: Home with family                  Gerardo Truong MD  Department of Hospital Medicine   [facility]

## 2020-12-04 NOTE — ASSESSMENT & PLAN NOTE
This is a chronic ongoing issue for pt ,for years  He has significant PAD and associated Ischemic colitis  Also H/o R Colectomy and associated diarrhea a possibility  Added Cholestyramine . Will also add rifaximin(Recieved this before)  No need for any other  abx

## 2020-12-04 NOTE — PLAN OF CARE
Problem: Oral Intake Inadequate  Goal: Improved Oral Intake  12/4/2020 1306 by Tami Finley RD  Outcome: Ongoing, Progressing  12/4/2020 1302 by Tami Finley RD  Outcome: Ongoing, Progressing  Intervention: Promote and Optimize Oral Intake  12/4/2020 1306 by Tami Finley RD  Flowsheets (Taken 12/4/2020 1306)  Oral Nutrition Promotion: calorie dense liquids provided  12/4/2020 1302 by Tami Finley RD  Flowsheets (Taken 12/4/2020 1302)  Oral Nutrition Promotion: calorie dense liquids provided

## 2020-12-05 LAB
ANION GAP SERPL CALC-SCNC: 11 MMOL/L (ref 8–16)
ANION GAP SERPL CALC-SCNC: 9 MMOL/L (ref 8–16)
BASOPHILS # BLD AUTO: 0.04 K/UL (ref 0–0.2)
BASOPHILS NFR BLD: 0.6 % (ref 0–1.9)
BUN SERPL-MCNC: 67 MG/DL (ref 8–23)
BUN SERPL-MCNC: 84 MG/DL (ref 8–23)
CALCIUM SERPL-MCNC: 8.5 MG/DL (ref 8.7–10.5)
CALCIUM SERPL-MCNC: 8.6 MG/DL (ref 8.7–10.5)
CHLORIDE SERPL-SCNC: 102 MMOL/L (ref 95–110)
CHLORIDE SERPL-SCNC: 104 MMOL/L (ref 95–110)
CO2 SERPL-SCNC: 26 MMOL/L (ref 23–29)
CO2 SERPL-SCNC: 29 MMOL/L (ref 23–29)
CREAT SERPL-MCNC: 1.5 MG/DL (ref 0.5–1.4)
CREAT SERPL-MCNC: 1.6 MG/DL (ref 0.5–1.4)
DIFFERENTIAL METHOD: ABNORMAL
EOSINOPHIL # BLD AUTO: 0.3 K/UL (ref 0–0.5)
EOSINOPHIL NFR BLD: 4.4 % (ref 0–8)
ERYTHROCYTE [DISTWIDTH] IN BLOOD BY AUTOMATED COUNT: 13.6 % (ref 11.5–14.5)
EST. GFR  (AFRICAN AMERICAN): 50.4 ML/MIN/1.73 M^2
EST. GFR  (AFRICAN AMERICAN): 54.5 ML/MIN/1.73 M^2
EST. GFR  (NON AFRICAN AMERICAN): 43.6 ML/MIN/1.73 M^2
EST. GFR  (NON AFRICAN AMERICAN): 47.2 ML/MIN/1.73 M^2
GLUCOSE SERPL-MCNC: 132 MG/DL (ref 70–110)
GLUCOSE SERPL-MCNC: 140 MG/DL (ref 70–110)
GLUCOSE SERPL-MCNC: 143 MG/DL (ref 70–110)
GLUCOSE SERPL-MCNC: 144 MG/DL (ref 70–110)
GLUCOSE SERPL-MCNC: 150 MG/DL (ref 70–110)
GLUCOSE SERPL-MCNC: 98 MG/DL (ref 70–110)
HCT VFR BLD AUTO: 27.6 % (ref 40–54)
HGB BLD-MCNC: 9.4 G/DL (ref 14–18)
IMM GRANULOCYTES # BLD AUTO: 0.04 K/UL (ref 0–0.04)
IMM GRANULOCYTES NFR BLD AUTO: 0.6 % (ref 0–0.5)
LYMPHOCYTES # BLD AUTO: 1.7 K/UL (ref 1–4.8)
LYMPHOCYTES NFR BLD: 25.2 % (ref 18–48)
MAGNESIUM SERPL-MCNC: 1.4 MG/DL (ref 1.6–2.6)
MCH RBC QN AUTO: 31.2 PG (ref 27–31)
MCHC RBC AUTO-ENTMCNC: 34.1 G/DL (ref 32–36)
MCV RBC AUTO: 92 FL (ref 82–98)
MONOCYTES # BLD AUTO: 0.9 K/UL (ref 0.3–1)
MONOCYTES NFR BLD: 13.9 % (ref 4–15)
NEUTROPHILS # BLD AUTO: 3.8 K/UL (ref 1.8–7.7)
NEUTROPHILS NFR BLD: 55.3 % (ref 38–73)
NRBC BLD-RTO: 0 /100 WBC
PLATELET # BLD AUTO: 188 K/UL (ref 150–350)
PMV BLD AUTO: 10.5 FL (ref 9.2–12.9)
POTASSIUM SERPL-SCNC: 3.8 MMOL/L (ref 3.5–5.1)
POTASSIUM SERPL-SCNC: 4 MMOL/L (ref 3.5–5.1)
RBC # BLD AUTO: 3.01 M/UL (ref 4.6–6.2)
SODIUM SERPL-SCNC: 140 MMOL/L (ref 136–145)
SODIUM SERPL-SCNC: 141 MMOL/L (ref 136–145)
STOOL CULTURE: NORMAL
WBC # BLD AUTO: 6.78 K/UL (ref 3.9–12.7)

## 2020-12-05 PROCEDURE — 25000003 PHARM REV CODE 250: Performed by: INTERNAL MEDICINE

## 2020-12-05 PROCEDURE — 63600175 PHARM REV CODE 636 W HCPCS: Performed by: INTERNAL MEDICINE

## 2020-12-05 PROCEDURE — 80048 BASIC METABOLIC PNL TOTAL CA: CPT | Mod: 91

## 2020-12-05 PROCEDURE — 25000003 PHARM REV CODE 250

## 2020-12-05 PROCEDURE — 85025 COMPLETE CBC W/AUTO DIFF WBC: CPT

## 2020-12-05 PROCEDURE — 36415 COLL VENOUS BLD VENIPUNCTURE: CPT

## 2020-12-05 PROCEDURE — 80048 BASIC METABOLIC PNL TOTAL CA: CPT

## 2020-12-05 PROCEDURE — 12000002 HC ACUTE/MED SURGE SEMI-PRIVATE ROOM

## 2020-12-05 PROCEDURE — 83735 ASSAY OF MAGNESIUM: CPT

## 2020-12-05 PROCEDURE — 25000003 PHARM REV CODE 250: Performed by: NURSE PRACTITIONER

## 2020-12-05 RX ORDER — CHOLECALCIFEROL (VITAMIN D3) 25 MCG
2000 TABLET ORAL DAILY
Status: DISCONTINUED | OUTPATIENT
Start: 2020-12-05 | End: 2020-12-07 | Stop reason: HOSPADM

## 2020-12-05 RX ORDER — MAGNESIUM SULFATE HEPTAHYDRATE 40 MG/ML
2 INJECTION, SOLUTION INTRAVENOUS ONCE
Status: COMPLETED | OUTPATIENT
Start: 2020-12-05 | End: 2020-12-05

## 2020-12-05 RX ORDER — LANOLIN ALCOHOL/MO/W.PET/CERES
400 CREAM (GRAM) TOPICAL DAILY
Status: DISCONTINUED | OUTPATIENT
Start: 2020-12-06 | End: 2020-12-06

## 2020-12-05 RX ORDER — SODIUM BICARBONATE 650 MG/1
650 TABLET ORAL DAILY
Status: DISCONTINUED | OUTPATIENT
Start: 2020-12-06 | End: 2020-12-07 | Stop reason: HOSPADM

## 2020-12-05 RX ADMIN — POTASSIUM CHLORIDE 20 MEQ: 20 TABLET, EXTENDED RELEASE ORAL at 09:12

## 2020-12-05 RX ADMIN — CLOPIDOGREL BISULFATE 75 MG: 75 TABLET, FILM COATED ORAL at 09:12

## 2020-12-05 RX ADMIN — DICYCLOMINE HYDROCHLORIDE 20 MG: 10 CAPSULE ORAL at 12:12

## 2020-12-05 RX ADMIN — RIFAXIMIN 550 MG: 550 TABLET ORAL at 09:12

## 2020-12-05 RX ADMIN — MAGNESIUM SULFATE 2 G: 2 INJECTION INTRAVENOUS at 01:12

## 2020-12-05 RX ADMIN — SODIUM BICARBONATE 650 MG TABLET 650 MG: at 09:12

## 2020-12-05 RX ADMIN — MUPIROCIN: 20 OINTMENT TOPICAL at 09:12

## 2020-12-05 RX ADMIN — Medication 2000 UNITS: at 03:12

## 2020-12-05 RX ADMIN — CHOLESTYRAMINE 4 G: 4 POWDER, FOR SUSPENSION ORAL at 03:12

## 2020-12-05 RX ADMIN — CHOLESTYRAMINE 4 G: 4 POWDER, FOR SUSPENSION ORAL at 09:12

## 2020-12-05 RX ADMIN — DICYCLOMINE HYDROCHLORIDE 20 MG: 10 CAPSULE ORAL at 05:12

## 2020-12-05 RX ADMIN — DICYCLOMINE HYDROCHLORIDE 20 MG: 10 CAPSULE ORAL at 01:12

## 2020-12-05 RX ADMIN — DONEPEZIL HYDROCHLORIDE 10 MG: 5 TABLET, FILM COATED ORAL at 09:12

## 2020-12-05 RX ADMIN — ASPIRIN 81 MG: 81 TABLET, DELAYED RELEASE ORAL at 09:12

## 2020-12-05 RX ADMIN — CHLORHEXIDINE GLUCONATE 15 ML: 1.2 RINSE ORAL at 09:12

## 2020-12-05 RX ADMIN — DICYCLOMINE HYDROCHLORIDE 20 MG: 10 CAPSULE ORAL at 11:12

## 2020-12-05 RX ADMIN — PANTOPRAZOLE SODIUM 40 MG: 40 TABLET, DELAYED RELEASE ORAL at 09:12

## 2020-12-05 RX ADMIN — GABAPENTIN 300 MG: 300 CAPSULE ORAL at 09:12

## 2020-12-05 RX ADMIN — TRAZODONE HYDROCHLORIDE 50 MG: 50 TABLET ORAL at 09:12

## 2020-12-05 RX ADMIN — ATORVASTATIN CALCIUM 80 MG: 40 TABLET, FILM COATED ORAL at 09:12

## 2020-12-05 RX ADMIN — DICYCLOMINE HYDROCHLORIDE 20 MG: 10 CAPSULE ORAL at 07:12

## 2020-12-05 NOTE — PROGRESS NOTES
[facility] Medicine  Progress Note    Patient Name: Jose English  MRN: [id number]  Patient Class: IP- Inpatient   Admission Date: 12/2/2020  Length of Stay: 3 days  Attending Physician: Gerardo Truong MD  Primary Care Provider: Primary Doctor No        Subjective:     Principal Problem:JEREMIAH (acute kidney injury)        HPI:  70 year old male multiple medical problems including CAD, PAD, DM,  right-sided colectomy for ischemic colitis 10/31/2018, BPH, bladder and renal cancer, gastroesophageal reflux, hypertension, disease, chronic kidney disease, dementia  Underwent  mesenteric artery stenting a week ago at [facility] for suspected recurrent ischemic colitis.   Resented to the ED after being seen by Dr. Loya earlier today  Reports persistent nausea, vomiting, diarrhea for the past 3 weeks.  His wife reports that he had approximately 22-30 loose large diarrhea stool per day  Denies fever but have been having chills  Lost 28 lb since October this year  Found to be hyportensive with systolic blood pressures in the 80s and near syncopal episode at gastroenterologist's office  Denies shortness of breath or chest pain  Reports epigastric discomfort with retching/vomiting and moderate to severe diffuse abdominal pain  Has not been urinating much    In the ED  Afebrile  Hypertensive  WBC 11.98, hemoglobin 12.8, hematocrit 37.3, platelets 317  , creatinine 6.2, CO2 14, anion gap 20, potassium 3.5  Lactic acid 1.6             Overview/Hospital Course:  12/03  Still having significant diarrhea  JEREMIAH getting better  No new issues    12/04  Pt Much better  Diarrhea resolved    12/05  Much improved  Diarrhea almost resolved  GI planning colonoscopy on Monday ?    Interval History:     Review of Systems   Constitutional: Negative for activity change and appetite change.   HENT: Negative for congestion and dental problem.    Eyes: Negative for discharge and itching.   Respiratory: Negative  for shortness of breath.    Cardiovascular: Negative for chest pain.   Gastrointestinal: Negative for abdominal distention and abdominal pain.   Endocrine: Negative for cold intolerance.   Genitourinary: Negative for difficulty urinating and dysuria.   Musculoskeletal: Negative for arthralgias and back pain.   Skin: Negative for color change.   Neurological: Negative for dizziness and facial asymmetry.   Hematological: Negative for adenopathy.   Psychiatric/Behavioral: Negative for agitation and behavioral problems.     Objective:     Vital Signs (Most Recent):  Temp: 97.5 °F (36.4 °C) (12/05/20 1100)  Pulse: 74 (12/05/20 1100)  Resp: 17 (12/05/20 1100)  BP: 130/64 (12/05/20 1100)  SpO2: 97 % (12/05/20 1100) Vital Signs (24h Range):  Temp:  [97.3 °F (36.3 °C)-98.7 °F (37.1 °C)] 97.5 °F (36.4 °C)  Pulse:  [69-94] 74  Resp:  [15-20] 17  SpO2:  [97 %-99 %] 97 %  BP: ()/(52-76) 130/64     Weight: 84.7 kg (186 lb 11.7 oz)  Body mass index is 31.07 kg/m².    Intake/Output Summary (Last 24 hours) at 12/5/2020 1333  Last data filed at 12/5/2020 0557  Gross per 24 hour   Intake --   Output 2175 ml   Net -2175 ml      Physical Exam  Vitals signs and nursing note reviewed.   Constitutional:       Appearance: He is well-developed.   HENT:      Head: Normocephalic.      Right Ear: External ear normal.      Left Ear: External ear normal.      Nose: Nose normal.      Mouth/Throat:      Mouth: Mucous membranes are moist.   Eyes:      Conjunctiva/sclera: Conjunctivae normal.   Neck:      Musculoskeletal: Full passive range of motion without pain and normal range of motion.   Cardiovascular:      Rate and Rhythm: Normal rate.   Pulmonary:      Effort: Pulmonary effort is normal.   Abdominal:      General: There is no distension.      Palpations: Abdomen is soft.   Musculoskeletal: Normal range of motion.   Skin:     General: Skin is warm.   Neurological:      Mental Status: He is alert and oriented to person, place, and time.    Psychiatric:         Behavior: Behavior normal.         Significant Labs:   CBC:   Recent Labs   Lab 12/04/20  0401 12/05/20  0555   WBC 7.54 6.78   HGB 9.6* 9.4*   HCT 26.5* 27.6*    188     CMP:   Recent Labs   Lab 12/03/20 2000 12/04/20  1627 12/05/20  0009 12/05/20  0555      < > 137 141 140   K 3.1*   < > 3.8 4.0 3.8      < > 100 104 102   CO2 18*   < > 25 26 29   *   < > 175* 132* 98   *   < > 86* 84* 67*   CREATININE 3.5*   < > 1.8* 1.6* 1.5*   CALCIUM 8.4*   < > 8.6* 8.6* 8.5*   ALBUMIN 3.1*  --   --   --   --    ANIONGAP 15   < > 12 11 9   EGFRNONAA 16.9*   < > 37.8* 43.6* 47.2*    < > = values in this interval not displayed.       Significant Imaging: I have reviewed all pertinent imaging results/findings within the past 24 hours.      Assessment/Plan:      * JEREMIAH (acute kidney injury)  Due to severe dehydration from chronic diarrhoea, hypotension, and contrast induced nephropathy in the background of CKD 2  ATN/JEREMIAH  Much better after received iv fluids  Renal Function back to baseline        Diarrhea  This is a chronic ongoing issue for pt ,for years  He has significant PAD and associated Ischemic colitis  Also H/o R Colectomy and associated diarrhea a possibility  Added Cholestyramine and rifaximin(Recieved this before)  Diarrhea almost resolved  Colonoscopy on 12/07 ?      PAD (peripheral artery disease)  Recent Mesenteric artery stenting a week ago   Continue aspirin/statin/plavix  H/o PTA L SFA and PTA and stent placement of left iliac artery, aorto-bifem bypass done in 2008   Also ischemic colitis with bowel perforation s/p R colectomy at [facility] on 10/31/18.    Metabolic acidosis  Combination of AGMA/NAGMA  Received iv fluids with HCO3-  Much better. HCO3- gtt D/C ed      Obesity  Need Therapeutic life style measures  Recently lost  significant amount of weight      Hypotension  Due to severe dehydration  Resolved      Hypokalemia  Monitor and  replete      Dementia  Stable      Hypomagnesemia  Monitor and replete      Diabetes mellitus with peripheral vascular disease  Hemoglobin A1c 7.2 about 5 months ago  Monitor blood glucose  Low-dose sliding scale insulin      Medically noncompliant  Per Chart review      Cancer of kidney, clear cell 2008.  H/o Partial L Nephrectomy      Bladder cancer, 2008.  X 2  Stable issue now        VTE Risk Mitigation (From admission, onward)         Ordered     IP VTE HIGH RISK PATIENT  Once      12/02/20 1914     Place sequential compression device  Until discontinued      12/02/20 1914                Discharge Planning   UMANG:      Code Status: Full Code   Is the patient medically ready for discharge?:     Reason for patient still in hospital (select all that apply): Treatment  Discharge Plan A: Home with family                  Gerardo Truong MD  Department of Hospital Medicine   [facility]

## 2020-12-05 NOTE — PROGRESS NOTES
01/13/18 0201   Vital Signs   Temp 97.2  F (36.2  C)   Temp src Oral   Resp 18   Pulse 65   Heart Rate 53   Pulse/Heart Rate Source Monitor   BP (!) 82/45   BP Location Left arm   Oxygen Therapy   SpO2 95 %   O2 Device None (Room air)   Pain/Comfort   Patient Currently in Pain no      INPATIENT NEPHROLOGY PROGRESS NOTE   [facility] NEPHROLOGY    Patient Name: Jose English  Date: 12/05/2020    Reason for consultation: JEREMIAH    Chief Complaint:   Chief Complaint   Patient presents with    Abdominal Pain     X SEVERAL WEEKS, SENT FROM PCP FOR EVAL OF LOW BP    Vomiting    Diarrhea       History of Present Illness:  70 year old male multiple medical problems including CAD, PAD, DM,  right-sided colectomy for ischemic colitis 10/31/2018, BPH, bladder and renal cancer, gastroesophageal reflux, hypertension, disease, chronic kidney disease, dementia who underwent  mesenteric artery stenting a week ago at [facility] for suspected recurrent ischemic colitis.  Represented to the ED after being seen by Dr. Loya earlier today  Reports persistent nausea, vomiting, diarrhea for the past 3 weeks. His wife reports that he had approximately 22-30 loose large diarrhea stool per day. Denies fever but has been having chills. Lost 28 lb since October this year. Found to be hyportensive with systolic blood pressures in the 80s and near syncopal episode at gastroenterologist's office. Denies shortness of breath or chest pain. Reports epigastric discomfort with retching/vomiting and moderate to severe diffuse abdominal pain. Has not been urinating much. We are consulted for JEREMIAH.    Home meds: metoprolol, norvasc, valsartan, HCTZ    Renal u/s:  Right kidney measures 10.9 cm in length. No cystic or solid right renal lesion. No hydronephrosis.  IVC is unremarkable. Aorta was not visualized due to bowel gas. Left kidney measures 8.3 cm in length. There is an echogenic focus at the lower pole the left kidney, corresponding to a rim calcified lesion described on the reference CT.  This lesion is not well evaluated sonographically due to calcification. There is no  hydronephrosis of the left kidney. Urinary bladder is incompletely distended and otherwise unremarkable.     Interval History:  12/3- hypotensive,  on RA, with cash; had BM this AM, denies cp/dyspnea/edema, UA + hyaline casts, urine Na < 10  12/4- BP is better, on RA, UOP 3.4L, feels much better, eating well- still with some lower abd pain, no cp/dyspnea/edema  12/5- isolated hypotension this AM, on RA, UOP 3L    Plan of Care:    1. Severe JEREMIAH secondary to intravascular volume depletion in setting of profound GI losses (supported by hypotension, urine studies); baseline serum Cr 1.2 (CKD II)  - renal function is improving steadily after initial volume repletion- anticipate full recovery  - he is nonoliguric  - change to daily renal panel   - hold ALL BP meds/diuretics   - no nsaids or IV contrast    2. Hypokalemia- 2/2 GI losses  - improved- start standing repletion KCl 20mEq daily     3. Metabolic acidosis (combined AG 2/2 JEREMIAH and NAG 2/2 diarrhea)  - remains resolved- now alkalotic  - change PO bicarb 650mg to daily (want some on board to maintain CO2 ~ 25 given chronic diarrhea issues)    4. HypoMg/Hypocalcemia  - ordered 2g IV Mg today and will start PO Mg 400mg daily tomorrow  - he has mild vitamin D deficiency- start cholecalciferol 2000 units daily today    5. Anemia of chronic disease  - stable- no acute transfusion needs    6. Baseline HTN  - hold all meds until BP > 150/90 persistently- will then decide what is safe to resume (can start with metoprolol, norvasc; keep ARB and HCTZ on hold at discharge- these aren't ideal meds in a patient prone to volume depletion from chronic GI issues)  - euvolemic  - no acute diuresis needs     7. Colitis  - plan for colonscopy Monday    Updated family.     Thank you for allowing us to participate in this patient's care. We will continue to follow.    Vital Signs:  Temp Readings from Last 3 Encounters:   12/05/20 97.6 °F (36.4 °C)   02/20/19 98.2 °F (36.8 °C)   02/06/19 98.1 °F (36.7 °C)       Pulse Readings from Last 3 Encounters:   12/05/20 69   02/20/19 65   02/07/19 71       BP Readings from Last 3 Encounters:    12/05/20 123/76   02/20/19 (!) 140/66   02/07/19 108/62       Weight:  Wt Readings from Last 3 Encounters:   12/03/20 84.7 kg (186 lb 11.7 oz)   02/20/19 72.6 kg (160 lb)   02/07/19 72.2 kg (159 lb 2.8 oz)     Medications:  Scheduled Meds:   aspirin  81 mg Oral Daily    atorvastatin  80 mg Oral Daily    chlorhexidine  15 mL Mouth/Throat BID    cholestyramine-aspartame  4 g Oral TID    clopidogreL  75 mg Oral QHS    dicyclomine  20 mg Oral Q6H    donepeziL  10 mg Oral QHS    gabapentin  300 mg Oral QHS    mupirocin   Nasal BID    pantoprazole  40 mg Oral BID    potassium chloride  20 mEq Oral Daily    rifAXImin  550 mg Oral BID    sodium bicarbonate  650 mg Oral BID    traZODone  50 mg Oral QHS     Continuous Infusions:    PRN Meds:.acetaminophen, acetaminophen, calcium chloride IVPB, calcium chloride IVPB, calcium chloride IVPB, dextrose 50%, dextrose 50%, glucagon (human recombinant), glucose, glucose, HYDROcodone-acetaminophen, insulin aspart U-100, magnesium oxide, magnesium sulfate IVPB, magnesium sulfate IVPB, magnesium sulfate IVPB, magnesium sulfate IVPB, ondansetron, potassium chloride in water, potassium chloride in water, potassium chloride in water, potassium chloride in water, potassium chloride, potassium chloride, potassium chloride, potassium chloride, promethazine (PHENERGAN) IVPB, sodium chloride 0.9%, sodium phosphate IVPB, sodium phosphate IVPB, sodium phosphate IVPB, sodium phosphate IVPB, sodium phosphate IVPB  No current facility-administered medications on file prior to encounter.      Current Outpatient Medications on File Prior to Encounter   Medication Sig Dispense Refill    allopurinol (ZYLOPRIM) 100 MG tablet Take 100 mg by mouth 2 (two) times daily.      amlodipine (NORVASC) 10 MG tablet TAKE 1 TABLET ONE TIME DAILY 90 tablet 3    aspirin (ECOTRIN) 81 MG EC tablet Take 81 mg by mouth once daily.      atorvastatin (LIPITOR) 80 MG tablet Take 80 mg by mouth once daily.       b complex vitamins tablet Take 1 tablet by mouth once daily.      clopidogrel (PLAVIX) 75 mg tablet TAKE 1 TABLET EVERY EVENING 90 tablet 3    cyanocobalamin (VITAMIN B-12) 1000 MCG tablet Take 1,000 mcg by mouth.      dicyclomine (BENTYL) 20 mg tablet Take 20 mg by mouth every 6 (six) hours.      diphenoxylate-atropine 2.5-0.025 mg (LOMOTIL) 2.5-0.025 mg per tablet diphenoxylate-atropine 2.5 mg-0.025 mg tablet      donepezil (ARICEPT) 5 MG tablet Take 10 mg by mouth every evening.      gabapentin (NEURONTIN) 300 MG capsule Take 300 mg by mouth every evening.      glipiZIDE (GLUCOTROL) 5 MG TR24 glipizide ER 5 mg tablet, extended release 24 hr   TK 1 T PO BID      hydrochlorothiazide (HYDRODIURIL) 25 MG tablet TAKE 1 TABLET EVERY MORNING 90 tablet 3    loperamide (IMODIUM) 2 mg capsule Take 2 mg by mouth 4 (four) times daily as needed for Diarrhea.      metoprolol succinate (TOPROL-XL) 50 MG 24 hr tablet metoprolol succinate ER 50 mg tablet,extended release 24 hr   TK 2 TS PO QD      metoprolol tartrate (LOPRESSOR) 50 MG tablet TAKE 1 TABLET TWICE DAILY 180 tablet 3    mv,Ca,min-iron-FA-lycopene 8 mg iron- 200 mcg-600 mcg Tab Take 1 capsule by mouth once daily.      ondansetron (ZOFRAN) 4 MG tablet Take 4 mg by mouth every 8 (eight) hours as needed for Nausea.      pantoprazole (PROTONIX) 40 MG tablet Take 40 mg by mouth once daily.      temazepam (RESTORIL) 30 mg capsule temazepam 30 mg capsule      valsartan (DIOVAN) 320 MG tablet Take 320 mg by mouth once daily.      ACCU-CHEK CEASAR CONTROL SOLN Soln USE ONE TIME DAILY 1 each 3    ACCU-CHEK CEASAR PLUS TEST STRP Strp TEST TWO TIMES DAILY 200 strip 3    ACCU-CHEK SOFTCLIX LANCETS Misc TEST TWO TIMES DAILY 200 each 3    BD ALCOHOL SWABS PadM TEST TWO TIMES DAILY 200 each 4    bismuth subsalicylate (KAOPECTATE, BISMUTH SUBSALICY,) 262 mg Tab Take by mouth.      cholestyramine (QUESTRAN) 4 gram packet Take 1 packet (4 g total) by mouth 2  "(two) times daily. 180 packet 3    fish oil-omega-3 fatty acids 300-1,000 mg capsule Take 2 g by mouth once daily.      flu vacc [id number],65yr up,PF (FLUZONE HIGH-DOSE 2019-20, PF,) 180 mcg/0.5 mL Syrg Fluzone High-Dose 2019-20 (PF) 180 mcg/0.5 mL intramuscular syringe      lancets (ONE TOUCH DELICA LANCETS) 33 gauge Misc 2 lancets by Misc.(Non-Drug; Combo Route) route 2 (two) times daily. 600 each 4       Review of Systems:  Review of Systems - All 14 systems reviewed and negative, except as noted in HPI    Physical Exam:  /76   Pulse 69   Temp 97.6 °F (36.4 °C)   Resp 17   Ht 5' 5" (1.651 m)   Wt 84.7 kg (186 lb 11.7 oz)   SpO2 99%   BMI 31.07 kg/m²     INS/OUTS:  I/O last 3 completed shifts:  In: 1805 [P.O.:1180; I.V.:625]  Out: 5200 [Urine:5200]  No intake/output data recorded.    Constitutional: nad, aao x 3  Heart: rrr, no m/r/g, wwp, no edema  Lungs: ctab, no w/r/r/c, no lb  Abdomen: s/nt/nd, +BS    Results:  Lab Results   Component Value Date     12/05/2020    K 3.8 12/05/2020     12/05/2020    CO2 29 12/05/2020    BUN 67 (H) 12/05/2020    CREATININE 1.5 (H) 12/05/2020    CALCIUM 8.5 (L) 12/05/2020    ANIONGAP 9 12/05/2020    ESTGFRAFRICA 54.5 (A) 12/05/2020    EGFRNONAA 47.2 (A) 12/05/2020       Lab Results   Component Value Date    CALCIUM 8.5 (L) 12/05/2020    PHOS 6.6 (H) 12/03/2020       Recent Labs   Lab 12/05/20  0555   WBC 6.78   RBC 3.01*   HGB 9.4*   HCT 27.6*      MCV 92   MCH 31.2*   MCHC 34.1       I have personally reviewed pertinent radiological imaging and reports.    I have spent > 35 minutes providing care for this patient for the above diagnoses. These services have included chart/data/imaging review, evaluation, exam, formulation of plan, , note preparation, and discussions with staff involved in this patient's care.    Paddy Lieberman MD MPH  [facility]  [address] [place] [id number]  [phone] (p)  [phone] " (f)

## 2020-12-05 NOTE — ASSESSMENT & PLAN NOTE
This is a chronic ongoing issue for pt ,for years  He has significant PAD and associated Ischemic colitis  Also H/o R Colectomy and associated diarrhea a possibility  Added Cholestyramine and rifaximin(Recieved this before)  Diarrhea almost resolved  Colonoscopy on 12/07 ?

## 2020-12-05 NOTE — SUBJECTIVE & OBJECTIVE
Interval History:     Review of Systems   Constitutional: Negative for activity change and appetite change.   HENT: Negative for congestion and dental problem.    Eyes: Negative for discharge and itching.   Respiratory: Negative for shortness of breath.    Cardiovascular: Negative for chest pain.   Gastrointestinal: Negative for abdominal distention and abdominal pain.   Endocrine: Negative for cold intolerance.   Genitourinary: Negative for difficulty urinating and dysuria.   Musculoskeletal: Negative for arthralgias and back pain.   Skin: Negative for color change.   Neurological: Negative for dizziness and facial asymmetry.   Hematological: Negative for adenopathy.   Psychiatric/Behavioral: Negative for agitation and behavioral problems.     Objective:     Vital Signs (Most Recent):  Temp: 97.5 °F (36.4 °C) (12/05/20 1100)  Pulse: 74 (12/05/20 1100)  Resp: 17 (12/05/20 1100)  BP: 130/64 (12/05/20 1100)  SpO2: 97 % (12/05/20 1100) Vital Signs (24h Range):  Temp:  [97.3 °F (36.3 °C)-98.7 °F (37.1 °C)] 97.5 °F (36.4 °C)  Pulse:  [69-94] 74  Resp:  [15-20] 17  SpO2:  [97 %-99 %] 97 %  BP: ()/(52-76) 130/64     Weight: 84.7 kg (186 lb 11.7 oz)  Body mass index is 31.07 kg/m².    Intake/Output Summary (Last 24 hours) at 12/5/2020 1333  Last data filed at 12/5/2020 0557  Gross per 24 hour   Intake --   Output 2175 ml   Net -2175 ml      Physical Exam  Vitals signs and nursing note reviewed.   Constitutional:       Appearance: He is well-developed.   HENT:      Head: Normocephalic.      Right Ear: External ear normal.      Left Ear: External ear normal.      Nose: Nose normal.      Mouth/Throat:      Mouth: Mucous membranes are moist.   Eyes:      Conjunctiva/sclera: Conjunctivae normal.   Neck:      Musculoskeletal: Full passive range of motion without pain and normal range of motion.   Cardiovascular:      Rate and Rhythm: Normal rate.   Pulmonary:      Effort: Pulmonary effort is normal.   Abdominal:       General: There is no distension.      Palpations: Abdomen is soft.   Musculoskeletal: Normal range of motion.   Skin:     General: Skin is warm.   Neurological:      Mental Status: He is alert and oriented to person, place, and time.   Psychiatric:         Behavior: Behavior normal.         Significant Labs:   CBC:   Recent Labs   Lab 12/04/20  0401 12/05/20  0555   WBC 7.54 6.78   HGB 9.6* 9.4*   HCT 26.5* 27.6*    188     CMP:   Recent Labs   Lab 12/03/20 2000 12/04/20  1627 12/05/20  0009 12/05/20  0555      < > 137 141 140   K 3.1*   < > 3.8 4.0 3.8      < > 100 104 102   CO2 18*   < > 25 26 29   *   < > 175* 132* 98   *   < > 86* 84* 67*   CREATININE 3.5*   < > 1.8* 1.6* 1.5*   CALCIUM 8.4*   < > 8.6* 8.6* 8.5*   ALBUMIN 3.1*  --   --   --   --    ANIONGAP 15   < > 12 11 9   EGFRNONAA 16.9*   < > 37.8* 43.6* 47.2*    < > = values in this interval not displayed.       Significant Imaging: I have reviewed all pertinent imaging results/findings within the past 24 hours.

## 2020-12-05 NOTE — ASSESSMENT & PLAN NOTE
Recent Mesenteric artery stenting a week ago   Continue aspirin/statin/plavix      H/o PTA L SFA and PTA and stent placement of left iliac artery, aorto-bifem bypass done in 2008   Also ischemic colitis with bowel perforation s/p R colectomy at [facility] on 10/31/18.

## 2020-12-05 NOTE — ASSESSMENT & PLAN NOTE
Due to severe dehydration from chronic diarrhoea, hypotension, and contrast induced nephropathy in the background of CKD 2  ATN/JEREMIAH  Much better after received iv fluids

## 2020-12-05 NOTE — PROGRESS NOTES
[facility]  Progress Note    Patient Name: Jose English  MRN: [id number]  Patient Class: IP- Inpatient   Admission Date: 12/2/2020  Length of Stay: 2 days  Attending Physician: Gerardo Truong MD  Primary Care Provider: Primary Doctor No        Subjective:     Principal Problem:JEREMIAH (acute kidney injury)        HPI:  70 year old male multiple medical problems including CAD, PAD, DM,  right-sided colectomy for ischemic colitis 10/31/2018, BPH, bladder and renal cancer, gastroesophageal reflux, hypertension, disease, chronic kidney disease, dementia  Underwent  mesenteric artery stenting a week ago at [facility] for suspected recurrent ischemic colitis.   Resented to the ED after being seen by Dr. Loya earlier today  Reports persistent nausea, vomiting, diarrhea for the past 3 weeks.  His wife reports that he had approximately 22-30 loose large diarrhea stool per day  Denies fever but have been having chills  Lost 28 lb since October this year  Found to be hyportensive with systolic blood pressures in the 80s and near syncopal episode at gastroenterologist's office  Denies shortness of breath or chest pain  Reports epigastric discomfort with retching/vomiting and moderate to severe diffuse abdominal pain  Has not been urinating much    In the ED  Afebrile  Hypertensive  WBC 11.98, hemoglobin 12.8, hematocrit 37.3, platelets 317  , creatinine 6.2, CO2 14, anion gap 20, potassium 3.5  Lactic acid 1.6             Overview/Hospital Course:  12/03  Still having significant diarrhea  JEREMIAH getting better  No new issues    12/04  Pt Much better  Diarrhea resolved    Interval History:     Review of Systems   Constitutional: Negative for activity change and appetite change.   HENT: Negative for congestion and dental problem.    Eyes: Negative for discharge and itching.   Respiratory: Negative for shortness of breath.    Cardiovascular: Negative for chest pain.   Gastrointestinal:  Negative for abdominal distention and abdominal pain.   Endocrine: Negative for cold intolerance.   Genitourinary: Negative for difficulty urinating and dysuria.   Musculoskeletal: Negative for arthralgias and back pain.   Skin: Negative for color change.   Neurological: Negative for dizziness and facial asymmetry.   Hematological: Negative for adenopathy.   Psychiatric/Behavioral: Negative for agitation and behavioral problems.     Objective:     Vital Signs (Most Recent):  Temp: 98.7 °F (37.1 °C) (12/04/20 1908)  Pulse: 78 (12/04/20 1908)  Resp: 19 (12/04/20 1908)  BP: (!) 147/63 (12/04/20 1908)  SpO2: 98 % (12/04/20 1908) Vital Signs (24h Range):  Temp:  [97.3 °F (36.3 °C)-98.8 °F (37.1 °C)] 98.7 °F (37.1 °C)  Pulse:  [] 78  Resp:  [19-37] 19  SpO2:  [98 %-100 %] 98 %  BP: (106-175)/(56-77) 147/63     Weight: 84.7 kg (186 lb 11.7 oz)  Body mass index is 31.07 kg/m².    Intake/Output Summary (Last 24 hours) at 12/4/2020 2153  Last data filed at 12/4/2020 1608  Gross per 24 hour   Intake 1805 ml   Output 3275 ml   Net -1470 ml      Physical Exam  Vitals signs and nursing note reviewed.   Constitutional:       Appearance: He is well-developed.   HENT:      Head: Atraumatic.      Right Ear: External ear normal.      Left Ear: External ear normal.      Nose: Nose normal.      Mouth/Throat:      Mouth: Mucous membranes are moist.   Eyes:      Conjunctiva/sclera: Conjunctivae normal.   Neck:      Musculoskeletal: Full passive range of motion without pain and normal range of motion.   Cardiovascular:      Rate and Rhythm: Normal rate.   Pulmonary:      Effort: Pulmonary effort is normal.   Abdominal:      General: There is no distension.   Musculoskeletal: Normal range of motion.   Skin:     General: Skin is warm.   Neurological:      Mental Status: He is alert and oriented to person, place, and time.   Psychiatric:         Behavior: Behavior normal.         Significant Labs:   CBC:   Recent Labs   Lab  12/03/20  0309 12/04/20  0401   WBC 10.61 7.54   HGB 10.6* 9.6*   HCT 31.9* 26.5*    205     CMP:   Recent Labs   Lab 12/03/20  1041  12/03/20 2000 12/04/20  0401 12/04/20  0833 12/04/20  1627     135*   < > 137   < > 141 136 137   K 2.7*  2.8*   < > 3.1*   < > 3.4* 3.2* 3.8     104   < > 104   < > 106 101 100   CO2 14*  14*   < > 18*   < > 25 22* 25   *  127*   < > 149*   < > 138* 224* 175*   *  142*   < > 136*   < > 118* 103* 86*   CREATININE 4.5*  4.4*   < > 3.5*   < > 2.6* 2.1* 1.8*   CALCIUM 7.9*  7.8*   < > 8.4*   < > 8.5* 8.1* 8.6*   ALBUMIN 3.0*  --  3.1*  --   --   --   --    ANIONGAP 17*  17*   < > 15   < > 10 13 12   EGFRNONAA 12.5*  12.8*   < > 16.9*   < > 24.3* 31.4* 37.8*    < > = values in this interval not displayed.       Significant Imaging: I have reviewed all pertinent imaging results/findings within the past 24 hours.      Assessment/Plan:      * JEREMIAH (acute kidney injury)  Due to severe dehydration from chronic diarrhoea, hypotension, and contrast induced nephropathy in the background of CKD 2  ATN/JEREMIAH  Much better after received iv fluids        Diarrhea  This is a chronic ongoing issue for pt ,for years  He has significant PAD and associated Ischemic colitis  Also H/o R Colectomy and associated diarrhea a possibility  Added Cholestyramine and rifaximin(Recieved this before)  Diarrhea almost resolved      PAD (peripheral artery disease)  Recent Mesenteric artery stenting a week ago   Continue aspirin/statin/plavix      H/o PTA L SFA and PTA and stent placement of left iliac artery, aorto-bifem bypass done in 2008   Also ischemic colitis with bowel perforation s/p R colectomy at [facility] on 10/31/18.    Metabolic acidosis  Combination of AGMA/NAGMA  Received iv fluids with HCO3-  Much better. HCO3- gtt D/C ed      Obesity  Need Therapeutic life style measures  Recently lose significant amount of weight      Hypotension  Due to severe  dehydration  Resolved      Hypokalemia  Monitor and replete      Dementia  Stable      Hypomagnesemia  Monitor and replete      Diabetes mellitus with peripheral vascular disease  Hemoglobin A1c 7.2 about 5 months ago  Monitor blood glucose  Low-dose sliding scale insulin  Hold metformin due to acute kidney injury  Hold glipizide also      Medically noncompliant  Per Chart review      Cancer of kidney, clear cell 2008.  H/o Partial L Nephrectomy      Bladder cancer, 2008.  X 2  Stable issue now        VTE Risk Mitigation (From admission, onward)         Ordered     IP VTE HIGH RISK PATIENT  Once      12/02/20 1914     Place sequential compression device  Until discontinued      12/02/20 1914                Discharge Planning   UMANG:      Code Status: Full Code   Is the patient medically ready for discharge?:     Reason for patient still in hospital (select all that apply): Treatment  Discharge Plan A: Home with family                  Gerardo Truong MD  Department of Hospital Medicine   [facility]

## 2020-12-05 NOTE — ASSESSMENT & PLAN NOTE
Recent Mesenteric artery stenting a week ago   Continue aspirin/statin/plavix  H/o PTA L SFA and PTA and stent placement of left iliac artery, aorto-bifem bypass done in 2008   Also ischemic colitis with bowel perforation s/p R colectomy at [facility] on 10/31/18.

## 2020-12-05 NOTE — ASSESSMENT & PLAN NOTE
Due to severe dehydration from chronic diarrhoea, hypotension, and contrast induced nephropathy in the background of CKD 2  ATN/JEREMIAH  Much better after received iv fluids  Renal Function back to baseline

## 2020-12-05 NOTE — SUBJECTIVE & OBJECTIVE
Interval History:     Review of Systems   Constitutional: Negative for activity change and appetite change.   HENT: Negative for congestion and dental problem.    Eyes: Negative for discharge and itching.   Respiratory: Negative for shortness of breath.    Cardiovascular: Negative for chest pain.   Gastrointestinal: Negative for abdominal distention and abdominal pain.   Endocrine: Negative for cold intolerance.   Genitourinary: Negative for difficulty urinating and dysuria.   Musculoskeletal: Negative for arthralgias and back pain.   Skin: Negative for color change.   Neurological: Negative for dizziness and facial asymmetry.   Hematological: Negative for adenopathy.   Psychiatric/Behavioral: Negative for agitation and behavioral problems.     Objective:     Vital Signs (Most Recent):  Temp: 98.7 °F (37.1 °C) (12/04/20 1908)  Pulse: 78 (12/04/20 1908)  Resp: 19 (12/04/20 1908)  BP: (!) 147/63 (12/04/20 1908)  SpO2: 98 % (12/04/20 1908) Vital Signs (24h Range):  Temp:  [97.3 °F (36.3 °C)-98.8 °F (37.1 °C)] 98.7 °F (37.1 °C)  Pulse:  [] 78  Resp:  [19-37] 19  SpO2:  [98 %-100 %] 98 %  BP: (106-175)/(56-77) 147/63     Weight: 84.7 kg (186 lb 11.7 oz)  Body mass index is 31.07 kg/m².    Intake/Output Summary (Last 24 hours) at 12/4/2020 2153  Last data filed at 12/4/2020 1608  Gross per 24 hour   Intake 1805 ml   Output 3275 ml   Net -1470 ml      Physical Exam  Vitals signs and nursing note reviewed.   Constitutional:       Appearance: He is well-developed.   HENT:      Head: Atraumatic.      Right Ear: External ear normal.      Left Ear: External ear normal.      Nose: Nose normal.      Mouth/Throat:      Mouth: Mucous membranes are moist.   Eyes:      Conjunctiva/sclera: Conjunctivae normal.   Neck:      Musculoskeletal: Full passive range of motion without pain and normal range of motion.   Cardiovascular:      Rate and Rhythm: Normal rate.   Pulmonary:      Effort: Pulmonary effort is normal.   Abdominal:       General: There is no distension.   Musculoskeletal: Normal range of motion.   Skin:     General: Skin is warm.   Neurological:      Mental Status: He is alert and oriented to person, place, and time.   Psychiatric:         Behavior: Behavior normal.         Significant Labs:   CBC:   Recent Labs   Lab 12/03/20  0309 12/04/20  0401   WBC 10.61 7.54   HGB 10.6* 9.6*   HCT 31.9* 26.5*    205     CMP:   Recent Labs   Lab 12/03/20  1041  12/03/20  2000  12/04/20  0401 12/04/20  0833 12/04/20  1627     135*   < > 137   < > 141 136 137   K 2.7*  2.8*   < > 3.1*   < > 3.4* 3.2* 3.8     104   < > 104   < > 106 101 100   CO2 14*  14*   < > 18*   < > 25 22* 25   *  127*   < > 149*   < > 138* 224* 175*   *  142*   < > 136*   < > 118* 103* 86*   CREATININE 4.5*  4.4*   < > 3.5*   < > 2.6* 2.1* 1.8*   CALCIUM 7.9*  7.8*   < > 8.4*   < > 8.5* 8.1* 8.6*   ALBUMIN 3.0*  --  3.1*  --   --   --   --    ANIONGAP 17*  17*   < > 15   < > 10 13 12   EGFRNONAA 12.5*  12.8*   < > 16.9*   < > 24.3* 31.4* 37.8*    < > = values in this interval not displayed.       Significant Imaging: I have reviewed all pertinent imaging results/findings within the past 24 hours.

## 2020-12-05 NOTE — ASSESSMENT & PLAN NOTE
This is a chronic ongoing issue for pt ,for years  He has significant PAD and associated Ischemic colitis  Also H/o R Colectomy and associated diarrhea a possibility  Added Cholestyramine and rifaximin(Recieved this before)  Diarrhea almost resolved

## 2020-12-06 LAB
ADV 40+41 DNA STL QL NAA+NON-PROBE: NOT DETECTED
ANION GAP SERPL CALC-SCNC: 10 MMOL/L (ref 8–16)
ASTRO TYP 1-8 RNA STL QL NAA+NON-PROBE: NOT DETECTED
BASOPHILS # BLD AUTO: 0.04 K/UL (ref 0–0.2)
BASOPHILS NFR BLD: 0.6 % (ref 0–1.9)
BUN SERPL-MCNC: 37 MG/DL (ref 8–23)
C CAYETANENSIS DNA STL QL NAA+NON-PROBE: NOT DETECTED
C COLI+JEJ+UPSA DNA STL QL NAA+NON-PROBE: NOT DETECTED
C DIF TOX TCDA+TCDB STL QL NAA+NON-PROBE: NOT DETECTED
CALCIUM SERPL-MCNC: 8.6 MG/DL (ref 8.7–10.5)
CHLORIDE SERPL-SCNC: 105 MMOL/L (ref 95–110)
CO2 SERPL-SCNC: 29 MMOL/L (ref 23–29)
CREAT SERPL-MCNC: 1.2 MG/DL (ref 0.5–1.4)
CRYPTOSP DNA STL QL NAA+NON-PROBE: NOT DETECTED
DIFFERENTIAL METHOD: ABNORMAL
E COLI O157 DNA STL QL NAA+NON-PROBE: NORMAL
E HISTOLYT DNA STL QL NAA+NON-PROBE: NOT DETECTED
EC STX1+STX2 GENES STL QL NAA+NON-PROBE: NOT DETECTED
ENTEROAGGREGATIVE E COLI: NOT DETECTED
ENTEROPATHOGENIC E COLI: NOT DETECTED
EOSINOPHIL # BLD AUTO: 0.5 K/UL (ref 0–0.5)
EOSINOPHIL NFR BLD: 7.5 % (ref 0–8)
ERYTHROCYTE [DISTWIDTH] IN BLOOD BY AUTOMATED COUNT: 13.1 % (ref 11.5–14.5)
EST. GFR  (AFRICAN AMERICAN): >60 ML/MIN/1.73 M^2
EST. GFR  (NON AFRICAN AMERICAN): >60 ML/MIN/1.73 M^2
ETEC LTA+ST1A+ST1B TOX ST NAA+NON-PROBE: NOT DETECTED
G LAMBLIA DNA STL QL NAA+NON-PROBE: NOT DETECTED
GLUCOSE SERPL-MCNC: 108 MG/DL (ref 70–110)
GLUCOSE SERPL-MCNC: 115 MG/DL (ref 70–110)
GLUCOSE SERPL-MCNC: 122 MG/DL (ref 70–110)
GLUCOSE SERPL-MCNC: 161 MG/DL (ref 70–110)
GLUCOSE SERPL-MCNC: 96 MG/DL (ref 70–110)
GPP - SALMONELLA: NOT DETECTED
GPP - VIBRIO CHOLERA: NOT DETECTED
GPP - YERSINIA ENTEROCOLITICA: NOT DETECTED
HCT VFR BLD AUTO: 28.8 % (ref 40–54)
HGB BLD-MCNC: 9.7 G/DL (ref 14–18)
IMM GRANULOCYTES # BLD AUTO: 0.05 K/UL (ref 0–0.04)
IMM GRANULOCYTES NFR BLD AUTO: 0.8 % (ref 0–0.5)
LYMPHOCYTES # BLD AUTO: 1.5 K/UL (ref 1–4.8)
LYMPHOCYTES NFR BLD: 22.3 % (ref 18–48)
MAGNESIUM SERPL-MCNC: 1.4 MG/DL (ref 1.6–2.6)
MCH RBC QN AUTO: 31.3 PG (ref 27–31)
MCHC RBC AUTO-ENTMCNC: 33.7 G/DL (ref 32–36)
MCV RBC AUTO: 93 FL (ref 82–98)
MONOCYTES # BLD AUTO: 0.8 K/UL (ref 0.3–1)
MONOCYTES NFR BLD: 12.2 % (ref 4–15)
NEUTROPHILS # BLD AUTO: 3.8 K/UL (ref 1.8–7.7)
NEUTROPHILS NFR BLD: 56.6 % (ref 38–73)
NOROVIRUS GI+II RNA STL QL NAA+NON-PROBE: NOT DETECTED
NRBC BLD-RTO: 0 /100 WBC
PLATELET # BLD AUTO: 168 K/UL (ref 150–350)
PLESIOMONAS SHIGELLOIDES: NOT DETECTED
PMV BLD AUTO: 10 FL (ref 9.2–12.9)
POTASSIUM SERPL-SCNC: 4.2 MMOL/L (ref 3.5–5.1)
RBC # BLD AUTO: 3.1 M/UL (ref 4.6–6.2)
RVA RNA STL QL NAA+NON-PROBE: NOT DETECTED
SAPO I+II+IV+V RNA STL QL NAA+NON-PROBE: NOT DETECTED
SHIGELLA SP+EIEC IPAH STL QL NAA+PROBE: NOT DETECTED
SODIUM SERPL-SCNC: 144 MMOL/L (ref 136–145)
TTG IGA SER-ACNC: <2 U/ML (ref 0–3)
VIBRIO: NOT DETECTED
WBC # BLD AUTO: 6.63 K/UL (ref 3.9–12.7)

## 2020-12-06 PROCEDURE — 83735 ASSAY OF MAGNESIUM: CPT

## 2020-12-06 PROCEDURE — 12000002 HC ACUTE/MED SURGE SEMI-PRIVATE ROOM

## 2020-12-06 PROCEDURE — 25000003 PHARM REV CODE 250: Performed by: NURSE PRACTITIONER

## 2020-12-06 PROCEDURE — 25000003 PHARM REV CODE 250: Performed by: INTERNAL MEDICINE

## 2020-12-06 PROCEDURE — 25000003 PHARM REV CODE 250

## 2020-12-06 PROCEDURE — 36415 COLL VENOUS BLD VENIPUNCTURE: CPT

## 2020-12-06 PROCEDURE — 63600175 PHARM REV CODE 636 W HCPCS: Performed by: INTERNAL MEDICINE

## 2020-12-06 PROCEDURE — 85025 COMPLETE CBC W/AUTO DIFF WBC: CPT

## 2020-12-06 PROCEDURE — 80048 BASIC METABOLIC PNL TOTAL CA: CPT

## 2020-12-06 RX ORDER — POLYETHYLENE GLYCOL 3350, SODIUM SULFATE ANHYDROUS, SODIUM BICARBONATE, SODIUM CHLORIDE, POTASSIUM CHLORIDE 236; 22.74; 6.74; 5.86; 2.97 G/4L; G/4L; G/4L; G/4L; G/4L
4000 POWDER, FOR SOLUTION ORAL ONCE
Status: DISCONTINUED | OUTPATIENT
Start: 2020-12-06 | End: 2020-12-06

## 2020-12-06 RX ORDER — POLYETHYLENE GLYCOL 3350, SODIUM CHLORIDE, SODIUM BICARBONATE, POTASSIUM CHLORIDE 420; 11.2; 5.72; 1.48 G/4L; G/4L; G/4L; G/4L
2000 POWDER, FOR SOLUTION ORAL ONCE
Status: COMPLETED | OUTPATIENT
Start: 2020-12-06 | End: 2020-12-06

## 2020-12-06 RX ORDER — LANOLIN ALCOHOL/MO/W.PET/CERES
400 CREAM (GRAM) TOPICAL 2 TIMES DAILY
Status: DISCONTINUED | OUTPATIENT
Start: 2020-12-06 | End: 2020-12-07 | Stop reason: HOSPADM

## 2020-12-06 RX ORDER — POLYETHYLENE GLYCOL 3350, SODIUM CHLORIDE, SODIUM BICARBONATE, POTASSIUM CHLORIDE 420; 11.2; 5.72; 1.48 G/4L; G/4L; G/4L; G/4L
2000 POWDER, FOR SOLUTION ORAL ONCE
Status: COMPLETED | OUTPATIENT
Start: 2020-12-07 | End: 2020-12-07

## 2020-12-06 RX ORDER — TRAZODONE HYDROCHLORIDE 50 MG/1
150 TABLET ORAL NIGHTLY
Status: DISCONTINUED | OUTPATIENT
Start: 2020-12-06 | End: 2020-12-07 | Stop reason: HOSPADM

## 2020-12-06 RX ORDER — MAGNESIUM SULFATE HEPTAHYDRATE 40 MG/ML
2 INJECTION, SOLUTION INTRAVENOUS ONCE
Status: COMPLETED | OUTPATIENT
Start: 2020-12-06 | End: 2020-12-06

## 2020-12-06 RX ORDER — POLYETHYLENE GLYCOL 3350, SODIUM CHLORIDE, SODIUM BICARBONATE, POTASSIUM CHLORIDE 420; 11.2; 5.72; 1.48 G/4L; G/4L; G/4L; G/4L
4000 POWDER, FOR SOLUTION ORAL ONCE
Status: DISCONTINUED | OUTPATIENT
Start: 2020-12-06 | End: 2020-12-06

## 2020-12-06 RX ADMIN — MAGNESIUM SULFATE IN WATER 2 G: 40 INJECTION, SOLUTION INTRAVENOUS at 04:12

## 2020-12-06 RX ADMIN — CHOLESTYRAMINE 4 G: 4 POWDER, FOR SUSPENSION ORAL at 09:12

## 2020-12-06 RX ADMIN — DICYCLOMINE HYDROCHLORIDE 20 MG: 10 CAPSULE ORAL at 12:12

## 2020-12-06 RX ADMIN — PANTOPRAZOLE SODIUM 40 MG: 40 TABLET, DELAYED RELEASE ORAL at 09:12

## 2020-12-06 RX ADMIN — CHOLESTYRAMINE 4 G: 4 POWDER, FOR SUSPENSION ORAL at 02:12

## 2020-12-06 RX ADMIN — ASPIRIN 81 MG: 81 TABLET, DELAYED RELEASE ORAL at 09:12

## 2020-12-06 RX ADMIN — POLYETHYLENE GLYCOL 3350, SODIUM CHLORIDE, SODIUM BICARBONATE AND POTASSIUM CHLORIDE 2000 ML: 420; 5.72; 11.2; 1.48 POWDER, FOR SOLUTION ORAL at 06:12

## 2020-12-06 RX ADMIN — MUPIROCIN: 20 OINTMENT TOPICAL at 09:12

## 2020-12-06 RX ADMIN — SODIUM BICARBONATE 650 MG TABLET 650 MG: at 09:12

## 2020-12-06 RX ADMIN — DONEPEZIL HYDROCHLORIDE 10 MG: 5 TABLET, FILM COATED ORAL at 08:12

## 2020-12-06 RX ADMIN — CHLORHEXIDINE GLUCONATE 15 ML: 1.2 RINSE ORAL at 08:12

## 2020-12-06 RX ADMIN — DICYCLOMINE HYDROCHLORIDE 20 MG: 10 CAPSULE ORAL at 05:12

## 2020-12-06 RX ADMIN — MAGNESIUM OXIDE 400 MG: 400 TABLET ORAL at 08:12

## 2020-12-06 RX ADMIN — TRAZODONE HYDROCHLORIDE 150 MG: 50 TABLET ORAL at 08:12

## 2020-12-06 RX ADMIN — RIFAXIMIN 550 MG: 550 TABLET ORAL at 09:12

## 2020-12-06 RX ADMIN — MAGNESIUM OXIDE 400 MG: 400 TABLET ORAL at 09:12

## 2020-12-06 RX ADMIN — ATORVASTATIN CALCIUM 80 MG: 40 TABLET, FILM COATED ORAL at 09:12

## 2020-12-06 RX ADMIN — RIFAXIMIN 550 MG: 550 TABLET ORAL at 08:12

## 2020-12-06 RX ADMIN — PANTOPRAZOLE SODIUM 40 MG: 40 TABLET, DELAYED RELEASE ORAL at 08:12

## 2020-12-06 RX ADMIN — CHLORHEXIDINE GLUCONATE 15 ML: 1.2 RINSE ORAL at 09:12

## 2020-12-06 RX ADMIN — POTASSIUM CHLORIDE 20 MEQ: 20 TABLET, EXTENDED RELEASE ORAL at 09:12

## 2020-12-06 RX ADMIN — GABAPENTIN 300 MG: 300 CAPSULE ORAL at 08:12

## 2020-12-06 RX ADMIN — Medication 2000 UNITS: at 09:12

## 2020-12-06 NOTE — PROGRESS NOTES
[facility]  Gastroenterology  Progress Note    Patient Name: Jose English  MRN: [id number]  Admission Date: 12/2/2020  Hospital Length of Stay: 4 days  Code Status: Full Code   Attending Provider: Gerardo Truong MD  Consulting Provider: CHANDA Grimm MD  Primary Care Physician: Primary Doctor No  Principal Problem: JEREMIAH (acute kidney injury)    Subjective:     Chief Complaint: diarrhea    Interval History: Diarrhea has improved with xifaxan.      Review of Systems:  No F/C  No CP  No SOB      Objective:     Vital Signs (Most Recent):  Temp: 98.4 °F (36.9 °C) (12/06/20 1136)  Pulse: 75 (12/06/20 1136)  Resp: 17 (12/06/20 1136)  BP: (!) 158/71 (12/06/20 1136)  SpO2: 95 % (12/06/20 1136) Vital Signs (24h Range):  Temp:  [97.8 °F (36.6 °C)-98.7 °F (37.1 °C)] 98.4 °F (36.9 °C)  Pulse:  [] 75  Resp:  [16-18] 17  SpO2:  [95 %-99 %] 95 %  BP: (108-190)/(59-79) 158/71     Weight: 84.7 kg (186 lb 11.7 oz) (12/03/20 1053)      Intake/Output Summary (Last 24 hours) at 12/6/2020 1330  Last data filed at 12/6/2020 0550  Gross per 24 hour   Intake --   Output 2950 ml   Net -2950 ml       Lines/Drains/Airways     Drain                 Urethral Catheter 12/02/20 2105 16 Fr. 3 days          Peripheral Intravenous Line                 Peripheral IV - Single Lumen 12/02/20 1615 20 G Left Antecubital 3 days         Peripheral IV - Single Lumen 12/02/20 2055 20 G;1 3/4 in Right Upper Arm 3 days                Physical Exam:  NAD  Anicteric, EOMI  MMM, NC  RRR; no lowell  No wheezes, no rhonchi  Soft, nondistended, nontender, +ABS  No c/c  No rash, no ulcer  Afocal, moves all ext.  AAOx 4, affect wnl      Significant Labs:  CBC:   Recent Labs   Lab 12/05/20  0555 12/06/20  0600   WBC 6.78 6.63   HGB 9.4* 9.7*   HCT 27.6* 28.8*    168     CMP:   Recent Labs   Lab 12/06/20  0600   GLU 96   CALCIUM 8.6*      K 4.2   CO2 29      BUN 37*   CREATININE 1.2     Coagulation: No results for input(s): PT,  INR, APTT in the last 48 hours.      Significant Imaging:  Imaging reviewed in Epic.      Assessment/Plan:     1. JEREMIAH on CKD - resolved with IVFs    2. Chronic diarrhea - symptoms have improved greatly since starting Xifaxan.  Suspect IBS D vs SIBO.  He is still having some diarrhea though.  I discussed the options with him however, due to this being an issue for > 1 year, he would like to go ahead and do the colonoscopy.  Of note, he is on plavix.          CHANDA Grimm MD  Gastroenterology  [facility]

## 2020-12-06 NOTE — PROGRESS NOTES
INPATIENT NEPHROLOGY PROGRESS NOTE   [facility] NEPHROLOGY    Patient Name: Jose English  Date: 12/06/2020    Reason for consultation: JEREMIAH    Chief Complaint:   Chief Complaint   Patient presents with    Abdominal Pain     X SEVERAL WEEKS, SENT FROM PCP FOR EVAL OF LOW BP    Vomiting    Diarrhea       History of Present Illness:  70 year old male multiple medical problems including CAD, PAD, DM,  right-sided colectomy for ischemic colitis 10/31/2018, BPH, bladder and renal cancer, gastroesophageal reflux, hypertension, disease, chronic kidney disease, dementia who underwent  mesenteric artery stenting a week ago at [facility] for suspected recurrent ischemic colitis.  Represented to the ED after being seen by Dr. Loya earlier today  Reports persistent nausea, vomiting, diarrhea for the past 3 weeks. His wife reports that he had approximately 22-30 loose large diarrhea stool per day. Denies fever but has been having chills. Lost 28 lb since October this year. Found to be hyportensive with systolic blood pressures in the 80s and near syncopal episode at gastroenterologist's office. Denies shortness of breath or chest pain. Reports epigastric discomfort with retching/vomiting and moderate to severe diffuse abdominal pain. Has not been urinating much. We are consulted for JEREMIAH.    Home meds: metoprolol, norvasc, valsartan, HCTZ    Renal u/s:  Right kidney measures 10.9 cm in length. No cystic or solid right renal lesion. No hydronephrosis.  IVC is unremarkable. Aorta was not visualized due to bowel gas. Left kidney measures 8.3 cm in length. There is an echogenic focus at the lower pole the left kidney, corresponding to a rim calcified lesion described on the reference CT.  This lesion is not well evaluated sonographically due to calcification. There is no  hydronephrosis of the left kidney. Urinary bladder is incompletely distended and otherwise unremarkable.     Interval History:  12/3- hypotensive,  on RA, with cash; had BM this AM, denies cp/dyspnea/edema, UA + hyaline casts, urine Na < 10  12/4- BP is better, on RA, UOP 3.4L, feels much better, eating well- still with some lower abd pain, no cp/dyspnea/edema  12/5- isolated hypotension this AM, on RA, UOP 3L  12/6  Scr wnl.  VSS.  UOP 2.9L.  K+ at goal.  No new complaints.  Possible colonoscopy tomorrow.    Plan of Care:    1. Severe JEREMIAH secondary to intravascular volume depletion in setting of profound GI losses (supported by hypotension, urine studies); baseline serum Cr 1.2 (CKD II)  - renal function is improving steadily after initial volume repletion- anticipate full recovery  - he is nonoliguric  - change to daily renal panel   - hold ALL BP meds/diuretics   - no nsaids or IV contrast    2. Hypokalemia- 2/2 GI losses  - improved- start standing repletion KCl 20mEq daily     3. Metabolic acidosis (combined AG 2/2 JEREMIAH and NAG 2/2 diarrhea)  - remains resolved- now alkalotic  - change PO bicarb 650mg to daily (want some on board to maintain CO2 ~ 25 given chronic diarrhea issues)    4. HypoMg/Hypocalcemia  - ordered 2g IV Mg today and will start PO Mg 400mg daily tomorrow  - he has mild vitamin D deficiency- start cholecalciferol 2000 units daily today    5. Anemia of chronic disease  - stable- no acute transfusion needs    6. Baseline HTN  - hold all meds until BP > 150/90 persistently- will then decide what is safe to resume (can start with metoprolol, norvasc; keep ARB and HCTZ on hold at discharge- these aren't ideal meds in a patient prone to volume depletion from chronic GI issues)  - euvolemic  - no acute diuresis needs     7. Colitis  - plan for colonscopy Monday    Updated family.     Thank you for allowing us to participate in this patient's care. We will continue to follow.    Vital Signs:  Temp Readings from Last 3 Encounters:   12/06/20 98.4 °F (36.9 °C)   02/20/19 98.2 °F (36.8 °C)   02/06/19 98.1 °F (36.7 °C)       Pulse Readings from Last  3 Encounters:   12/06/20 75   02/20/19 65   02/07/19 71       BP Readings from Last 3 Encounters:   12/06/20 (!) 158/71   02/20/19 (!) 140/66   02/07/19 108/62       Weight:  Wt Readings from Last 3 Encounters:   12/03/20 84.7 kg (186 lb 11.7 oz)   02/20/19 72.6 kg (160 lb)   02/07/19 72.2 kg (159 lb 2.8 oz)     Medications:  Scheduled Meds:   aspirin  81 mg Oral Daily    atorvastatin  80 mg Oral Daily    chlorhexidine  15 mL Mouth/Throat BID    cholestyramine-aspartame  4 g Oral TID    clopidogreL  75 mg Oral QHS    dicyclomine  20 mg Oral Q6H    donepeziL  10 mg Oral QHS    gabapentin  300 mg Oral QHS    magnesium oxide  400 mg Oral Daily    mupirocin   Nasal BID    pantoprazole  40 mg Oral BID    potassium chloride  20 mEq Oral Daily    rifAXImin  550 mg Oral BID    sodium bicarbonate  650 mg Oral Daily    traZODone  50 mg Oral QHS    vitamin D  2,000 Units Oral Daily     Continuous Infusions:    PRN Meds:.acetaminophen, acetaminophen, calcium chloride IVPB, calcium chloride IVPB, calcium chloride IVPB, dextrose 50%, dextrose 50%, glucagon (human recombinant), glucose, glucose, HYDROcodone-acetaminophen, insulin aspart U-100, magnesium oxide, magnesium sulfate IVPB, magnesium sulfate IVPB, magnesium sulfate IVPB, magnesium sulfate IVPB, ondansetron, potassium chloride in water, potassium chloride in water, potassium chloride in water, potassium chloride in water, potassium chloride, potassium chloride, potassium chloride, potassium chloride, promethazine (PHENERGAN) IVPB, sodium chloride 0.9%, sodium phosphate IVPB, sodium phosphate IVPB, sodium phosphate IVPB, sodium phosphate IVPB, sodium phosphate IVPB  No current facility-administered medications on file prior to encounter.      Current Outpatient Medications on File Prior to Encounter   Medication Sig Dispense Refill    allopurinol (ZYLOPRIM) 100 MG tablet Take 100 mg by mouth 2 (two) times daily.      amlodipine (NORVASC) 10 MG tablet TAKE  1 TABLET ONE TIME DAILY 90 tablet 3    aspirin (ECOTRIN) 81 MG EC tablet Take 81 mg by mouth once daily.      atorvastatin (LIPITOR) 80 MG tablet Take 80 mg by mouth once daily.      b complex vitamins tablet Take 1 tablet by mouth once daily.      clopidogrel (PLAVIX) 75 mg tablet TAKE 1 TABLET EVERY EVENING 90 tablet 3    cyanocobalamin (VITAMIN B-12) 1000 MCG tablet Take 1,000 mcg by mouth.      dicyclomine (BENTYL) 20 mg tablet Take 20 mg by mouth every 6 (six) hours.      diphenoxylate-atropine 2.5-0.025 mg (LOMOTIL) 2.5-0.025 mg per tablet diphenoxylate-atropine 2.5 mg-0.025 mg tablet      donepezil (ARICEPT) 5 MG tablet Take 10 mg by mouth every evening.      gabapentin (NEURONTIN) 300 MG capsule Take 300 mg by mouth every evening.      glipiZIDE (GLUCOTROL) 5 MG TR24 glipizide ER 5 mg tablet, extended release 24 hr   TK 1 T PO BID      hydrochlorothiazide (HYDRODIURIL) 25 MG tablet TAKE 1 TABLET EVERY MORNING 90 tablet 3    loperamide (IMODIUM) 2 mg capsule Take 2 mg by mouth 4 (four) times daily as needed for Diarrhea.      metoprolol succinate (TOPROL-XL) 50 MG 24 hr tablet metoprolol succinate ER 50 mg tablet,extended release 24 hr   TK 2 TS PO QD      metoprolol tartrate (LOPRESSOR) 50 MG tablet TAKE 1 TABLET TWICE DAILY 180 tablet 3    mv,Ca,min-iron-FA-lycopene 8 mg iron- 200 mcg-600 mcg Tab Take 1 capsule by mouth once daily.      ondansetron (ZOFRAN) 4 MG tablet Take 4 mg by mouth every 8 (eight) hours as needed for Nausea.      pantoprazole (PROTONIX) 40 MG tablet Take 40 mg by mouth once daily.      temazepam (RESTORIL) 30 mg capsule temazepam 30 mg capsule      valsartan (DIOVAN) 320 MG tablet Take 320 mg by mouth once daily.      ACCU-CHEK CEASAR CONTROL SOLN Soln USE ONE TIME DAILY 1 each 3    ACCU-CHEK CEASAR PLUS TEST STRP Strp TEST TWO TIMES DAILY 200 strip 3    ACCU-CHEK SOFTCLIX LANCETS Misc TEST TWO TIMES DAILY 200 each 3    BD ALCOHOL SWABS PadM TEST TWO TIMES DAILY  "200 each 4    bismuth subsalicylate (KAOPECTATE, BISMUTH SUBSALICY,) 262 mg Tab Take by mouth.      cholestyramine (QUESTRAN) 4 gram packet Take 1 packet (4 g total) by mouth 2 (two) times daily. 180 packet 3    fish oil-omega-3 fatty acids 300-1,000 mg capsule Take 2 g by mouth once daily.      flu vacc [id number],65yr up,PF (FLUZONE HIGH-DOSE 2019-20, PF,) 180 mcg/0.5 mL Syrg Fluzone High-Dose 2019-20 (PF) 180 mcg/0.5 mL intramuscular syringe      lancets (ONE TOUCH DELICA LANCETS) 33 gauge Misc 2 lancets by Misc.(Non-Drug; Combo Route) route 2 (two) times daily. 600 each 4       Review of Systems:  Review of Systems - All 14 systems reviewed and negative, except as noted in HPI    Physical Exam:  BP (!) 158/71   Pulse 75   Temp 98.4 °F (36.9 °C)   Resp 17   Ht 5' 5" (1.651 m)   Wt 84.7 kg (186 lb 11.7 oz)   SpO2 95%   BMI 31.07 kg/m²     INS/OUTS:  I/O last 3 completed shifts:  In: -   Out: 4875 [Urine:4875]  No intake/output data recorded.    Constitutional: nad, aao x 3  Heart: rrr, no m/r/g, wwp, no edema  Lungs: ctab, no w/r/r/c, no lb  Abdomen: s/nt/nd, +BS    Results:  Lab Results   Component Value Date     12/06/2020    K 4.2 12/06/2020     12/06/2020    CO2 29 12/06/2020    BUN 37 (H) 12/06/2020    CREATININE 1.2 12/06/2020    CALCIUM 8.6 (L) 12/06/2020    ANIONGAP 10 12/06/2020    ESTGFRAFRICA >60.0 12/06/2020    EGFRNONAA >60.0 12/06/2020       Lab Results   Component Value Date    CALCIUM 8.6 (L) 12/06/2020    PHOS 6.6 (H) 12/03/2020       Recent Labs   Lab 12/06/20  0600   WBC 6.63   RBC 3.10*   HGB 9.7*   HCT 28.8*      MCV 93   MCH 31.3*   MCHC 33.7       I have personally reviewed pertinent radiological imaging and reports.    I have spent > 35 minutes providing care for this patient for the above diagnoses. These services have included chart/data/imaging review, evaluation, exam, formulation of plan, , note preparation, and discussions with staff involved " in this patient's care.    Paddy Lieberman MD MPH  [facility] [address] [place] [address]  [phone] (p)  [phone] (f)

## 2020-12-07 ENCOUNTER — ANESTHESIA EVENT (OUTPATIENT)
Dept: SURGERY | Facility: HOSPITAL | Age: 70
DRG: 683 | End: 2020-12-07
Payer: MEDICARE

## 2020-12-07 ENCOUNTER — ANESTHESIA (OUTPATIENT)
Dept: SURGERY | Facility: HOSPITAL | Age: 70
DRG: 683 | End: 2020-12-07
Payer: MEDICARE

## 2020-12-07 VITALS
HEIGHT: 65 IN | HEART RATE: 75 BPM | TEMPERATURE: 98 F | DIASTOLIC BLOOD PRESSURE: 64 MMHG | WEIGHT: 186.75 LBS | OXYGEN SATURATION: 98 % | BODY MASS INDEX: 31.11 KG/M2 | RESPIRATION RATE: 17 BRPM | SYSTOLIC BLOOD PRESSURE: 170 MMHG

## 2020-12-07 PROBLEM — E83.42 HYPOMAGNESEMIA: Status: RESOLVED | Noted: 2018-12-04 | Resolved: 2020-12-07

## 2020-12-07 PROBLEM — R19.7 DIARRHEA: Status: RESOLVED | Noted: 2018-12-06 | Resolved: 2020-12-07

## 2020-12-07 PROBLEM — E87.6 HYPOKALEMIA: Status: RESOLVED | Noted: 2019-01-14 | Resolved: 2020-12-07

## 2020-12-07 PROBLEM — E87.20 METABOLIC ACIDOSIS: Status: RESOLVED | Noted: 2020-12-03 | Resolved: 2020-12-07

## 2020-12-07 PROBLEM — I95.9 HYPOTENSION: Status: RESOLVED | Noted: 2020-12-02 | Resolved: 2020-12-07

## 2020-12-07 PROBLEM — N17.9 AKI (ACUTE KIDNEY INJURY): Status: RESOLVED | Noted: 2020-12-02 | Resolved: 2020-12-07

## 2020-12-07 LAB
ABO + RH BLD: NORMAL
ANION GAP SERPL CALC-SCNC: 8 MMOL/L (ref 8–16)
BACTERIA BLD CULT: NORMAL
BACTERIA BLD CULT: NORMAL
BASOPHILS # BLD AUTO: 0.05 K/UL (ref 0–0.2)
BASOPHILS NFR BLD: 0.8 % (ref 0–1.9)
BUN SERPL-MCNC: 23 MG/DL (ref 8–23)
CALCIUM SERPL-MCNC: 8.2 MG/DL (ref 8.7–10.5)
CHLORIDE SERPL-SCNC: 99 MMOL/L (ref 95–110)
CO2 SERPL-SCNC: 29 MMOL/L (ref 23–29)
CREAT SERPL-MCNC: 1.3 MG/DL (ref 0.5–1.4)
DIFFERENTIAL METHOD: ABNORMAL
EOSINOPHIL # BLD AUTO: 0.3 K/UL (ref 0–0.5)
EOSINOPHIL NFR BLD: 4.4 % (ref 0–8)
ERYTHROCYTE [DISTWIDTH] IN BLOOD BY AUTOMATED COUNT: 12.9 % (ref 11.5–14.5)
EST. GFR  (AFRICAN AMERICAN): >60 ML/MIN/1.73 M^2
EST. GFR  (NON AFRICAN AMERICAN): 56.1 ML/MIN/1.73 M^2
GLUCOSE SERPL-MCNC: 107 MG/DL (ref 70–110)
GLUCOSE SERPL-MCNC: 107 MG/DL (ref 70–110)
GLUCOSE SERPL-MCNC: 130 MG/DL (ref 70–110)
HCT VFR BLD AUTO: 30.4 % (ref 40–54)
HGB BLD-MCNC: 9.8 G/DL (ref 14–18)
IMM GRANULOCYTES # BLD AUTO: 0.07 K/UL (ref 0–0.04)
IMM GRANULOCYTES NFR BLD AUTO: 1.1 % (ref 0–0.5)
LYMPHOCYTES # BLD AUTO: 1.1 K/UL (ref 1–4.8)
LYMPHOCYTES NFR BLD: 16.3 % (ref 18–48)
MAGNESIUM SERPL-MCNC: 1.4 MG/DL (ref 1.6–2.6)
MCH RBC QN AUTO: 30.3 PG (ref 27–31)
MCHC RBC AUTO-ENTMCNC: 32.2 G/DL (ref 32–36)
MCV RBC AUTO: 94 FL (ref 82–98)
MONOCYTES # BLD AUTO: 0.7 K/UL (ref 0.3–1)
MONOCYTES NFR BLD: 10.3 % (ref 4–15)
NEUTROPHILS # BLD AUTO: 4.3 K/UL (ref 1.8–7.7)
NEUTROPHILS NFR BLD: 67.1 % (ref 38–73)
NRBC BLD-RTO: 0 /100 WBC
PLATELET # BLD AUTO: 183 K/UL (ref 150–350)
PMV BLD AUTO: 10 FL (ref 9.2–12.9)
POTASSIUM SERPL-SCNC: 4 MMOL/L (ref 3.5–5.1)
RBC # BLD AUTO: 3.23 M/UL (ref 4.6–6.2)
SODIUM SERPL-SCNC: 136 MMOL/L (ref 136–145)
WBC # BLD AUTO: 6.43 K/UL (ref 3.9–12.7)

## 2020-12-07 PROCEDURE — 25000003 PHARM REV CODE 250

## 2020-12-07 PROCEDURE — 85025 COMPLETE CBC W/AUTO DIFF WBC: CPT

## 2020-12-07 PROCEDURE — 45380 COLONOSCOPY AND BIOPSY: CPT | Performed by: INTERNAL MEDICINE

## 2020-12-07 PROCEDURE — 25000003 PHARM REV CODE 250: Performed by: NURSE ANESTHETIST, CERTIFIED REGISTERED

## 2020-12-07 PROCEDURE — 25000003 PHARM REV CODE 250: Performed by: INTERNAL MEDICINE

## 2020-12-07 PROCEDURE — 88305 TISSUE EXAM BY PATHOLOGIST: CPT | Mod: TC,59

## 2020-12-07 PROCEDURE — 37000008 HC ANESTHESIA 1ST 15 MINUTES: Performed by: INTERNAL MEDICINE

## 2020-12-07 PROCEDURE — 27000671 HC TUBING MICROBORE EXT: Performed by: ANESTHESIOLOGY

## 2020-12-07 PROCEDURE — 63600175 PHARM REV CODE 636 W HCPCS: Performed by: NURSE ANESTHETIST, CERTIFIED REGISTERED

## 2020-12-07 PROCEDURE — 37000009 HC ANESTHESIA EA ADD 15 MINS: Performed by: INTERNAL MEDICINE

## 2020-12-07 PROCEDURE — 86901 BLOOD TYPING SEROLOGIC RH(D): CPT

## 2020-12-07 PROCEDURE — 80048 BASIC METABOLIC PNL TOTAL CA: CPT

## 2020-12-07 PROCEDURE — 27200043 HC FORCEPS, BIOPSY: Performed by: INTERNAL MEDICINE

## 2020-12-07 PROCEDURE — 83735 ASSAY OF MAGNESIUM: CPT

## 2020-12-07 PROCEDURE — 25000003 PHARM REV CODE 250: Performed by: NURSE PRACTITIONER

## 2020-12-07 PROCEDURE — 36415 COLL VENOUS BLD VENIPUNCTURE: CPT

## 2020-12-07 RX ORDER — SODIUM CHLORIDE 9 MG/ML
INJECTION, SOLUTION INTRAVENOUS CONTINUOUS PRN
Status: DISCONTINUED | OUTPATIENT
Start: 2020-12-07 | End: 2020-12-07

## 2020-12-07 RX ORDER — PROPOFOL 10 MG/ML
VIAL (ML) INTRAVENOUS
Status: DISCONTINUED | OUTPATIENT
Start: 2020-12-07 | End: 2020-12-07

## 2020-12-07 RX ORDER — CHOLESTYRAMINE 4 G/4.8G
4 POWDER, FOR SUSPENSION ORAL 3 TIMES DAILY PRN
Qty: 21 PACKET | Refills: 1 | Status: ON HOLD | OUTPATIENT
Start: 2020-12-07 | End: 2020-12-19 | Stop reason: SDUPTHER

## 2020-12-07 RX ORDER — METOPROLOL SUCCINATE 50 MG/1
50 TABLET, EXTENDED RELEASE ORAL DAILY
Start: 2020-12-07 | End: 2021-09-11

## 2020-12-07 RX ADMIN — CHLORHEXIDINE GLUCONATE 15 ML: 1.2 RINSE ORAL at 01:12

## 2020-12-07 RX ADMIN — PROPOFOL 30 MG: 10 INJECTION, EMULSION INTRAVENOUS at 12:12

## 2020-12-07 RX ADMIN — DICYCLOMINE HYDROCHLORIDE 20 MG: 10 CAPSULE ORAL at 01:12

## 2020-12-07 RX ADMIN — PROPOFOL 40 MG: 10 INJECTION, EMULSION INTRAVENOUS at 12:12

## 2020-12-07 RX ADMIN — PROPOFOL 50 MG: 10 INJECTION, EMULSION INTRAVENOUS at 12:12

## 2020-12-07 RX ADMIN — PROPOFOL 20 MG: 10 INJECTION, EMULSION INTRAVENOUS at 12:12

## 2020-12-07 RX ADMIN — MAGNESIUM OXIDE 400 MG: 400 TABLET ORAL at 01:12

## 2020-12-07 RX ADMIN — ASPIRIN 81 MG: 81 TABLET, DELAYED RELEASE ORAL at 01:12

## 2020-12-07 RX ADMIN — POLYETHYLENE GLYCOL 3350, SODIUM CHLORIDE, SODIUM BICARBONATE AND POTASSIUM CHLORIDE 2000 ML: 420; 5.72; 11.2; 1.48 POWDER, FOR SOLUTION ORAL at 03:12

## 2020-12-07 RX ADMIN — PANTOPRAZOLE SODIUM 40 MG: 40 TABLET, DELAYED RELEASE ORAL at 01:12

## 2020-12-07 RX ADMIN — SIMETHICONE 20 MG: 20 SUSPENSION/ DROPS ORAL at 12:12

## 2020-12-07 RX ADMIN — DICYCLOMINE HYDROCHLORIDE 20 MG: 10 CAPSULE ORAL at 12:12

## 2020-12-07 RX ADMIN — Medication 2000 UNITS: at 01:12

## 2020-12-07 RX ADMIN — SODIUM BICARBONATE 650 MG TABLET 650 MG: at 01:12

## 2020-12-07 RX ADMIN — ATORVASTATIN CALCIUM 80 MG: 40 TABLET, FILM COATED ORAL at 01:12

## 2020-12-07 RX ADMIN — MUPIROCIN: 20 OINTMENT TOPICAL at 01:12

## 2020-12-07 RX ADMIN — SODIUM CHLORIDE: 0.9 INJECTION, SOLUTION INTRAVENOUS at 12:12

## 2020-12-07 RX ADMIN — DICYCLOMINE HYDROCHLORIDE 20 MG: 10 CAPSULE ORAL at 05:12

## 2020-12-07 RX ADMIN — POTASSIUM CHLORIDE 20 MEQ: 20 TABLET, EXTENDED RELEASE ORAL at 01:12

## 2020-12-07 RX ADMIN — RIFAXIMIN 550 MG: 550 TABLET ORAL at 01:12

## 2020-12-07 NOTE — ANESTHESIA PREPROCEDURE EVALUATION
12/07/2020  Jose English is a 70 y.o., male.    Patient Active Problem List   Diagnosis    Bladder cancer, 2008.    Cancer of kidney, clear cell 2008.    Lower urinary tract symptoms (LUTS)    HTN (hypertension)    PAD (peripheral artery disease)    BPH (benign prostatic hyperplasia)    Hyperuricemia without signs of inflammatory arthritis and tophaceous disease    Insomnia    Sleep disturbance    Snoring    CHRISTY (obstructive sleep apnea)    Mixed hyperlipidemia    Medically noncompliant    Diabetes mellitus with peripheral vascular disease    Pre-op evaluation    Hypomagnesemia    Encounter for dietary consultation    Diarrhea    Phlegmon    Intra-abdominal infection    Anastomotic leak of intestine    Gout    Dementia    Hypokalemia    Acute blood loss anemia    Dyspepsia    JEREMIAH (acute kidney injury)    Hypotension    Obesity    Metabolic acidosis       Past Surgical History:   Procedure Laterality Date    abdominal aorta bypass by fem      BLADDER SURGERY      COLONOSCOPY  12/06/2018    COLONOSCOPY N/A 12/6/2018    Procedure: COLONOSCOPY;  Surgeon: Familia Dickson MD;  Location: [facility];  Service: General;  Laterality: N/A;    CYSTOSCOPY      ENDOSCOPIC ULTRASOUND OF UPPER GASTROINTESTINAL TRACT N/A 2/20/2019    Procedure: ULTRASOUND, UPPER GI TRACT, ENDOSCOPIC;  Surgeon: Govind Hassan MD;  Location: [facility] ([address] [place] [address]);  Service: Endoscopy;  Laterality: N/A;  Please schedule EGD and EUS, for celiac sprue biopsies, and evaluation of pancreatitis.  5 day hold Plavix, Dr Jarret Calle  PM prep    ESOPHAGOGASTRODUODENOSCOPY N/A 2/20/2019    Procedure: EGD (ESOPHAGOGASTRODUODENOSCOPY);  Surgeon: Govind Hassan MD;  [facility]: [facility] ([facility]  Service: Endoscopy;  Laterality: N/A;  Please schedule EGD and EUS, for celiac sprue biopsies, and evaluation of  pancreatitis.  5 day hold Plavix, Dr Jarret Calle  PM prep    multiple angiosplastia      NEPHRECTOMY      partial nephrectomy left    PERCUTANEOUS TRANSLUMINAL ANGIOPLASTY (PTA) OF PERIPHERAL VESSEL Bilateral 8/21/2018    Procedure: PTA, PERIPHERAL VESSEL;  Surgeon: Patrick Love MD;  Location: [facility] CATH;  Service: Cardiology;  Laterality: Bilateral;  Please schedule Left common iliac artery intervention and b/l LE angio via left brachial on August 21        Tobacco Use:  The patient  reports that he quit smoking about 12 years ago. His smoking use included cigarettes. He has a 60.00 pack-year smoking history. He has never used smokeless tobacco.     Results for orders placed or performed during the hospital encounter of 12/02/20   EKG 12-lead    Collection Time: 12/02/20  3:56 PM    Narrative    Test Reason : R10.9,    Vent. Rate : 056 BPM     Atrial Rate : 056 BPM     P-R Int : 172 ms          QRS Dur : 086 ms      QT Int : 450 ms       P-R-T Axes : 067 072 082 degrees     QTc Int : 434 ms    Sinus bradycardia  Otherwise normal ECG  When compared with ECG of 12-JAN-2019 18:49,  ST elevation now present in Inferior leads    Referred By: [email or web address]   SELF           Confirmed By:         Imaging Results          US Kidney (Final result)  Result time 12/02/20 19:53:16    Final result by Jesus Galeano MD (12/02/20 19:53:16)                 Narrative:    US KIDNEY    CLINICAL HISTORY:  70 years Male olga    COMPARISON: CT abdomen and pelvis same day    FINDINGS:    Right kidney measures 10.9 cm in length. No cystic or solid right  renal lesion. No hydronephrosis.    IVC is unremarkable. Aorta was not visualized due to bowel gas.    Left kidney measures 8.3 cm in length. There is an echogenic focus at  the lower pole the left kidney, corresponding to a rim calcified  lesion described on the reference CT.  This lesion is not well  evaluated sonographically due to calcification. There is no  hydronephrosis of  the left kidney.    Urinary bladder is incompletely distended and otherwise unremarkable.    Impression:    Negative for hydronephrosis.    Rim calcified lesion seen on reference CT at the lower pole the left  kidney is not well assessed sonographically. Follow-up renal protocol  CT is again recommended.    Electronically Signed by Jesus MARTINEZ on 12/2/2020 7:59 PM                             CT Abdomen Pelvis  Without Contrast (Final result)  Result time 12/02/20 18:19:49    Final result by Jesus Galeano MD (12/02/20 18:19:49)                 Impression:      No noncontrast CT evidence of acute pathology involving the abdomen or pelvis.    Cholelithiasis.    Exophytic lesion arising from the lower pole the left kidney demonstrating both soft tissue attenuation as well as fatty elements, with peripheral calcification.  A combination of fat necrosis/scarring, or angiomyolipoma are possibilities.  Correlation with surgical history as well as follow-up renal protocol CT is recommended.    Extensive arterial vascular disease, status post aortic bypass.    Additional incidental findings as above including postoperative changes of the colon and bilateral adrenal adenomas.      Electronically signed by: Jesus Galeano MD  Date:    12/02/2020  Time:    18:19             Narrative:    EXAMINATION:  CT ABDOMEN PELVIS WITHOUT CONTRAST    CLINICAL HISTORY:  Abdominal pain, acute, nonlocalized;    TECHNIQUE:  CMS Mandated Quality Data-CT Radiation Dose-436    All CT scans at this facility dose modulation, iterative reconstruction, and or weight-based dosing when appropriate to reduce radiation dose to as low as reasonably achievable.    COMPARISON:  CT abdomen and pelvis 01/23/2019    FINDINGS:  Lung bases are clear.  Bone window images demonstrate lumbar spondylosis.  No acute or aggressive osseous abnormality.    On this unenhanced exam, no focal hepatic lesion.  Small calcified gallstone appears to be present  within the gallbladder neck.  The gallbladder is collapsed.  No bile duct dilation.  Spleen and pancreas are unremarkable.  Stable bilateral adrenal nodules consistent with benign adenomas.  Bilateral perinephric stranding.  Bilateral renal vascular calcifications.  No hydronephrosis.  At the lower pole the left kidney there is a calcified lesion demonstrating both soft tissue and fatty attenuation which has been present on multiple prior exams.  Ureters are normal in caliber.  Urinary bladder is collapsed.    Stomach is unremarkable.  No evidence small-bowel obstruction.  Postoperative changes of the colon.  No evidence of acute colitis.    Extensive atherosclerotic calcification of the abdominal aorta and its branch vessels.  A stent is present within the celiac artery.  Aortic bypass.  No free fluid or free air within the abdomen or pelvis.  No pathologically enlarged abdominopelvic lymph nodes.  Intramuscular lipoma involving the right gluteal musculature.  Soft tissue mass of the anterior left thigh demonstrating fatty elements is also stable from prior.                               X-Ray Chest AP Portable (Final result)  Result time 12/02/20 17:05:12    Final result by Jesus Galeano MD (12/02/20 17:05:12)                 Narrative:    XR CHEST AP PORTABLE    CLINICAL HISTORY:  70 years Male Chest Pain    COMPARISON: None    FINDINGS: Cardiac silhouette size is within normal limits.  Atherosclerotic calcification of the liver.    No confluent airspace disease. No pleural fluid or pneumothorax. No  acute osseous abnormality. Surgical clips within the neck.    IMPRESSION: No acute pulmonary process.    Electronically Signed by Jesus MARTINEZ on 12/2/2020 5:07 PM                               Lab Results   Component Value Date    WBC 6.43 12/07/2020    HGB 9.8 (L) 12/07/2020    HCT 30.4 (L) 12/07/2020    MCV 94 12/07/2020     12/07/2020     BMP  Lab Results   Component Value Date      12/07/2020    K 4.0 12/07/2020    CL 99 12/07/2020    CO2 29 12/07/2020    BUN 23 12/07/2020    CREATININE 1.3 12/07/2020    CALCIUM 8.2 (L) 12/07/2020    ANIONGAP 8 12/07/2020     (H) 12/07/2020    GLU 96 12/06/2020    GLU 98 12/05/2020       No results found for this or any previous visit.      Anesthesia Evaluation    I have reviewed the Patient Summary Reports.    I have reviewed the Nursing Notes. I have reviewed the NPO Status.   I have reviewed the Medications.     Review of Systems  Anesthesia Hx:  No problems with previous Anesthesia  History of prior surgery of interest to airway management or planning: nephrectomy. Previous anesthesia: General, MAC Denies Family Hx of Anesthesia complications.   Denies Personal Hx of Anesthesia complications.   Social:  Former Smoker, No Alcohol Use    Hematology/Oncology:         -- Anemia: Hematology Comments: Acute blood loss anemia  --  Cancer in past history:  surgery and chemotherapy  Oncology Comments: Bladder cancer  Cancer of kidney     Cardiovascular:   Hypertension, well controlled CAD asymptomatic  PVD hyperlipidemia ECG has been reviewed.    Pulmonary:   Sleep Apnea    Education provided regarding risk of obstructive sleep apnea     Renal/:   Chronic Renal Disease, ARF, CRI BPH    Hepatic/GI:   Bowel Prep. GERD, well controlled Diarrhea  No active nausea vomiting  No intestinal obstruction   Musculoskeletal:   Arthritis  Gout   Neurological:   TIA, CVA, no residual symptoms  Dementia mild    Endocrine:   Diabetes, poorly controlled, type 2    Psych:   Psychiatric History          Physical Exam  General:  Obesity, Well nourished    Airway/Jaw/Neck:  Airway Findings: Mouth Opening: Normal Tongue: Normal  General Airway Assessment: Adult  Mallampati: III  Improves to II with phonation.  TM Distance: 4 - 6 cm  Jaw/Neck Findings:  Neck ROM: Normal ROM      Dental:  Dental Findings: Periodontal disease, Severe    Chest/Lungs:  Chest/Lungs Findings: Clear  to auscultation, Normal Respiratory Rate     Heart/Vascular:  Heart Findings: Rate: Normal  Rhythm: Regular Rhythm  Sounds: Normal        Mental Status:  Mental Status Findings:  Cooperative, Alert and Oriented         Anesthesia Plan  Type of Anesthesia, risks & benefits discussed:  Anesthesia Type:  MAC  Patient's Preference:   Intra-op Monitoring Plan: standard ASA monitors  Intra-op Monitoring Plan Comments:   Post Op Pain Control Plan: per primary service following discharge from PACU  Post Op Pain Control Plan Comments:   Induction:    Beta Blocker:  Patient is on a Beta-Blocker and has received one dose within the past 24 hours (No further documentation required).       Informed Consent: Patient understands risks and agrees with Anesthesia plan.  Questions answered. Anesthesia consent signed with patient.  ASA Score: 4     Day of Surgery Review of History & Physical:        Anesthesia Plan Notes: MAC with Propofol   POM Mask        Ready For Surgery From Anesthesia Perspective.

## 2020-12-07 NOTE — ANESTHESIA POSTPROCEDURE EVALUATION
Anesthesia Post Evaluation    Patient: Jose English    Procedure(s) Performed: Procedure(s) (LRB):  COLONOSCOPY (N/A)    Final Anesthesia Type: MAC    Patient location during evaluation: GI PACU  Patient participation: Yes- Able to Participate  Level of consciousness: awake and alert, oriented and awake  Post-procedure vital signs: reviewed and stable  Pain management: adequate  Airway patency: patent    PONV status at discharge: No PONV  Anesthetic complications: no      Cardiovascular status: blood pressure returned to baseline, hemodynamically stable and stable  Respiratory status: unassisted, spontaneous ventilation and room air  Hydration status: euvolemic  Follow-up not needed.  Comments: The patient states that he was comfortable for the procedure and is without recall the procedure.          Vitals Value Taken Time   /59 12/07/20 1245   Temp 36.3 °C (97.3 °F) 12/07/20 1150   Pulse 65 12/07/20 1249   Resp 12 12/07/20 1249   SpO2 96 % 12/07/20 1249   Vitals shown include unvalidated device data.      No case tracking events are documented in the log.      Pain/Kermit Score: No data recorded

## 2020-12-07 NOTE — TRANSFER OF CARE
"Anesthesia Transfer of Care Note    Patient: Jose English    Procedure(s) Performed: Procedure(s) (LRB):  COLONOSCOPY (N/A)    Patient location: GI    Anesthesia Type: MAC    Transport from OR: Transported from OR on 2-3 L/min O2 by NC with adequate spontaneous ventilation    Post pain: adequate analgesia    Post assessment: no apparent anesthetic complications    Post vital signs: stable    Level of consciousness: awake and alert    Nausea/Vomiting: no nausea/vomiting    Complications: none    Transfer of care protocol was followed      Last vitals:   Visit Vitals  BP (!) 158/63   Pulse 71   Temp 36.3 °C (97.3 °F)   Resp 18   Ht 5' 5" (1.651 m)   Wt 84.7 kg (186 lb 11.7 oz)   SpO2 100%   BMI 31.07 kg/m²     "

## 2020-12-07 NOTE — ASSESSMENT & PLAN NOTE
This is a chronic ongoing issue for pt ,for years  He has significant PAD and associated Ischemic colitis  Also H/o R Colectomy and associated diarrhea a possibility  Added Cholestyramine and rifaximin(Recieved this before)  Diarrhea almost resolved  Colonoscopy on 12/07  And can go home after that

## 2020-12-07 NOTE — PROGRESS NOTES
[facility] Medicine  Progress Note    Patient Name: Jose English  MRN: [id number]  Patient Class: IP- Inpatient   Admission Date: 12/2/2020  Length of Stay: 4 days  Attending Physician: Gerardo Truong MD  Primary Care Provider: Primary Doctor No        Subjective:     Principal Problem:JEREMIAH (acute kidney injury)        HPI:  70 year old male multiple medical problems including CAD, PAD, DM,  right-sided colectomy for ischemic colitis 10/31/2018, BPH, bladder and renal cancer, gastroesophageal reflux, hypertension, disease, chronic kidney disease, dementia  Underwent  mesenteric artery stenting a week ago at [facility] for suspected recurrent ischemic colitis.   Resented to the ED after being seen by Dr. Loya earlier today  Reports persistent nausea, vomiting, diarrhea for the past 3 weeks.  His wife reports that he had approximately 22-30 loose large diarrhea stool per day  Denies fever but have been having chills  Lost 28 lb since October this year  Found to be hyportensive with systolic blood pressures in the 80s and near syncopal episode at gastroenterologist's office  Denies shortness of breath or chest pain  Reports epigastric discomfort with retching/vomiting and moderate to severe diffuse abdominal pain  Has not been urinating much    In the ED  Afebrile  Hypertensive  WBC 11.98, hemoglobin 12.8, hematocrit 37.3, platelets 317  , creatinine 6.2, CO2 14, anion gap 20, potassium 3.5  Lactic acid 1.6             Overview/Hospital Course:  12/03  Still having significant diarrhea  JEREMIAH getting better  No new issues    12/04  Pt Much better  Diarrhea resolved    12/05  Much improved  Diarrhea almost resolved  GI planning colonoscopy on Monday ?    12/06  Diarhhea and renal function stable  Will have colonoscopy tomorrow    Interval History:     Review of Systems   Constitutional: Negative for activity change and appetite change.   HENT: Negative for congestion and dental  problem.    Eyes: Negative for discharge and itching.   Respiratory: Negative for shortness of breath.    Cardiovascular: Negative for chest pain.   Gastrointestinal: Negative for abdominal distention and abdominal pain.   Endocrine: Negative for cold intolerance.   Genitourinary: Negative for difficulty urinating and dysuria.   Musculoskeletal: Negative for arthralgias and back pain.   Skin: Negative for color change.   Neurological: Negative for dizziness and facial asymmetry.   Hematological: Negative for adenopathy.   Psychiatric/Behavioral: Negative for agitation and behavioral problems.     Objective:     Vital Signs (Most Recent):  Temp: 98.7 °F (37.1 °C) (12/06/20 1703)  Pulse: 76 (12/06/20 1703)  Resp: 18 (12/06/20 1703)  BP: (!) 165/65 (12/06/20 1703)  SpO2: 100 % (12/06/20 1703) Vital Signs (24h Range):  Temp:  [97.8 °F (36.6 °C)-98.7 °F (37.1 °C)] 98.7 °F (37.1 °C)  Pulse:  [] 76  Resp:  [17-18] 18  SpO2:  [95 %-100 %] 100 %  BP: (108-190)/(59-79) 165/65     Weight: 84.7 kg (186 lb 11.7 oz)  Body mass index is 31.07 kg/m².    Intake/Output Summary (Last 24 hours) at 12/6/2020 1927  Last data filed at 12/6/2020 1247  Gross per 24 hour   Intake --   Output 3500 ml   Net -3500 ml      Physical Exam  Vitals signs and nursing note reviewed.   Constitutional:       Appearance: He is well-developed.   HENT:      Head: Atraumatic.      Right Ear: External ear normal.      Left Ear: External ear normal.      Nose: Nose normal.      Mouth/Throat:      Mouth: Mucous membranes are moist.   Eyes:      Conjunctiva/sclera: Conjunctivae normal.   Neck:      Musculoskeletal: Full passive range of motion without pain and normal range of motion.   Cardiovascular:      Rate and Rhythm: Normal rate.   Pulmonary:      Effort: Pulmonary effort is normal.   Abdominal:      General: There is no distension.   Musculoskeletal: Normal range of motion.   Skin:     General: Skin is warm.   Neurological:      Mental Status: He  is alert and oriented to person, place, and time.   Psychiatric:         Behavior: Behavior normal.         Significant Labs:   CBC:   Recent Labs   Lab 12/05/20  0555 12/06/20  0600   WBC 6.78 6.63   HGB 9.4* 9.7*   HCT 27.6* 28.8*    168     CMP:   Recent Labs   Lab 12/05/20  0009 12/05/20  0555 12/06/20  0600    140 144   K 4.0 3.8 4.2    102 105   CO2 26 29 29   * 98 96   BUN 84* 67* 37*   CREATININE 1.6* 1.5* 1.2   CALCIUM 8.6* 8.5* 8.6*   ANIONGAP 11 9 10   EGFRNONAA 43.6* 47.2* >60.0       Significant Imaging: I have reviewed all pertinent imaging results/findings within the past 24 hours.      Assessment/Plan:      * JEREMIAH (acute kidney injury)  Due to severe dehydration from chronic diarrhoea, hypotension, and contrast induced nephropathy in the background of CKD 2  ATN/JEREMIAH  Much better after received iv fluids  Renal Function back to baseline        Diarrhea  This is a chronic ongoing issue for pt ,for years  He has significant PAD and associated Ischemic colitis  Also H/o R Colectomy and associated diarrhea a possibility  Added Cholestyramine and rifaximin(Recieved this before)  Diarrhea almost resolved  Colonoscopy on 12/07  And can go home after that      PAD (peripheral artery disease)  Recent Mesenteric artery stenting a week ago   Continue aspirin/statin/plavix  H/o PTA L SFA and PTA and stent placement of left iliac artery, aorto-bifem bypass done in 2008   Also ischemic colitis with bowel perforation s/p R colectomy at [facility] on 10/31/18.    Metabolic acidosis  Combination of AGMA/NAGMA  Received iv fluids with HCO3-  Much better. HCO3- gtt D/C ed      Obesity  Need Therapeutic life style measures  Recently lost  significant amount of weight      Hypotension  Due to severe dehydration  Resolved      Hypokalemia  Monitor and replete      Dementia  Stable      Hypomagnesemia  Monitor and replete      Diabetes mellitus with peripheral vascular disease  Hemoglobin A1c 7.2 about 5  months ago  Monitor blood glucose  Low-dose sliding scale insulin      Medically noncompliant  Per Chart review      Cancer of kidney, clear cell 2008.  H/o Partial L Nephrectomy      Bladder cancer, 2008.  X 2  Stable issue now        VTE Risk Mitigation (From admission, onward)         Ordered     IP VTE HIGH RISK PATIENT  Once      12/02/20 1914     Place sequential compression device  Until discontinued      12/02/20 1914                Discharge Planning   UMANG:      Code Status: Full Code   Is the patient medically ready for discharge?:     Reason for patient still in hospital (select all that apply): Treatment  Discharge Plan A: Home with family                  Gerardo Truong MD  Department of Hospital Medicine   [facility]

## 2020-12-07 NOTE — PLAN OF CARE
12/07/20 1353   Final Note   Assessment Type Final Discharge Note   Anticipated Discharge Disposition Home   Reviewed chart and no needs at this time.

## 2020-12-07 NOTE — [employer] PATIENT INSTRUCTIONS
Discharge Summary/Instructions after an Endoscopic Procedure  Patient Name: Jose English  Patient MRN: [id number]  Patient YOB: 1950 Monday, December 7, 2020  Andi Loya MD  RESTRICTIONS:  During your procedure today, you received medications for sedation.  These   medications may affect your judgment, balance and coordination.  Therefore,   for 24 hours, you have the following restrictions:   - DO NOT drive a car, operate machinery, make legal/financial decisions,   sign important papers or drink alcohol.    ACTIVITY:  Today: no heavy lifting, straining or running due to procedural   sedation/anesthesia.  The following day: return to full activity including work.  DIET:  Eat and drink normally unless instructed otherwise.     TREATMENT FOR COMMON SIDE EFFECTS:  - Mild abdominal pain, nausea, belching, bloating or excessive gas:  rest,   eat lightly and use a heating pad.  - Sore Throat: treat with throat lozenges and/or gargle with warm salt   water.  - Because air was used during the procedure, expelling large amounts of air   from your rectum or belching is normal.  - If a bowel prep was taken, you may not have a bowel movement for 1-3 days.    This is normal.  SYMPTOMS TO WATCH FOR AND REPORT TO YOUR PHYSICIAN:  1. Abdominal pain or bloating, other than gas cramps.  2. Chest pain.  3. Back pain.  4. Signs of infection such as: chills or fever occurring within 24 hours   after the procedure.  5. Rectal bleeding, which would show as bright red, maroon, or black stools.   (A tablespoon of blood from the rectum is not serious, especially if   hemorrhoids are present.)  6. Vomiting.  7. Weakness or dizziness.  GO DIRECTLY TO THE NEAREST EMERGENCY ROOM IF YOU HAVE ANY OF THE FOLLOWING:      Difficulty breathing              Chills and/or fever over 101 F   Persistent vomiting and/or vomiting blood   Severe abdominal pain   Severe chest pain   Black, tarry stools   Bleeding- more than one  tablespoon   Any other symptom or condition that you feel may need urgent attention  Your doctor recommends these additional instructions:  If any biopsies were taken, your doctors clinic will contact you in 1 to 2   weeks with any results.  - [facility] to [facility].   -Cholestyramine 1 g TID PRN   -Xifaxan 550 mg po tid x 10 days  -follow up in 10 days  For questions, problems or results please call your physician - Andi Loya MD at Work:  [phone].  [facility], EMERGENCY ROOM PHONE NUMBER: [phone]  IF A COMPLICATION OR EMERGENCY SITUATION ARISES AND YOU ARE UNABLE TO REACH   YOUR PHYSICIAN - GO DIRECTLY TO THE EMERGENCY ROOM.  MD Andi Siu MD  12/7/2020 12:38:49 PM  This report has been verified and signed electronically.  [employer]

## 2020-12-07 NOTE — ASSESSMENT & PLAN NOTE
Recent Mesenteric artery stenting a week ago   Continue aspirin/statin/plavix  H/o PTA L SFA and PTA and stent placement of left iliac artery, aorto-bifem bypass done in 2008   Also ischemic colitis with bowel perforation s/p R colectomy at [facility] on 10/31/18.

## 2020-12-07 NOTE — PROGRESS NOTES
INPATIENT NEPHROLOGY PROGRESS NOTE   [facility] NEPHROLOGY    Patient Name: Jose English  Date: 12/07/2020    Reason for consultation: JEREMIAH    Chief Complaint:   Chief Complaint   Patient presents with    Abdominal Pain     X SEVERAL WEEKS, SENT FROM PCP FOR EVAL OF LOW BP    Vomiting    Diarrhea       History of Present Illness:  70 year old male multiple medical problems including CAD, PAD, DM,  right-sided colectomy for ischemic colitis 10/31/2018, BPH, bladder and renal cancer, gastroesophageal reflux, hypertension, disease, chronic kidney disease, dementia who underwent  mesenteric artery stenting a week ago at [facility] for suspected recurrent ischemic colitis.  Represented to the ED after being seen by Dr. Loya earlier today  Reports persistent nausea, vomiting, diarrhea for the past 3 weeks. His wife reports that he had approximately 22-30 loose large diarrhea stool per day. Denies fever but has been having chills. Lost 28 lb since October this year. Found to be hyportensive with systolic blood pressures in the 80s and near syncopal episode at gastroenterologist's office. Denies shortness of breath or chest pain. Reports epigastric discomfort with retching/vomiting and moderate to severe diffuse abdominal pain. Has not been urinating much. We are consulted for JEREMIAH.    Home meds: metoprolol, norvasc, valsartan, HCTZ    Renal u/s:  Right kidney measures 10.9 cm in length. No cystic or solid right renal lesion. No hydronephrosis.  IVC is unremarkable. Aorta was not visualized due to bowel gas. Left kidney measures 8.3 cm in length. There is an echogenic focus at the lower pole the left kidney, corresponding to a rim calcified lesion described on the reference CT.  This lesion is not well evaluated sonographically due to calcification. There is no  hydronephrosis of the left kidney. Urinary bladder is incompletely distended and otherwise unremarkable.     Interval History:  12/3- hypotensive,  on RA, with cash; had BM this AM, denies cp/dyspnea/edema, UA + hyaline casts, urine Na < 10  12/4- BP is better, on RA, UOP 3.4L, feels much better, eating well- still with some lower abd pain, no cp/dyspnea/edema  12/5- isolated hypotension this AM, on RA, UOP 3L  12/6  Scr wnl.  VSS.  UOP 2.9L.  K+ at goal.  No new complaints.  Possible colonoscopy tomorrow.  12/7  No nausea, chest pain, sob, fever, urinary or bowel complaint, new neurologic symptoms, new joint pain,      Plan of Care:    1. Severe JEREMIAH secondary to intravascular volume depletion in setting of profound GI losses (supported by hypotension, urine studies); baseline serum Cr 1.2 (CKD II)  - renal function is improving steadily after initial volume repletion- anticipate full recovery  - he is nonoliguric  - change to daily renal panel       - no nsaids or IV contrast    2. Hypokalemia- 2/2 GI losses  - improved- start standing repletion KCl 20mEq daily     3. Metabolic acidosis (combined AG 2/2 JEREMIAH and NAG 2/2 diarrhea)  - remains resolved- now alkalotic  - change PO bicarb 650mg to daily (want some on board to maintain CO2 ~ 25 given chronic diarrhea issues)    4. HypoMg/Hypocalcemia  - ordered 2g IV Mg today and will start PO Mg 400mg daily tomorrow  - he has mild vitamin D deficiency- start cholecalciferol 2000 units daily today    5. Anemia of chronic disease  - stable- no acute transfusion needs    6. Baseline HTN  - hold all meds until BP > 150/90 persistently- will then decide what is safe to resume (can start with metoprolol, norvasc; keep ARB and HCTZ on hold at discharge- these aren't ideal meds in a patient prone to volume depletion from chronic GI issues)  - euvolemic  - no acute diuresis needs     7. Colitis  - plan for colonscopy Monday    Updated family.     Thank you for allowing us to participate in this patient's care. We will continue to follow.    Vital Signs:  Temp Readings from Last 3 Encounters:   12/07/20 97.8 °F (36.6 °C)    02/20/19 98.2 °F (36.8 °C)   02/06/19 98.1 °F (36.7 °C)       Pulse Readings from Last 3 Encounters:   12/07/20 80   02/20/19 65   02/07/19 71       BP Readings from Last 3 Encounters:   12/07/20 (!) 154/73   02/20/19 (!) 140/66   02/07/19 108/62       Weight:  Wt Readings from Last 3 Encounters:   12/03/20 84.7 kg (186 lb 11.7 oz)   02/20/19 72.6 kg (160 lb)   02/07/19 72.2 kg (159 lb 2.8 oz)     Medications:  Scheduled Meds:   aspirin  81 mg Oral Daily    atorvastatin  80 mg Oral Daily    chlorhexidine  15 mL Mouth/Throat BID    cholestyramine-aspartame  4 g Oral TID    dicyclomine  20 mg Oral Q6H    donepeziL  10 mg Oral QHS    gabapentin  300 mg Oral QHS    magnesium oxide  400 mg Oral BID    mupirocin   Nasal BID    pantoprazole  40 mg Oral BID    potassium chloride  20 mEq Oral Daily    rifAXImin  550 mg Oral BID    sodium bicarbonate  650 mg Oral Daily    traZODone  150 mg Oral QHS    vitamin D  2,000 Units Oral Daily     Continuous Infusions:    PRN Meds:.acetaminophen, acetaminophen, calcium chloride IVPB, calcium chloride IVPB, calcium chloride IVPB, dextrose 50%, dextrose 50%, glucagon (human recombinant), glucose, glucose, HYDROcodone-acetaminophen, insulin aspart U-100, magnesium oxide, magnesium sulfate IVPB, magnesium sulfate IVPB, magnesium sulfate IVPB, magnesium sulfate IVPB, ondansetron, potassium chloride in water, potassium chloride in water, potassium chloride in water, potassium chloride in water, potassium chloride, potassium chloride, potassium chloride, potassium chloride, promethazine (PHENERGAN) IVPB, sodium chloride 0.9%, sodium phosphate IVPB, sodium phosphate IVPB, sodium phosphate IVPB, sodium phosphate IVPB, sodium phosphate IVPB  No current facility-administered medications on file prior to encounter.      Current Outpatient Medications on File Prior to Encounter   Medication Sig Dispense Refill    allopurinol (ZYLOPRIM) 100 MG tablet Take 100 mg by mouth 2 (two)  times daily.      amlodipine (NORVASC) 10 MG tablet TAKE 1 TABLET ONE TIME DAILY 90 tablet 3    aspirin (ECOTRIN) 81 MG EC tablet Take 81 mg by mouth once daily.      atorvastatin (LIPITOR) 80 MG tablet Take 80 mg by mouth once daily.      b complex vitamins tablet Take 1 tablet by mouth once daily.      clopidogrel (PLAVIX) 75 mg tablet TAKE 1 TABLET EVERY EVENING 90 tablet 3    cyanocobalamin (VITAMIN B-12) 1000 MCG tablet Take 1,000 mcg by mouth.      dicyclomine (BENTYL) 20 mg tablet Take 20 mg by mouth every 6 (six) hours.      diphenoxylate-atropine 2.5-0.025 mg (LOMOTIL) 2.5-0.025 mg per tablet diphenoxylate-atropine 2.5 mg-0.025 mg tablet      donepezil (ARICEPT) 5 MG tablet Take 10 mg by mouth every evening.      gabapentin (NEURONTIN) 300 MG capsule Take 300 mg by mouth every evening.      glipiZIDE (GLUCOTROL) 5 MG TR24 glipizide ER 5 mg tablet, extended release 24 hr   TK 1 T PO BID      hydrochlorothiazide (HYDRODIURIL) 25 MG tablet TAKE 1 TABLET EVERY MORNING 90 tablet 3    loperamide (IMODIUM) 2 mg capsule Take 2 mg by mouth 4 (four) times daily as needed for Diarrhea.      metoprolol succinate (TOPROL-XL) 50 MG 24 hr tablet metoprolol succinate ER 50 mg tablet,extended release 24 hr   TK 2 TS PO QD      metoprolol tartrate (LOPRESSOR) 50 MG tablet TAKE 1 TABLET TWICE DAILY 180 tablet 3    mv,Ca,min-iron-FA-lycopene 8 mg iron- 200 mcg-600 mcg Tab Take 1 capsule by mouth once daily.      ondansetron (ZOFRAN) 4 MG tablet Take 4 mg by mouth every 8 (eight) hours as needed for Nausea.      pantoprazole (PROTONIX) 40 MG tablet Take 40 mg by mouth once daily.      temazepam (RESTORIL) 30 mg capsule temazepam 30 mg capsule      valsartan (DIOVAN) 320 MG tablet Take 320 mg by mouth once daily.      ACCU-CHEK CEASAR CONTROL SOLN Soln USE ONE TIME DAILY 1 each 3    ACCU-CHEK CEASAR PLUS TEST STRP Strp TEST TWO TIMES DAILY 200 strip 3    ACCU-CHEK SOFTCLIX LANCETS Misc TEST TWO TIMES DAILY  "200 each 3    BD ALCOHOL SWABS PadM TEST TWO TIMES DAILY 200 each 4    bismuth subsalicylate (KAOPECTATE, BISMUTH SUBSALICY,) 262 mg Tab Take by mouth.      cholestyramine (QUESTRAN) 4 gram packet Take 1 packet (4 g total) by mouth 2 (two) times daily. 180 packet 3    fish oil-omega-3 fatty acids 300-1,000 mg capsule Take 2 g by mouth once daily.      flu vacc [id number],65yr up,PF (FLUZONE HIGH-DOSE 2019-20, PF,) 180 mcg/0.5 mL Syrg Fluzone High-Dose 2019-20 (PF) 180 mcg/0.5 mL intramuscular syringe      lancets (ONE TOUCH DELICA LANCETS) 33 gauge Misc 2 lancets by Misc.(Non-Drug; Combo Route) route 2 (two) times daily. 600 each 4       Review of Systems:  Review of Systems - All 14 systems reviewed and negative, except as noted in HPI    Physical Exam:  BP (!) 154/73   Pulse 80   Temp 97.8 °F (36.6 °C)   Resp 18   Ht 5' 5" (1.651 m)   Wt 84.7 kg (186 lb 11.7 oz)   SpO2 98%   BMI 31.07 kg/m²     INS/OUTS:  I/O last 3 completed shifts:  In: -   Out: 4800 [Urine:4800]  No intake/output data recorded.    Constitutional: nad, aao x 3  Heart: rrr, no m/r/g, wwp, no edema  Lungs: ctab, no w/r/r/c, no lb  Abdomen: s/nt/nd, +BS    Results:  Lab Results   Component Value Date     12/07/2020    K 4.0 12/07/2020    CL 99 12/07/2020    CO2 29 12/07/2020    BUN 23 12/07/2020    CREATININE 1.3 12/07/2020    CALCIUM 8.2 (L) 12/07/2020    ANIONGAP 8 12/07/2020    ESTGFRAFRICA >60.0 12/07/2020    EGFRNONAA 56.1 (A) 12/07/2020       Lab Results   Component Value Date    CALCIUM 8.2 (L) 12/07/2020    PHOS 6.6 (H) 12/03/2020       Recent Labs   Lab 12/07/20  0506   WBC 6.43   RBC 3.23*   HGB 9.8*   HCT 30.4*      MCV 94   MCH 30.3   MCHC 32.2       I have personally reviewed pertinent radiological imaging and reports.    I have spent > 35 minutes providing care for this patient for the above diagnoses. These services have included chart/data/imaging review, evaluation, exam, formulation of plan, , " note preparation, and discussions with staff involved in this patient's care.    Renzo Tran MD  Nephrology  [facility] [place]  [phone]

## 2020-12-07 NOTE — SUBJECTIVE & OBJECTIVE
Interval History:     Review of Systems   Constitutional: Negative for activity change and appetite change.   HENT: Negative for congestion and dental problem.    Eyes: Negative for discharge and itching.   Respiratory: Negative for shortness of breath.    Cardiovascular: Negative for chest pain.   Gastrointestinal: Negative for abdominal distention and abdominal pain.   Endocrine: Negative for cold intolerance.   Genitourinary: Negative for difficulty urinating and dysuria.   Musculoskeletal: Negative for arthralgias and back pain.   Skin: Negative for color change.   Neurological: Negative for dizziness and facial asymmetry.   Hematological: Negative for adenopathy.   Psychiatric/Behavioral: Negative for agitation and behavioral problems.     Objective:     Vital Signs (Most Recent):  Temp: 98.7 °F (37.1 °C) (12/06/20 1703)  Pulse: 76 (12/06/20 1703)  Resp: 18 (12/06/20 1703)  BP: (!) 165/65 (12/06/20 1703)  SpO2: 100 % (12/06/20 1703) Vital Signs (24h Range):  Temp:  [97.8 °F (36.6 °C)-98.7 °F (37.1 °C)] 98.7 °F (37.1 °C)  Pulse:  [] 76  Resp:  [17-18] 18  SpO2:  [95 %-100 %] 100 %  BP: (108-190)/(59-79) 165/65     Weight: 84.7 kg (186 lb 11.7 oz)  Body mass index is 31.07 kg/m².    Intake/Output Summary (Last 24 hours) at 12/6/2020 1927  Last data filed at 12/6/2020 1247  Gross per 24 hour   Intake --   Output 3500 ml   Net -3500 ml      Physical Exam  Vitals signs and nursing note reviewed.   Constitutional:       Appearance: He is well-developed.   HENT:      Head: Atraumatic.      Right Ear: External ear normal.      Left Ear: External ear normal.      Nose: Nose normal.      Mouth/Throat:      Mouth: Mucous membranes are moist.   Eyes:      Conjunctiva/sclera: Conjunctivae normal.   Neck:      Musculoskeletal: Full passive range of motion without pain and normal range of motion.   Cardiovascular:      Rate and Rhythm: Normal rate.   Pulmonary:      Effort: Pulmonary effort is normal.   Abdominal:       General: There is no distension.   Musculoskeletal: Normal range of motion.   Skin:     General: Skin is warm.   Neurological:      Mental Status: He is alert and oriented to person, place, and time.   Psychiatric:         Behavior: Behavior normal.         Significant Labs:   CBC:   Recent Labs   Lab 12/05/20  0555 12/06/20  0600   WBC 6.78 6.63   HGB 9.4* 9.7*   HCT 27.6* 28.8*    168     CMP:   Recent Labs   Lab 12/05/20  0009 12/05/20  0555 12/06/20  0600    140 144   K 4.0 3.8 4.2    102 105   CO2 26 29 29   * 98 96   BUN 84* 67* 37*   CREATININE 1.6* 1.5* 1.2   CALCIUM 8.6* 8.5* 8.6*   ANIONGAP 11 9 10   EGFRNONAA 43.6* 47.2* >60.0       Significant Imaging: I have reviewed all pertinent imaging results/findings within the past 24 hours.

## 2020-12-07 NOTE — PLAN OF CARE
Important Message from Medicare was sign, explained and given to patient/caregiver on 12/07/2020 at 8:45am     answered all questions.

## 2020-12-08 NOTE — DISCHARGE SUMMARY
[facility] Medicine  Discharge Summary      Patient Name: Jose English  MRN: [id number]  Admission Date: 12/2/2020  Hospital Length of Stay: 5 days  Discharge Date and Time:  12/07/2020 6:47 PM  Attending Physician: No att. providers found   Discharging Provider: Gerardo Truong MD  Primary Care Provider: Primary Doctor Yanique      HPI:   70 year old male multiple medical problems including CAD, PAD, DM,  right-sided colectomy for ischemic colitis 10/31/2018, BPH, bladder and renal cancer, gastroesophageal reflux, hypertension, disease, chronic kidney disease, dementia  Underwent  mesenteric artery stenting a week ago at [facility] for suspected recurrent ischemic colitis.   Resented to the ED after being seen by Dr. Loya earlier today  Reports persistent nausea, vomiting, diarrhea for the past 3 weeks.  His wife reports that he had approximately 22-30 loose large diarrhea stool per day  Denies fever but have been having chills  Lost 28 lb since October this year  Found to be hyportensive with systolic blood pressures in the 80s and near syncopal episode at gastroenterologist's office  Denies shortness of breath or chest pain  Reports epigastric discomfort with retching/vomiting and moderate to severe diffuse abdominal pain  Has not been urinating much    In the ED  Afebrile  Hypertensive  WBC 11.98, hemoglobin 12.8, hematocrit 37.3, platelets 317  , creatinine 6.2, CO2 14, anion gap 20, potassium 3.5  Lactic acid 1.6             Procedure(s) (LRB):  COLONOSCOPY (N/A)      Hospital Course:   Patient admitted with acute on chronic diarrhea and associated hypovolemia/dehydration with JEREMIAH AGMA/NAGMA  Patient was treated with iv fluids and Rifaximin/Cholestyramine and diarrhea improved  Pt underwent colonoscopy .Patient has significant PAD and associated Ischemic colitis  Also H/o R Colectomy and associated diarrhea a possibility  H/o Recent Mesenteric artery stenting a week ago  before this admission  H/o PTA L SFA and PTA and stent placement of left iliac artery, aorto-bifem bypass done in 2008   Also ischemic colitis with bowel perforation s/p R colectomy at [facility] on 10/31/18.  Patient was discharged to home with meds and he will follow up with GI MD as an OP         Consults:   Consults (From admission, onward)        Status Ordering Provider     Inpatient consult to Gastroenterology  Once     Provider:  Andi Loya MD    Acknowledged ASHWIN GA     Inpatient consult to Nephrology  Once     Provider:  Renzo Tran MD    Completed [facility] new Assessment & Plan notes have been filed under this hospital service since the last note was generated.  Service: Hospital Medicine    Final Active Diagnoses:    Diagnosis Date Noted POA    PAD (peripheral artery disease) [I73.9] 04/10/2014 Yes    Obesity [E66.9] 12/03/2020 Unknown    Dementia [F03.90] 01/14/2019 Yes    Diabetes mellitus with peripheral vascular disease [E11.51] 10/24/2014 Yes    Cancer of kidney, clear cell 2008. [C64.9] 04/09/2013 Yes    Bladder cancer, 2008. [C67.9] 04/09/2013 Yes      Problems Resolved During this Admission:    Diagnosis Date Noted Date Resolved POA    PRINCIPAL PROBLEM:  JEREMIAH (acute kidney injury) [N17.9] 12/02/2020 12/07/2020 Yes    Diarrhea [R19.7] 12/06/2018 12/07/2020 Yes    Metabolic acidosis [E87.2] 12/03/2020 12/07/2020 Unknown    Hypotension [I95.9] 12/02/2020 12/07/2020 Unknown    Hypokalemia [E87.6] 01/14/2019 12/07/2020 Yes    Hypomagnesemia [E83.42] 12/04/2018 12/07/2020 Yes    Medically noncompliant [Z91.19] 10/24/2014 12/07/2020 Not Applicable       Discharged Condition: good    Disposition: Home or Self Care    Follow Up:  Follow-up Information     Andi Loya MD In 2 weeks.    Specialty: Gastroenterology  Contact information:  [address] [place] [address]  [phone]                 Patient Instructions:      Diet Cardiac     Activity as  tolerated       Significant Diagnostic Studies: Labs:   CMP   Recent Labs   Lab 12/06/20  0600 12/07/20  0506    136   K 4.2 4.0    99   CO2 29 29   GLU 96 130*   BUN 37* 23   CREATININE 1.2 1.3   CALCIUM 8.6* 8.2*   ANIONGAP 10 8   ESTGFRAFRICA >60.0 >60.0   EGFRNONAA >60.0 56.1*    and CBC   Recent Labs   Lab 12/06/20  0600 12/07/20  0506   WBC 6.63 6.43   HGB 9.7* 9.8*   HCT 28.8* 30.4*    183       Pending Diagnostic Studies:     Procedure Component Value Units Date/Time    Specimen to Pathology - Surgery [[id number]] Collected: 12/07/20 1233    Order Status: Sent Lab Status: No result     Specimen: Tissue          Medications:  Reconciled Home Medications:      Medication List      START taking these medications    cholestyramine-aspartame 4 gram Pwpk  Commonly known as: QUESTRAN LIGHT  Take 1 packet (4 g total) by mouth 3 (three) times daily as needed (diarrhhoea).     rifAXIMin 550 mg Tab  Commonly known as: XIFAXAN  Take 1 tablet (550 mg total) by mouth 3 (three) times daily. for 10 days        CHANGE how you take these medications    metoprolol succinate 50 MG 24 hr tablet  Commonly known as: TOPROL-XL  Take 1 tablet (50 mg total) by mouth once daily.  What changed: See the new instructions.        CONTINUE taking these medications    ACCU-CHEK CEASAR CONTROL SOLN Soln  Generic drug: blood glucose control high,low  USE ONE TIME DAILY     ACCU-CHEK CEASAR PLUS TEST STRP Strp  Generic drug: blood sugar diagnostic  TEST TWO TIMES DAILY     amLODIPine 10 MG tablet  Commonly known as: NORVASC  TAKE 1 TABLET ONE TIME DAILY     aspirin 81 MG EC tablet  Commonly known as: ECOTRIN  Take 81 mg by mouth once daily.     atorvastatin 80 MG tablet  Commonly known as: LIPITOR  Take 80 mg by mouth once daily.     b complex vitamins tablet  Take 1 tablet by mouth once daily.     BD ALCOHOL SWABS Padm  Generic drug: alcohol swabs  TEST TWO TIMES DAILY     clopidogreL 75 mg tablet  Commonly known as:  PLAVIX  TAKE 1 TABLET EVERY EVENING     cyanocobalamin 1000 MCG tablet  Commonly known as: VITAMIN B-12  Take 1,000 mcg by mouth.     dicyclomine 20 mg tablet  Commonly known as: BENTYL  Take 20 mg by mouth every 6 (six) hours.     donepeziL 5 MG tablet  Commonly known as: ARICEPT  Take 10 mg by mouth every evening.     gabapentin 300 MG capsule  Commonly known as: NEURONTIN  Take 300 mg by mouth every evening.     glipiZIDE 5 MG Tr24  Commonly known as: GLUCOTROL  glipizide ER 5 mg tablet, extended release 24 hr   TK 1 T PO BID     Kaopectate (BISMUTH subsalicy) 262 mg Tab  Generic drug: bismuth subsalicylate  Take by mouth.     * lancets 33 gauge Misc  Commonly known as: ONETOUCH DELICA LANCETS  2 lancets by Misc.(Non-Drug; Combo Route) route 2 (two) times daily.     * ACCU-CHEK SOFTCLIX LANCETS Misc  Generic drug: lancets  TEST TWO TIMES DAILY     loperamide 2 mg capsule  Commonly known as: IMODIUM  Take 2 mg by mouth 4 (four) times daily as needed for Diarrhea.     pantoprazole 40 MG tablet  Commonly known as: PROTONIX  Take 40 mg by mouth once daily.     temazepam 30 mg capsule  Commonly known as: RESTORIL  temazepam 30 mg capsule         * This list has 2 medication(s) that are the same as other medications prescribed for you. Read the directions carefully, and ask your doctor or other care provider to review them with you.            STOP taking these medications    allopurinoL 100 MG tablet  Commonly known as: ZYLOPRIM     cholestyramine 4 gram packet  Commonly known as: QUESTRAN     diphenoxylate-atropine 2.5-0.025 mg 2.5-0.025 mg per tablet  Commonly known as: LOMOTIL     fish oil-omega-3 fatty acids 300-1,000 mg capsule     FLUZONE HIGH-DOSE 2019-20 (PF) 180 mcg/0.5 mL Syrg  Generic drug: flu vacc [id number](65yr up)PF     hydroCHLOROthiazide 25 MG tablet  Commonly known as: HYDRODIURIL     metoprolol tartrate 50 MG tablet  Commonly known as: LOPRESSOR     mv,Ca,min-iron-FA-lycopene 8 mg iron- 200 mcg-600  mcg Tab     ondansetron 4 MG tablet  Commonly known as: ZOFRAN     PNEUMOVAX-23 25 mcg/0.5 mL vaccine  Generic drug: pneumococcal 23-jose ps     valsartan 320 MG tablet  Commonly known as: DIOVAN            Indwelling Lines/Drains at time of discharge:   Lines/Drains/Airways     None                 Time spent on the discharge of patient: 45  minutes  Patient was seen and examined on the date of discharge and determined to be suitable for discharge.         Gerardo Truong MD  Department of Hospital Medicine  [facility]

## 2020-12-10 LAB — O+P STL TRI STN: NORMAL

## 2020-12-15 ENCOUNTER — HOSPITAL ENCOUNTER (INPATIENT)
Facility: HOSPITAL | Age: 70
LOS: 5 days | Discharge: HOME | DRG: 683 | End: 2020-12-20
Attending: EMERGENCY MEDICINE | Admitting: FAMILY MEDICINE
Payer: MEDICARE

## 2020-12-15 DIAGNOSIS — I10 ESSENTIAL HYPERTENSION: Chronic | ICD-10-CM

## 2020-12-15 DIAGNOSIS — R07.9 CHEST PAIN: ICD-10-CM

## 2020-12-15 DIAGNOSIS — E86.0 DEHYDRATION: ICD-10-CM

## 2020-12-15 DIAGNOSIS — R19.7 DIARRHEA: ICD-10-CM

## 2020-12-15 DIAGNOSIS — N17.9 AKI (ACUTE KIDNEY INJURY): ICD-10-CM

## 2020-12-15 DIAGNOSIS — R19.7 DIARRHEA, UNSPECIFIED TYPE: Primary | ICD-10-CM

## 2020-12-15 PROBLEM — E87.1 HYPONATREMIA: Status: ACTIVE | Noted: 2020-12-15

## 2020-12-15 PROBLEM — Z71.3 ENCOUNTER FOR DIETARY CONSULTATION: Status: ACTIVE | Noted: 2018-12-05

## 2020-12-15 LAB
ALBUMIN SERPL BCP-MCNC: 4.6 G/DL (ref 3.5–5.2)
ALP SERPL-CCNC: 101 U/L (ref 55–135)
ALT SERPL W/O P-5'-P-CCNC: 58 U/L (ref 10–44)
ANION GAP SERPL CALC-SCNC: 14 MMOL/L (ref 8–16)
AST SERPL-CCNC: 26 U/L (ref 10–40)
BASOPHILS # BLD AUTO: 0.06 K/UL (ref 0–0.2)
BASOPHILS NFR BLD: 0.4 % (ref 0–1.9)
BILIRUB SERPL-MCNC: 1 MG/DL (ref 0.1–1)
BUN SERPL-MCNC: 42 MG/DL (ref 8–23)
CALCIUM SERPL-MCNC: 9.2 MG/DL (ref 8.7–10.5)
CHLORIDE SERPL-SCNC: 104 MMOL/L (ref 95–110)
CO2 SERPL-SCNC: 14 MMOL/L (ref 23–29)
CREAT SERPL-MCNC: 4.7 MG/DL (ref 0.5–1.4)
DIFFERENTIAL METHOD: ABNORMAL
EOSINOPHIL # BLD AUTO: 0 K/UL (ref 0–0.5)
EOSINOPHIL NFR BLD: 0.1 % (ref 0–8)
ERYTHROCYTE [DISTWIDTH] IN BLOOD BY AUTOMATED COUNT: 14.6 % (ref 11.5–14.5)
EST. GFR  (AFRICAN AMERICAN): 13.7 ML/MIN/1.73 M^2
EST. GFR  (NON AFRICAN AMERICAN): 11.9 ML/MIN/1.73 M^2
GLUCOSE SERPL-MCNC: 119 MG/DL (ref 70–110)
GLUCOSE SERPL-MCNC: 173 MG/DL (ref 70–110)
HCT VFR BLD AUTO: 36.5 % (ref 40–54)
HGB BLD-MCNC: 12.2 G/DL (ref 14–18)
IMM GRANULOCYTES # BLD AUTO: 0.12 K/UL (ref 0–0.04)
IMM GRANULOCYTES NFR BLD AUTO: 0.9 % (ref 0–0.5)
LIPASE SERPL-CCNC: 36 U/L (ref 4–60)
LYMPHOCYTES # BLD AUTO: 0.9 K/UL (ref 1–4.8)
LYMPHOCYTES NFR BLD: 6.2 % (ref 18–48)
MCH RBC QN AUTO: 32.2 PG (ref 27–31)
MCHC RBC AUTO-ENTMCNC: 33.4 G/DL (ref 32–36)
MCV RBC AUTO: 96 FL (ref 82–98)
MONOCYTES # BLD AUTO: 1 K/UL (ref 0.3–1)
MONOCYTES NFR BLD: 7.2 % (ref 4–15)
NEUTROPHILS # BLD AUTO: 11.8 K/UL (ref 1.8–7.7)
NEUTROPHILS NFR BLD: 85.2 % (ref 38–73)
NRBC BLD-RTO: 0 /100 WBC
PLATELET # BLD AUTO: 371 K/UL (ref 150–350)
PMV BLD AUTO: 9.9 FL (ref 9.2–12.9)
POTASSIUM SERPL-SCNC: 4.3 MMOL/L (ref 3.5–5.1)
PROT SERPL-MCNC: 8.4 G/DL (ref 6–8.4)
RBC # BLD AUTO: 3.79 M/UL (ref 4.6–6.2)
SARS-COV-2 RDRP RESP QL NAA+PROBE: NEGATIVE
SODIUM SERPL-SCNC: 132 MMOL/L (ref 136–145)
WBC # BLD AUTO: 13.82 K/UL (ref 3.9–12.7)

## 2020-12-15 PROCEDURE — 87045 FECES CULTURE AEROBIC BACT: CPT

## 2020-12-15 PROCEDURE — 83690 ASSAY OF LIPASE: CPT

## 2020-12-15 PROCEDURE — 25000003 PHARM REV CODE 250: Performed by: EMERGENCY MEDICINE

## 2020-12-15 PROCEDURE — 80053 COMPREHEN METABOLIC PANEL: CPT

## 2020-12-15 PROCEDURE — 96361 HYDRATE IV INFUSION ADD-ON: CPT

## 2020-12-15 PROCEDURE — U0002 COVID-19 LAB TEST NON-CDC: HCPCS

## 2020-12-15 PROCEDURE — 89055 LEUKOCYTE ASSESSMENT FECAL: CPT

## 2020-12-15 PROCEDURE — 63600175 PHARM REV CODE 636 W HCPCS: Performed by: EMERGENCY MEDICINE

## 2020-12-15 PROCEDURE — 85025 COMPLETE CBC W/AUTO DIFF WBC: CPT

## 2020-12-15 PROCEDURE — 25000003 PHARM REV CODE 250: Performed by: FAMILY MEDICINE

## 2020-12-15 PROCEDURE — 63600175 PHARM REV CODE 636 W HCPCS: Performed by: FAMILY MEDICINE

## 2020-12-15 PROCEDURE — 96374 THER/PROPH/DIAG INJ IV PUSH: CPT

## 2020-12-15 PROCEDURE — 87046 STOOL CULTR AEROBIC BACT EA: CPT | Mod: 59

## 2020-12-15 PROCEDURE — 87449 NOS EACH ORGANISM AG IA: CPT

## 2020-12-15 PROCEDURE — 87209 SMEAR COMPLEX STAIN: CPT

## 2020-12-15 PROCEDURE — 99291 CRITICAL CARE FIRST HOUR: CPT

## 2020-12-15 PROCEDURE — 83993 ASSAY FOR CALPROTECTIN FECAL: CPT

## 2020-12-15 PROCEDURE — 12000002 HC ACUTE/MED SURGE SEMI-PRIVATE ROOM

## 2020-12-15 PROCEDURE — 87324 CLOSTRIDIUM AG IA: CPT

## 2020-12-15 PROCEDURE — 87015 SPECIMEN INFECT AGNT CONCNTJ: CPT

## 2020-12-15 PROCEDURE — 96372 THER/PROPH/DIAG INJ SC/IM: CPT | Mod: 59

## 2020-12-15 RX ORDER — MAGNESIUM SULFATE HEPTAHYDRATE 40 MG/ML
2 INJECTION, SOLUTION INTRAVENOUS
Status: DISCONTINUED | OUTPATIENT
Start: 2020-12-15 | End: 2020-12-20 | Stop reason: HOSPADM

## 2020-12-15 RX ORDER — CALCIUM CHLORIDE IN 0.9 % NACL 1 G/100 ML
1 INTRAVENOUS SOLUTION, PIGGYBACK (ML) INTRAVENOUS
Status: DISCONTINUED | OUTPATIENT
Start: 2020-12-15 | End: 2020-12-20 | Stop reason: HOSPADM

## 2020-12-15 RX ORDER — POTASSIUM CHLORIDE 20 MEQ/1
40 TABLET, EXTENDED RELEASE ORAL
Status: DISCONTINUED | OUTPATIENT
Start: 2020-12-15 | End: 2020-12-20 | Stop reason: HOSPADM

## 2020-12-15 RX ORDER — POTASSIUM CHLORIDE 7.45 MG/ML
20 INJECTION INTRAVENOUS
Status: DISCONTINUED | OUTPATIENT
Start: 2020-12-15 | End: 2020-12-20 | Stop reason: HOSPADM

## 2020-12-15 RX ORDER — POTASSIUM CHLORIDE 20 MEQ/1
20 TABLET, EXTENDED RELEASE ORAL
Status: DISCONTINUED | OUTPATIENT
Start: 2020-12-15 | End: 2020-12-20 | Stop reason: HOSPADM

## 2020-12-15 RX ORDER — LANOLIN ALCOHOL/MO/W.PET/CERES
800 CREAM (GRAM) TOPICAL
Status: DISCONTINUED | OUTPATIENT
Start: 2020-12-15 | End: 2020-12-20 | Stop reason: HOSPADM

## 2020-12-15 RX ORDER — ONDANSETRON 2 MG/ML
4 INJECTION INTRAMUSCULAR; INTRAVENOUS
Status: COMPLETED | OUTPATIENT
Start: 2020-12-15 | End: 2020-12-15

## 2020-12-15 RX ORDER — ACETAMINOPHEN 325 MG/1
650 TABLET ORAL EVERY 8 HOURS PRN
Status: DISCONTINUED | OUTPATIENT
Start: 2020-12-15 | End: 2020-12-20 | Stop reason: HOSPADM

## 2020-12-15 RX ORDER — SODIUM CHLORIDE, SODIUM LACTATE, POTASSIUM CHLORIDE, CALCIUM CHLORIDE 600; 310; 30; 20 MG/100ML; MG/100ML; MG/100ML; MG/100ML
INJECTION, SOLUTION INTRAVENOUS CONTINUOUS
Status: DISCONTINUED | OUTPATIENT
Start: 2020-12-15 | End: 2020-12-18

## 2020-12-15 RX ORDER — POTASSIUM CHLORIDE 7.45 MG/ML
40 INJECTION INTRAVENOUS
Status: DISCONTINUED | OUTPATIENT
Start: 2020-12-15 | End: 2020-12-20 | Stop reason: HOSPADM

## 2020-12-15 RX ORDER — ONDANSETRON 2 MG/ML
4 INJECTION INTRAMUSCULAR; INTRAVENOUS EVERY 8 HOURS PRN
Status: DISCONTINUED | OUTPATIENT
Start: 2020-12-15 | End: 2020-12-16

## 2020-12-15 RX ORDER — IPRATROPIUM BROMIDE AND ALBUTEROL SULFATE 2.5; .5 MG/3ML; MG/3ML
3 SOLUTION RESPIRATORY (INHALATION) EVERY 6 HOURS PRN
Status: DISCONTINUED | OUTPATIENT
Start: 2020-12-15 | End: 2020-12-20 | Stop reason: HOSPADM

## 2020-12-15 RX ORDER — CHOLESTYRAMINE 4 G/4.8G
4 POWDER, FOR SUSPENSION ORAL 3 TIMES DAILY PRN
Status: DISCONTINUED | OUTPATIENT
Start: 2020-12-15 | End: 2020-12-16

## 2020-12-15 RX ORDER — TALC
6 POWDER (GRAM) TOPICAL NIGHTLY PRN
Status: DISCONTINUED | OUTPATIENT
Start: 2020-12-15 | End: 2020-12-20 | Stop reason: HOSPADM

## 2020-12-15 RX ORDER — DICYCLOMINE HYDROCHLORIDE 10 MG/ML
20 INJECTION INTRAMUSCULAR
Status: COMPLETED | OUTPATIENT
Start: 2020-12-15 | End: 2020-12-15

## 2020-12-15 RX ORDER — MAGNESIUM SULFATE 1 G/100ML
1 INJECTION INTRAVENOUS
Status: DISCONTINUED | OUTPATIENT
Start: 2020-12-15 | End: 2020-12-20 | Stop reason: HOSPADM

## 2020-12-15 RX ORDER — CLOPIDOGREL BISULFATE 75 MG/1
75 TABLET ORAL NIGHTLY
Status: DISCONTINUED | OUTPATIENT
Start: 2020-12-15 | End: 2020-12-20 | Stop reason: HOSPADM

## 2020-12-15 RX ORDER — ENOXAPARIN SODIUM 100 MG/ML
30 INJECTION SUBCUTANEOUS EVERY 24 HOURS
Status: DISCONTINUED | OUTPATIENT
Start: 2020-12-15 | End: 2020-12-19

## 2020-12-15 RX ORDER — ACETAMINOPHEN 325 MG/1
650 TABLET ORAL EVERY 4 HOURS PRN
Status: DISCONTINUED | OUTPATIENT
Start: 2020-12-15 | End: 2020-12-20 | Stop reason: HOSPADM

## 2020-12-15 RX ORDER — POLYETHYLENE GLYCOL 3350 17 G/17G
17 POWDER, FOR SOLUTION ORAL DAILY PRN
Status: DISCONTINUED | OUTPATIENT
Start: 2020-12-15 | End: 2020-12-16

## 2020-12-15 RX ORDER — MAGNESIUM SULFATE HEPTAHYDRATE 40 MG/ML
4 INJECTION, SOLUTION INTRAVENOUS
Status: DISCONTINUED | OUTPATIENT
Start: 2020-12-15 | End: 2020-12-20 | Stop reason: HOSPADM

## 2020-12-15 RX ORDER — SODIUM CHLORIDE 0.9 % (FLUSH) 0.9 %
2 SYRINGE (ML) INJECTION
Status: DISCONTINUED | OUTPATIENT
Start: 2020-12-15 | End: 2020-12-20 | Stop reason: HOSPADM

## 2020-12-15 RX ADMIN — RIFAXIMIN 550 MG: 550 TABLET ORAL at 09:12

## 2020-12-15 RX ADMIN — ENOXAPARIN SODIUM 30 MG: 30 INJECTION SUBCUTANEOUS at 06:12

## 2020-12-15 RX ADMIN — SODIUM CHLORIDE, SODIUM LACTATE, POTASSIUM CHLORIDE, AND CALCIUM CHLORIDE 1000 ML: .6; .31; .03; .02 INJECTION, SOLUTION INTRAVENOUS at 03:12

## 2020-12-15 RX ADMIN — SODIUM CHLORIDE, SODIUM LACTATE, POTASSIUM CHLORIDE, AND CALCIUM CHLORIDE: .6; .31; .03; .02 INJECTION, SOLUTION INTRAVENOUS at 06:12

## 2020-12-15 RX ADMIN — MELATONIN 6 MG: at 09:12

## 2020-12-15 RX ADMIN — CLOPIDOGREL BISULFATE 75 MG: 75 TABLET, FILM COATED ORAL at 09:12

## 2020-12-15 RX ADMIN — SODIUM CHLORIDE 1000 ML: 0.9 INJECTION, SOLUTION INTRAVENOUS at 04:12

## 2020-12-15 RX ADMIN — ONDANSETRON 4 MG: 2 INJECTION INTRAMUSCULAR; INTRAVENOUS at 10:12

## 2020-12-15 RX ADMIN — DICYCLOMINE HYDROCHLORIDE 20 MG: 10 INJECTION INTRAMUSCULAR at 03:12

## 2020-12-15 RX ADMIN — ONDANSETRON 4 MG: 2 INJECTION INTRAMUSCULAR; INTRAVENOUS at 03:12

## 2020-12-15 NOTE — SUBJECTIVE & OBJECTIVE
Past Medical History:   Diagnosis Date    Bladder cancer     2 times    BPH (benign prostatic hypertrophy)     Cancer of kidney     1/2    Colon polyp     Coronary artery disease     DM (diabetes mellitus)     GERD (gastroesophageal reflux disease)     Hypertension     PAD (peripheral artery disease)     Peripheral vascular disease        Past Surgical History:   Procedure Laterality Date    abdominal aorta bypass by fem      BLADDER SURGERY      COLONOSCOPY  12/06/2018    COLONOSCOPY N/A 12/6/2018    Procedure: COLONOSCOPY;  Surgeon: Familia Dickson MD;  Location: [facility] ENDO;  Service: General;  Laterality: N/A;    COLONOSCOPY N/A 12/7/2020    Procedure: COLONOSCOPY;  Surgeon: Andi Loya MD;  Location: [facility] ENDO;  Service: Endoscopy;  Laterality: N/A;    CYSTOSCOPY      ENDOSCOPIC ULTRASOUND OF UPPER GASTROINTESTINAL TRACT N/A 2/20/2019    Procedure: ULTRASOUND, UPPER GI TRACT, ENDOSCOPIC;  Surgeon: Govind Hassan MD;  Location: [facility] ([address] [place] [address]);  Service: Endoscopy;  Laterality: N/A;  Please schedule EGD and EUS, for celiac sprue biopsies, and evaluation of pancreatitis.  5 day hold Plavix, Dr Jarret Calle  PM prep    ESOPHAGOGASTRODUODENOSCOPY N/A 2/20/2019    Procedure: EGD (ESOPHAGOGASTRODUODENOSCOPY);  Surgeon: Govind Hassan MD;  [facility]  Service: Endoscopy;  Laterality: N/A;  Please schedule EGD and EUS, for celiac sprue biopsies, and evaluation of pancreatitis.  5 day hold Plavix, Dr Jarret Calle  PM prep    multiple angiosplastia      NEPHRECTOMY      partial nephrectomy left    PERCUTANEOUS TRANSLUMINAL ANGIOPLASTY (PTA) OF PERIPHERAL VESSEL Bilateral 8/21/2018    Procedure: PTA, PERIPHERAL VESSEL;  Surgeon: Patrick Love MD;  Location: [facility] CATH;  Service: Cardiology;  Laterality: Bilateral;  Please schedule Left common iliac artery intervention and b/l LE angio via left brachial on August 21       Review of patient's allergies indicates:    Allergen Reactions    Amoxicillin Hives    Zolpidem tartrate Hallucinations       No current facility-administered medications on file prior to encounter.      Current Outpatient Medications on File Prior to Encounter   Medication Sig    amlodipine (NORVASC) 10 MG tablet TAKE 1 TABLET ONE TIME DAILY (Patient taking differently: Take 10 mg by mouth once daily. )    aspirin (ECOTRIN) 81 MG EC tablet Take 81 mg by mouth once daily.    atorvastatin (LIPITOR) 80 MG tablet Take 80 mg by mouth once daily.    b complex vitamins tablet Take 1 tablet by mouth once daily.    bismuth subsalicylate (KAOPECTATE, BISMUTH SUBSALICY,) 262 mg Tab Take by mouth.    cholestyramine-aspartame (QUESTRAN LIGHT) 4 gram PwPk Take 1 packet (4 g total) by mouth 3 (three) times daily as needed (diarrhhoea).    clopidogrel (PLAVIX) 75 mg tablet TAKE 1 TABLET EVERY EVENING    cyanocobalamin (VITAMIN B-12) 1000 MCG tablet Take 1,000 mcg by mouth once daily.     dicyclomine (BENTYL) 20 mg tablet Take 20 mg by mouth every 6 (six) hours.    donepezil (ARICEPT) 5 MG tablet Take 10 mg by mouth every evening.    gabapentin (NEURONTIN) 300 MG capsule Take 300 mg by mouth every evening.    glipiZIDE (GLUCOTROL) 5 MG TR24 Take 5 mg by mouth 2 (two) times a day.     metoprolol succinate (TOPROL-XL) 50 MG 24 hr tablet Take 1 tablet (50 mg total) by mouth once daily.    pantoprazole (PROTONIX) 40 MG tablet Take 40 mg by mouth once daily.    rifAXIMin (XIFAXAN) 550 mg Tab Take 1 tablet (550 mg total) by mouth 3 (three) times daily. for 10 days    temazepam (RESTORIL) 30 mg capsule Take 30 mg by mouth nightly as needed.     ACCU-CHEK CEASAR CONTROL SOLN Soln USE ONE TIME DAILY    ACCU-CHEK CEASAR PLUS TEST STRP Strp TEST TWO TIMES DAILY    ACCU-CHEK SOFTCLIX LANCETS Misc TEST TWO TIMES DAILY    BD ALCOHOL SWABS PadM TEST TWO TIMES DAILY    lancets (ONE TOUCH DELICA LANCETS) 33 gauge Misc 2 lancets by Misc.(Non-Drug; Combo Route) route 2  (two) times daily.    loperamide (IMODIUM) 2 mg capsule Take 2 mg by mouth 4 (four) times daily as needed for Diarrhea.     Family History     Problem Relation (Age of Onset)    Cancer Father, Maternal Uncle        Tobacco Use    Smoking status: Former Smoker     Packs/day: 1.50     Years: 40.00     Pack years: 60.00     Types: Cigarettes     Quit date: 2008     Years since quittin.8    Smokeless tobacco: Never Used   Substance and Sexual Activity    Alcohol use: Not Currently     Alcohol/week: 2.0 standard drinks     Types: 2 Cans of beer per week     Comment: quit 2 years ago    Drug use: Not on file    Sexual activity: Yes     Partners: Female     Review of Systems   Constitutional: Negative for chills, diaphoresis, fatigue, fever and unexpected weight change.   HENT: Negative for congestion, ear pain, postnasal drip, rhinorrhea, sinus pressure, sneezing, sore throat, trouble swallowing and voice change.    Eyes: Negative for pain, discharge, itching and visual disturbance.   Respiratory: Negative for cough, chest tightness, shortness of breath, wheezing and stridor.    Cardiovascular: Negative for chest pain, palpitations and leg swelling.   Gastrointestinal: Negative for abdominal pain, blood in stool, constipation, diarrhea, nausea and vomiting.   Endocrine: Negative for cold intolerance, heat intolerance, polydipsia, polyphagia and polyuria.   Genitourinary: Negative for difficulty urinating, dysuria, flank pain, frequency, hematuria and urgency.   Musculoskeletal: Negative for arthralgias, back pain, joint swelling and myalgias.   Skin: Negative for pallor, rash and wound.   Neurological: Negative for dizziness, seizures, syncope, weakness, light-headedness and headaches.     Objective:     Vital Signs (Most Recent):  Temp: 97.7 °F (36.5 °C) (12/15/20 1506)  Pulse: 98 (12/15/20 1506)  Resp: 18 (12/15/20 1506)  BP: (!) 66/37 (12/15/20 1506)  SpO2: 98 % (12/15/20 1506) Vital Signs (24h  Range):  Temp:  [97.7 °F (36.5 °C)] 97.7 °F (36.5 °C)  Pulse:  [98] 98  Resp:  [18] 18  SpO2:  [98 %] 98 %  BP: (66)/(37) 66/37     Weight: 78.5 kg (173 lb)  Body mass index is 28.79 kg/m².    Physical Exam  Vitals signs and nursing note reviewed.   Constitutional:       General: He is not in acute distress.     Appearance: Normal appearance. He is well-developed. He is not ill-appearing, toxic-appearing or diaphoretic.   HENT:      Head: Normocephalic and atraumatic.      Right Ear: External ear normal.      Left Ear: External ear normal.      Nose: Nose normal.      Mouth/Throat:      Mouth: Mucous membranes are moist.      Pharynx: No oropharyngeal exudate or posterior oropharyngeal erythema.   Eyes:      General: No scleral icterus.        Right eye: No discharge.         Left eye: No discharge.      Extraocular Movements: Extraocular movements intact.      Conjunctiva/sclera: Conjunctivae normal.      Pupils: Pupils are equal, round, and reactive to light.   Neck:      Musculoskeletal: Normal range of motion and neck supple.      Thyroid: No thyromegaly.   Cardiovascular:      Rate and Rhythm: Normal rate and regular rhythm.      Heart sounds: Normal heart sounds. No murmur. No friction rub. No gallop.    Pulmonary:      Effort: Pulmonary effort is normal. No respiratory distress.      Breath sounds: Normal breath sounds. No wheezing or rales.   Chest:      Chest wall: No tenderness.   Abdominal:      General: Bowel sounds are normal. There is no distension.      Palpations: Abdomen is soft. There is no mass.      Tenderness: There is no abdominal tenderness. There is no guarding or rebound.      Hernia: No hernia is present.   Musculoskeletal: Normal range of motion.         General: No tenderness.   Lymphadenopathy:      Cervical: No cervical adenopathy.   Skin:     General: Skin is warm.   Neurological:      General: No focal deficit present.      Mental Status: He is alert and oriented to person, place, and  time.   Psychiatric:         Mood and Affect: Mood normal.         Behavior: Behavior normal.         Thought Content: Thought content normal.         Judgment: Judgment normal.           CRANIAL NERVES     CN III, IV, VI   Pupils are equal, round, and reactive to light.       Significant Labs:   CBC:   Recent Labs   Lab 12/15/20  1521   WBC 13.82*   HGB 12.2*   HCT 36.5*   *     CMP:   Recent Labs   Lab 12/15/20  1521   *   K 4.3      CO2 14*   *   BUN 42*   CREATININE 4.7*   CALCIUM 9.2   PROT 8.4   ALBUMIN 4.6   BILITOT 1.0   ALKPHOS 101   AST 26   ALT 58*   ANIONGAP 14   EGFRNONAA 11.9*       Significant Imaging:     CT Abdomen Pelvis Without Contrast [[id number]] Collected: 12/15/20 1729   Order Status: Completed Updated: 12/15/20 1806   Narrative:     CMS MANDATED QUALITY DATA - CT RADIATION  436     All CT scans at this facility utilize dose modulation, iterative   reconstruction, and/or weight based dosing when appropriate to reduce   radiation dose to as low as reasonably achievable.     CT ABDOMEN PELVIS WITHOUT CONTRAST     CLINICAL HISTORY:   70 years Male Nausea/vomiting     COMPARISON: CT abdomen and pelvis December 2, 2020     FINDINGS: Lung bases are clear. Bone window images demonstrate lumbar   spondylosis. No acute or aggressive osseous abnormality.     On this unenhanced exam, no focal hepatic lesion. Calcified gallstones   noted. Gallbladder is otherwise unremarkable. No bile duct dilation.   Spleen and pancreas are unremarkable. Stable appearance of bilateral   adrenal nodules. Bilateral perinephric stranding. Bilateral renal   vascular calcifications. No hydronephrosis. Partially calcified lesion   at the lower pole the left kidney is unchanged compared to the recent   prior examination, again demonstrating postoperative soft tissue and   fatty elements. Ureters are normal in caliber. Urinary bladder is   unremarkable.     Stomach is unremarkable. No evidence of small  bowel obstruction.   Postoperative changes of the colon. No evidence of acute colitis.     Extensive atherosclerotic calcification of the abdominal aorta and its   branch vessels. A stent is present within the celiac artery. Prior   aortic bypass. No free fluid or free air within the abdomen or pelvis.   No pathologically enlarged abdominopelvic lymph nodes. Right gluteal   intramuscular lipoma again noted. Soft tissue mass of the anterior   left thigh demonstrating fatty elements is again evident and stable.     IMPRESSION:     No noncontrast CT evidence of acute pathology involving the abdomen or   pelvis.     Cholelithiasis.     Stable appearance of exophytic lesion arising from the lower pole of   the left kidney. Dedicated renal protocol follow-up is again   recommended.     Additional stable incidental findings as above.

## 2020-12-15 NOTE — H&P
[facility] Medicine  History & Physical    Patient Name: Jose English  MRN: [id number]  Admission Date: 12/15/2020  Attending Physician: Dwight Haines MD   Primary Care Provider: Primary Doctor No         Patient information was obtained from patient, past medical records and ER records.     Subjective:     Principal Problem:Diarrhea    Chief Complaint:   Chief Complaint   Patient presents with    Emesis     vomiting and diarrhea        HPI: 70-year-old male hypertension, hyperlipidemia, diabetes, CAD, significant peripheral vascular disease, s/p mesenteric artery stenting, s/p left iliac artery, aorto-bifem bypass in 2008, history of ischemic colitis requiring right-sided colectomy in 2018 comes in for persistent diarrhea and occasional emesis.  Patient was recently discharge from [facility] on 12/07 with similar issues.  Patient reports that after being discharged patient was doing well.  Patient picked up his medications from GI office, Dr. Loya and started taking medications.  Reports that about 3 days ago he started having profuse watery diarrhea.  Then he started having episodes of bilious emesis.  Only able to hold down Gatorade.  Denies any fever, chills.  Complains of abdominal pain due to persistent emesis.  Denies hematochezia, melena, hematemesis.  Patient came into the ED today for further evaluation.    In the ED, patient was hypotensive 92/56.  Patient received bolus IV fluids with significant improvement of blood pressure.  Found to have emesis of yellow watery fluid during my interview.    Past Medical History:   Diagnosis Date    Bladder cancer     2 times    BPH (benign prostatic hypertrophy)     Cancer of kidney     1/2    Colon polyp     Coronary artery disease     DM (diabetes mellitus)     GERD (gastroesophageal reflux disease)     Hypertension     PAD (peripheral artery disease)     Peripheral vascular disease        Past Surgical History:   Procedure  Laterality Date    abdominal aorta bypass by fem      BLADDER SURGERY      COLONOSCOPY  12/06/2018    COLONOSCOPY N/A 12/6/2018    Procedure: COLONOSCOPY;  Surgeon: Familia Dickson MD;  Location: [facility] ENDO;  Service: General;  Laterality: N/A;    COLONOSCOPY N/A 12/7/2020    Procedure: COLONOSCOPY;  Surgeon: Andi Loya MD;  Location: [facility] ENDO;  Service: Endoscopy;  Laterality: N/A;    CYSTOSCOPY      ENDOSCOPIC ULTRASOUND OF UPPER GASTROINTESTINAL TRACT N/A 2/20/2019    Procedure: ULTRASOUND, UPPER GI TRACT, ENDOSCOPIC;  Surgeon: Govind Hassan MD;  Location: [facility] ([facility]);  Service: Endoscopy;  Laterality: N/A;  Please schedule EGD and EUS, for celiac sprue biopsies, and evaluation of pancreatitis.  5 day hold Plavix, Dr Jarret Calle  PM prep    ESOPHAGOGASTRODUODENOSCOPY N/A 2/20/2019    Procedure: EGD (ESOPHAGOGASTRODUODENOSCOPY);  Surgeon: Govind Hassan MD;  [facility]  Service: Endoscopy;  Laterality: N/A;  Please schedule EGD and EUS, for celiac sprue biopsies, and evaluation of pancreatitis.  5 day hold Plavix, Dr Jarret Calle  PM prep    multiple angiosplastia      NEPHRECTOMY      partial nephrectomy left    PERCUTANEOUS TRANSLUMINAL ANGIOPLASTY (PTA) OF PERIPHERAL VESSEL Bilateral 8/21/2018    Procedure: PTA, PERIPHERAL VESSEL;  Surgeon: Patrick Love MD;  Location: [facility] CATH;  Service: Cardiology;  Laterality: Bilateral;  Please schedule Left common iliac artery intervention and b/l LE angio via left brachial on August 21       Review of patient's allergies indicates:   Allergen Reactions    Amoxicillin Hives    Zolpidem tartrate Hallucinations       No current facility-administered medications on file prior to encounter.      Current Outpatient Medications on File Prior to Encounter   Medication Sig    amlodipine (NORVASC) 10 MG tablet TAKE 1 TABLET ONE TIME DAILY (Patient taking differently: Take 10 mg by mouth once daily. )    aspirin  (ECOTRIN) 81 MG EC tablet Take 81 mg by mouth once daily.    atorvastatin (LIPITOR) 80 MG tablet Take 80 mg by mouth once daily.    b complex vitamins tablet Take 1 tablet by mouth once daily.    bismuth subsalicylate (KAOPECTATE, BISMUTH SUBSALICY,) 262 mg Tab Take by mouth.    cholestyramine-aspartame (QUESTRAN LIGHT) 4 gram PwPk Take 1 packet (4 g total) by mouth 3 (three) times daily as needed (diarrhhoea).    clopidogrel (PLAVIX) 75 mg tablet TAKE 1 TABLET EVERY EVENING    cyanocobalamin (VITAMIN B-12) 1000 MCG tablet Take 1,000 mcg by mouth once daily.     dicyclomine (BENTYL) 20 mg tablet Take 20 mg by mouth every 6 (six) hours.    donepezil (ARICEPT) 5 MG tablet Take 10 mg by mouth every evening.    gabapentin (NEURONTIN) 300 MG capsule Take 300 mg by mouth every evening.    glipiZIDE (GLUCOTROL) 5 MG TR24 Take 5 mg by mouth 2 (two) times a day.     metoprolol succinate (TOPROL-XL) 50 MG 24 hr tablet Take 1 tablet (50 mg total) by mouth once daily.    pantoprazole (PROTONIX) 40 MG tablet Take 40 mg by mouth once daily.    rifAXIMin (XIFAXAN) 550 mg Tab Take 1 tablet (550 mg total) by mouth 3 (three) times daily. for 10 days    temazepam (RESTORIL) 30 mg capsule Take 30 mg by mouth nightly as needed.     ACCU-CHEK CEASAR CONTROL SOLN Soln USE ONE TIME DAILY    ACCU-CHEK CEASAR PLUS TEST STRP Strp TEST TWO TIMES DAILY    ACCU-CHEK SOFTCLIX LANCETS Misc TEST TWO TIMES DAILY    BD ALCOHOL SWABS PadM TEST TWO TIMES DAILY    lancets (ONE TOUCH DELICA LANCETS) 33 gauge Misc 2 lancets by Misc.(Non-Drug; Combo Route) route 2 (two) times daily.    loperamide (IMODIUM) 2 mg capsule Take 2 mg by mouth 4 (four) times daily as needed for Diarrhea.     Family History     Problem Relation (Age of Onset)    Cancer Father, Maternal Uncle        Tobacco Use    Smoking status: Former Smoker     Packs/day: 1.50     Years: 40.00     Pack years: 60.00     Types: Cigarettes     Quit date: 2/9/2008     Years since  quittin.8    Smokeless tobacco: Never Used   Substance and Sexual Activity    Alcohol use: Not Currently     Alcohol/week: 2.0 standard drinks     Types: 2 Cans of beer per week     Comment: quit 2 years ago    Drug use: Not on file    Sexual activity: Yes     Partners: Female     Review of Systems   Constitutional: Negative for chills, diaphoresis, fatigue, fever and unexpected weight change.   HENT: Negative for congestion, ear pain, postnasal drip, rhinorrhea, sinus pressure, sneezing, sore throat, trouble swallowing and voice change.    Eyes: Negative for pain, discharge, itching and visual disturbance.   Respiratory: Negative for cough, chest tightness, shortness of breath, wheezing and stridor.    Cardiovascular: Negative for chest pain, palpitations and leg swelling.   Gastrointestinal: Negative for abdominal pain, blood in stool, constipation, diarrhea, nausea and vomiting.   Endocrine: Negative for cold intolerance, heat intolerance, polydipsia, polyphagia and polyuria.   Genitourinary: Negative for difficulty urinating, dysuria, flank pain, frequency, hematuria and urgency.   Musculoskeletal: Negative for arthralgias, back pain, joint swelling and myalgias.   Skin: Negative for pallor, rash and wound.   Neurological: Negative for dizziness, seizures, syncope, weakness, light-headedness and headaches.     Objective:     Vital Signs (Most Recent):  Temp: 97.7 °F (36.5 °C) (12/15/20 1506)  Pulse: 98 (12/15/20 1506)  Resp: 18 (12/15/20 1506)  BP: (!) 66/37 (12/15/20 1506)  SpO2: 98 % (12/15/20 1506) Vital Signs (24h Range):  Temp:  [97.7 °F (36.5 °C)] 97.7 °F (36.5 °C)  Pulse:  [98] 98  Resp:  [18] 18  SpO2:  [98 %] 98 %  BP: (66)/(37) 66/37     Weight: 78.5 kg (173 lb)  Body mass index is 28.79 kg/m².    Physical Exam  Vitals signs and nursing note reviewed.   Constitutional:       General: He is not in acute distress.     Appearance: Normal appearance. He is well-developed. He is not ill-appearing,  toxic-appearing or diaphoretic.   HENT:      Head: Normocephalic and atraumatic.      Right Ear: External ear normal.      Left Ear: External ear normal.      Nose: Nose normal.      Mouth/Throat:      Mouth: Mucous membranes are moist.      Pharynx: No oropharyngeal exudate or posterior oropharyngeal erythema.   Eyes:      General: No scleral icterus.        Right eye: No discharge.         Left eye: No discharge.      Extraocular Movements: Extraocular movements intact.      Conjunctiva/sclera: Conjunctivae normal.      Pupils: Pupils are equal, round, and reactive to light.   Neck:      Musculoskeletal: Normal range of motion and neck supple.      Thyroid: No thyromegaly.   Cardiovascular:      Rate and Rhythm: Normal rate and regular rhythm.      Heart sounds: Normal heart sounds. No murmur. No friction rub. No gallop.    Pulmonary:      Effort: Pulmonary effort is normal. No respiratory distress.      Breath sounds: Normal breath sounds. No wheezing or rales.   Chest:      Chest wall: No tenderness.   Abdominal:      General: Bowel sounds are normal. There is no distension.      Palpations: Abdomen is soft. There is no mass.      Tenderness: There is no abdominal tenderness. There is no guarding or rebound.      Hernia: No hernia is present.   Musculoskeletal: Normal range of motion.         General: No tenderness.   Lymphadenopathy:      Cervical: No cervical adenopathy.   Skin:     General: Skin is warm.   Neurological:      General: No focal deficit present.      Mental Status: He is alert and oriented to person, place, and time.   Psychiatric:         Mood and Affect: Mood normal.         Behavior: Behavior normal.         Thought Content: Thought content normal.         Judgment: Judgment normal.           CRANIAL NERVES     CN III, IV, VI   Pupils are equal, round, and reactive to light.       Significant Labs:   CBC:   Recent Labs   Lab 12/15/20  1521   WBC 13.82*   HGB 12.2*   HCT 36.5*   *      CMP:   Recent Labs   Lab 12/15/20  1521   *   K 4.3      CO2 14*   *   BUN 42*   CREATININE 4.7*   CALCIUM 9.2   PROT 8.4   ALBUMIN 4.6   BILITOT 1.0   ALKPHOS 101   AST 26   ALT 58*   ANIONGAP 14   EGFRNONAA 11.9*       Significant Imaging:     CT Abdomen Pelvis Without Contrast [[id number]] Collected: 12/15/20 1729   Order Status: Completed Updated: 12/15/20 1806   Narrative:     CMS MANDATED QUALITY DATA - CT RADIATION  436     All CT scans at this facility utilize dose modulation, iterative   reconstruction, and/or weight based dosing when appropriate to reduce   radiation dose to as low as reasonably achievable.     CT ABDOMEN PELVIS WITHOUT CONTRAST     CLINICAL HISTORY:   70 years Male Nausea/vomiting     COMPARISON: CT abdomen and pelvis December 2, 2020     FINDINGS: Lung bases are clear. Bone window images demonstrate lumbar   spondylosis. No acute or aggressive osseous abnormality.     On this unenhanced exam, no focal hepatic lesion. Calcified gallstones   noted. Gallbladder is otherwise unremarkable. No bile duct dilation.   Spleen and pancreas are unremarkable. Stable appearance of bilateral   adrenal nodules. Bilateral perinephric stranding. Bilateral renal   vascular calcifications. No hydronephrosis. Partially calcified lesion   at the lower pole the left kidney is unchanged compared to the recent   prior examination, again demonstrating postoperative soft tissue and   fatty elements. Ureters are normal in caliber. Urinary bladder is   unremarkable.     Stomach is unremarkable. No evidence of small bowel obstruction.   Postoperative changes of the colon. No evidence of acute colitis.     Extensive atherosclerotic calcification of the abdominal aorta and its   branch vessels. A stent is present within the celiac artery. Prior   aortic bypass. No free fluid or free air within the abdomen or pelvis.   No pathologically enlarged abdominopelvic lymph nodes. Right gluteal    intramuscular lipoma again noted. Soft tissue mass of the anterior   left thigh demonstrating fatty elements is again evident and stable.     IMPRESSION:     No noncontrast CT evidence of acute pathology involving the abdomen or   pelvis.     Cholelithiasis.     Stable appearance of exophytic lesion arising from the lower pole of   the left kidney. Dedicated renal protocol follow-up is again   recommended.     Additional stable incidental findings as above.          Assessment/Plan:     Active Hospital Problems    Diagnosis  POA    *Diarrhea [R19.7]  Yes    Hyponatremia [E87.1]  Yes    Hypotension [I95.9]  Yes    JEREMIAH (acute kidney injury) [N17.9]  Yes    Diabetes mellitus with peripheral vascular disease [E11.51]  Yes     Chronic    Mixed hyperlipidemia [E78.2]  Yes     Chronic    HTN (hypertension) [I10]  Yes     Chronic    PAD (peripheral artery disease) [I73.9]  Yes     Chronic      Resolved Hospital Problems   No resolved problems to display.       Plan:  Hypotension and JEREMIAH likely due to severe diarrhea  Diarrhea chronic and likely related to history peripheral artery disease and ischemic colitis  Stool studies pending  C diff pending  Fecal calprotectin  Clears  Anti-emetics  NPO midnight, IVF  Holding most home medications.  Continue home rifaximin and cholestyramine  ISS  Consider nephrology consult if renal function worsens    Consult GI    VTE Risk Mitigation (From admission, onward)         Ordered     enoxaparin injection 30 mg  Every 24 hours      12/15/20 1826     IP VTE HIGH RISK PATIENT  Once      12/15/20 1826     Place sequential compression device  Until discontinued      12/15/20 1826                   Dwight Haines MD  Department of Hospital Medicine   [facility]  Date of service: 12/15/2020

## 2020-12-15 NOTE — ED PROVIDER NOTES
Encounter Date: 12/15/2020       History     Chief Complaint   Patient presents with    Emesis     vomiting and diarrhea     This is a 70-year-old male with past medical history of coronary artery disease, diabetes, peripheral arterial disease, who presents emergency department vomiting and diarrhea.  Says that for the last 3 days he has had profuse watery diarrhea, no blood or mucus in the stool.  Said he had 1-2 episodes of vomiting earlier today without any blood or bile in it.  Does complains of some anterior midline abdominal pain occasionally mostly when he is retching.  Currently does not have any pain.  No urinary symptoms reported such as frequency urgency or burning.  No fever or chills reported.  Says he has not eaten any bad foods.  He has city water.  No one else is sick at home with similar symptoms.  Says he was here last week with similar symptoms and was discharged home after bag of fluids.  Said he tested negative for COVID.        Review of patient's allergies indicates:   Allergen Reactions    Amoxicillin Hives    Zolpidem tartrate Hallucinations     Past Medical History:   Diagnosis Date    Bladder cancer     2 times    BPH (benign prostatic hypertrophy)     Cancer of kidney     1/2    Colon polyp     Coronary artery disease     DM (diabetes mellitus)     GERD (gastroesophageal reflux disease)     Hypertension     PAD (peripheral artery disease)     Peripheral vascular disease      Past Surgical History:   Procedure Laterality Date    abdominal aorta bypass by fem      BLADDER SURGERY      COLONOSCOPY  12/06/2018    COLONOSCOPY N/A 12/6/2018    Procedure: COLONOSCOPY;  Surgeon: Familia Dickson MD;  Location: [facility];  Service: General;  Laterality: N/A;    COLONOSCOPY N/A 12/7/2020    Procedure: COLONOSCOPY;  Surgeon: Andi Loya MD;  Location: [facility];  Service: Endoscopy;  Laterality: N/A;    CYSTOSCOPY      ENDOSCOPIC ULTRASOUND OF UPPER GASTROINTESTINAL TRACT  N/A 2019    Procedure: ULTRASOUND, UPPER GI TRACT, ENDOSCOPIC;  Surgeon: Govind Hassan MD;  [facility]  Service: Endoscopy;  Laterality: N/A;  Please schedule EGD and EUS, for celiac sprue biopsies, and evaluation of pancreatitis.  5 day hold Plavix, Dr Jarret Calle  PM prep    ESOPHAGOGASTRODUODENOSCOPY N/A 2019    Procedure: EGD (ESOPHAGOGASTRODUODENOSCOPY);  Surgeon: Govind Hassan MD;  [facility]  Service: Endoscopy;  Laterality: N/A;  Please schedule EGD and EUS, for celiac sprue biopsies, and evaluation of pancreatitis.  5 day hold Plavix, Dr Jarret Calle  PM prep    multiple angiosplastia      NEPHRECTOMY      partial nephrectomy left    PERCUTANEOUS TRANSLUMINAL ANGIOPLASTY (PTA) OF PERIPHERAL VESSEL Bilateral 2018    Procedure: PTA, PERIPHERAL VESSEL;  Surgeon: Patrick Love MD;  Location: [facility] CATH;  Service: Cardiology;  Laterality: Bilateral;  Please schedule Left common iliac artery intervention and b/l LE angio via left brachial on      Family History   Problem Relation Age of Onset    Cancer Father     Cancer Maternal Uncle      Social History     Tobacco Use    Smoking status: Former Smoker     Packs/day: 1.50     Years: 40.00     Pack years: 60.00     Types: Cigarettes     Quit date: 2008     Years since quittin.8    Smokeless tobacco: Never Used   Substance Use Topics    Alcohol use: Not Currently     Alcohol/week: 2.0 standard drinks     Types: 2 Cans of beer per week     Comment: quit 2 years ago    Drug use: Not on file     Review of Systems   Constitutional: Negative for chills and fever.   HENT: Negative for sore throat.    Respiratory: Negative for shortness of breath.    Cardiovascular: Negative for chest pain.   Gastrointestinal: Positive for abdominal pain, diarrhea, nausea and vomiting.   Genitourinary: Negative for dysuria.   Musculoskeletal: Negative for back pain.   Skin: Negative for rash.    Neurological: Negative for weakness.   Hematological: Does not bruise/bleed easily.   All other systems reviewed and are negative.      Physical Exam     Initial Vitals [12/15/20 1506]   BP Pulse Resp Temp SpO2   (!) 66/37 98 18 97.7 °F (36.5 °C) 98 %      MAP       --         Physical Exam    Nursing note and vitals reviewed.  Constitutional: He appears well-developed and well-nourished. He is not diaphoretic. No distress.   HENT:   Head: Normocephalic and atraumatic.   Mouth/Throat: Oropharynx is clear and moist. No oropharyngeal exudate.   Eyes: Conjunctivae and EOM are normal. Pupils are equal, round, and reactive to light.   Neck: No tracheal deviation present.   Cardiovascular: Normal rate, regular rhythm, normal heart sounds and intact distal pulses.   No murmur heard.  Pulmonary/Chest: Breath sounds normal. No stridor. No respiratory distress. He has no wheezes. He has no rhonchi. He has no rales.   Abdominal: Soft. He exhibits no distension. There is no abdominal tenderness. There is no rebound and no guarding.   Diminished bowel sounds   Musculoskeletal: Normal range of motion. No tenderness or edema.   Lymphadenopathy:     He has no cervical adenopathy.   Neurological: He is alert and oriented to person, place, and time. He has normal strength and normal reflexes. No cranial nerve deficit or sensory deficit.   Skin: Skin is warm and dry. Capillary refill takes less than 2 seconds. No rash noted. No erythema. No pallor.   Psychiatric: He has a normal mood and affect. His behavior is normal. Thought content normal.         ED Course   Procedures  Labs Reviewed   CBC W/ AUTO DIFFERENTIAL - Abnormal; Notable for the following components:       Result Value    WBC 13.82 (*)     RBC 3.79 (*)     Hemoglobin 12.2 (*)     Hematocrit 36.5 (*)     MCH 32.2 (*)     RDW 14.6 (*)     Platelets 371 (*)     Immature Granulocytes 0.9 (*)     Gran # (ANC) 11.8 (*)     Immature Grans (Abs) 0.12 (*)     Lymph # 0.9 (*)      Gran % 85.2 (*)     Lymph % 6.2 (*)     All other components within normal limits   COMPREHENSIVE METABOLIC PANEL - Abnormal; Notable for the following components:    Sodium 132 (*)     CO2 14 (*)     Glucose 173 (*)     BUN 42 (*)     Creatinine 4.7 (*)     ALT 58 (*)     eGFR if  13.7 (*)     eGFR if non  11.9 (*)     All other components within normal limits   CLOSTRIDIUM DIFFICILE   CULTURE, STOOL   LIPASE   STOOL EXAM-OVA,CYSTS,PARASITES   WBC, STOOL   OCCULT BLOOD X 1, STOOL   SARS-COV-2 RNA AMPLIFICATION, QUAL          Imaging Results          CT Abdomen Pelvis  Without Contrast (In process)                  Medical Decision Making:   ED Management:  Patient presents emergency department with severe diarrhea and dehydration.  He is found have evidence of acute kidney injury with a creatinine of 4.2.  He is dehydrated initially was hypotensive which spontaneously resolved even prior to fluid administration.  Initial blood pressure may have been erroneous as subsequent blood pressures are in the low 100s.  He has been given 2 L of IV fluids emergency department.  Stool cultures have been ordered as he has had perfuse diarrhea.  CT scan ordered and pending although I have a low suspicion for any acute pathology as he has a soft nontender non peritoneal abdomen.  Will be admitted to the hospitalist for further care and evaluation of his symptoms              Attending Attestation:         Attending Critical Care:   Critical Care Times:   Direct Patient Care (initial evaluation, reassessments, and time considering the case)................................................................15 minutes.   Additional History from reviewing old medical records or taking additional history from the family, EMS, PCP, etc.......................5 minutes.   Ordering, Reviewing, and Interpreting Diagnostic  Studies...............................................................................................................5 minutes.   Documentation..................................................................................................................................................................................5 minutes.   Consultation with other Physicians. .................................................................................................................................................5 minutes.   Consultation with the patient's family directly relating to the patient's condition, care, and DNR status (when patient unable)......5 minutes.   ==============================================================  · Total Critical Care Time - exclusive of procedural time: 40 minutes.  ==============================================================  Critical care was necessary to treat or prevent imminent or life-threatening deterioration of the following conditions: dehydration and renal failure.   The following critical care procedures were done by me (see procedure notes): blood draw for specimens and pulse oximetry.   Critical care was time spent personally by me on the following activities: obtaining history from patient or relative, examination of patient, review of old charts, development of treatment plan with patient or relative, ordering and performing treatments and interventions, evaluation of patient's response to treatment, interpretation of cardiac measurements and re-evaluation of patient's conition.   Critical Care Condition: potentially life-threatening               ED Course as of Dec 15 1755   Tue Dec 15, 2020   1547 When I evaluated the patient blood pressure is 104/54    [JR]   1703 Dr. Haines from [facility] medicine will admit the patient    [JR]      ED Course User Index  [JR] Raj Riggs DO            Clinical Impression:     ICD-10-CM ICD-9-CM   1. Diarrhea, unspecified  type  R19.7 787.91   2. Dehydration  E86.0 276.51   3. JEREMIAH (acute kidney injury)  N17.9 584.9                          ED Disposition Condition    Admit                             Raj Riggs DO  12/15/20 2673     [Abdominal Pain] : abdominal pain [Constipation] : constipation [Joint Stiffness] : joint stiffness [Joint Pain] : joint pain [Negative] : Neurological [Depression] : depression [Diarrhea] : diarrhea [Vomiting] : no vomiting [Nausea] : no nausea

## 2020-12-16 PROBLEM — D72.829 LEUKOCYTOSIS: Status: RESOLVED | Noted: 2020-12-16 | Resolved: 2020-12-16

## 2020-12-16 PROBLEM — E87.1 HYPONATREMIA: Status: RESOLVED | Noted: 2020-12-15 | Resolved: 2020-12-16

## 2020-12-16 PROBLEM — I95.9 HYPOTENSION: Status: RESOLVED | Noted: 2020-12-02 | Resolved: 2020-12-16

## 2020-12-16 PROBLEM — D72.829 LEUKOCYTOSIS: Status: ACTIVE | Noted: 2020-12-16

## 2020-12-16 LAB
ANION GAP SERPL CALC-SCNC: 10 MMOL/L (ref 8–16)
BASOPHILS # BLD AUTO: 0.03 K/UL (ref 0–0.2)
BASOPHILS NFR BLD: 0.3 % (ref 0–1.9)
BUN SERPL-MCNC: 38 MG/DL (ref 8–23)
C DIFF GDH STL QL: NEGATIVE
C DIFF TOX A+B STL QL IA: NEGATIVE
CALCIUM SERPL-MCNC: 8.4 MG/DL (ref 8.7–10.5)
CHLORIDE SERPL-SCNC: 110 MMOL/L (ref 95–110)
CO2 SERPL-SCNC: 17 MMOL/L (ref 23–29)
CREAT SERPL-MCNC: 2.7 MG/DL (ref 0.5–1.4)
CRP SERPL-MCNC: 1.25 MG/DL
DIFFERENTIAL METHOD: ABNORMAL
EOSINOPHIL # BLD AUTO: 0 K/UL (ref 0–0.5)
EOSINOPHIL NFR BLD: 0.1 % (ref 0–8)
ERYTHROCYTE [DISTWIDTH] IN BLOOD BY AUTOMATED COUNT: 14.5 % (ref 11.5–14.5)
EST. GFR  (AFRICAN AMERICAN): 26.8 ML/MIN/1.73 M^2
EST. GFR  (NON AFRICAN AMERICAN): 23.2 ML/MIN/1.73 M^2
GLUCOSE SERPL-MCNC: 115 MG/DL (ref 70–110)
GLUCOSE SERPL-MCNC: 124 MG/DL (ref 70–110)
GLUCOSE SERPL-MCNC: 131 MG/DL (ref 70–110)
GLUCOSE SERPL-MCNC: 139 MG/DL (ref 70–110)
GLUCOSE SERPL-MCNC: 140 MG/DL (ref 70–110)
HCT VFR BLD AUTO: 30.4 % (ref 40–54)
HGB BLD-MCNC: 10 G/DL (ref 14–18)
IMM GRANULOCYTES # BLD AUTO: 0.05 K/UL (ref 0–0.04)
IMM GRANULOCYTES NFR BLD AUTO: 0.6 % (ref 0–0.5)
LYMPHOCYTES # BLD AUTO: 1.1 K/UL (ref 1–4.8)
LYMPHOCYTES NFR BLD: 12.2 % (ref 18–48)
MAGNESIUM SERPL-MCNC: 1.5 MG/DL (ref 1.6–2.6)
MCH RBC QN AUTO: 31.3 PG (ref 27–31)
MCHC RBC AUTO-ENTMCNC: 32.9 G/DL (ref 32–36)
MCV RBC AUTO: 95 FL (ref 82–98)
MONOCYTES # BLD AUTO: 1 K/UL (ref 0.3–1)
MONOCYTES NFR BLD: 11.6 % (ref 4–15)
NEUTROPHILS # BLD AUTO: 6.7 K/UL (ref 1.8–7.7)
NEUTROPHILS NFR BLD: 75.2 % (ref 38–73)
NRBC BLD-RTO: 0 /100 WBC
PHOSPHATE SERPL-MCNC: 4.1 MG/DL (ref 2.7–4.5)
PLATELET # BLD AUTO: 260 K/UL (ref 150–350)
PMV BLD AUTO: 9.8 FL (ref 9.2–12.9)
POTASSIUM SERPL-SCNC: 4.1 MMOL/L (ref 3.5–5.1)
RBC # BLD AUTO: 3.19 M/UL (ref 4.6–6.2)
SODIUM SERPL-SCNC: 137 MMOL/L (ref 136–145)
T4 FREE SERPL-MCNC: 0.99 NG/DL (ref 0.71–1.51)
TSH SERPL DL<=0.005 MIU/L-ACNC: 0.32 UIU/ML (ref 0.34–5.6)
WBC # BLD AUTO: 8.87 K/UL (ref 3.9–12.7)
WBC #/AREA STL HPF: ABNORMAL /[HPF]

## 2020-12-16 PROCEDURE — 99900035 HC TECH TIME PER 15 MIN (STAT)

## 2020-12-16 PROCEDURE — 83516 IMMUNOASSAY NONANTIBODY: CPT

## 2020-12-16 PROCEDURE — 82705 FATS/LIPIDS FECES QUAL: CPT

## 2020-12-16 PROCEDURE — 84443 ASSAY THYROID STIM HORMONE: CPT

## 2020-12-16 PROCEDURE — 82656 EL-1 FECAL QUAL/SEMIQ: CPT

## 2020-12-16 PROCEDURE — 84100 ASSAY OF PHOSPHORUS: CPT

## 2020-12-16 PROCEDURE — 36415 COLL VENOUS BLD VENIPUNCTURE: CPT

## 2020-12-16 PROCEDURE — 63600175 PHARM REV CODE 636 W HCPCS: Performed by: INTERNAL MEDICINE

## 2020-12-16 PROCEDURE — 30000890 HC MISC. SEND OUT TEST

## 2020-12-16 PROCEDURE — 83735 ASSAY OF MAGNESIUM: CPT

## 2020-12-16 PROCEDURE — 85025 COMPLETE CBC W/AUTO DIFF WBC: CPT

## 2020-12-16 PROCEDURE — 94761 N-INVAS EAR/PLS OXIMETRY MLT: CPT

## 2020-12-16 PROCEDURE — 25000003 PHARM REV CODE 250: Performed by: FAMILY MEDICINE

## 2020-12-16 PROCEDURE — 84439 ASSAY OF FREE THYROXINE: CPT

## 2020-12-16 PROCEDURE — 84302 ASSAY OF SWEAT SODIUM: CPT

## 2020-12-16 PROCEDURE — 80048 BASIC METABOLIC PNL TOTAL CA: CPT

## 2020-12-16 PROCEDURE — 63600175 PHARM REV CODE 636 W HCPCS: Performed by: FAMILY MEDICINE

## 2020-12-16 PROCEDURE — 30000890 LABCORP MISCELLANEOUS TEST

## 2020-12-16 PROCEDURE — 84999 UNLISTED CHEMISTRY PROCEDURE: CPT

## 2020-12-16 PROCEDURE — 84238 ASSAY NONENDOCRINE RECEPTOR: CPT

## 2020-12-16 PROCEDURE — 86140 C-REACTIVE PROTEIN: CPT

## 2020-12-16 PROCEDURE — 25000003 PHARM REV CODE 250: Performed by: INTERNAL MEDICINE

## 2020-12-16 PROCEDURE — 12000002 HC ACUTE/MED SURGE SEMI-PRIVATE ROOM

## 2020-12-16 PROCEDURE — 63600175 PHARM REV CODE 636 W HCPCS

## 2020-12-16 RX ORDER — AMLODIPINE BESYLATE 5 MG/1
10 TABLET ORAL DAILY
Status: DISCONTINUED | OUTPATIENT
Start: 2020-12-16 | End: 2020-12-20 | Stop reason: HOSPADM

## 2020-12-16 RX ORDER — METOPROLOL SUCCINATE 50 MG/1
50 TABLET, EXTENDED RELEASE ORAL DAILY
Status: DISCONTINUED | OUTPATIENT
Start: 2020-12-16 | End: 2020-12-20 | Stop reason: HOSPADM

## 2020-12-16 RX ORDER — MAGNESIUM SULFATE 1 G/100ML
1 INJECTION INTRAVENOUS ONCE
Status: COMPLETED | OUTPATIENT
Start: 2020-12-16 | End: 2020-12-16

## 2020-12-16 RX ORDER — LOPERAMIDE HYDROCHLORIDE 2 MG/1
2 CAPSULE ORAL 4 TIMES DAILY PRN
Status: DISCONTINUED | OUTPATIENT
Start: 2020-12-16 | End: 2020-12-20 | Stop reason: HOSPADM

## 2020-12-16 RX ORDER — ASPIRIN 81 MG/1
81 TABLET ORAL DAILY
Status: DISCONTINUED | OUTPATIENT
Start: 2020-12-16 | End: 2020-12-20 | Stop reason: HOSPADM

## 2020-12-16 RX ORDER — CHOLESTYRAMINE 4 G/4.8G
4 POWDER, FOR SUSPENSION ORAL 4 TIMES DAILY
Status: DISCONTINUED | OUTPATIENT
Start: 2020-12-16 | End: 2020-12-20 | Stop reason: HOSPADM

## 2020-12-16 RX ORDER — ONDANSETRON 2 MG/ML
4 INJECTION INTRAMUSCULAR; INTRAVENOUS ONCE
Status: COMPLETED | OUTPATIENT
Start: 2020-12-16 | End: 2020-12-16

## 2020-12-16 RX ORDER — ONDANSETRON 2 MG/ML
INJECTION INTRAMUSCULAR; INTRAVENOUS
Status: COMPLETED
Start: 2020-12-16 | End: 2020-12-16

## 2020-12-16 RX ORDER — ATORVASTATIN CALCIUM 40 MG/1
80 TABLET, FILM COATED ORAL DAILY
Status: DISCONTINUED | OUTPATIENT
Start: 2020-12-17 | End: 2020-12-20 | Stop reason: HOSPADM

## 2020-12-16 RX ORDER — PANTOPRAZOLE SODIUM 40 MG/1
40 TABLET, DELAYED RELEASE ORAL
Status: DISCONTINUED | OUTPATIENT
Start: 2020-12-16 | End: 2020-12-20 | Stop reason: HOSPADM

## 2020-12-16 RX ORDER — ONDANSETRON 2 MG/ML
4 INJECTION INTRAMUSCULAR; INTRAVENOUS EVERY 6 HOURS PRN
Status: DISCONTINUED | OUTPATIENT
Start: 2020-12-16 | End: 2020-12-20 | Stop reason: HOSPADM

## 2020-12-16 RX ORDER — GABAPENTIN 300 MG/1
300 CAPSULE ORAL NIGHTLY
Status: DISCONTINUED | OUTPATIENT
Start: 2020-12-16 | End: 2020-12-20 | Stop reason: HOSPADM

## 2020-12-16 RX ORDER — LANOLIN ALCOHOL/MO/W.PET/CERES
1000 CREAM (GRAM) TOPICAL DAILY
Status: DISCONTINUED | OUTPATIENT
Start: 2020-12-16 | End: 2020-12-17

## 2020-12-16 RX ADMIN — RIFAXIMIN 550 MG: 550 TABLET ORAL at 11:12

## 2020-12-16 RX ADMIN — METOPROLOL SUCCINATE 50 MG: 50 TABLET, FILM COATED, EXTENDED RELEASE ORAL at 08:12

## 2020-12-16 RX ADMIN — CHOLESTYRAMINE 4 G: 4 POWDER, FOR SUSPENSION ORAL at 01:12

## 2020-12-16 RX ADMIN — CHOLESTYRAMINE 4 G: 4 POWDER, FOR SUSPENSION ORAL at 11:12

## 2020-12-16 RX ADMIN — RIFAXIMIN 550 MG: 550 TABLET ORAL at 08:12

## 2020-12-16 RX ADMIN — AMLODIPINE BESYLATE 10 MG: 5 TABLET ORAL at 04:12

## 2020-12-16 RX ADMIN — PANTOPRAZOLE SODIUM 40 MG: 40 TABLET, DELAYED RELEASE ORAL at 08:12

## 2020-12-16 RX ADMIN — MELATONIN 6 MG: at 08:12

## 2020-12-16 RX ADMIN — GABAPENTIN 300 MG: 300 CAPSULE ORAL at 08:12

## 2020-12-16 RX ADMIN — ONDANSETRON 4 MG: 2 INJECTION INTRAMUSCULAR; INTRAVENOUS at 01:12

## 2020-12-16 RX ADMIN — CHOLESTYRAMINE 4 G: 4 POWDER, FOR SUSPENSION ORAL at 04:12

## 2020-12-16 RX ADMIN — SODIUM CHLORIDE, SODIUM LACTATE, POTASSIUM CHLORIDE, AND CALCIUM CHLORIDE: .6; .31; .03; .02 INJECTION, SOLUTION INTRAVENOUS at 04:12

## 2020-12-16 RX ADMIN — RIFAXIMIN 550 MG: 550 TABLET ORAL at 04:12

## 2020-12-16 RX ADMIN — CYANOCOBALAMIN TAB 1000 MCG 1000 MCG: 1000 TAB at 08:12

## 2020-12-16 RX ADMIN — CLOPIDOGREL BISULFATE 75 MG: 75 TABLET, FILM COATED ORAL at 08:12

## 2020-12-16 RX ADMIN — ASPIRIN 81 MG: 81 TABLET, DELAYED RELEASE ORAL at 08:12

## 2020-12-16 RX ADMIN — MAGNESIUM SULFATE 1 G: 1 INJECTION INTRAVENOUS at 08:12

## 2020-12-16 RX ADMIN — ENOXAPARIN SODIUM 30 MG: 30 INJECTION SUBCUTANEOUS at 04:12

## 2020-12-16 RX ADMIN — ONDANSETRON 4 MG: 2 INJECTION INTRAMUSCULAR; INTRAVENOUS at 06:12

## 2020-12-16 NOTE — ASSESSMENT & PLAN NOTE
Severe peripheral vascular disease status post prior aortofemoral bypass as well as stenting  Continue aspirin and Plavix along with high-intensity statin

## 2020-12-16 NOTE — SUBJECTIVE & OBJECTIVE
Interval History:  Patient seen with wife present at bedside.  States he was doing well prior to recent discharge for about 5 days then developed recurrent large volume profuse diarrhea, nonbloody, quantified about 10 episodes per day, associated with generalized weakness.  Denies any urinary symptoms.  States he was still taking the cholestyramine.  Associated with fecal urgency.  States about 20 minutes after any oral intake would need to have a bowel movement.  Blood pressure was low on presentation 66/37 however now up trending.  Afebrile with T-max 98.2°.  Labs with hemoglobin 10, creatinine improved to 2.7, magnesium 1.5, TSH low with free T4 normal, CT abdomen and pelvis reviewed.  Gastroenterology note reviewed.  Plan of care discussed with patient and wife.  All questions addressed.     Review of Systems   Constitutional: Negative for chills and fever.   HENT: Negative for trouble swallowing.    Respiratory: Negative for shortness of breath.    Gastrointestinal: Positive for abdominal pain, diarrhea and nausea.   Genitourinary: Negative for difficulty urinating, dysuria and urgency.   Musculoskeletal: Negative for gait problem.   Skin: Negative for wound.   Allergic/Immunologic: Negative for immunocompromised state.   Hematological: Does not bruise/bleed easily.   Psychiatric/Behavioral: Negative for confusion.     Objective:     Vital Signs (Most Recent):  Temp: 97.9 °F (36.6 °C) (12/16/20 1154)  Pulse: 67 (12/16/20 1154)  Resp: 18 (12/16/20 1154)  BP: (!) 171/55 (12/16/20 1154)  SpO2: 98 % (12/16/20 1154) Vital Signs (24h Range):  Temp:  [97.5 °F (36.4 °C)-98.2 °F (36.8 °C)] 97.9 °F (36.6 °C)  Pulse:  [] 67  Resp:  [10-37] 18  SpO2:  [97 %-100 %] 98 %  BP: (118-225)/() 171/55     Weight: 79.8 kg (175 lb 14.8 oz)  Body mass index is 29.28 kg/m².    Intake/Output Summary (Last 24 hours) at 12/16/2020 1550  Last data filed at 12/16/2020 0700  Gross per 24 hour   Intake 2236.66 ml   Output --    Net 2236.66 ml      Physical Exam  Vitals signs and nursing note reviewed.   Constitutional:       General: He is not in acute distress.     Appearance: Normal appearance. He is normal weight. He is not ill-appearing, toxic-appearing or diaphoretic.   HENT:      Head: Normocephalic and atraumatic.      Mouth/Throat:      Mouth: Mucous membranes are moist.   Eyes:      General:         Right eye: No discharge.         Left eye: No discharge.      Conjunctiva/sclera: Conjunctivae normal.   Cardiovascular:      Rate and Rhythm: Normal rate and regular rhythm.      Comments: No significant lower extremity edema  Pulmonary:      Effort: Pulmonary effort is normal. No respiratory distress.      Breath sounds: Normal breath sounds. No stridor. No wheezing or rhonchi.      Comments: Not on supplemental oxygen  Abdominal:      Comments: Nondistended, healed midline surgical incision, soft and nontender, hyperactive bowel sounds   Genitourinary:     Comments: No suprapubic fullness or tenderness, no CVAT  Skin:     General: Skin is warm and dry.      Capillary Refill: Capillary refill takes 2 to 3 seconds.   Neurological:      Mental Status: He is alert and oriented to person, place, and time.   Psychiatric:         Mood and Affect: Mood normal.         Significant Labs:   BMP:   Recent Labs   Lab 12/16/20  0637   *      K 4.1      CO2 17*   BUN 38*   CREATININE 2.7*   CALCIUM 8.4*   MG 1.5*     CBC:   Recent Labs   Lab 12/15/20  1521 12/16/20  0637   WBC 13.82* 8.87   HGB 12.2* 10.0*   HCT 36.5* 30.4*   * 260     CMP:   Recent Labs   Lab 12/15/20  1521 12/16/20  0637   * 137   K 4.3 4.1    110   CO2 14* 17*   * 124*   BUN 42* 38*   CREATININE 4.7* 2.7*   CALCIUM 9.2 8.4*   PROT 8.4  --    ALBUMIN 4.6  --    BILITOT 1.0  --    ALKPHOS 101  --    AST 26  --    ALT 58*  --    ANIONGAP 14 10   EGFRNONAA 11.9* 23.2*     Cardiac Markers: No results for input(s): CKMB, MYOGLOBIN, BNP,  TROPISTAT in the last 48 hours.  Lactic Acid: No results for input(s): LACTATE in the last 48 hours.  Lipid Panel: No results for input(s): CHOL, HDL, LDLCALC, TRIG, CHOLHDL in the last 48 hours.  Magnesium:   Recent Labs   Lab 12/16/20  0637   MG 1.5*     POCT Glucose: No results for input(s): POCTGLUCOSE in the last 48 hours.  Respiratory Culture: No results for input(s): GSRESP, RESPIRATORYC in the last 48 hours.  Troponin: No results for input(s): TROPONINI in the last 48 hours.  TSH:   Recent Labs   Lab 12/16/20  0637   TSH 0.320*     Urine Culture: No results for input(s): LABURIN in the last 48 hours.  Urine Studies: No results for input(s): COLORU, APPEARANCEUA, PHUR, SPECGRAV, PROTEINUA, GLUCUA, KETONESU, BILIRUBINUA, OCCULTUA, NITRITE, UROBILINOGEN, LEUKOCYTESUR, RBCUA, WBCUA, BACTERIA, SQUAMEPITHEL, HYALINECASTS in the last 48 hours.    Invalid input(s): WRIGHTSUR  All pertinent labs within the past 24 hours have been reviewed.    Significant Imaging: I have reviewed all pertinent imaging results/findings within the past 24 hours.     X-ray Chest Ap Portable    Result Date: 12/2/2020  XR CHEST AP PORTABLE CLINICAL HISTORY: 70 years Male Chest Pain COMPARISON: None FINDINGS: Cardiac silhouette size is within normal limits. Atherosclerotic calcification of the liver. No confluent airspace disease. No pleural fluid or pneumothorax. No acute osseous abnormality. Surgical clips within the neck. IMPRESSION: No acute pulmonary process. Electronically Signed by Jesus MARTINEZ on 12/2/2020 5:07 PM    Us Kidney    Result Date: 12/2/2020  US KIDNEY CLINICAL HISTORY: 70 years Male olga COMPARISON: CT abdomen and pelvis same day FINDINGS: Right kidney measures 10.9 cm in length. No cystic or solid right renal lesion. No hydronephrosis. IVC is unremarkable. Aorta was not visualized due to bowel gas. Left kidney measures 8.3 cm in length. There is an echogenic focus at the lower pole the left kidney, corresponding  to a rim calcified lesion described on the reference CT.  This lesion is not well evaluated sonographically due to calcification. There is no hydronephrosis of the left kidney. Urinary bladder is incompletely distended and otherwise unremarkable. Impression: Negative for hydronephrosis. Rim calcified lesion seen on reference CT at the lower pole the left kidney is not well assessed sonographically. Follow-up renal protocol CT is again recommended. Electronically Signed by Jesus MARTINEZ on 12/2/2020 7:59 PM    Ct Abdomen Pelvis  Without Contrast    Result Date: 12/15/2020  CMS MANDATED QUALITY DATA - CT RADIATION  436 All CT scans at this facility utilize dose modulation, iterative reconstruction, and/or weight based dosing when appropriate to reduce radiation dose to as low as reasonably achievable. CT ABDOMEN PELVIS WITHOUT CONTRAST CLINICAL HISTORY: 70 years Male Nausea/vomiting COMPARISON: CT abdomen and pelvis December 2, 2020 FINDINGS: Lung bases are clear. Bone window images demonstrate lumbar spondylosis. No acute or aggressive osseous abnormality. On this unenhanced exam, no focal hepatic lesion. Calcified gallstones noted. Gallbladder is otherwise unremarkable. No bile duct dilation. Spleen and pancreas are unremarkable. Stable appearance of bilateral adrenal nodules. Bilateral perinephric stranding. Bilateral renal vascular calcifications. No hydronephrosis. Partially calcified lesion at the lower pole the left kidney is unchanged compared to the recent prior examination, again demonstrating postoperative soft tissue and fatty elements. Ureters are normal in caliber. Urinary bladder is unremarkable. Stomach is unremarkable. No evidence of small bowel obstruction. Postoperative changes of the colon. No evidence of acute colitis. Extensive atherosclerotic calcification of the abdominal aorta and its branch vessels. A stent is present within the celiac artery. Prior aortic bypass. No free fluid or  free air within the abdomen or pelvis. No pathologically enlarged abdominopelvic lymph nodes. Right gluteal intramuscular lipoma again noted. Soft tissue mass of the anterior left thigh demonstrating fatty elements is again evident and stable. IMPRESSION: No noncontrast CT evidence of acute pathology involving the abdomen or pelvis. Cholelithiasis. Stable appearance of exophytic lesion arising from the lower pole of the left kidney. Dedicated renal protocol follow-up is again recommended. Additional stable incidental findings as above. Electronically Signed by Jesus MARTINEZ on 12/15/2020 6:03 PM    Ct Abdomen Pelvis  Without Contrast    Result Date: 12/2/2020  EXAMINATION: CT ABDOMEN PELVIS WITHOUT CONTRAST CLINICAL HISTORY: Abdominal pain, acute, nonlocalized; TECHNIQUE: CMS Mandated Quality Data-CT Radiation Dose-436 All CT scans at this facility dose modulation, iterative reconstruction, and or weight-based dosing when appropriate to reduce radiation dose to as low as reasonably achievable. COMPARISON: CT abdomen and pelvis 01/23/2019 FINDINGS: Lung bases are clear.  Bone window images demonstrate lumbar spondylosis.  No acute or aggressive osseous abnormality. On this unenhanced exam, no focal hepatic lesion.  Small calcified gallstone appears to be present within the gallbladder neck.  The gallbladder is collapsed.  No bile duct dilation.  Spleen and pancreas are unremarkable.  Stable bilateral adrenal nodules consistent with benign adenomas.  Bilateral perinephric stranding.  Bilateral renal vascular calcifications.  No hydronephrosis.  At the lower pole the left kidney there is a calcified lesion demonstrating both soft tissue and fatty attenuation which has been present on multiple prior exams.  Ureters are normal in caliber.  Urinary bladder is collapsed. Stomach is unremarkable.  No evidence small-bowel obstruction.  Postoperative changes of the colon.  No evidence of acute colitis. Extensive  atherosclerotic calcification of the abdominal aorta and its branch vessels.  A stent is present within the celiac artery.  Aortic bypass.  No free fluid or free air within the abdomen or pelvis.  No pathologically enlarged abdominopelvic lymph nodes.  Intramuscular lipoma involving the right gluteal musculature.  Soft tissue mass of the anterior left thigh demonstrating fatty elements is also stable from prior.     No noncontrast CT evidence of acute pathology involving the abdomen or pelvis. Cholelithiasis. Exophytic lesion arising from the lower pole the left kidney demonstrating both soft tissue attenuation as well as fatty elements, with peripheral calcification.  A combination of fat necrosis/scarring, or angiomyolipoma are possibilities.  Correlation with surgical history as well as follow-up renal protocol CT is recommended. Extensive arterial vascular disease, status post aortic bypass. Additional incidental findings as above including postoperative changes of the colon and bilateral adrenal adenomas. Electronically signed by: Jesus Galeano MD Date:    12/02/2020 Time:    18:19

## 2020-12-16 NOTE — ASSESSMENT & PLAN NOTE
Known history of chronic diarrhea, recent admission for same a now removed recurrent associated with systemic hypotension and acute kidney injury  Significant prior got history, history of mesenteric ischemia status post stenting and ischemic colitis with right hemicolectomy  Celiac disease ruled out with negative duodenal biopsy, no evidence of IBD on colonoscopy, biopsy negative for microscopic colitis  Infectious workup negative to date  He was treated with rifaximin for possible small bowel bacterial overgrowth however denies any improvement  Suspect small-bowel pathology, possible short-gut syndrome versus other  Continue to monitor stool output  Checking stool gap, elastase, calprotectin, fecal elastase  NPO midnight for small-bowel follow-through tomorrow  A.m. labs ordered  Appreciate Gastroenterology input

## 2020-12-16 NOTE — NURSING
"Patient in room having emesis. PRN Zofran IVP administered. Effect felt after two minutes. States, "The pain is easing a little." New emesis bag provided. Upon checking 20 minutes after administration, pt denies any further pain. States, "It must be the nausea that caused it or the other way around." Instructed to call for staff when he feel the same abd discomfort. Verbalized back understanding.  "

## 2020-12-16 NOTE — PLAN OF CARE
Patient verified address as: [address] [place] [address] [place] [address]  Phone Number: [phone]  PCP- has one doesn't know name  Pharmacy-  [address]  Insurance- [facility]- None  Dialysis- None  Advance dir.- None 5 wishes booklet given to the patient  - Ele English - spouse - [phone]       12/16/20 1323   Discharge Assessment   Assessment Type Discharge Planning Assessment   Confirmed/corrected address and phone number on facesheet? Yes   Assessment information obtained from? Patient   Prior to hospitilization cognitive status: Alert/Oriented   Prior to hospitalization functional status: Independent   Current cognitive status: Alert/Oriented   Current Functional Status: Independent   Lives With spouse   Able to Return to Prior Arrangements yes   Is patient able to care for self after discharge? Yes   Patient currently being followed by outpatient case management? No   Patient currently receives any other outside agency services? No   Equipment Currently Used at Home walker, standard   Do you have any problems affording any of your prescribed medications? No   Is the patient taking medications as prescribed? yes   Does the patient have transportation home? Yes   Transportation Anticipated family or friend will provide   Dialysis Name and Scheduled days none   Does the patient receive services at the [facility]? No   Discharge Plan A Home   Discharge Plan B Home   DME Needed Upon Discharge  none   Patient/Family in Agreement with Plan yes

## 2020-12-16 NOTE — PROGRESS NOTES
[facility] Medicine  Progress Note    Patient Name: Jose English  MRN: [id number]  Patient Class: IP- Inpatient   Admission Date: 12/15/2020  Length of Stay: 1 days  Attending Physician: Samara Murray MD  Primary Care Provider: Primary Doctor No        Subjective:     Principal Problem:Diarrhea        HPI:  70-year-old male hypertension, hyperlipidemia, diabetes, CAD, significant peripheral vascular disease, s/p mesenteric artery stenting, s/p left iliac artery, aorto-bifem bypass in 2008, history of ischemic colitis requiring right-sided colectomy in 2018 comes in for persistent diarrhea and occasional emesis.  Patient was recently discharge from [facility] on 12/07 with similar issues.  Patient reports that after being discharged patient was doing well.  Patient picked up his medications from GI office, Dr. Loya and started taking medications.  Reports that about 3 days ago he started having profuse watery diarrhea.  Then he started having episodes of bilious emesis.  Only able to hold down Gatorade.  Denies any fever, chills.  Complains of abdominal pain due to persistent emesis.  Denies hematochezia, melena, hematemesis.  Patient came into the ED today for further evaluation.    In the ED, patient was hypotensive 92/56.  Patient received bolus IV fluids with significant improvement of blood pressure.  Found to have emesis of yellow watery fluid during my interview.    Overview/Hospital Course:  Assumed care of this patient on 12/16/20.  Patient with a history of chronic diarrhea.  Recent [facility] admission and discharge on 12/07 with severe diarrhea with associated acute kidney injury.  Known history of ischemic colitis status post prior bowel surgery including mesenteric artery stenting, right colectomy, previous duodenal biopsies negative for celiac, colonoscopy negative for IBD, biopsy negative for microscopic colitis, stool studies negative.  States he was doing well on cholestyramine  however 3 days following presentation developed recurrent severe diarrhea, quantified 10 episodes per day associated with generalized weakness.  On admission he was hypotensive with BP 66/37, labs with significant acute kidney injury with creatinine 4.7, low TSH with normal free T4, CT abdomen and pelvis without any acute pathology.  He was admitted, started on IV fluid hydration holding of antihypertensive with gastroenterology consultation.  On 12/16, no further episodes of nausea or vomiting, some mild epigastric discomfort, states he has had 4 small volume liquid stool, dark in color, overall feeling better.  Seen by Gastroenterology with plans for stool gap, elastase, calprotectin, fecal fat with small bowel follow through.    Interval History:  Patient seen with wife present at bedside.  States he was doing well prior to recent discharge for about 5 days then developed recurrent large volume profuse diarrhea, nonbloody, quantified about 10 episodes per day, associated with generalized weakness.  Denies any urinary symptoms.  States he was still taking the cholestyramine.  Associated with fecal urgency.  States about 20 minutes after any oral intake would need to have a bowel movement.  Blood pressure was low on presentation 66/37 however now up trending.  Afebrile with T-max 98.2°.  Labs with hemoglobin 10, creatinine improved to 2.7, magnesium 1.5, TSH low with free T4 normal, CT abdomen and pelvis reviewed.  Gastroenterology note reviewed.  Plan of care discussed with patient and wife.  All questions addressed.     Review of Systems   Constitutional: Negative for chills and fever.   HENT: Negative for trouble swallowing.    Respiratory: Negative for shortness of breath.    Gastrointestinal: Positive for abdominal pain, diarrhea and nausea.   Genitourinary: Negative for difficulty urinating, dysuria and urgency.   Musculoskeletal: Negative for gait problem.   Skin: Negative for wound.   Allergic/Immunologic:  Negative for immunocompromised state.   Hematological: Does not bruise/bleed easily.   Psychiatric/Behavioral: Negative for confusion.     Objective:     Vital Signs (Most Recent):  Temp: 97.9 °F (36.6 °C) (12/16/20 1154)  Pulse: 67 (12/16/20 1154)  Resp: 18 (12/16/20 1154)  BP: (!) 171/55 (12/16/20 1154)  SpO2: 98 % (12/16/20 1154) Vital Signs (24h Range):  Temp:  [97.5 °F (36.4 °C)-98.2 °F (36.8 °C)] 97.9 °F (36.6 °C)  Pulse:  [] 67  Resp:  [10-37] 18  SpO2:  [97 %-100 %] 98 %  BP: (118-225)/() 171/55     Weight: 79.8 kg (175 lb 14.8 oz)  Body mass index is 29.28 kg/m².    Intake/Output Summary (Last 24 hours) at 12/16/2020 1550  Last data filed at 12/16/2020 0700  Gross per 24 hour   Intake 2236.66 ml   Output --   Net 2236.66 ml      Physical Exam  Vitals signs and nursing note reviewed.   Constitutional:       General: He is not in acute distress.     Appearance: Normal appearance. He is normal weight. He is not ill-appearing, toxic-appearing or diaphoretic.   HENT:      Head: Normocephalic and atraumatic.      Mouth/Throat:      Mouth: Mucous membranes are moist.   Eyes:      General:         Right eye: No discharge.         Left eye: No discharge.      Conjunctiva/sclera: Conjunctivae normal.   Cardiovascular:      Rate and Rhythm: Normal rate and regular rhythm.      Comments: No significant lower extremity edema  Pulmonary:      Effort: Pulmonary effort is normal. No respiratory distress.      Breath sounds: Normal breath sounds. No stridor. No wheezing or rhonchi.      Comments: Not on supplemental oxygen  Abdominal:      Comments: Nondistended, healed midline surgical incision, soft and nontender, hyperactive bowel sounds   Genitourinary:     Comments: No suprapubic fullness or tenderness, no CVAT  Skin:     General: Skin is warm and dry.      Capillary Refill: Capillary refill takes 2 to 3 seconds.   Neurological:      Mental Status: He is alert and oriented to person, place, and time.    Psychiatric:         Mood and Affect: Mood normal.         Significant Labs:   BMP:   Recent Labs   Lab 12/16/20  0637   *      K 4.1      CO2 17*   BUN 38*   CREATININE 2.7*   CALCIUM 8.4*   MG 1.5*     CBC:   Recent Labs   Lab 12/15/20  1521 12/16/20  0637   WBC 13.82* 8.87   HGB 12.2* 10.0*   HCT 36.5* 30.4*   * 260     CMP:   Recent Labs   Lab 12/15/20  1521 12/16/20  0637   * 137   K 4.3 4.1    110   CO2 14* 17*   * 124*   BUN 42* 38*   CREATININE 4.7* 2.7*   CALCIUM 9.2 8.4*   PROT 8.4  --    ALBUMIN 4.6  --    BILITOT 1.0  --    ALKPHOS 101  --    AST 26  --    ALT 58*  --    ANIONGAP 14 10   EGFRNONAA 11.9* 23.2*     Cardiac Markers: No results for input(s): CKMB, MYOGLOBIN, BNP, TROPISTAT in the last 48 hours.  Lactic Acid: No results for input(s): LACTATE in the last 48 hours.  Lipid Panel: No results for input(s): CHOL, HDL, LDLCALC, TRIG, CHOLHDL in the last 48 hours.  Magnesium:   Recent Labs   Lab 12/16/20  0637   MG 1.5*     POCT Glucose: No results for input(s): POCTGLUCOSE in the last 48 hours.  Respiratory Culture: No results for input(s): GSRESP, RESPIRATORYC in the last 48 hours.  Troponin: No results for input(s): TROPONINI in the last 48 hours.  TSH:   Recent Labs   Lab 12/16/20  0637   TSH 0.320*     Urine Culture: No results for input(s): LABURIN in the last 48 hours.  Urine Studies: No results for input(s): COLORU, APPEARANCEUA, PHUR, SPECGRAV, PROTEINUA, GLUCUA, KETONESU, BILIRUBINUA, OCCULTUA, NITRITE, UROBILINOGEN, LEUKOCYTESUR, RBCUA, WBCUA, BACTERIA, SQUAMEPITHEL, HYALINECASTS in the last 48 hours.    Invalid input(s): NAZARIOSUR  All pertinent labs within the past 24 hours have been reviewed.    Significant Imaging: I have reviewed all pertinent imaging results/findings within the past 24 hours.     X-ray Chest Ap Portable    Result Date: 12/2/2020  XR CHEST AP PORTABLE CLINICAL HISTORY: 70 years Male Chest Pain COMPARISON: None FINDINGS:  Cardiac silhouette size is within normal limits. Atherosclerotic calcification of the liver. No confluent airspace disease. No pleural fluid or pneumothorax. No acute osseous abnormality. Surgical clips within the neck. IMPRESSION: No acute pulmonary process. Electronically Signed by Jesus MARTINEZ on 12/2/2020 5:07 PM    Us Kidney    Result Date: 12/2/2020  US KIDNEY CLINICAL HISTORY: 70 years Male olga COMPARISON: CT abdomen and pelvis same day FINDINGS: Right kidney measures 10.9 cm in length. No cystic or solid right renal lesion. No hydronephrosis. IVC is unremarkable. Aorta was not visualized due to bowel gas. Left kidney measures 8.3 cm in length. There is an echogenic focus at the lower pole the left kidney, corresponding to a rim calcified lesion described on the reference CT.  This lesion is not well evaluated sonographically due to calcification. There is no hydronephrosis of the left kidney. Urinary bladder is incompletely distended and otherwise unremarkable. Impression: Negative for hydronephrosis. Rim calcified lesion seen on reference CT at the lower pole the left kidney is not well assessed sonographically. Follow-up renal protocol CT is again recommended. Electronically Signed by Jesus MARTINEZ on 12/2/2020 7:59 PM    Ct Abdomen Pelvis  Without Contrast    Result Date: 12/15/2020  CMS MANDATED QUALITY DATA - CT RADIATION  436 All CT scans at this facility utilize dose modulation, iterative reconstruction, and/or weight based dosing when appropriate to reduce radiation dose to as low as reasonably achievable. CT ABDOMEN PELVIS WITHOUT CONTRAST CLINICAL HISTORY: 70 years Male Nausea/vomiting COMPARISON: CT abdomen and pelvis December 2, 2020 FINDINGS: Lung bases are clear. Bone window images demonstrate lumbar spondylosis. No acute or aggressive osseous abnormality. On this unenhanced exam, no focal hepatic lesion. Calcified gallstones noted. Gallbladder is otherwise unremarkable. No  bile duct dilation. Spleen and pancreas are unremarkable. Stable appearance of bilateral adrenal nodules. Bilateral perinephric stranding. Bilateral renal vascular calcifications. No hydronephrosis. Partially calcified lesion at the lower pole the left kidney is unchanged compared to the recent prior examination, again demonstrating postoperative soft tissue and fatty elements. Ureters are normal in caliber. Urinary bladder is unremarkable. Stomach is unremarkable. No evidence of small bowel obstruction. Postoperative changes of the colon. No evidence of acute colitis. Extensive atherosclerotic calcification of the abdominal aorta and its branch vessels. A stent is present within the celiac artery. Prior aortic bypass. No free fluid or free air within the abdomen or pelvis. No pathologically enlarged abdominopelvic lymph nodes. Right gluteal intramuscular lipoma again noted. Soft tissue mass of the anterior left thigh demonstrating fatty elements is again evident and stable. IMPRESSION: No noncontrast CT evidence of acute pathology involving the abdomen or pelvis. Cholelithiasis. Stable appearance of exophytic lesion arising from the lower pole of the left kidney. Dedicated renal protocol follow-up is again recommended. Additional stable incidental findings as above. Electronically Signed by Jesus MARTINEZ on 12/15/2020 6:03 PM    Ct Abdomen Pelvis  Without Contrast    Result Date: 12/2/2020  EXAMINATION: CT ABDOMEN PELVIS WITHOUT CONTRAST CLINICAL HISTORY: Abdominal pain, acute, nonlocalized; TECHNIQUE: CMS Mandated Quality Data-CT Radiation Dose-436 All CT scans at this facility dose modulation, iterative reconstruction, and or weight-based dosing when appropriate to reduce radiation dose to as low as reasonably achievable. COMPARISON: CT abdomen and pelvis 01/23/2019 FINDINGS: Lung bases are clear.  Bone window images demonstrate lumbar spondylosis.  No acute or aggressive osseous abnormality. On this  unenhanced exam, no focal hepatic lesion.  Small calcified gallstone appears to be present within the gallbladder neck.  The gallbladder is collapsed.  No bile duct dilation.  Spleen and pancreas are unremarkable.  Stable bilateral adrenal nodules consistent with benign adenomas.  Bilateral perinephric stranding.  Bilateral renal vascular calcifications.  No hydronephrosis.  At the lower pole the left kidney there is a calcified lesion demonstrating both soft tissue and fatty attenuation which has been present on multiple prior exams.  Ureters are normal in caliber.  Urinary bladder is collapsed. Stomach is unremarkable.  No evidence small-bowel obstruction.  Postoperative changes of the colon.  No evidence of acute colitis. Extensive atherosclerotic calcification of the abdominal aorta and its branch vessels.  A stent is present within the celiac artery.  Aortic bypass.  No free fluid or free air within the abdomen or pelvis.  No pathologically enlarged abdominopelvic lymph nodes.  Intramuscular lipoma involving the right gluteal musculature.  Soft tissue mass of the anterior left thigh demonstrating fatty elements is also stable from prior.     No noncontrast CT evidence of acute pathology involving the abdomen or pelvis. Cholelithiasis. Exophytic lesion arising from the lower pole the left kidney demonstrating both soft tissue attenuation as well as fatty elements, with peripheral calcification.  A combination of fat necrosis/scarring, or angiomyolipoma are possibilities.  Correlation with surgical history as well as follow-up renal protocol CT is recommended. Extensive arterial vascular disease, status post aortic bypass. Additional incidental findings as above including postoperative changes of the colon and bilateral adrenal adenomas. Electronically signed by: Jesus Galeano MD Date:    12/02/2020 Time:    18:19            Assessment/Plan:      * Acute on chronic diarrhea, recurrent  Known history of chronic  diarrhea, recent admission for same a now removed recurrent associated with systemic hypotension and acute kidney injury  Significant prior got history, history of mesenteric ischemia status post stenting and ischemic colitis with right hemicolectomy  Celiac disease ruled out with negative duodenal biopsy, no evidence of IBD on colonoscopy, biopsy negative for microscopic colitis  Infectious workup negative to date  He was treated with rifaximin for possible small bowel bacterial overgrowth however denies any improvement  Suspect small-bowel pathology, possible short-gut syndrome versus other  Continue to monitor stool output  Checking stool gap, elastase, calprotectin, fecal elastase  NPO midnight for small-bowel follow-through tomorrow  A.m. labs ordered  Appreciate Gastroenterology input      JEREMIAH (acute kidney injury)  Severe acute kidney injury on presentation with BUN/creatinine 42/4.7.  No known history of CKD.  Suspect prerenal due to the intravascular depletion/dehydration due to increased GI fluid loss  Does have history of prior bladder and renal cancer status post partial nephrectomy  Continue IV fluids, decreased rate to 100 cc an hour  Encourage oral intake and symptom control for diarrhea  Renally dosing all medications and avoid nephrotoxin drugs  Trending BMP      Hypomagnesemia  Magnesium 1.5.  Ordered 1 g IV repletion today.  Repeat levels in a.m..      Non anion gap metabolic acidosis  Improving with HCO2 improved from 14-17.  Due to large volume diarrhea.  Monitoring      Diabetes mellitus with peripheral vascular disease  Holding oral hypoglycemics.  Moderate dose insulin sliding scale t.i.d. with meals and Accu-Cheks.  As needed hypoglycemic measures      Mixed hyperlipidemia  Continue Lipitor      PAD (peripheral artery disease)  Severe peripheral vascular disease status post prior aortofemoral bypass as well as stenting  Continue aspirin and Plavix along with high-intensity statin      HTN  (hypertension)  Hypotension now resolved and blood pressure up trending  Resume home antihypertensives and monitor        VTE Risk Mitigation (From admission, onward)         Ordered     enoxaparin injection 30 mg  Every 24 hours      12/15/20 1826     IP VTE HIGH RISK PATIENT  Once      12/15/20 1826     Place sequential compression device  Until discontinued      12/15/20 1826                Discharge Planning   UMANG:      Code Status: Full Code   Is the patient medically ready for discharge?:     Reason for patient still in hospital (select all that apply): Patient trending condition  Discharge Plan A: Home                  Samara Murray MD  Department of Hospital Medicine   [facility] hospital

## 2020-12-16 NOTE — NURSING
Pt once again had emesis. MD Cameron notified. One time STAT Zofran IV ordered and given.     As with earlier administration, pt verbalized comfort within a few minutes of receiving medication.

## 2020-12-16 NOTE — ASSESSMENT & PLAN NOTE
Holding oral hypoglycemics.  Moderate dose insulin sliding scale t.i.d. with meals and Accu-Cheks.  As needed hypoglycemic measures

## 2020-12-16 NOTE — HOSPITAL COURSE
Assumed care of this patient on 12/16/20.  Patient with a history of chronic diarrhea.  Recent [facility] admission and discharge on 12/07 with severe diarrhea with associated acute kidney injury.  Known history of ischemic colitis status post prior bowel surgery including mesenteric artery stenting, right colectomy, previous duodenal biopsies negative for celiac, colonoscopy negative for IBD, biopsy negative for microscopic colitis, stool studies negative.  States he was doing well on cholestyramine however 3 days following presentation developed recurrent severe diarrhea, quantified 10 episodes per day associated with generalized weakness.  On admission he was hypotensive with BP 66/37, labs with significant acute kidney injury with creatinine 4.7, low TSH with normal free T4, CT abdomen and pelvis without any acute pathology.  He was admitted, started on IV fluid hydration holding of antihypertensive with gastroenterology consultation.  On 12/16, no further episodes of nausea or vomiting, some mild epigastric discomfort, states he has had 4 small volume liquid stool, dark in color, overall feeling better.  Seen by Gastroenterology with plans for stool gap, elastase, calprotectin, fecal fat with small bowel follow through.  On 12/17, anemia labs checked, B12 level low at 286, had small-bowel follow-through with multiple loops of gas-filled small bowel, time of 15 minutes.  On 12/19 did not have any bowel movements and tolerating full diet well. On 12/20 one BM following breakfast and otherwise stable and feels ready for discharge.No electrolyte abnormalities and renal function back to his baseline.  Counseled patient on needing to make dietary changes at home in order to prevent recurrence.  Gastroenterology recommends to discharge on Questran and Creon supplementation, discontinue rifaximin, prescription sent to pharmacy and spoke with pharmacist, also patient given written printed prescriptions for these 2 medications.   Discharge plan including medication changes, follow-up as well as return precautions were reviewed.  No objection to discharge.  All questions were addressed.    Discharge examination  Sitting in chair, alert and oriented, regular rhythm, not on supplemental oxygen, abdomen soft and nontender

## 2020-12-16 NOTE — ASSESSMENT & PLAN NOTE
Hypotension now resolved and blood pressure up trending  Resume home antihypertensives and monitor

## 2020-12-16 NOTE — PROGRESS NOTES
11:30am Spoke with Elizabeth SPENCE, patient on clear liquids. RN to hold patient NPO for SBFT and we will do in the AM.

## 2020-12-16 NOTE — ASSESSMENT & PLAN NOTE
Severe acute kidney injury on presentation with BUN/creatinine 42/4.7.  No known history of CKD.  Suspect prerenal due to the intravascular depletion/dehydration due to increased GI fluid loss  Does have history of prior bladder and renal cancer status post partial nephrectomy  Continue IV fluids, decreased rate to 100 cc an hour  Encourage oral intake and symptom control for diarrhea  Renally dosing all medications and avoid nephrotoxin drugs  Trending BMP

## 2020-12-16 NOTE — HPI
70-year-old male hypertension, hyperlipidemia, diabetes, CAD, significant peripheral vascular disease, s/p mesenteric artery stenting, s/p left iliac artery, aorto-bifem bypass in 2008, history of ischemic colitis requiring right-sided colectomy in 2018 comes in for persistent diarrhea and occasional emesis.  Patient was recently discharge from [facility] on 12/07 with similar issues.  Patient reports that after being discharged patient was doing well.  Patient picked up his medications from GI office, Dr. Loya and started taking medications.  Reports that about 3 days ago he started having profuse watery diarrhea.  Then he started having episodes of bilious emesis.  Only able to hold down Gatorade.  Denies any fever, chills.  Complains of abdominal pain due to persistent emesis.  Denies hematochezia, melena, hematemesis.  Patient came into the ED today for further evaluation.    In the ED, patient was hypotensive 92/56.  Patient received bolus IV fluids with significant improvement of blood pressure.  Found to have emesis of yellow watery fluid during my interview.

## 2020-12-16 NOTE — CONSULTS
Chief Complaint:  Diarrhea    HPI:  70 M with history of history of renal cancer (s/p partial nephrectomy), bladder cancer, CAD, TIA, DM GERD, HTN, PVD on DAPT s/p multiple stents, ischemic colitis s/p right colectomy (10/31/18 at [facility])  chronic severe diarrhea for years associated with severe dehydration and YAO.  Work up unrevealing thus far.  Prior possible dx of chronic panc.  Random r/l bx negative for MC.  Normal ileum.  Pt back with severe diarrhea and YAO.  2 bm yesterday.     Review of Systems: Complete ROS performed and negative unless stated above in HPI    CT abd w/o contrast 12/15  IMPRESSION:     No noncontrast CT evidence of acute pathology involving the abdomen or  pelvis.     Cholelithiasis.     Stable appearance of exophytic lesion arising from the lower pole of  the left kidney. Dedicated renal protocol follow-up is again  recommended.       ---------------------    Chart Review        12/20 TTG IGA < 2    2/2019  Stool neg for pathogen pcr, o/p, giardia/crypto    Clinic note from [facility] reviewed- prior negative capsule    EUS 2019                        - Gastric mucosal atrophy. Biopsied.                         - Normal examined duodenum. Biopsied.                         - There was no sign of significant pathology in the                         common bile duct.                         - Pancreatic parenchymal abnormalities consisting                         of diffuse echogenicity, hyperechoic strands and                         lobularity were noted in the entire pancreas.     FINAL PATHOLOGIC DIAGNOSIS  1. DUODENUM, BIOPSY:  Benign duodenal mucosa with no significant histopathologic alterations.  No features of gluten sensitivity enteropathy or intervillous parasitic microorganisms.  2. STOMACH, BIOPSY:  Antral mucosa with focal features suggestive of chemical/reactive gastropathy.  No Helicobacter pylori microorganisms identified on H&E stain.  Diagnosed        Past Medical History:    Diagnosis Date    Bladder cancer     2 times    BPH (benign prostatic hypertrophy)     Cancer of kidney     1/2    Colon polyp     Coronary artery disease     DM (diabetes mellitus)     GERD (gastroesophageal reflux disease)     Hypertension     PAD (peripheral artery disease)     Peripheral vascular disease        Past Surgical History:   Procedure Laterality Date    abdominal aorta bypass by fem      BLADDER SURGERY      COLONOSCOPY  12/06/2018    COLONOSCOPY N/A 12/6/2018    Procedure: COLONOSCOPY;  Surgeon: Familia Dickson MD;  Location: [facility] ENDO;  Service: General;  Laterality: N/A;    COLONOSCOPY N/A 12/7/2020    Procedure: COLONOSCOPY;  Surgeon: Andi Loya MD;  Location: [facility] ENDO;  Service: Endoscopy;  Laterality: N/A;    CYSTOSCOPY      ENDOSCOPIC ULTRASOUND OF UPPER GASTROINTESTINAL TRACT N/A 2/20/2019    Procedure: ULTRASOUND, UPPER GI TRACT, ENDOSCOPIC;  Surgeon: Govind Hassan MD;  Location: [facility] ([address] [place] [address]);  Service: Endoscopy;  Laterality: N/A;  Please schedule EGD and EUS, for celiac sprue biopsies, and evaluation of pancreatitis.  5 day hold Plavix, Dr Jarret Calle  PM prep    ESOPHAGOGASTRODUODENOSCOPY N/A 2/20/2019    Procedure: EGD (ESOPHAGOGASTRODUODENOSCOPY);  Surgeon: Govind Hassan MD;  Location: [facility] ([address] [place] [address]);  Service: Endoscopy;  Laterality: N/A;  Please schedule EGD and EUS, for celiac sprue biopsies, and evaluation of pancreatitis.  5 day hold Plavix, Dr Jarret Calle  PM prep    multiple angiosplastia      NEPHRECTOMY      partial nephrectomy left    PERCUTANEOUS TRANSLUMINAL ANGIOPLASTY (PTA) OF PERIPHERAL VESSEL Bilateral 8/21/2018    Procedure: PTA, PERIPHERAL VESSEL;  Surgeon: Patrick Love MD;  Location: [facility] CATH;  Service: Cardiology;  Laterality: Bilateral;  Please schedule Left common iliac artery intervention and b/l LE angio via left brachial on August 21       Family History   Problem Relation Age of Onset    Cancer Father   "   Cancer Maternal Uncle        Social History     Socioeconomic History    Marital status:      Spouse name: Not on file    Number of children: Not on file    Years of education: Not on file    Highest education level: Not on file   Occupational History    Not on file   Social Needs    Financial resource strain: Not on file    Food insecurity     Worry: Not on file     Inability: Not on file    Transportation needs     Medical: Not on file     Non-medical: Not on file   Tobacco Use    Smoking status: Former Smoker     Packs/day: 1.50     Years: 40.00     Pack years: 60.00     Types: Cigarettes     Quit date: 2008     Years since quittin.8    Smokeless tobacco: Never Used   Substance and Sexual Activity    Alcohol use: Not Currently     Alcohol/week: 2.0 standard drinks     Types: 2 Cans of beer per week     Comment: quit 2 years ago    Drug use: Not on file    Sexual activity: Yes     Partners: Female   Lifestyle    Physical activity     Days per week: Not on file     Minutes per session: Not on file    Stress: Not on file   Relationships    Social connections     Talks on phone: Not on file     Gets together: Not on file     Attends [employer] service: Not on file     Active member of club or organization: Not on file     Attends meetings of clubs or organizations: Not on file     Relationship status: Not on file   Other Topics Concern    Not on file   Social History Narrative    SOCIAL HISTORY:    And he goes by "Jorge".    2014.    The patient lives with his wife.  Recently his daughter moved in with her 4 children, 2 of whom have pervasive developmental delay and it seems as though his daughter will not be able to reconcile with her .    He is taking the children to school in the morning, picking him up from school in the afternoon.    He quit cigarette smoking in  when he came down with bladder cancer.    He likes to drink beer.       Review of patient's " allergies indicates:   Allergen Reactions    Amoxicillin Hives    Zolpidem tartrate Hallucinations       No current facility-administered medications on file prior to encounter.      Current Outpatient Medications on File Prior to Encounter   Medication Sig Dispense Refill    amlodipine (NORVASC) 10 MG tablet TAKE 1 TABLET ONE TIME DAILY (Patient taking differently: Take 10 mg by mouth once daily. ) 90 tablet 3    aspirin (ECOTRIN) 81 MG EC tablet Take 81 mg by mouth once daily.      atorvastatin (LIPITOR) 80 MG tablet Take 80 mg by mouth once daily.      b complex vitamins tablet Take 1 tablet by mouth once daily.      bismuth subsalicylate (KAOPECTATE, BISMUTH SUBSALICY,) 262 mg Tab Take by mouth.      cholestyramine-aspartame (QUESTRAN LIGHT) 4 gram PwPk Take 1 packet (4 g total) by mouth 3 (three) times daily as needed (diarrhhoea). 21 packet 1    clopidogrel (PLAVIX) 75 mg tablet TAKE 1 TABLET EVERY EVENING 90 tablet 3    cyanocobalamin (VITAMIN B-12) 1000 MCG tablet Take 1,000 mcg by mouth once daily.       dicyclomine (BENTYL) 20 mg tablet Take 20 mg by mouth every 6 (six) hours.      donepezil (ARICEPT) 5 MG tablet Take 10 mg by mouth every evening.      gabapentin (NEURONTIN) 300 MG capsule Take 300 mg by mouth every evening.      glipiZIDE (GLUCOTROL) 5 MG TR24 Take 5 mg by mouth 2 (two) times a day.       metoprolol succinate (TOPROL-XL) 50 MG 24 hr tablet Take 1 tablet (50 mg total) by mouth once daily.      pantoprazole (PROTONIX) 40 MG tablet Take 40 mg by mouth once daily.      rifAXIMin (XIFAXAN) 550 mg Tab Take 1 tablet (550 mg total) by mouth 3 (three) times daily. for 10 days 30 tablet 0    temazepam (RESTORIL) 30 mg capsule Take 30 mg by mouth nightly as needed.       ACCU-CHEK CEASAR CONTROL SOLN Soln USE ONE TIME DAILY 1 each 3    ACCU-CHEK CEASAR PLUS TEST STRP Strp TEST TWO TIMES DAILY 200 strip 3    ACCU-CHEK SOFTCLIX LANCETS Misc TEST TWO TIMES DAILY 200 each 3    BD  "ALCOHOL SWABS PadM TEST TWO TIMES DAILY 200 each 4    lancets (ONE TOUCH DELICA LANCETS) 33 gauge Misc 2 lancets by Misc.(Non-Drug; Combo Route) route 2 (two) times daily. 600 each 4    loperamide (IMODIUM) 2 mg capsule Take 2 mg by mouth 4 (four) times daily as needed for Diarrhea.         Objective:  /60   Pulse 94   Temp 98.2 °F (36.8 °C) (Oral)   Resp 18   Ht 5' 5" (1.651 m)   Wt 79.8 kg (175 lb 14.8 oz)   SpO2 97%   BMI 29.28 kg/m²   General: wd, wn, nad  HE: ncat, perrl, eomi  ENT: op pink and moist without lesions or exudates  CV: +s1s2, no mrg, rrr  Resp: ctapb, no wrr  GI: bs active, abd soft, nt, nd  Skin: no lesions, no rash  Neuro: cn 2-12 in tact, no focal deficits, no asterixis  Psych: regular rate speech, normal affect    Labs:  Recent Results (from the past 2688 hour(s))   CBC auto differential    Collection Time: 12/02/20  4:17 PM   Result Value Ref Range    WBC 11.93 3.90 - 12.70 K/uL    RBC 4.18 (L) 4.60 - 6.20 M/uL    Hemoglobin 12.8 (L) 14.0 - 18.0 g/dL    Hematocrit 37.3 (L) 40.0 - 54.0 %    MCV 89 82 - 98 fL    MCH 30.6 27.0 - 31.0 pg    MCHC 34.3 32.0 - 36.0 g/dL    RDW 13.8 11.5 - 14.5 %    Platelets 317 150 - 350 K/uL    MPV 10.3 9.2 - 12.9 fL    Immature Granulocytes 1.0 (H) 0.0 - 0.5 %    Gran # (ANC) 9.1 (H) 1.8 - 7.7 K/uL    Immature Grans (Abs) 0.12 (H) 0.00 - 0.04 K/uL    Lymph # 1.2 1.0 - 4.8 K/uL    Mono # 1.3 (H) 0.3 - 1.0 K/uL    Eos # 0.2 0.0 - 0.5 K/uL    Baso # 0.05 0.00 - 0.20 K/uL    nRBC 0 0 /100 WBC    Gran % 76.3 (H) 38.0 - 73.0 %    Lymph % 10.1 (L) 18.0 - 48.0 %    Mono % 10.9 4.0 - 15.0 %    Eosinophil % 1.3 0.0 - 8.0 %    Basophil % 0.4 0.0 - 1.9 %    Differential Method Automated    Comprehensive metabolic panel    Collection Time: 12/02/20  4:17 PM   Result Value Ref Range    Sodium 136 136 - 145 mmol/L    Potassium 3.5 3.5 - 5.1 mmol/L    Chloride 102 95 - 110 mmol/L    CO2 14 (L) 23 - 29 mmol/L    Glucose 149 (H) 70 - 110 mg/dL     (H) 8 - 23 " mg/dL    Creatinine 6.2 (H) 0.5 - 1.4 mg/dL    Calcium 8.9 8.7 - 10.5 mg/dL    Total Protein 7.6 6.0 - 8.4 g/dL    Albumin 4.2 3.5 - 5.2 g/dL    Total Bilirubin 0.7 0.1 - 1.0 mg/dL    Alkaline Phosphatase 66 55 - 135 U/L    AST 11 10 - 40 U/L    ALT 18 10 - 44 U/L    Anion Gap 20 (H) 8 - 16 mmol/L    eGFR if African American 9.8 (A) >60 mL/min/1.73 m^2    eGFR if non African American 8.5 (A) >60 mL/min/1.73 m^2   Lipase    Collection Time: 12/02/20  4:17 PM   Result Value Ref Range    Lipase 67 (H) 4 - 60 U/L   Iron and TIBC    Collection Time: 12/02/20  4:17 PM   Result Value Ref Range    Iron 108 45 - 160 ug/dL    Transferrin 221 200 - 375 mg/dL    TIBC 309 250 - 450 ug/dL    Saturated Iron 35 20 - 50 %   Ferritin    Collection Time: 12/02/20  4:17 PM   Result Value Ref Range    Ferritin 514 (H) 20.0 - 300.0 ng/mL   CK-MB    Collection Time: 12/02/20  4:17 PM   Result Value Ref Range    CPK MB 1.9 0.1 - 6.5 ng/mL   CK    Collection Time: 12/02/20  4:17 PM   Result Value Ref Range    CPK 28 20 - 200 U/L   COVID-19 Rapid Screening    Collection Time: 12/02/20  5:20 PM   Result Value Ref Range    SARS-CoV-2 RNA, Amplification, Qual Negative Negative   Blood culture    Collection Time: 12/02/20  5:21 PM    Specimen: Peripheral, Antecubital, Left; Blood   Result Value Ref Range    Blood Culture, Routine No growth after 5 days.    Lactic acid, plasma    Collection Time: 12/02/20  5:21 PM   Result Value Ref Range    Lactate (Lactic Acid) 1.6 0.5 - 1.9 mmol/L   Blood culture    Collection Time: 12/02/20  5:45 PM    Specimen: Peripheral, Antecubital, Right; Blood   Result Value Ref Range    Blood Culture, Routine No growth after 5 days.    POCT glucose    Collection Time: 12/02/20  8:34 PM   Result Value Ref Range    POC Glucose 115 (H) 70 - 110   Urinalysis, Reflex to Urine Culture Urine, Clean Catch    Collection Time: 12/02/20  9:04 PM    Specimen: Urine, Catheterized   Result Value Ref Range    Specimen UA Urine, Clean  Catch     Color, UA Yellow Yellow, Straw, Missy    Appearance, UA Hazy (A) Clear    pH, UA 6.0 5.0 - 8.0    Specific Gravity, UA 1.020 1.005 - 1.030    Protein, UA 1+ (A) Negative    Glucose, UA Negative Negative    Ketones, UA Negative Negative    Bilirubin (UA) Negative Negative    Occult Blood UA Negative Negative    Nitrite, UA Negative Negative    Urobilinogen, UA Negative Negative EU/dL    Leukocytes, UA Negative Negative   Sodium, urine, random    Collection Time: 12/02/20  9:04 PM   Result Value Ref Range    Sodium, Urine <10 (L) 20 - 250 mmol/L   Potassium, urine, random    Collection Time: 12/02/20  9:04 PM   Result Value Ref Range    Potassium, Urine 14 (L) 15 - 95 mmol/L   Urinalysis Microscopic    Collection Time: 12/02/20  9:04 PM   Result Value Ref Range    RBC, UA 1 0 - 4 /hpf    WBC, UA 2 0 - 5 /hpf    Bacteria Negative None-Occ /hpf    Squam Epithel, UA 8 /hpf    Hyaline Casts, UA 72 (A) 0-1/lpf /lpf    Microscopic Comment SEE COMMENT    Basic Metabolic Panel (BMP)    Collection Time: 12/03/20  3:09 AM   Result Value Ref Range    Sodium 136 136 - 145 mmol/L    Potassium 3.2 (L) 3.5 - 5.1 mmol/L    Chloride 108 95 - 110 mmol/L    CO2 9 (LL) 23 - 29 mmol/L    Glucose 52 (L) 70 - 110 mg/dL     (H) 8 - 23 mg/dL    Creatinine 5.4 (H) 0.5 - 1.4 mg/dL    Calcium 8.2 (L) 8.7 - 10.5 mg/dL    Anion Gap 19 (H) 8 - 16 mmol/L    eGFR if African American 11.6 (A) >60 mL/min/1.73 m^2    eGFR if non African American 10.0 (A) >60 mL/min/1.73 m^2   Magnesium    Collection Time: 12/03/20  3:09 AM   Result Value Ref Range    Magnesium 1.6 1.6 - 2.6 mg/dL   CBC with Automated Differential    Collection Time: 12/03/20  3:09 AM   Result Value Ref Range    WBC 10.61 3.90 - 12.70 K/uL    RBC 3.48 (L) 4.60 - 6.20 M/uL    Hemoglobin 10.6 (L) 14.0 - 18.0 g/dL    Hematocrit 31.9 (L) 40.0 - 54.0 %    MCV 92 82 - 98 fL    MCH 30.5 27.0 - 31.0 pg    MCHC 33.2 32.0 - 36.0 g/dL    RDW 13.8 11.5 - 14.5 %    Platelets 234 150  - 350 K/uL    MPV 10.5 9.2 - 12.9 fL    Immature Granulocytes 0.7 (H) 0.0 - 0.5 %    Gran # (ANC) 7.4 1.8 - 7.7 K/uL    Immature Grans (Abs) 0.07 (H) 0.00 - 0.04 K/uL    Lymph # 1.7 1.0 - 4.8 K/uL    Mono # 1.2 (H) 0.3 - 1.0 K/uL    Eos # 0.2 0.0 - 0.5 K/uL    Baso # 0.05 0.00 - 0.20 K/uL    nRBC 0 0 /100 WBC    Gran % 69.2 38.0 - 73.0 %    Lymph % 15.9 (L) 18.0 - 48.0 %    Mono % 11.4 4.0 - 15.0 %    Eosinophil % 2.3 0.0 - 8.0 %    Basophil % 0.5 0.0 - 1.9 %    Differential Method Automated    Stool Exam-Ova,Cysts,Parasites    Collection Time: 12/03/20  6:22 AM   Result Value Ref Range    Stool Exam-Ova,Cysts,Parasites No ova or parasites seen    WBC, Stool    Collection Time: 12/03/20  6:22 AM   Result Value Ref Range    Stool WBC No neutrophils seen No neutrophils seen   Stool culture **cannot be ordered stat**    Collection Time: 12/03/20  7:08 AM    Specimen: Stool   Result Value Ref Range    Stool Culture No enteric kailey     Stool Culture No Salmonella,Shigella,Vibrio,Campylobacter.     Stool Culture No E coli [id number] isolated.    POCT glucose    Collection Time: 12/03/20  9:13 AM   Result Value Ref Range    POC Glucose 143 (H) 70 - 110   ISTAT PROCEDURE    Collection Time: 12/03/20  9:50 AM   Result Value Ref Range    POC PH 7.347 (L) 7.35 - 7.45    POC PCO2 26.4 (LL) 35 - 45 mmHg    POC PO2 112 (H) 80 - 100 mmHg    POC HCO3 14.5 (L) 24 - 28 mmol/L    POC BE -11 -2 to 2 mmol/L    POC SATURATED O2 98 95 - 100 %    POC TCO2 15 (L) 23 - 27 mmol/L    Sample ARTERIAL     Site LR     Allens Test Pass     DelSys Room Air     Mode SPONT     FiO2 0.21    Magnesium    Collection Time: 12/03/20 10:37 AM   Result Value Ref Range    Magnesium 1.4 (L) 1.6 - 2.6 mg/dL   Renal Function Panel    Collection Time: 12/03/20 10:41 AM   Result Value Ref Range    Glucose 127 (H) 70 - 110 mg/dL    Sodium 135 (L) 136 - 145 mmol/L    Potassium 2.8 (LL) 3.5 - 5.1 mmol/L    Chloride 104 95 - 110 mmol/L    CO2 14 (L) 23 - 29 mmol/L      (H) 8 - 23 mg/dL    Calcium 7.8 (L) 8.7 - 10.5 mg/dL    Creatinine 4.4 (H) 0.5 - 1.4 mg/dL    Albumin 3.0 (L) 3.5 - 5.2 g/dL    Phosphorus 6.6 (H) 2.7 - 4.5 mg/dL    eGFR if African American 14.8 (A) >60 mL/min/1.73 m^2    eGFR if non  12.8 (A) >60 mL/min/1.73 m^2    Anion Gap 17 (H) 8 - 16 mmol/L   Basic Metabolic Panel (BMP)    Collection Time: 12/03/20 10:41 AM   Result Value Ref Range    Sodium 138 136 - 145 mmol/L    Potassium 2.7 (LL) 3.5 - 5.1 mmol/L    Chloride 107 95 - 110 mmol/L    CO2 14 (L) 23 - 29 mmol/L    Glucose 132 (H) 70 - 110 mg/dL     (H) 8 - 23 mg/dL    Creatinine 4.5 (H) 0.5 - 1.4 mg/dL    Calcium 7.9 (L) 8.7 - 10.5 mg/dL    Anion Gap 17 (H) 8 - 16 mmol/L    eGFR if African American 14.4 (A) >60 mL/min/1.73 m^2    eGFR if non African American 12.5 (A) >60 mL/min/1.73 m^2   POCT glucose    Collection Time: 12/03/20 11:30 AM   Result Value Ref Range    POC Glucose 181 (H) 70 - 110   Tissue transglutaminase, IgA    Collection Time: 12/03/20  3:59 PM   Result Value Ref Range    TTG IgA <2 0 - 3 U/mL   Basic Metabolic Panel (BMP)    Collection Time: 12/03/20  3:59 PM   Result Value Ref Range    Sodium 135 (L) 136 - 145 mmol/L    Potassium 2.9 (LL) 3.5 - 5.1 mmol/L    Chloride 102 95 - 110 mmol/L    CO2 17 (L) 23 - 29 mmol/L    Glucose 122 (H) 70 - 110 mg/dL     (H) 8 - 23 mg/dL    Creatinine 3.8 (H) 0.5 - 1.4 mg/dL    Calcium 7.7 (L) 8.7 - 10.5 mg/dL    Anion Gap 16 8 - 16 mmol/L    eGFR if African American 17.7 (A) >60 mL/min/1.73 m^2    eGFR if non  15.3 (A) >60 mL/min/1.73 m^2   POCT glucose    Collection Time: 12/03/20  6:12 PM   Result Value Ref Range    POC Glucose 127 (H) 70 - 110   Gastrointestinal Pathogens Panel, PCR    Collection Time: 12/03/20  6:19 PM   Result Value Ref Range    GPP - Campylobacter Not Detected Not Detected    GPP - Clostridium difficile Toxin A/B Not Detected Not Detected    Plesiomonas shigelloides Not Detected Not  Detected    GPP - Salmonella Not Detected Not Detected    Vibrio Not Detected Not Detected    GPP - Vibrio cholera Not Detected Not Detected    GPP - Yersinia enterocolitica Not Detected Not Detected    Enteroaggregative E coli Not Detected Not Detected    Enteropathogenic E coli Not Detected Not Detected    GPP - Enterotoxigenic E coli (ETEC) Not Detected Not Detected    GPP - Shiga Toxin-producing E coli (STEC) Not Detected Not Detected    GPP - E coli O157 Not applicable Not Detected    Shigella/Enteroinvasive E coli Not Detected Not Detected    GPP - Cryptosporidium Not Detected Not Detected    Cyclospora cayetanensis Not Detected Not Detected    GPP - Entamoeba histolytica Not Detected Not Detected    GPP - Giardia lamblia Not Detected Not Detected    GPP - Adenovirus 40/41 Not Detected Not Detected    Astrovirus Not Detected Not Detected    GPP - Norovirus GI/GII Not Detected Not Detected    GPP - Rotavirus A Not Detected Not Detected    Sapovirus Not Detected Not Detected   Basic Metabolic Panel (BMP)    Collection Time: 12/03/20  8:00 PM   Result Value Ref Range    Sodium 137 136 - 145 mmol/L    Potassium 3.1 (L) 3.5 - 5.1 mmol/L    Chloride 104 95 - 110 mmol/L    CO2 18 (L) 23 - 29 mmol/L    Glucose 149 (H) 70 - 110 mg/dL     (H) 8 - 23 mg/dL    Creatinine 3.5 (H) 0.5 - 1.4 mg/dL    Calcium 8.4 (L) 8.7 - 10.5 mg/dL    Anion Gap 15 8 - 16 mmol/L    eGFR if African American 19.6 (A) >60 mL/min/1.73 m^2    eGFR if non  16.9 (A) >60 mL/min/1.73 m^2   Albumin    Collection Time: 12/03/20  8:00 PM   Result Value Ref Range    Albumin 3.1 (L) 3.5 - 5.2 g/dL   Magnesium    Collection Time: 12/03/20  8:00 PM   Result Value Ref Range    Magnesium 2.2 1.6 - 2.6 mg/dL   POCT glucose    Collection Time: 12/03/20 10:09 PM   Result Value Ref Range    POC Glucose 180 (H) 70 - 110   Basic Metabolic Panel (BMP)    Collection Time: 12/04/20 12:05 AM   Result Value Ref Range    Sodium 136 136 - 145 mmol/L     Potassium 2.9 (LL) 3.5 - 5.1 mmol/L    Chloride 102 95 - 110 mmol/L    CO2 20 (L) 23 - 29 mmol/L    Glucose 155 (H) 70 - 110 mg/dL     (H) 8 - 23 mg/dL    Creatinine 2.9 (H) 0.5 - 1.4 mg/dL    Calcium 8.4 (L) 8.7 - 10.5 mg/dL    Anion Gap 14 8 - 16 mmol/L    eGFR if African American 24.6 (A) >60 mL/min/1.73 m^2    eGFR if non  21.3 (A) >60 mL/min/1.73 m^2   Calcium, ionized    Collection Time: 12/04/20 12:05 AM   Result Value Ref Range    Ionized Calcium 1.13 1.06 - 1.42 mmol/L   Magnesium    Collection Time: 12/04/20  4:01 AM   Result Value Ref Range    Magnesium 1.9 1.6 - 2.6 mg/dL   CBC with Automated Differential    Collection Time: 12/04/20  4:01 AM   Result Value Ref Range    WBC 7.54 3.90 - 12.70 K/uL    RBC 3.00 (L) 4.60 - 6.20 M/uL    Hemoglobin 9.6 (L) 14.0 - 18.0 g/dL    Hematocrit 26.5 (L) 40.0 - 54.0 %    MCV 88 82 - 98 fL    MCH 32.0 (H) 27.0 - 31.0 pg    MCHC 36.2 (H) 32.0 - 36.0 g/dL    RDW 13.2 11.5 - 14.5 %    Platelets 205 150 - 350 K/uL    MPV 10.3 9.2 - 12.9 fL    Immature Granulocytes 0.7 (H) 0.0 - 0.5 %    Gran # (ANC) 5.3 1.8 - 7.7 K/uL    Immature Grans (Abs) 0.05 (H) 0.00 - 0.04 K/uL    Lymph # 1.1 1.0 - 4.8 K/uL    Mono # 0.8 0.3 - 1.0 K/uL    Eos # 0.3 0.0 - 0.5 K/uL    Baso # 0.04 0.00 - 0.20 K/uL    nRBC 0 0 /100 WBC    Gran % 69.7 38.0 - 73.0 %    Lymph % 14.7 (L) 18.0 - 48.0 %    Mono % 10.7 4.0 - 15.0 %    Eosinophil % 3.7 0.0 - 8.0 %    Basophil % 0.5 0.0 - 1.9 %    Differential Method Automated    Basic Metabolic Panel (BMP)    Collection Time: 12/04/20  4:01 AM   Result Value Ref Range    Sodium 141 136 - 145 mmol/L    Potassium 3.4 (L) 3.5 - 5.1 mmol/L    Chloride 106 95 - 110 mmol/L    CO2 25 23 - 29 mmol/L    Glucose 138 (H) 70 - 110 mg/dL     (H) 8 - 23 mg/dL    Creatinine 2.6 (H) 0.5 - 1.4 mg/dL    Calcium 8.5 (L) 8.7 - 10.5 mg/dL    Anion Gap 10 8 - 16 mmol/L    eGFR if African American 28.0 (A) >60 mL/min/1.73 m^2    eGFR if non African  American 24.3 (A) >60 mL/min/1.73 m^2   Clostridium difficile EIA    Collection Time: 12/04/20  8:30 AM    Specimen: Stool   Result Value Ref Range    C. diff Antigen Negative Negative    C difficile Toxins A+B, EIA Negative Negative   Basic Metabolic Panel (BMP)    Collection Time: 12/04/20  8:33 AM   Result Value Ref Range    Sodium 136 136 - 145 mmol/L    Potassium 3.2 (L) 3.5 - 5.1 mmol/L    Chloride 101 95 - 110 mmol/L    CO2 22 (L) 23 - 29 mmol/L    Glucose 224 (H) 70 - 110 mg/dL     (H) 8 - 23 mg/dL    Creatinine 2.1 (H) 0.5 - 1.4 mg/dL    Calcium 8.1 (L) 8.7 - 10.5 mg/dL    Anion Gap 13 8 - 16 mmol/L    eGFR if African American 36.3 (A) >60 mL/min/1.73 m^2    eGFR if non  31.4 (A) >60 mL/min/1.73 m^2   Vitamin D    Collection Time: 12/04/20  8:33 AM   Result Value Ref Range    Vit D, 25-Hydroxy 24 (L) 30 - 96 ng/mL   POCT glucose    Collection Time: 12/04/20 11:09 AM   Result Value Ref Range    POC Glucose 164 (H) 70 - 110   POCT glucose    Collection Time: 12/04/20  3:56 PM   Result Value Ref Range    POC Glucose 198 (H) 70 - 110   Basic Metabolic Panel    Collection Time: 12/04/20  4:27 PM   Result Value Ref Range    Sodium 137 136 - 145 mmol/L    Potassium 3.8 3.5 - 5.1 mmol/L    Chloride 100 95 - 110 mmol/L    CO2 25 23 - 29 mmol/L    Glucose 175 (H) 70 - 110 mg/dL    BUN 86 (H) 8 - 23 mg/dL    Creatinine 1.8 (H) 0.5 - 1.4 mg/dL    Calcium 8.6 (L) 8.7 - 10.5 mg/dL    Anion Gap 12 8 - 16 mmol/L    eGFR if African American 43.7 (A) >60 mL/min/1.73 m^2    eGFR if non  37.8 (A) >60 mL/min/1.73 m^2   POCT glucose    Collection Time: 12/04/20  7:30 PM   Result Value Ref Range    POC Glucose 164 (H) 70 - 110   Basic Metabolic Panel    Collection Time: 12/05/20 12:09 AM   Result Value Ref Range    Sodium 141 136 - 145 mmol/L    Potassium 4.0 3.5 - 5.1 mmol/L    Chloride 104 95 - 110 mmol/L    CO2 26 23 - 29 mmol/L    Glucose 132 (H) 70 - 110 mg/dL    BUN 84 (H) 8 - 23 mg/dL     Creatinine 1.6 (H) 0.5 - 1.4 mg/dL    Calcium 8.6 (L) 8.7 - 10.5 mg/dL    Anion Gap 11 8 - 16 mmol/L    eGFR if African American 50.4 (A) >60 mL/min/1.73 m^2    eGFR if non  43.6 (A) >60 mL/min/1.73 m^2   Magnesium    Collection Time: 12/05/20  5:55 AM   Result Value Ref Range    Magnesium 1.4 (L) 1.6 - 2.6 mg/dL   CBC with Automated Differential    Collection Time: 12/05/20  5:55 AM   Result Value Ref Range    WBC 6.78 3.90 - 12.70 K/uL    RBC 3.01 (L) 4.60 - 6.20 M/uL    Hemoglobin 9.4 (L) 14.0 - 18.0 g/dL    Hematocrit 27.6 (L) 40.0 - 54.0 %    MCV 92 82 - 98 fL    MCH 31.2 (H) 27.0 - 31.0 pg    MCHC 34.1 32.0 - 36.0 g/dL    RDW 13.6 11.5 - 14.5 %    Platelets 188 150 - 350 K/uL    MPV 10.5 9.2 - 12.9 fL    Immature Granulocytes 0.6 (H) 0.0 - 0.5 %    Gran # (ANC) 3.8 1.8 - 7.7 K/uL    Immature Grans (Abs) 0.04 0.00 - 0.04 K/uL    Lymph # 1.7 1.0 - 4.8 K/uL    Mono # 0.9 0.3 - 1.0 K/uL    Eos # 0.3 0.0 - 0.5 K/uL    Baso # 0.04 0.00 - 0.20 K/uL    nRBC 0 0 /100 WBC    Gran % 55.3 38.0 - 73.0 %    Lymph % 25.2 18.0 - 48.0 %    Mono % 13.9 4.0 - 15.0 %    Eosinophil % 4.4 0.0 - 8.0 %    Basophil % 0.6 0.0 - 1.9 %    Differential Method Automated    Basic Metabolic Panel    Collection Time: 12/05/20  5:55 AM   Result Value Ref Range    Sodium 140 136 - 145 mmol/L    Potassium 3.8 3.5 - 5.1 mmol/L    Chloride 102 95 - 110 mmol/L    CO2 29 23 - 29 mmol/L    Glucose 98 70 - 110 mg/dL    BUN 67 (H) 8 - 23 mg/dL    Creatinine 1.5 (H) 0.5 - 1.4 mg/dL    Calcium 8.5 (L) 8.7 - 10.5 mg/dL    Anion Gap 9 8 - 16 mmol/L    eGFR if African American 54.5 (A) >60 mL/min/1.73 m^2    eGFR if non  47.2 (A) >60 mL/min/1.73 m^2   POCT glucose    Collection Time: 12/05/20  7:08 AM   Result Value Ref Range    POC Glucose 140 (H) 70 - 110   POCT glucose    Collection Time: 12/05/20 11:27 AM   Result Value Ref Range    POC Glucose 144 (H) 70 - 110   POCT glucose    Collection Time: 12/05/20  4:20 PM    Result Value Ref Range    POC Glucose 143 (H) 70 - 110   POCT glucose    Collection Time: 12/05/20  8:03 PM   Result Value Ref Range    POC Glucose 150 (H) 70 - 110   Magnesium    Collection Time: 12/06/20  6:00 AM   Result Value Ref Range    Magnesium 1.4 (L) 1.6 - 2.6 mg/dL   CBC with Automated Differential    Collection Time: 12/06/20  6:00 AM   Result Value Ref Range    WBC 6.63 3.90 - 12.70 K/uL    RBC 3.10 (L) 4.60 - 6.20 M/uL    Hemoglobin 9.7 (L) 14.0 - 18.0 g/dL    Hematocrit 28.8 (L) 40.0 - 54.0 %    MCV 93 82 - 98 fL    MCH 31.3 (H) 27.0 - 31.0 pg    MCHC 33.7 32.0 - 36.0 g/dL    RDW 13.1 11.5 - 14.5 %    Platelets 168 150 - 350 K/uL    MPV 10.0 9.2 - 12.9 fL    Immature Granulocytes 0.8 (H) 0.0 - 0.5 %    Gran # (ANC) 3.8 1.8 - 7.7 K/uL    Immature Grans (Abs) 0.05 (H) 0.00 - 0.04 K/uL    Lymph # 1.5 1.0 - 4.8 K/uL    Mono # 0.8 0.3 - 1.0 K/uL    Eos # 0.5 0.0 - 0.5 K/uL    Baso # 0.04 0.00 - 0.20 K/uL    nRBC 0 0 /100 WBC    Gran % 56.6 38.0 - 73.0 %    Lymph % 22.3 18.0 - 48.0 %    Mono % 12.2 4.0 - 15.0 %    Eosinophil % 7.5 0.0 - 8.0 %    Basophil % 0.6 0.0 - 1.9 %    Differential Method Automated    Basic Metabolic Panel    Collection Time: 12/06/20  6:00 AM   Result Value Ref Range    Sodium 144 136 - 145 mmol/L    Potassium 4.2 3.5 - 5.1 mmol/L    Chloride 105 95 - 110 mmol/L    CO2 29 23 - 29 mmol/L    Glucose 96 70 - 110 mg/dL    BUN 37 (H) 8 - 23 mg/dL    Creatinine 1.2 0.5 - 1.4 mg/dL    Calcium 8.6 (L) 8.7 - 10.5 mg/dL    Anion Gap 10 8 - 16 mmol/L    eGFR if African American >60.0 >60 mL/min/1.73 m^2    eGFR if non African American >60.0 >60 mL/min/1.73 m^2   POCT glucose    Collection Time: 12/06/20  7:55 AM   Result Value Ref Range    POC Glucose 108 70 - 110   POCT glucose    Collection Time: 12/06/20 11:05 AM   Result Value Ref Range    POC Glucose 161 (H) 70 - 110   POCT glucose    Collection Time: 12/06/20  4:57 PM   Result Value Ref Range    POC Glucose 115 (H) 70 - 110   POCT  glucose    Collection Time: 12/06/20  8:11 PM   Result Value Ref Range    POC Glucose 122 (H) 70 - 110   Magnesium    Collection Time: 12/07/20  5:06 AM   Result Value Ref Range    Magnesium 1.4 (L) 1.6 - 2.6 mg/dL   CBC with Automated Differential    Collection Time: 12/07/20  5:06 AM   Result Value Ref Range    WBC 6.43 3.90 - 12.70 K/uL    RBC 3.23 (L) 4.60 - 6.20 M/uL    Hemoglobin 9.8 (L) 14.0 - 18.0 g/dL    Hematocrit 30.4 (L) 40.0 - 54.0 %    MCV 94 82 - 98 fL    MCH 30.3 27.0 - 31.0 pg    MCHC 32.2 32.0 - 36.0 g/dL    RDW 12.9 11.5 - 14.5 %    Platelets 183 150 - 350 K/uL    MPV 10.0 9.2 - 12.9 fL    Immature Granulocytes 1.1 (H) 0.0 - 0.5 %    Gran # (ANC) 4.3 1.8 - 7.7 K/uL    Immature Grans (Abs) 0.07 (H) 0.00 - 0.04 K/uL    Lymph # 1.1 1.0 - 4.8 K/uL    Mono # 0.7 0.3 - 1.0 K/uL    Eos # 0.3 0.0 - 0.5 K/uL    Baso # 0.05 0.00 - 0.20 K/uL    nRBC 0 0 /100 WBC    Gran % 67.1 38.0 - 73.0 %    Lymph % 16.3 (L) 18.0 - 48.0 %    Mono % 10.3 4.0 - 15.0 %    Eosinophil % 4.4 0.0 - 8.0 %    Basophil % 0.8 0.0 - 1.9 %    Differential Method Automated    Basic Metabolic Panel    Collection Time: 12/07/20  5:06 AM   Result Value Ref Range    Sodium 136 136 - 145 mmol/L    Potassium 4.0 3.5 - 5.1 mmol/L    Chloride 99 95 - 110 mmol/L    CO2 29 23 - 29 mmol/L    Glucose 130 (H) 70 - 110 mg/dL    BUN 23 8 - 23 mg/dL    Creatinine 1.3 0.5 - 1.4 mg/dL    Calcium 8.2 (L) 8.7 - 10.5 mg/dL    Anion Gap 8 8 - 16 mmol/L    eGFR if African American >60.0 >60 mL/min/1.73 m^2    eGFR if non  56.1 (A) >60 mL/min/1.73 m^2   POCT glucose    Collection Time: 12/07/20  7:32 AM   Result Value Ref Range    POC Glucose 107 70 - 110   POCT glucose    Collection Time: 12/07/20 11:13 AM   Result Value Ref Range    POC Glucose 107 70 - 110   ABO/Rh    Collection Time: 12/07/20  2:46 PM   Result Value Ref Range    Group & Rh O POS    CBC auto differential    Collection Time: 12/15/20  3:21 PM   Result Value Ref Range    WBC  13.82 (H) 3.90 - 12.70 K/uL    RBC 3.79 (L) 4.60 - 6.20 M/uL    Hemoglobin 12.2 (L) 14.0 - 18.0 g/dL    Hematocrit 36.5 (L) 40.0 - 54.0 %    MCV 96 82 - 98 fL    MCH 32.2 (H) 27.0 - 31.0 pg    MCHC 33.4 32.0 - 36.0 g/dL    RDW 14.6 (H) 11.5 - 14.5 %    Platelets 371 (H) 150 - 350 K/uL    MPV 9.9 9.2 - 12.9 fL    Immature Granulocytes 0.9 (H) 0.0 - 0.5 %    Gran # (ANC) 11.8 (H) 1.8 - 7.7 K/uL    Immature Grans (Abs) 0.12 (H) 0.00 - 0.04 K/uL    Lymph # 0.9 (L) 1.0 - 4.8 K/uL    Mono # 1.0 0.3 - 1.0 K/uL    Eos # 0.0 0.0 - 0.5 K/uL    Baso # 0.06 0.00 - 0.20 K/uL    nRBC 0 0 /100 WBC    Gran % 85.2 (H) 38.0 - 73.0 %    Lymph % 6.2 (L) 18.0 - 48.0 %    Mono % 7.2 4.0 - 15.0 %    Eosinophil % 0.1 0.0 - 8.0 %    Basophil % 0.4 0.0 - 1.9 %    Differential Method Automated    Comprehensive metabolic panel    Collection Time: 12/15/20  3:21 PM   Result Value Ref Range    Sodium 132 (L) 136 - 145 mmol/L    Potassium 4.3 3.5 - 5.1 mmol/L    Chloride 104 95 - 110 mmol/L    CO2 14 (L) 23 - 29 mmol/L    Glucose 173 (H) 70 - 110 mg/dL    BUN 42 (H) 8 - 23 mg/dL    Creatinine 4.7 (H) 0.5 - 1.4 mg/dL    Calcium 9.2 8.7 - 10.5 mg/dL    Total Protein 8.4 6.0 - 8.4 g/dL    Albumin 4.6 3.5 - 5.2 g/dL    Total Bilirubin 1.0 0.1 - 1.0 mg/dL    Alkaline Phosphatase 101 55 - 135 U/L    AST 26 10 - 40 U/L    ALT 58 (H) 10 - 44 U/L    Anion Gap 14 8 - 16 mmol/L    eGFR if African American 13.7 (A) >60 mL/min/1.73 m^2    eGFR if non African American 11.9 (A) >60 mL/min/1.73 m^2   Lipase    Collection Time: 12/15/20  3:21 PM   Result Value Ref Range    Lipase 36 4 - 60 U/L   COVID-19 Rapid Screening    Collection Time: 12/15/20  4:48 PM   Result Value Ref Range    SARS-CoV-2 RNA, Amplification, Qual Negative Negative   POCT glucose    Collection Time: 12/15/20  8:57 PM   Result Value Ref Range    POC Glucose 119 (H) 70 - 110   Clostridium difficile EIA    Collection Time: 12/15/20  9:02 PM    Specimen: Stool   Result Value Ref Range    C.  diff Antigen Negative Negative    C difficile Toxins A+B, EIA Negative Negative   Stool culture **cannot be ordered stat**    Collection Time: 12/15/20  9:02 PM    Specimen: Stool   Result Value Ref Range    Stool Culture Nothing significant to date    WBC, Stool    Collection Time: 12/15/20  9:02 PM   Result Value Ref Range    Stool WBC Mod neutrophils seen (A) No neutrophils seen   Basic Metabolic Panel (BMP)    Collection Time: 12/16/20  6:37 AM   Result Value Ref Range    Sodium 137 136 - 145 mmol/L    Potassium 4.1 3.5 - 5.1 mmol/L    Chloride 110 95 - 110 mmol/L    CO2 17 (L) 23 - 29 mmol/L    Glucose 124 (H) 70 - 110 mg/dL    BUN 38 (H) 8 - 23 mg/dL    Creatinine 2.7 (H) 0.5 - 1.4 mg/dL    Calcium 8.4 (L) 8.7 - 10.5 mg/dL    Anion Gap 10 8 - 16 mmol/L    eGFR if African American 26.8 (A) >60 mL/min/1.73 m^2    eGFR if non  23.2 (A) >60 mL/min/1.73 m^2   Magnesium    Collection Time: 12/16/20  6:37 AM   Result Value Ref Range    Magnesium 1.5 (L) 1.6 - 2.6 mg/dL   Phosphorus    Collection Time: 12/16/20  6:37 AM   Result Value Ref Range    Phosphorus 4.1 2.7 - 4.5 mg/dL   CBC with Automated Differential    Collection Time: 12/16/20  6:37 AM   Result Value Ref Range    WBC 8.87 3.90 - 12.70 K/uL    RBC 3.19 (L) 4.60 - 6.20 M/uL    Hemoglobin 10.0 (L) 14.0 - 18.0 g/dL    Hematocrit 30.4 (L) 40.0 - 54.0 %    MCV 95 82 - 98 fL    MCH 31.3 (H) 27.0 - 31.0 pg    MCHC 32.9 32.0 - 36.0 g/dL    RDW 14.5 11.5 - 14.5 %    Platelets 260 150 - 350 K/uL    MPV 9.8 9.2 - 12.9 fL    Immature Granulocytes 0.6 (H) 0.0 - 0.5 %    Gran # (ANC) 6.7 1.8 - 7.7 K/uL    Immature Grans (Abs) 0.05 (H) 0.00 - 0.04 K/uL    Lymph # 1.1 1.0 - 4.8 K/uL    Mono # 1.0 0.3 - 1.0 K/uL    Eos # 0.0 0.0 - 0.5 K/uL    Baso # 0.03 0.00 - 0.20 K/uL    nRBC 0 0 /100 WBC    Gran % 75.2 (H) 38.0 - 73.0 %    Lymph % 12.2 (L) 18.0 - 48.0 %    Mono % 11.6 4.0 - 15.0 %    Eosinophil % 0.1 0.0 - 8.0 %    Basophil % 0.3 0.0 - 1.9 %     "Differential Method Automated    POCT glucose    Collection Time: 12/16/20  7:43 AM   Result Value Ref Range    POC Glucose 139 (H) 70 - 110        Assessment:  Severe persistent diarrhea of unknown etiology    Plan:  Stool gap, fecal elastase repeat, calprotectin, fecal fat  Clinically suspicious for SB pathology given volume of diarrhea and normal colon  Possible outpatient repeat capsule study  Get SBFT this admission    EUS - possible chronic panc (tried creon in past)  Duodenal bx 2019 - negative for celiac.  Negative IGA  Colon 12/20 - normal bx for MC. And normal anastomosis. No IBD  Reported negative capsule in past  Xifaxan w/o improvement x 2  Good response to cholestyramine last admissions    Per Dr Whyte note 2019 "  Patient also reports having a colonoscopy, and a VCE, all unrevealing for cause of diarrhea. Multiple treatments including imodium, Lomotil, questran and rifaximin were tried with no benefit. Also he tried altering diet, and eating more healthy, but with no benefit."      "

## 2020-12-17 PROBLEM — E87.20 METABOLIC ACIDOSIS: Status: RESOLVED | Noted: 2020-12-03 | Resolved: 2020-12-17

## 2020-12-17 PROBLEM — E83.39 HYPOPHOSPHATEMIA: Status: ACTIVE | Noted: 2020-12-17

## 2020-12-17 PROBLEM — D64.9 ANEMIA: Chronic | Status: ACTIVE | Noted: 2020-12-17

## 2020-12-17 PROBLEM — E53.8 B12 DEFICIENCY: Chronic | Status: ACTIVE | Noted: 2020-12-17

## 2020-12-17 LAB
ANION GAP SERPL CALC-SCNC: 6 MMOL/L (ref 8–16)
BASOPHILS # BLD AUTO: 0.05 K/UL (ref 0–0.2)
BASOPHILS NFR BLD: 0.7 % (ref 0–1.9)
BUN SERPL-MCNC: 24 MG/DL (ref 8–23)
CALCIUM SERPL-MCNC: 8.3 MG/DL (ref 8.7–10.5)
CHLORIDE SERPL-SCNC: 105 MMOL/L (ref 95–110)
CO2 SERPL-SCNC: 21 MMOL/L (ref 23–29)
CREAT SERPL-MCNC: 1.3 MG/DL (ref 0.5–1.4)
DIFFERENTIAL METHOD: ABNORMAL
EOSINOPHIL # BLD AUTO: 0.1 K/UL (ref 0–0.5)
EOSINOPHIL NFR BLD: 1.5 % (ref 0–8)
ERYTHROCYTE [DISTWIDTH] IN BLOOD BY AUTOMATED COUNT: 14.7 % (ref 11.5–14.5)
EST. GFR  (AFRICAN AMERICAN): >60 ML/MIN/1.73 M^2
EST. GFR  (NON AFRICAN AMERICAN): 56.1 ML/MIN/1.73 M^2
FAT STL QL: NORMAL
FERRITIN SERPL-MCNC: 316 NG/ML (ref 20–300)
FOLATE SERPL-MCNC: >24.8 NG/ML (ref 4–24)
GLUCOSE SERPL-MCNC: 104 MG/DL (ref 70–110)
GLUCOSE SERPL-MCNC: 126 MG/DL (ref 70–110)
GLUCOSE SERPL-MCNC: 127 MG/DL (ref 70–110)
GLUCOSE SERPL-MCNC: 131 MG/DL (ref 70–110)
GLUCOSE SERPL-MCNC: 151 MG/DL (ref 70–110)
HCT VFR BLD AUTO: 25.7 % (ref 40–54)
HGB BLD-MCNC: 8.4 G/DL (ref 14–18)
IMM GRANULOCYTES # BLD AUTO: 0.04 K/UL (ref 0–0.04)
IMM GRANULOCYTES NFR BLD AUTO: 0.6 % (ref 0–0.5)
IRON SERPL-MCNC: 51 UG/DL (ref 45–160)
LYMPHOCYTES # BLD AUTO: 0.9 K/UL (ref 1–4.8)
LYMPHOCYTES NFR BLD: 13 % (ref 18–48)
MAGNESIUM SERPL-MCNC: 1.6 MG/DL (ref 1.6–2.6)
MCH RBC QN AUTO: 31 PG (ref 27–31)
MCHC RBC AUTO-ENTMCNC: 32.7 G/DL (ref 32–36)
MCV RBC AUTO: 95 FL (ref 82–98)
MONOCYTES # BLD AUTO: 0.8 K/UL (ref 0.3–1)
MONOCYTES NFR BLD: 12.4 % (ref 4–15)
NEUTRAL FAT STL QL: NORMAL
NEUTROPHILS # BLD AUTO: 4.8 K/UL (ref 1.8–7.7)
NEUTROPHILS NFR BLD: 71.8 % (ref 38–73)
NRBC BLD-RTO: 0 /100 WBC
PHOSPHATE SERPL-MCNC: 2.2 MG/DL (ref 2.7–4.5)
PLATELET # BLD AUTO: 208 K/UL (ref 150–350)
PMV BLD AUTO: 9.6 FL (ref 9.2–12.9)
POTASSIUM SERPL-SCNC: 4.1 MMOL/L (ref 3.5–5.1)
RBC # BLD AUTO: 2.71 M/UL (ref 4.6–6.2)
SATURATED IRON: 22 % (ref 20–50)
SODIUM SERPL-SCNC: 132 MMOL/L (ref 136–145)
STOOL CULTURE: NORMAL
TOTAL IRON BINDING CAPACITY: 237 UG/DL (ref 250–450)
TRANSFERRIN SERPL-MCNC: 169 MG/DL (ref 200–375)
VIT B12 SERPL-MCNC: 286 PG/ML (ref 210–950)
WBC # BLD AUTO: 6.7 K/UL (ref 3.9–12.7)

## 2020-12-17 PROCEDURE — 25000003 PHARM REV CODE 250: Performed by: FAMILY MEDICINE

## 2020-12-17 PROCEDURE — 36415 COLL VENOUS BLD VENIPUNCTURE: CPT

## 2020-12-17 PROCEDURE — 82728 ASSAY OF FERRITIN: CPT

## 2020-12-17 PROCEDURE — 30000890 LABCORP MISCELLANEOUS TEST

## 2020-12-17 PROCEDURE — 82607 VITAMIN B-12: CPT

## 2020-12-17 PROCEDURE — 85025 COMPLETE CBC W/AUTO DIFF WBC: CPT

## 2020-12-17 PROCEDURE — 84100 ASSAY OF PHOSPHORUS: CPT

## 2020-12-17 PROCEDURE — 82746 ASSAY OF FOLIC ACID SERUM: CPT

## 2020-12-17 PROCEDURE — 63600175 PHARM REV CODE 636 W HCPCS: Performed by: INTERNAL MEDICINE

## 2020-12-17 PROCEDURE — 99900035 HC TECH TIME PER 15 MIN (STAT)

## 2020-12-17 PROCEDURE — 25000003 PHARM REV CODE 250: Performed by: INTERNAL MEDICINE

## 2020-12-17 PROCEDURE — 80048 BASIC METABOLIC PNL TOTAL CA: CPT

## 2020-12-17 PROCEDURE — 63600175 PHARM REV CODE 636 W HCPCS: Performed by: FAMILY MEDICINE

## 2020-12-17 PROCEDURE — 83735 ASSAY OF MAGNESIUM: CPT

## 2020-12-17 PROCEDURE — 82941 ASSAY OF GASTRIN: CPT

## 2020-12-17 PROCEDURE — 12000002 HC ACUTE/MED SURGE SEMI-PRIVATE ROOM

## 2020-12-17 PROCEDURE — 83540 ASSAY OF IRON: CPT

## 2020-12-17 PROCEDURE — 94760 N-INVAS EAR/PLS OXIMETRY 1: CPT

## 2020-12-17 RX ORDER — MAGNESIUM SULFATE HEPTAHYDRATE 40 MG/ML
2 INJECTION, SOLUTION INTRAVENOUS ONCE
Status: COMPLETED | OUTPATIENT
Start: 2020-12-17 | End: 2020-12-17

## 2020-12-17 RX ORDER — CYANOCOBALAMIN 1000 UG/ML
1000 INJECTION, SOLUTION INTRAMUSCULAR; SUBCUTANEOUS ONCE
Status: COMPLETED | OUTPATIENT
Start: 2020-12-17 | End: 2020-12-17

## 2020-12-17 RX ADMIN — ENOXAPARIN SODIUM 30 MG: 30 INJECTION SUBCUTANEOUS at 04:12

## 2020-12-17 RX ADMIN — CHOLESTYRAMINE 4 G: 4 POWDER, FOR SUSPENSION ORAL at 02:12

## 2020-12-17 RX ADMIN — RIFAXIMIN 550 MG: 550 TABLET ORAL at 02:12

## 2020-12-17 RX ADMIN — GABAPENTIN 300 MG: 300 CAPSULE ORAL at 08:12

## 2020-12-17 RX ADMIN — SODIUM CHLORIDE, SODIUM LACTATE, POTASSIUM CHLORIDE, AND CALCIUM CHLORIDE: .6; .31; .03; .02 INJECTION, SOLUTION INTRAVENOUS at 05:12

## 2020-12-17 RX ADMIN — MELATONIN 6 MG: at 08:12

## 2020-12-17 RX ADMIN — SODIUM PHOSPHATE, MONOBASIC, MONOHYDRATE AND SODIUM PHOSPHATE, DIBASIC, ANHYDROUS 15 MMOL: 276; 142 INJECTION, SOLUTION INTRAVENOUS at 10:12

## 2020-12-17 RX ADMIN — CYANOCOBALAMIN 1000 MCG: 1000 INJECTION, SOLUTION INTRAMUSCULAR at 05:12

## 2020-12-17 RX ADMIN — RIFAXIMIN 550 MG: 550 TABLET ORAL at 08:12

## 2020-12-17 RX ADMIN — CLOPIDOGREL BISULFATE 75 MG: 75 TABLET, FILM COATED ORAL at 08:12

## 2020-12-17 RX ADMIN — CHOLESTYRAMINE 4 G: 4 POWDER, FOR SUSPENSION ORAL at 04:12

## 2020-12-17 RX ADMIN — MAGNESIUM SULFATE IN WATER 2 G: 40 INJECTION, SOLUTION INTRAVENOUS at 05:12

## 2020-12-17 RX ADMIN — CHOLESTYRAMINE 4 G: 4 POWDER, FOR SUSPENSION ORAL at 10:12

## 2020-12-17 NOTE — ASSESSMENT & PLAN NOTE
Suspect due to malabsorptive state.  Will give subcutaneous B12 supplementation while hospitalized.

## 2020-12-17 NOTE — ASSESSMENT & PLAN NOTE
Known history of chronic diarrhea, recent admission for same a now removed recurrent associated with systemic hypotension and acute kidney injury  Significant prior got history, history of mesenteric ischemia status post stenting and ischemic colitis with right hemicolectomy  Celiac disease ruled out with negative duodenal biopsy, no evidence of IBD on colonoscopy, biopsy negative for microscopic colitis  Infectious workup negative to date  He was treated with rifaximin for possible small bowel bacterial overgrowth however denies any improvement  Suspect small-bowel pathology, possible short-gut syndrome versus other  Continue to monitor stool output  Pending stool gap, elastase, calprotectin, fecal elastase  Small-bowel follow-through with multiple loops of gas-filled small bowel, time to reach rectum 15 minutes, will discussed with Gastroenterology  A.m. labs ordered  Appreciate Gastroenterology input

## 2020-12-17 NOTE — SUBJECTIVE & OBJECTIVE
Interval History:  Patient had small bowel follow-through study done today, multiple loops of gas-filled small cut, time to reach the rectum 15 minutes.  Patient was given meal at around 1:00 p.m., at the time of my encounter has had 2 bowel movements.  Denies any nausea or vomiting.  Producing good urine.  Vital stable.  Labs with magnesium 1.6, phosphorus 2.2, B12 286, sodium 132, BUN/creatinine 24/1.3.  Plan of care discussed with patient.    Review of Systems   Constitutional: Negative for chills and fever.   HENT: Negative for trouble swallowing.    Respiratory: Negative for shortness of breath.    Cardiovascular: Negative for chest pain.   Gastrointestinal: Positive for diarrhea. Negative for abdominal pain, nausea and vomiting.   Genitourinary: Negative for difficulty urinating.   Psychiatric/Behavioral: Negative for confusion.     Objective:     Vital Signs (Most Recent):  Temp: 97.7 °F (36.5 °C) (12/17/20 1644)  Pulse: 65 (12/17/20 1644)  Resp: 18 (12/17/20 1644)  BP: 137/68 (12/17/20 1644)  SpO2: 97 % (12/17/20 1644) Vital Signs (24h Range):  Temp:  [97.7 °F (36.5 °C)-98.5 °F (36.9 °C)] 97.7 °F (36.5 °C)  Pulse:  [64-70] 65  Resp:  [18] 18  SpO2:  [97 %-100 %] 97 %  BP: (108-139)/(50-69) 137/68     Weight: 79.8 kg (175 lb 14.8 oz)  Body mass index is 29.28 kg/m².    Intake/Output Summary (Last 24 hours) at 12/17/2020 1722  Last data filed at 12/16/2020 1940  Gross per 24 hour   Intake --   Output 500 ml   Net -500 ml      Physical Exam  Vitals signs and nursing note reviewed.   Constitutional:       General: He is not in acute distress.     Appearance: Normal appearance. He is normal weight. He is not ill-appearing, toxic-appearing or diaphoretic.   HENT:      Head: Normocephalic and atraumatic.      Mouth/Throat:      Mouth: Mucous membranes are moist.   Eyes:      General:         Right eye: No discharge.         Left eye: No discharge.      Conjunctiva/sclera: Conjunctivae normal.   Cardiovascular:       Rate and Rhythm: Normal rate and regular rhythm.      Comments: No significant lower extremity edema  Pulmonary:      Effort: Pulmonary effort is normal. No respiratory distress.      Breath sounds: Normal breath sounds. No stridor. No wheezing or rhonchi.      Comments: Not on supplemental oxygen  Abdominal:      Comments: Nondistended, healed midline surgical incision, soft and nontender, hyperactive bowel sounds   Genitourinary:     Comments: No suprapubic fullness or tenderness, no CVAT  Skin:     General: Skin is warm and dry.      Capillary Refill: Capillary refill takes 2 to 3 seconds.   Neurological:      Mental Status: He is alert and oriented to person, place, and time.   Psychiatric:         Mood and Affect: Mood normal.         Significant Labs:   BMP:   Recent Labs   Lab 12/17/20  0646   *   *   K 4.1      CO2 21*   BUN 24*   CREATININE 1.3   CALCIUM 8.3*   MG 1.6     CBC:   Recent Labs   Lab 12/16/20 0637 12/17/20  0646   WBC 8.87 6.70   HGB 10.0* 8.4*   HCT 30.4* 25.7*    208     CMP:   Recent Labs   Lab 12/16/20 0637 12/17/20  0646    132*   K 4.1 4.1    105   CO2 17* 21*   * 126*   BUN 38* 24*   CREATININE 2.7* 1.3   CALCIUM 8.4* 8.3*   ANIONGAP 10 6*   EGFRNONAA 23.2* 56.1*     Cardiac Markers: No results for input(s): CKMB, MYOGLOBIN, BNP, TROPISTAT in the last 48 hours.  Lactic Acid: No results for input(s): LACTATE in the last 48 hours.  Lipase: No results for input(s): LIPASE in the last 48 hours.  Magnesium:   Recent Labs   Lab 12/16/20 0637 12/17/20  0646   MG 1.5* 1.6     POCT Glucose: No results for input(s): POCTGLUCOSE in the last 48 hours.  Troponin: No results for input(s): TROPONINI in the last 48 hours.  TSH:   Recent Labs   Lab 12/16/20  0637   TSH 0.320*     Urine Culture: No results for input(s): LABURIN in the last 48 hours.  Urine Studies: No results for input(s): COLORU, APPEARANCEUA, PHUR, SPECGRAV, PROTEINUA, GLUCUA, KETONESU,  BILIRUBINUA, OCCULTUA, NITRITE, UROBILINOGEN, LEUKOCYTESUR, RBCUA, WBCUA, BACTERIA, SQUAMEPITHEL, HYALINECASTS in the last 48 hours.    Invalid input(s): JOSE  All pertinent labs within the past 24 hours have been reviewed.    Significant Imaging: I have reviewed all pertinent imaging results/findings within the past 24 hours.

## 2020-12-17 NOTE — ASSESSMENT & PLAN NOTE
H&H downtrending, suspect element of hemoconcentration which is now improving after IV fluid hydration.  Anemia labs ordered and checked today, B12 level slow, starting supplementation  Monitor

## 2020-12-17 NOTE — PLAN OF CARE
Important Message from Medicare was sign, explained and given to patient/caregiver on 12/17/2020 at 9:19am     answered all questions.

## 2020-12-17 NOTE — ASSESSMENT & PLAN NOTE
Severe acute kidney injury on presentation with BUN/creatinine 42/4.7.  No known history of CKD.  Creatinine has improved to 1.3 today.  Suspect prerenal due to the intravascular depletion/dehydration due to increased GI fluid loss  Does have history of prior bladder and renal cancer status post partial nephrectomy  Continue IV fluids, decreasing rate  Encourage oral intake and symptom control for diarrhea  Renally dosing all medications and avoid nephrotoxin drugs  Trending BMP

## 2020-12-17 NOTE — PLAN OF CARE
12/16/20 2100   Patient Assessment/Suction   Level of Consciousness (AVPU) alert   Respiratory Effort Normal;Unlabored   Expansion/Accessory Muscles/Retractions no use of accessory muscles   All Lung Fields Breath Sounds equal bilaterally;diminished   PRE-TX-O2   O2 Device (Oxygen Therapy) room air   SpO2 97 %   Pulse Oximetry Type Intermittent   $ Pulse Oximetry - Multiple Charge Pulse Oximetry - Multiple   Pulse 70   Resp 18   Positioning   Body Position positioned/repositioned independently   Head of Bed (HOB) HOB at 15 degrees   Respiratory Evaluation   $ Care Plan Tech Time 15 min   Evaluation For New Orders

## 2020-12-17 NOTE — ASSESSMENT & PLAN NOTE
Hypotension now resolved and blood pressure up trending  Continue home antihypertensives and monitoring

## 2020-12-18 PROBLEM — E87.1 HYPONATREMIA: Status: RESOLVED | Noted: 2020-12-15 | Resolved: 2020-12-18

## 2020-12-18 PROBLEM — E83.39 HYPOPHOSPHATEMIA: Status: RESOLVED | Noted: 2020-12-17 | Resolved: 2020-12-18

## 2020-12-18 PROBLEM — N17.9 AKI (ACUTE KIDNEY INJURY): Status: RESOLVED | Noted: 2020-12-02 | Resolved: 2020-12-18

## 2020-12-18 PROBLEM — E83.42 HYPOMAGNESEMIA: Status: RESOLVED | Noted: 2018-12-04 | Resolved: 2020-12-18

## 2020-12-18 LAB
ANION GAP SERPL CALC-SCNC: 9 MMOL/L (ref 8–16)
BASOPHILS # BLD AUTO: 0.04 K/UL (ref 0–0.2)
BASOPHILS NFR BLD: 0.8 % (ref 0–1.9)
BUN SERPL-MCNC: 11 MG/DL (ref 8–23)
CALCIUM SERPL-MCNC: 8.6 MG/DL (ref 8.7–10.5)
CALPROTECTIN STL-MCNT: 54 UG/G (ref 0–120)
CHLORIDE SERPL-SCNC: 108 MMOL/L (ref 95–110)
CO2 SERPL-SCNC: 22 MMOL/L (ref 23–29)
CREAT SERPL-MCNC: 0.8 MG/DL (ref 0.5–1.4)
DIFFERENTIAL METHOD: ABNORMAL
EOSINOPHIL # BLD AUTO: 0.2 K/UL (ref 0–0.5)
EOSINOPHIL NFR BLD: 3.6 % (ref 0–8)
ERYTHROCYTE [DISTWIDTH] IN BLOOD BY AUTOMATED COUNT: 14.6 % (ref 11.5–14.5)
EST. GFR  (AFRICAN AMERICAN): >60 ML/MIN/1.73 M^2
EST. GFR  (NON AFRICAN AMERICAN): >60 ML/MIN/1.73 M^2
GLUCOSE SERPL-MCNC: 109 MG/DL (ref 70–110)
GLUCOSE SERPL-MCNC: 119 MG/DL (ref 70–110)
GLUCOSE SERPL-MCNC: 141 MG/DL (ref 70–110)
GLUCOSE SERPL-MCNC: 145 MG/DL (ref 70–110)
GLUCOSE SERPL-MCNC: 163 MG/DL (ref 70–110)
HCT VFR BLD AUTO: 26.4 % (ref 40–54)
HGB BLD-MCNC: 8.7 G/DL (ref 14–18)
IMM GRANULOCYTES # BLD AUTO: 0.03 K/UL (ref 0–0.04)
IMM GRANULOCYTES NFR BLD AUTO: 0.6 % (ref 0–0.5)
LYMPHOCYTES # BLD AUTO: 1.3 K/UL (ref 1–4.8)
LYMPHOCYTES NFR BLD: 27.7 % (ref 18–48)
MAGNESIUM SERPL-MCNC: 1.9 MG/DL (ref 1.6–2.6)
MCH RBC QN AUTO: 31.6 PG (ref 27–31)
MCHC RBC AUTO-ENTMCNC: 33 G/DL (ref 32–36)
MCV RBC AUTO: 96 FL (ref 82–98)
MONOCYTES # BLD AUTO: 0.7 K/UL (ref 0.3–1)
MONOCYTES NFR BLD: 13.7 % (ref 4–15)
NEUTROPHILS # BLD AUTO: 2.6 K/UL (ref 1.8–7.7)
NEUTROPHILS NFR BLD: 53.6 % (ref 38–73)
NRBC BLD-RTO: 0 /100 WBC
O+P STL TRI STN: NORMAL
PHOSPHATE SERPL-MCNC: 2.7 MG/DL (ref 2.7–4.5)
PLATELET # BLD AUTO: 220 K/UL (ref 150–350)
PMV BLD AUTO: 9.9 FL (ref 9.2–12.9)
POTASSIUM SERPL-SCNC: 4 MMOL/L (ref 3.5–5.1)
RBC # BLD AUTO: 2.75 M/UL (ref 4.6–6.2)
SODIUM SERPL-SCNC: 139 MMOL/L (ref 136–145)
TTG IGA SER-ACNC: <2 U/ML (ref 0–3)
WBC # BLD AUTO: 4.76 K/UL (ref 3.9–12.7)

## 2020-12-18 PROCEDURE — 36415 COLL VENOUS BLD VENIPUNCTURE: CPT

## 2020-12-18 PROCEDURE — 63600175 PHARM REV CODE 636 W HCPCS: Performed by: INTERNAL MEDICINE

## 2020-12-18 PROCEDURE — 99900035 HC TECH TIME PER 15 MIN (STAT)

## 2020-12-18 PROCEDURE — 85025 COMPLETE CBC W/AUTO DIFF WBC: CPT

## 2020-12-18 PROCEDURE — 25000003 PHARM REV CODE 250: Performed by: INTERNAL MEDICINE

## 2020-12-18 PROCEDURE — 12000002 HC ACUTE/MED SURGE SEMI-PRIVATE ROOM

## 2020-12-18 PROCEDURE — 63600175 PHARM REV CODE 636 W HCPCS: Performed by: FAMILY MEDICINE

## 2020-12-18 PROCEDURE — 25000003 PHARM REV CODE 250: Performed by: FAMILY MEDICINE

## 2020-12-18 PROCEDURE — 83735 ASSAY OF MAGNESIUM: CPT

## 2020-12-18 PROCEDURE — 94761 N-INVAS EAR/PLS OXIMETRY MLT: CPT

## 2020-12-18 PROCEDURE — 84100 ASSAY OF PHOSPHORUS: CPT

## 2020-12-18 PROCEDURE — 82962 GLUCOSE BLOOD TEST: CPT

## 2020-12-18 PROCEDURE — 80048 BASIC METABOLIC PNL TOTAL CA: CPT

## 2020-12-18 RX ORDER — CYANOCOBALAMIN 1000 UG/ML
1000 INJECTION, SOLUTION INTRAMUSCULAR; SUBCUTANEOUS ONCE
Status: COMPLETED | OUTPATIENT
Start: 2020-12-18 | End: 2020-12-18

## 2020-12-18 RX ADMIN — CHOLESTYRAMINE 4 G: 4 POWDER, FOR SUSPENSION ORAL at 10:12

## 2020-12-18 RX ADMIN — ATORVASTATIN CALCIUM 80 MG: 40 TABLET, FILM COATED ORAL at 08:12

## 2020-12-18 RX ADMIN — GABAPENTIN 300 MG: 300 CAPSULE ORAL at 09:12

## 2020-12-18 RX ADMIN — CLOPIDOGREL BISULFATE 75 MG: 75 TABLET, FILM COATED ORAL at 09:12

## 2020-12-18 RX ADMIN — SODIUM CHLORIDE, SODIUM LACTATE, POTASSIUM CHLORIDE, AND CALCIUM CHLORIDE: .6; .31; .03; .02 INJECTION, SOLUTION INTRAVENOUS at 05:12

## 2020-12-18 RX ADMIN — ENOXAPARIN SODIUM 30 MG: 30 INJECTION SUBCUTANEOUS at 05:12

## 2020-12-18 RX ADMIN — CHOLESTYRAMINE 4 G: 4 POWDER, FOR SUSPENSION ORAL at 07:12

## 2020-12-18 RX ADMIN — PANTOPRAZOLE SODIUM 40 MG: 40 TABLET, DELAYED RELEASE ORAL at 05:12

## 2020-12-18 RX ADMIN — PANCRELIPASE 3 CAPSULE: 30000; 6000; 19000 CAPSULE, DELAYED RELEASE PELLETS ORAL at 05:12

## 2020-12-18 RX ADMIN — AMLODIPINE BESYLATE 10 MG: 5 TABLET ORAL at 08:12

## 2020-12-18 RX ADMIN — ASPIRIN 81 MG: 81 TABLET, DELAYED RELEASE ORAL at 08:12

## 2020-12-18 RX ADMIN — METOPROLOL SUCCINATE 50 MG: 50 TABLET, FILM COATED, EXTENDED RELEASE ORAL at 08:12

## 2020-12-18 RX ADMIN — MELATONIN 6 MG: at 09:12

## 2020-12-18 RX ADMIN — CYANOCOBALAMIN 1000 MCG: 1000 INJECTION, SOLUTION INTRAMUSCULAR; SUBCUTANEOUS at 05:12

## 2020-12-18 RX ADMIN — RIFAXIMIN 550 MG: 550 TABLET ORAL at 03:12

## 2020-12-18 RX ADMIN — RIFAXIMIN 550 MG: 550 TABLET ORAL at 08:12

## 2020-12-18 NOTE — SUBJECTIVE & OBJECTIVE
Interval History:  Patient has eaten breakfast without any bowel movement, lunch and following that did have a bowel movement, fruit snack with no bowel movement yet.  Continues to deny any abdominal pain, nausea, vomiting.  He believes he is not having diarrhea as he is not eating adequately, discussed with patient would be okay with him getting some outside meals in order to monitor his bowel movement given this is recurrent symptoms.  Counseled on need to avoid fried or high fatty contents or food that may worsen diarrhea.  He states diarrhea always improves in the hospital and gets worse when he goes home.  Vital stable.  Hemoglobin 8.7, BUN/creatinine 11/0.8.  No family members at bedside.  RN at bedside during encounter.  Did communicate with gastroenterologist, recommendations for continued diet and monitoring stools.    Review of Systems   Constitutional: Negative for chills and fever.   Respiratory: Negative for shortness of breath.    Cardiovascular: Negative for chest pain.   Gastrointestinal: Negative for abdominal pain, nausea and vomiting.   Genitourinary: Negative for difficulty urinating.   Psychiatric/Behavioral: Negative for confusion.     Objective:     Vital Signs (Most Recent):  Temp: 97.9 °F (36.6 °C) (12/18/20 1207)  Pulse: 76 (12/18/20 1207)  Resp: 17 (12/18/20 1207)  BP: (!) 155/70 (12/18/20 1207)  SpO2: 98 % (12/18/20 1207) Vital Signs (24h Range):  Temp:  [97.1 °F (36.2 °C)-98.4 °F (36.9 °C)] 97.9 °F (36.6 °C)  Pulse:  [65-81] 76  Resp:  [16-19] 17  SpO2:  [95 %-98 %] 98 %  BP: (133-167)/(61-70) 155/70     Weight: 79.8 kg (175 lb 14.8 oz)  Body mass index is 29.28 kg/m².    Intake/Output Summary (Last 24 hours) at 12/18/2020 1630  Last data filed at 12/18/2020 1200  Gross per 24 hour   Intake 800 ml   Output --   Net 800 ml      Physical Exam  Vitals signs and nursing note reviewed.   Constitutional:       General: He is not in acute distress.     Appearance: Normal appearance. He is  normal weight. He is not ill-appearing, toxic-appearing or diaphoretic.      Comments: Sitting side of bed   HENT:      Head: Normocephalic and atraumatic.      Mouth/Throat:      Mouth: Mucous membranes are moist.   Eyes:      General:         Right eye: No discharge.         Left eye: No discharge.      Conjunctiva/sclera: Conjunctivae normal.   Cardiovascular:      Rate and Rhythm: Normal rate and regular rhythm.      Comments: No significant lower extremity edema  Pulmonary:      Effort: Pulmonary effort is normal. No respiratory distress.      Breath sounds: Normal breath sounds. No stridor. No wheezing or rhonchi.      Comments: Not on supplemental oxygen  Abdominal:      Comments: Nondistended, healed midline surgical incision, soft and nontender, hyperactive bowel sounds   Genitourinary:     Comments: No suprapubic fullness or tenderness, no CVAT  Skin:     General: Skin is warm and dry.      Capillary Refill: Capillary refill takes 2 to 3 seconds.   Neurological:      Mental Status: He is alert and oriented to person, place, and time.   Psychiatric:         Mood and Affect: Mood normal.         Significant Labs:   BMP:   Recent Labs   Lab 12/18/20  0553         K 4.0      CO2 22*   BUN 11   CREATININE 0.8   CALCIUM 8.6*   MG 1.9     CBC:   Recent Labs   Lab 12/17/20  0646 12/18/20  0553   WBC 6.70 4.76   HGB 8.4* 8.7*   HCT 25.7* 26.4*    220     CMP:   Recent Labs   Lab 12/17/20  0646 12/18/20  0553   * 139   K 4.1 4.0    108   CO2 21* 22*   * 109   BUN 24* 11   CREATININE 1.3 0.8   CALCIUM 8.3* 8.6*   ANIONGAP 6* 9   EGFRNONAA 56.1* >60.0     Cardiac Markers: No results for input(s): CKMB, MYOGLOBIN, BNP, TROPISTAT in the last 48 hours.  Lactic Acid: No results for input(s): LACTATE in the last 48 hours.  POCT Glucose: No results for input(s): POCTGLUCOSE in the last 48 hours.    Significant Imaging: I have reviewed all pertinent imaging results/findings within  the past 24 hours.

## 2020-12-18 NOTE — PLAN OF CARE
12/18/20 0849   Patient Assessment/Suction   Level of Consciousness (AVPU) alert   Respiratory Effort Normal;Unlabored   Expansion/Accessory Muscles/Retractions no use of accessory muscles;no retractions;expansion symmetric   All Lung Fields Breath Sounds clear;equal bilaterally   Rhythm/Pattern, Respiratory unlabored;pattern regular;depth regular;chest wiggle adequate;no shortness of breath reported   Cough Frequency no cough   PRE-TX-O2   O2 Device (Oxygen Therapy) room air   SpO2 98 %   Pulse Oximetry Type Intermittent   $ Pulse Oximetry - Multiple Charge Pulse Oximetry - Multiple   Pulse 79   Resp 16   Aerosol Therapy   $ Aerosol Therapy Charges PRN treatment not required   Respiratory Evaluation   $ Care Plan Tech Time 15 min

## 2020-12-18 NOTE — PROGRESS NOTES
Case discussed with nursing today.  One bowel movement after lunch.        Lab Results   Component Value Date    WBC 4.76 12/18/2020    HGB 8.7 (L) 12/18/2020    HCT 26.4 (L) 12/18/2020    MCV 96 12/18/2020     12/18/2020       BMP  Lab Results   Component Value Date     12/18/2020    K 4.0 12/18/2020     12/18/2020    CO2 22 (L) 12/18/2020    BUN 11 12/18/2020    CREATININE 0.8 12/18/2020    CALCIUM 8.6 (L) 12/18/2020    ANIONGAP 9 12/18/2020    ESTGFRAFRICA >60.0 12/18/2020    EGFRNONAA >60.0 12/18/2020     Lab Results   Component Value Date    INR 1.0 01/23/2019    INR 1.1 01/12/2019    INR 1.1 08/21/2018     Lab Results   Component Value Date    IRON 51 12/17/2020    TIBC 237 (L) 12/17/2020    FERRITIN 316 (H) 12/17/2020     A/P  Diarrhea of unclear etiology  -await stool testing  -had one bowel movement today  -random colon bx negative  -needs creon 72 k tid on discharge  -continue cholestyramine tid  -no need for xifaxin on discharge

## 2020-12-18 NOTE — PROGRESS NOTES
Replaced by [facility]  Progress Note    Patient Name: Jose English  MRN: [id number]  Patient Class: IP- Inpatient   Admission Date: 12/15/2020  Length of Stay: 3 days  Attending Physician: Samara Murray MD  Primary Care Provider: Primary Doctor No        Subjective:     Principal Problem:Diarrhea        HPI:  70-year-old male hypertension, hyperlipidemia, diabetes, CAD, significant peripheral vascular disease, s/p mesenteric artery stenting, s/p left iliac artery, aorto-bifem bypass in 2008, history of ischemic colitis requiring right-sided colectomy in 2018 comes in for persistent diarrhea and occasional emesis.  Patient was recently discharge from [facility] on 12/07 with similar issues.  Patient reports that after being discharged patient was doing well.  Patient picked up his medications from GI office, Dr. Loya and started taking medications.  Reports that about 3 days ago he started having profuse watery diarrhea.  Then he started having episodes of bilious emesis.  Only able to hold down Gatorade.  Denies any fever, chills.  Complains of abdominal pain due to persistent emesis.  Denies hematochezia, melena, hematemesis.  Patient came into the ED today for further evaluation.    In the ED, patient was hypotensive 92/56.  Patient received bolus IV fluids with significant improvement of blood pressure.  Found to have emesis of yellow watery fluid during my interview.    Overview/Hospital Course:  Assumed care of this patient on 12/16/20.  Patient with a history of chronic diarrhea.  Recent [facility] admission and discharge on 12/07 with severe diarrhea with associated acute kidney injury.  Known history of ischemic colitis status post prior bowel surgery including mesenteric artery stenting, right colectomy, previous duodenal biopsies negative for celiac, colonoscopy negative for IBD, biopsy negative for microscopic colitis, stool studies negative.  States he was doing well on cholestyramine  however 3 days following presentation developed recurrent severe diarrhea, quantified 10 episodes per day associated with generalized weakness.  On admission he was hypotensive with BP 66/37, labs with significant acute kidney injury with creatinine 4.7, low TSH with normal free T4, CT abdomen and pelvis without any acute pathology.  He was admitted, started on IV fluid hydration holding of antihypertensive with gastroenterology consultation.  On 12/16, no further episodes of nausea or vomiting, some mild epigastric discomfort, states he has had 4 small volume liquid stool, dark in color, overall feeling better.  Seen by Gastroenterology with plans for stool gap, elastase, calprotectin, fecal fat with small bowel follow through.  On 12/17, anemia labs checked, B12 level low at 286, had small-bowel follow-through with multiple loops of gas-filled small bowel, time of 15 minutes.    Interval History:  Patient has eaten breakfast without any bowel movement, lunch and following that did have a bowel movement, fruit snack with no bowel movement yet.  Continues to deny any abdominal pain, nausea, vomiting.  He believes he is not having diarrhea as he is not eating adequately, discussed with patient would be okay with him getting some outside meals in order to monitor his bowel movement given this is recurrent symptoms.  Counseled on need to avoid fried or high fatty contents or food that may worsen diarrhea.  He states diarrhea always improves in the hospital and gets worse when he goes home.  Vital stable.  Hemoglobin 8.7, BUN/creatinine 11/0.8.  No family members at bedside.  RN at bedside during encounter.  Did communicate with gastroenterologist, recommendations for continued diet and monitoring stools.    Review of Systems   Constitutional: Negative for chills and fever.   Respiratory: Negative for shortness of breath.    Cardiovascular: Negative for chest pain.   Gastrointestinal: Negative for abdominal pain, nausea  and vomiting.   Genitourinary: Negative for difficulty urinating.   Psychiatric/Behavioral: Negative for confusion.     Objective:     Vital Signs (Most Recent):  Temp: 97.9 °F (36.6 °C) (12/18/20 1207)  Pulse: 76 (12/18/20 1207)  Resp: 17 (12/18/20 1207)  BP: (!) 155/70 (12/18/20 1207)  SpO2: 98 % (12/18/20 1207) Vital Signs (24h Range):  Temp:  [97.1 °F (36.2 °C)-98.4 °F (36.9 °C)] 97.9 °F (36.6 °C)  Pulse:  [65-81] 76  Resp:  [16-19] 17  SpO2:  [95 %-98 %] 98 %  BP: (133-167)/(61-70) 155/70     Weight: 79.8 kg (175 lb 14.8 oz)  Body mass index is 29.28 kg/m².    Intake/Output Summary (Last 24 hours) at 12/18/2020 1630  Last data filed at 12/18/2020 1200  Gross per 24 hour   Intake 800 ml   Output --   Net 800 ml      Physical Exam  Vitals signs and nursing note reviewed.   Constitutional:       General: He is not in acute distress.     Appearance: Normal appearance. He is normal weight. He is not ill-appearing, toxic-appearing or diaphoretic.      Comments: Sitting side of bed   HENT:      Head: Normocephalic and atraumatic.      Mouth/Throat:      Mouth: Mucous membranes are moist.   Eyes:      General:         Right eye: No discharge.         Left eye: No discharge.      Conjunctiva/sclera: Conjunctivae normal.   Cardiovascular:      Rate and Rhythm: Normal rate and regular rhythm.      Comments: No significant lower extremity edema  Pulmonary:      Effort: Pulmonary effort is normal. No respiratory distress.      Breath sounds: Normal breath sounds. No stridor. No wheezing or rhonchi.      Comments: Not on supplemental oxygen  Abdominal:      Comments: Nondistended, healed midline surgical incision, soft and nontender, hyperactive bowel sounds   Genitourinary:     Comments: No suprapubic fullness or tenderness, no CVAT  Skin:     General: Skin is warm and dry.      Capillary Refill: Capillary refill takes 2 to 3 seconds.   Neurological:      Mental Status: He is alert and oriented to person, place, and time.    Psychiatric:         Mood and Affect: Mood normal.         Significant Labs:   BMP:   Recent Labs   Lab 12/18/20  0553         K 4.0      CO2 22*   BUN 11   CREATININE 0.8   CALCIUM 8.6*   MG 1.9     CBC:   Recent Labs   Lab 12/17/20  0646 12/18/20  0553   WBC 6.70 4.76   HGB 8.4* 8.7*   HCT 25.7* 26.4*    220     CMP:   Recent Labs   Lab 12/17/20  0646 12/18/20  0553   * 139   K 4.1 4.0    108   CO2 21* 22*   * 109   BUN 24* 11   CREATININE 1.3 0.8   CALCIUM 8.3* 8.6*   ANIONGAP 6* 9   EGFRNONAA 56.1* >60.0     Cardiac Markers: No results for input(s): CKMB, MYOGLOBIN, BNP, TROPISTAT in the last 48 hours.  Lactic Acid: No results for input(s): LACTATE in the last 48 hours.  POCT Glucose: No results for input(s): POCTGLUCOSE in the last 48 hours.    Significant Imaging: I have reviewed all pertinent imaging results/findings within the past 24 hours.      Assessment/Plan:      * Acute on chronic diarrhea, recurrent  Known history of chronic diarrhea, recent admission for same a now removed recurrent associated with systemic hypotension and acute kidney injury  Significant prior got history, history of mesenteric ischemia status post stenting and ischemic colitis with right hemicolectomy  Celiac disease ruled out with negative duodenal biopsy, no evidence of IBD on colonoscopy, biopsy negative for microscopic colitis  Infectious workup negative to date  He was treated with rifaximin for possible small bowel bacterial overgrowth however denies any improvement  Suspect small-bowel pathology, possible short-gut syndrome versus other  Continue to monitor stool output  Pending stool gap, elastase, calprotectin, fecal elastase  Small-bowel follow-through with multiple loops of gas-filled small bowel, time to reach rectum 15 minutes, will discussed with Gastroenterology  Increasing diet and monitoring stool output, has only had 1 bowel movement today, counseled on need for diet  restriction at home as he states symptoms worsen on discharge but diarrhea improves while he is in the hospital  A.m. labs ordered  Appreciate Gastroenterology input      Anemia  H&H downtrending, suspect element of hemoconcentration which is now improving after IV fluid hydration.  Anemia labs ordered and checked today, B12 level low, starting supplementation  Monitor      B12 deficiency  Suspect due to malabsorptive state.  Will give subcutaneous B12 supplementation while hospitalized.    Diabetes mellitus with peripheral vascular disease  Holding oral hypoglycemics.  Moderate dose insulin sliding scale t.i.d. with meals and Accu-Cheks.  As needed hypoglycemic measures      Mixed hyperlipidemia  Continue Lipitor      PAD (peripheral artery disease)  Severe peripheral vascular disease status post prior aortofemoral bypass as well as stenting  Continue aspirin and plavix along with high-intensity statin      HTN (hypertension)  Hypotension now resolved and blood pressure stable  Continue home antihypertensives and monitoring        VTE Risk Mitigation (From admission, onward)         Ordered     enoxaparin injection 30 mg  Every 24 hours      12/15/20 1826     IP VTE HIGH RISK PATIENT  Once      12/15/20 1826     Place sequential compression device  Until discontinued      12/15/20 1826                Discharge Planning   UMANG:      Code Status: Full Code   Is the patient medically ready for discharge?:     Reason for patient still in hospital (select all that apply): Patient trending condition  Discharge Plan A: Home                  Samara Murray MD  Department of Hospital Medicine   Critical access hospital

## 2020-12-18 NOTE — ASSESSMENT & PLAN NOTE
H&H downtrending, suspect element of hemoconcentration which is now improving after IV fluid hydration.  Anemia labs ordered and checked today, B12 level low, starting supplementation  Monitor

## 2020-12-18 NOTE — ASSESSMENT & PLAN NOTE
Hypotension now resolved and blood pressure stable  Continue home antihypertensives and monitoring

## 2020-12-18 NOTE — PLAN OF CARE
Problem: Adult Inpatient Plan of Care  Goal: Plan of Care Review  Outcome: Ongoing, Progressing     Problem: Diabetes Comorbidity  Goal: Blood Glucose Level Within Desired Range  Outcome: Ongoing, Progressing     Problem: Adult Inpatient Plan of Care  Goal: Optimal Comfort and Wellbeing  Outcome: Ongoing, Progressing     Problem: Fall Injury Risk  Goal: Absence of Fall and Fall-Related Injury  Outcome: Ongoing, Progressing

## 2020-12-18 NOTE — ASSESSMENT & PLAN NOTE
Known history of chronic diarrhea, recent admission for same a now removed recurrent associated with systemic hypotension and acute kidney injury  Significant prior got history, history of mesenteric ischemia status post stenting and ischemic colitis with right hemicolectomy  Celiac disease ruled out with negative duodenal biopsy, no evidence of IBD on colonoscopy, biopsy negative for microscopic colitis  Infectious workup negative to date  He was treated with rifaximin for possible small bowel bacterial overgrowth however denies any improvement  Suspect small-bowel pathology, possible short-gut syndrome versus other  Continue to monitor stool output  Pending stool gap, elastase, calprotectin, fecal elastase  Small-bowel follow-through with multiple loops of gas-filled small bowel, time to reach rectum 15 minutes, will discussed with Gastroenterology  Increasing diet and monitoring stool output, has only had 1 bowel movement today, counseled on need for diet restriction at home as he states symptoms worsen on discharge but diarrhea improves while he is in the hospital  A.m. labs ordered  Appreciate Gastroenterology input

## 2020-12-18 NOTE — PLAN OF CARE
12/17/20 2040   Patient Assessment/Suction   Level of Consciousness (AVPU) alert   Respiratory Effort Normal;Unlabored   Expansion/Accessory Muscles/Retractions no retractions;no use of accessory muscles;expansion symmetric   All Lung Fields Breath Sounds clear;equal bilaterally   PRE-TX-O2   O2 Device (Oxygen Therapy) room air   SpO2 98 %   Pulse Oximetry Type Intermittent   $ Pulse Oximetry - Single Charge Pulse Oximetry - Single   Pulse 68   Resp 16   Aerosol Therapy   Respiratory Treatment Status (SVN) PRN treatment not required   Respiratory Evaluation   $ Care Plan Tech Time 15 min

## 2020-12-19 LAB
ANION GAP SERPL CALC-SCNC: 7 MMOL/L (ref 8–16)
BASOPHILS # BLD AUTO: 0.06 K/UL (ref 0–0.2)
BASOPHILS NFR BLD: 1.3 % (ref 0–1.9)
BUN SERPL-MCNC: 8 MG/DL (ref 8–23)
CALCIUM SERPL-MCNC: 8.3 MG/DL (ref 8.7–10.5)
CHLORIDE SERPL-SCNC: 107 MMOL/L (ref 95–110)
CO2 SERPL-SCNC: 25 MMOL/L (ref 23–29)
CREAT SERPL-MCNC: 0.8 MG/DL (ref 0.5–1.4)
DIFFERENTIAL METHOD: ABNORMAL
EOSINOPHIL # BLD AUTO: 0.3 K/UL (ref 0–0.5)
EOSINOPHIL NFR BLD: 5.8 % (ref 0–8)
ERYTHROCYTE [DISTWIDTH] IN BLOOD BY AUTOMATED COUNT: 14.5 % (ref 11.5–14.5)
EST. GFR  (AFRICAN AMERICAN): >60 ML/MIN/1.73 M^2
EST. GFR  (NON AFRICAN AMERICAN): >60 ML/MIN/1.73 M^2
GLUCOSE SERPL-MCNC: 107 MG/DL (ref 70–110)
GLUCOSE SERPL-MCNC: 114 MG/DL (ref 70–110)
GLUCOSE SERPL-MCNC: 121 MG/DL (ref 70–110)
GLUCOSE SERPL-MCNC: 125 MG/DL (ref 70–110)
GLUCOSE SERPL-MCNC: 142 MG/DL (ref 70–110)
HCT VFR BLD AUTO: 27.8 % (ref 40–54)
HGB BLD-MCNC: 8.9 G/DL (ref 14–18)
IMM GRANULOCYTES # BLD AUTO: 0.06 K/UL (ref 0–0.04)
IMM GRANULOCYTES NFR BLD AUTO: 1.3 % (ref 0–0.5)
LABCORP MISC TEST CODE: NORMAL
LABCORP MISC TEST NAME: NORMAL
LABCORP MISCELLANEOUS TEST: NORMAL
LYMPHOCYTES # BLD AUTO: 1.1 K/UL (ref 1–4.8)
LYMPHOCYTES NFR BLD: 23.7 % (ref 18–48)
MAGNESIUM SERPL-MCNC: 1.5 MG/DL (ref 1.6–2.6)
MCH RBC QN AUTO: 30.6 PG (ref 27–31)
MCHC RBC AUTO-ENTMCNC: 32 G/DL (ref 32–36)
MCV RBC AUTO: 96 FL (ref 82–98)
MONOCYTES # BLD AUTO: 0.7 K/UL (ref 0.3–1)
MONOCYTES NFR BLD: 14.7 % (ref 4–15)
NEUTROPHILS # BLD AUTO: 2.4 K/UL (ref 1.8–7.7)
NEUTROPHILS NFR BLD: 53.2 % (ref 38–73)
NRBC BLD-RTO: 0 /100 WBC
PHOSPHATE SERPL-MCNC: 2.6 MG/DL (ref 2.7–4.5)
PLATELET # BLD AUTO: 218 K/UL (ref 150–350)
PMV BLD AUTO: 9.6 FL (ref 9.2–12.9)
POTASSIUM SERPL-SCNC: 4.4 MMOL/L (ref 3.5–5.1)
RBC # BLD AUTO: 2.91 M/UL (ref 4.6–6.2)
SODIUM SERPL-SCNC: 139 MMOL/L (ref 136–145)
WBC # BLD AUTO: 4.48 K/UL (ref 3.9–12.7)

## 2020-12-19 PROCEDURE — 12000002 HC ACUTE/MED SURGE SEMI-PRIVATE ROOM

## 2020-12-19 PROCEDURE — 84100 ASSAY OF PHOSPHORUS: CPT

## 2020-12-19 PROCEDURE — 85025 COMPLETE CBC W/AUTO DIFF WBC: CPT

## 2020-12-19 PROCEDURE — 25000003 PHARM REV CODE 250: Performed by: INTERNAL MEDICINE

## 2020-12-19 PROCEDURE — 82962 GLUCOSE BLOOD TEST: CPT

## 2020-12-19 PROCEDURE — 94761 N-INVAS EAR/PLS OXIMETRY MLT: CPT

## 2020-12-19 PROCEDURE — 25000003 PHARM REV CODE 250: Performed by: FAMILY MEDICINE

## 2020-12-19 PROCEDURE — 36415 COLL VENOUS BLD VENIPUNCTURE: CPT

## 2020-12-19 PROCEDURE — 63600175 PHARM REV CODE 636 W HCPCS: Performed by: INTERNAL MEDICINE

## 2020-12-19 PROCEDURE — 99900035 HC TECH TIME PER 15 MIN (STAT)

## 2020-12-19 PROCEDURE — 80048 BASIC METABOLIC PNL TOTAL CA: CPT

## 2020-12-19 PROCEDURE — 83735 ASSAY OF MAGNESIUM: CPT

## 2020-12-19 RX ORDER — MAGNESIUM SULFATE HEPTAHYDRATE 40 MG/ML
2 INJECTION, SOLUTION INTRAVENOUS ONCE
Status: COMPLETED | OUTPATIENT
Start: 2020-12-19 | End: 2020-12-19

## 2020-12-19 RX ORDER — CYANOCOBALAMIN 1000 UG/ML
1000 INJECTION, SOLUTION INTRAMUSCULAR; SUBCUTANEOUS ONCE
Status: COMPLETED | OUTPATIENT
Start: 2020-12-19 | End: 2020-12-19

## 2020-12-19 RX ORDER — ENOXAPARIN SODIUM 100 MG/ML
40 INJECTION SUBCUTANEOUS EVERY 24 HOURS
Status: DISCONTINUED | OUTPATIENT
Start: 2020-12-19 | End: 2020-12-20 | Stop reason: HOSPADM

## 2020-12-19 RX ORDER — CHOLESTYRAMINE 4 G/4.8G
1 POWDER, FOR SUSPENSION ORAL
Qty: 60 PACKET | Refills: 1 | Status: SHIPPED | OUTPATIENT
Start: 2020-12-19 | End: 2020-12-19 | Stop reason: SDUPTHER

## 2020-12-19 RX ORDER — CHOLESTYRAMINE 4 G/4.8G
1 POWDER, FOR SUSPENSION ORAL
Qty: 60 PACKET | Refills: 1 | Status: SHIPPED | OUTPATIENT
Start: 2020-12-19 | End: 2020-12-20 | Stop reason: SDUPTHER

## 2020-12-19 RX ORDER — PANCRELIPASE 36000; 180000; 114000 [USP'U]/1; [USP'U]/1; [USP'U]/1
3 CAPSULE, DELAYED RELEASE PELLETS ORAL
Qty: 270 CAPSULE | Refills: 0 | Status: SHIPPED | OUTPATIENT
Start: 2020-12-19 | End: 2020-12-20 | Stop reason: SDUPTHER

## 2020-12-19 RX ADMIN — METOPROLOL SUCCINATE 50 MG: 50 TABLET, FILM COATED, EXTENDED RELEASE ORAL at 08:12

## 2020-12-19 RX ADMIN — ASPIRIN 81 MG: 81 TABLET, DELAYED RELEASE ORAL at 08:12

## 2020-12-19 RX ADMIN — PANCRELIPASE 3 CAPSULE: 30000; 6000; 19000 CAPSULE, DELAYED RELEASE PELLETS ORAL at 08:12

## 2020-12-19 RX ADMIN — CHOLESTYRAMINE 4 G: 4 POWDER, FOR SUSPENSION ORAL at 01:12

## 2020-12-19 RX ADMIN — CHOLESTYRAMINE 4 G: 4 POWDER, FOR SUSPENSION ORAL at 12:12

## 2020-12-19 RX ADMIN — AMLODIPINE BESYLATE 10 MG: 5 TABLET ORAL at 08:12

## 2020-12-19 RX ADMIN — PANCRELIPASE 3 CAPSULE: 30000; 6000; 19000 CAPSULE, DELAYED RELEASE PELLETS ORAL at 11:12

## 2020-12-19 RX ADMIN — MAGNESIUM OXIDE 800 MG: 400 TABLET ORAL at 08:12

## 2020-12-19 RX ADMIN — MAGNESIUM SULFATE IN WATER 2 G: 40 INJECTION, SOLUTION INTRAVENOUS at 11:12

## 2020-12-19 RX ADMIN — MELATONIN 6 MG: at 09:12

## 2020-12-19 RX ADMIN — CLOPIDOGREL BISULFATE 75 MG: 75 TABLET, FILM COATED ORAL at 09:12

## 2020-12-19 RX ADMIN — CHOLESTYRAMINE 4 G: 4 POWDER, FOR SUSPENSION ORAL at 06:12

## 2020-12-19 RX ADMIN — GABAPENTIN 300 MG: 300 CAPSULE ORAL at 09:12

## 2020-12-19 RX ADMIN — CHOLESTYRAMINE 4 G: 4 POWDER, FOR SUSPENSION ORAL at 09:12

## 2020-12-19 RX ADMIN — CYANOCOBALAMIN 1000 MCG: 1000 INJECTION, SOLUTION INTRAMUSCULAR; SUBCUTANEOUS at 11:12

## 2020-12-19 RX ADMIN — ATORVASTATIN CALCIUM 80 MG: 40 TABLET, FILM COATED ORAL at 08:12

## 2020-12-19 RX ADMIN — SODIUM PHOSPHATE, MONOBASIC, MONOHYDRATE AND SODIUM PHOSPHATE, DIBASIC, ANHYDROUS 30 MMOL: 276; 142 INJECTION, SOLUTION INTRAVENOUS at 02:12

## 2020-12-19 RX ADMIN — PANTOPRAZOLE SODIUM 40 MG: 40 TABLET, DELAYED RELEASE ORAL at 07:12

## 2020-12-19 RX ADMIN — PANCRELIPASE 3 CAPSULE: 30000; 6000; 19000 CAPSULE, DELAYED RELEASE PELLETS ORAL at 05:12

## 2020-12-19 RX ADMIN — ENOXAPARIN SODIUM 40 MG: 40 INJECTION SUBCUTANEOUS at 04:12

## 2020-12-19 NOTE — PROGRESS NOTES
"[facility]  Adult Nutrition   Progress Note (Initial Assessment)     SUMMARY     Recommendations/Interventions:    Recommendation/Intervention: 1. Continue current diet as tolerated, encourage intake. 2. Hypomagnesemia- continue to monitor and manage per MD. 3. Honor meal choices.  Goals: 1. Patient to meet at least 75% of estimated needs via PO intake of meals and supplements. 2. Electrolytes replaced as needed.  Nutrition Goal Status: new    Dietitian Rounds Brief:  · Seen 2' LOS. Patient admitted with persistent diarrhea. Patient said that this has been an issues for 3 years. He stated no know hx of gastroenteritis, IBS, chrons, IBD, or diverticulitis. Appetite and intake are good-just does not like hospital food.  Had 3 BM's yesterday. None today thus far. Continue B12 supplementation as outpatient. Patient wants to try questran. Will continue to monitor intake, labs, and plan of care.  Reason for Assessment  Reason For Assessment: length of stay  Diagnosis: (chronic diarrhea)  Relevant Medical History: CAD, DM, GERD, HTN, PAD, chronic diarrhea x 3 days  Interdisciplinary Rounds: did not attend    Nutrition Risk Screen  Nutrition Risk Screen: no indicators present     MST Score: 0  Have you recently lost weight without trying?: No  Weight loss score: 0  Have you been eating poorly because of a decreased appetite?: No  Appetite score: 0       Nutrition/Diet History  Food Allergies: NKFA  Factors Affecting Nutritional Intake: diarrhea    Anthropometrics  Temp: 97.8 °F (36.6 °C)  Height Method: Stated  Height: 5' 5" (165.1 cm)  Height (inches): 65 in  Weight Method: Bed Scale  Weight: 79.8 kg (175 lb 14.8 oz)  Weight (lb): 175.93 lb  Ideal Body Weight (IBW), Male: 136 lb  % Ideal Body Weight, Male (lb): 129.36 %  BMI (Calculated): 29.3  BMI Grade: 25 - 29.9 - overweight     Weight History:  Wt Readings from Last 10 Encounters:   12/15/20 79.8 kg (175 lb 14.8 oz)   12/03/20 84.7 kg (186 lb 11.7 oz) "   02/20/19 72.6 kg (160 lb)   02/07/19 72.2 kg (159 lb 2.8 oz)   02/06/19 72.1 kg (159 lb 1 oz)   01/23/19 72.6 kg (160 lb)   01/12/19 73.9 kg (163 lb)   12/05/18 78.9 kg (174 lb)   08/21/18 88.9 kg (196 lb)   05/28/17 93 kg (205 lb)     Lab/Procedures/Meds: Pertinent Labs Reviewed  Clinical Chemistry:  Recent Labs   Lab 12/15/20  1521 12/17/20  0646 12/18/20  0553 12/19/20  0705   * 132* 139 139   K 4.3 4.1 4.0 4.4    105 108 107   CO2 14* 21* 22* 25   * 126* 109 121*   BUN 42* 24* 11 8   CREATININE 4.7* 1.3 0.8 0.8   CALCIUM 9.2 8.3* 8.6* 8.3*   PROT 8.4  --   --   --    ALBUMIN 4.6  --   --   --    BILITOT 1.0  --   --   --    ALKPHOS 101  --   --   --    AST 26  --   --   --    ALT 58*  --   --   --    ANIONGAP 14 6* 9 7*   ESTGFRAFRICA 13.7* >60.0 >60.0 >60.0   EGFRNONAA 11.9* 56.1* >60.0 >60.0   MG  --  1.6 1.9 1.5*   PHOS  --  2.2* 2.7 2.6*   LIPASE 36  --   --   --     < > = values in this interval not displayed.     CBC:   Recent Labs   Lab 12/19/20  0705   WBC 4.48   RBC 2.91*   HGB 8.9*   HCT 27.8*      MCV 96   MCH 30.6   MCHC 32.0     Inflammatory Labs:  Recent Labs   Lab 12/16/20  0637   CRP 1.25*     Thyroid & Parathyroid:  Recent Labs   Lab 12/16/20  0637   TSH 0.320*   FREET4 0.99     Medications: Pertinent Medications reviewed  Scheduled Meds:   amLODIPine  10 mg Oral Daily    aspirin  81 mg Oral Daily    atorvastatin  80 mg Oral Daily    cholestyramine-aspartame  4 g Oral QID    clopidogreL  75 mg Oral QHS    enoxaparin  40 mg Subcutaneous Q24H    gabapentin  300 mg Oral QHS    lipase-protease-amylase 6,000-19,000-30,000 units  3 capsule Oral TID WM    metoprolol succinate  50 mg Oral Daily    pantoprazole  40 mg Oral Before breakfast    sodium phosphate IVPB  30 mmol Intravenous Once     Continuous Infusions:  PRN Meds:.acetaminophen, acetaminophen, albuterol-ipratropium, calcium chloride IVPB, calcium chloride IVPB, calcium chloride IVPB, dextrose 50%,  dextrose 50%, insulin regular, loperamide, magnesium oxide, magnesium sulfate IVPB, magnesium sulfate IVPB, magnesium sulfate IVPB, magnesium sulfate IVPB, melatonin, ondansetron, potassium chloride in water, potassium chloride in water, potassium chloride in water, potassium chloride in water, potassium chloride, potassium chloride, potassium chloride, potassium chloride, sodium chloride 0.9%, sodium phosphate IVPB, sodium phosphate IVPB, sodium phosphate IVPB, sodium phosphate IVPB, sodium phosphate IVPB    Antibiotics (From admission, onward)    None        Estimated/Assessed Needs  Weight Used For Calorie Calculations: 79.8 kg (175 lb 14.8 oz)  Energy Calorie Requirements (kcal): 1995-2394 kcals/day (25-30 kcals/kg)  Energy Need Method: Kcal/kg  Protein Requirements: 64-80 g/day (0.8-1.0 g/kg)  Weight Used For Protein Calculations: 79.8 kg (175 lb 14.8 oz)     Estimated Fluid Requirement Method: RDA Method  RDA Method (mL): 1995       Nutrition Prescription Ordered    Current Diet Order: Regular Diet  Oral Nutrition Supplement: Glucerna TID    Evaluation of Received Nutrient/Fluid Intake    Energy Calories Required: not meeting needs  Protein Required: not meeting needs  Fluid Required: meeting needs  Tolerance: tolerating  % Intake of Estimated Energy Needs: 50 - 75 %  % Meal Intake: 50 - 75 %    Intake/Output Summary (Last 24 hours) at 12/19/2020 1222  Last data filed at 12/19/2020 0800  Gross per 24 hour   Intake 538 ml   Output --   Net 538 ml      Nutrition Risk    Level of Risk/Frequency of Follow-up: moderate - high   Monitor and Evaluation    Food and Nutrient Intake: energy intake, food and beverage intake  Food and Nutrient Adminstration: diet order  Physical Activity and Function: nutrition-related ADLs and IADLs, factors affecting access to physical activity  Anthropometric Measurements: weight, weight change, body mass index  Biochemical Data, Medical Tests and Procedures: lipid profile, electrolyte  and renal panel, gastrointestinal profile, glucose/endocrine profile, inflammatory profile  Nutrition-Focused Physical Findings: overall appearance     Nutrition Follow-Up    RD Follow-up?: Yes  Meghan Haynes RD 12/19/2020 12:26 PM

## 2020-12-19 NOTE — NURSING
Patient reports had second BM this shift (1900) and stated it was a little formed mixed with loose stool

## 2020-12-19 NOTE — PLAN OF CARE
12/19/20 1011   Patient Assessment/Suction   Level of Consciousness (AVPU) alert   Respiratory Effort Normal;Unlabored   Expansion/Accessory Muscles/Retractions no use of accessory muscles;no retractions;expansion symmetric   All Lung Fields Breath Sounds clear   Rhythm/Pattern, Respiratory unlabored;pattern regular;depth regular;chest wiggle adequate;no shortness of breath reported   Cough Frequency no cough   PRE-TX-O2   O2 Device (Oxygen Therapy) room air   SpO2 98 %   Pulse Oximetry Type Intermittent   $ Pulse Oximetry - Multiple Charge Pulse Oximetry - Multiple   Pulse 75   Resp 18   Aerosol Therapy   $ Aerosol Therapy Charges PRN treatment not required   Respiratory Evaluation   $ Care Plan Tech Time 15 min

## 2020-12-19 NOTE — SUBJECTIVE & OBJECTIVE
Interval History:  Patient had a total of 3 bowel movements yesterday.  Ate breakfast and no bowel movement as of now.  States during prior [facility] discharge he was sent home with 1 box of questran and wishes to have this on discharge as he states this is more cost effective, will call in to pharmacy and get cost.  Also gastroenterology recommending Creon, patient is also concerned about costing and will get co-pay for patient.  Blood pressure running on the slightly higher side.  Labs with hemoglobin 8.9, magnesium 1.5, phosphorus 2.6, BUN/creatinine 8/0.8.  Plan of care discussed with patient, all questions were addressed.  No family members at bedside.  Review of Systems   Constitutional: Negative for chills and fever.   HENT: Negative for trouble swallowing.    Respiratory: Negative for shortness of breath.    Cardiovascular: Negative for chest pain.   Gastrointestinal: Positive for diarrhea. Negative for abdominal pain, nausea and vomiting.   Genitourinary: Negative for difficulty urinating.   Psychiatric/Behavioral: Negative for confusion.     Objective:     Vital Signs (Most Recent):  Temp: 98.7 °F (37.1 °C) (12/19/20 0754)  Pulse: 75 (12/19/20 1011)  Resp: 18 (12/19/20 1011)  BP: (!) 156/67 (12/19/20 0754)  SpO2: 98 % (12/19/20 1011) Vital Signs (24h Range):  Temp:  [97.9 °F (36.6 °C)-98.7 °F (37.1 °C)] 98.7 °F (37.1 °C)  Pulse:  [65-76] 75  Resp:  [17-18] 18  SpO2:  [97 %-99 %] 98 %  BP: (148-161)/(60-75) 156/67     Weight: 79.8 kg (175 lb 14.8 oz)  Body mass index is 29.28 kg/m².    Intake/Output Summary (Last 24 hours) at 12/19/2020 1113  Last data filed at 12/19/2020 0800  Gross per 24 hour   Intake 938 ml   Output --   Net 938 ml      Physical Exam  Vitals signs and nursing note reviewed.   Constitutional:       General: He is not in acute distress.     Appearance: Normal appearance. He is normal weight. He is not ill-appearing, toxic-appearing or diaphoretic.      Comments: Sitting side of bed   HENT:       Head: Normocephalic and atraumatic.      Mouth/Throat:      Mouth: Mucous membranes are moist.   Eyes:      General:         Right eye: No discharge.         Left eye: No discharge.      Conjunctiva/sclera: Conjunctivae normal.   Cardiovascular:      Rate and Rhythm: Normal rate and regular rhythm.      Comments: No significant lower extremity edema  Pulmonary:      Effort: Pulmonary effort is normal. No respiratory distress.      Breath sounds: Normal breath sounds. No stridor. No wheezing or rhonchi.      Comments: Not on supplemental oxygen  Abdominal:      Comments: Nondistended, healed midline surgical incision, soft and nontender, hyperactive bowel sounds   Genitourinary:     Comments: No suprapubic fullness or tenderness, no CVAT  Skin:     General: Skin is warm and dry.      Capillary Refill: Capillary refill takes 2 to 3 seconds.   Neurological:      Mental Status: He is alert and oriented to person, place, and time.   Psychiatric:         Mood and Affect: Mood normal.         Significant Labs:   BMP:   Recent Labs   Lab 12/19/20  0705   *      K 4.4      CO2 25   BUN 8   CREATININE 0.8   CALCIUM 8.3*   MG 1.5*     CBC:   Recent Labs   Lab 12/18/20  0553 12/19/20  0705   WBC 4.76 4.48   HGB 8.7* 8.9*   HCT 26.4* 27.8*    218     CMP:   Recent Labs   Lab 12/18/20  0553 12/19/20  0705    139   K 4.0 4.4    107   CO2 22* 25    121*   BUN 11 8   CREATININE 0.8 0.8   CALCIUM 8.6* 8.3*   ANIONGAP 9 7*   EGFRNONAA >60.0 >60.0     Cardiac Markers: No results for input(s): CKMB, MYOGLOBIN, BNP, TROPISTAT in the last 48 hours.  Lactic Acid: No results for input(s): LACTATE in the last 48 hours.  Magnesium:   Recent Labs   Lab 12/18/20  0553 12/19/20  0705   MG 1.9 1.5*     POCT Glucose: No results for input(s): POCTGLUCOSE in the last 48 hours.  Troponin: No results for input(s): TROPONINI in the last 48 hours.  All pertinent labs within the past 24 hours have been  reviewed.    Significant Imaging: I have reviewed all pertinent imaging results/findings within the past 24 hours.

## 2020-12-19 NOTE — ASSESSMENT & PLAN NOTE
Known history of chronic diarrhea, recent admission for same a now removed recurrent associated with systemic hypotension and acute kidney injury  Significant prior got history, history of mesenteric ischemia status post stenting and ischemic colitis with right hemicolectomy  Celiac disease ruled out with negative duodenal biopsy, no evidence of IBD on colonoscopy, biopsy negative for microscopic colitis  Infectious workup negative to date  He was treated with rifaximin for possible small bowel bacterial overgrowth however denies any improvement  Suspect small-bowel pathology, possible short-gut syndrome versus other  Continue to monitor stool output--has been improving, only 3 stools yesterday, renal function maintaining off IV fluids  Small-bowel follow-through with multiple loops of gas-filled small bowel, time to reach rectum 15 minutes, will discussed with Gastroenterology  Continue questran and Kiesha-- called in to pharmacy to get cost  A.m. labs ordered  Appreciate Gastroenterology input

## 2020-12-19 NOTE — PLAN OF CARE
Problem: Adult Inpatient Plan of Care  Goal: Plan of Care Review  Outcome: Ongoing, Progressing     Problem: Diabetes Comorbidity  Goal: Blood Glucose Level Within Desired Range  Outcome: Ongoing, Progressing     Problem: Electrolyte Imbalance (Acute Kidney Injury/Impairment)  Goal: Serum Electrolyte Balance  Outcome: Ongoing, Progressing     Problem: Fall Injury Risk  Goal: Absence of Fall and Fall-Related Injury  Outcome: Ongoing, Progressing     Problem: Oral Intake Inadequate (Acute Kidney Injury/Impairment)  Goal: Optimal Nutrition Intake  Outcome: Ongoing, Progressing

## 2020-12-19 NOTE — PROGRESS NOTES
Pharmacist Renal Dose Adjustment Note    Jose English is a 70 y.o. male being treated with Enoxaparin    Patient Data:    Vital Signs (Most Recent):  Temp: 98.7 °F (37.1 °C) (12/19/20 0754)  Pulse: 75 (12/19/20 1011)  Resp: 18 (12/19/20 1011)  BP: (!) 156/67 (12/19/20 0754)  SpO2: 98 % (12/19/20 1011)   Vital Signs (72h Range):  Temp:  [97.1 °F (36.2 °C)-98.7 °F (37.1 °C)]   Pulse:  [64-81]   Resp:  [16-19]   BP: (108-171)/(50-75)   SpO2:  [95 %-100 %]      Recent Labs   Lab 12/17/20  0646 12/18/20  0553 12/19/20  0705   CREATININE 1.3 0.8 0.8     Serum creatinine: 0.8 mg/dL 12/19/20 0705  Estimated creatinine clearance: 86 mL/min    Enoxaparin 30 mg SC Q24H will be changed to Enoxaparin 40 mg SC Q24H.    Pharmacist's Name: Nahomi Alejo  Pharmacist's Extension: [phone]

## 2020-12-19 NOTE — PROGRESS NOTES
[facility]  Progress Note    Patient Name: Jose English  MRN: [id number]  Patient Class: IP- Inpatient   Admission Date: 12/15/2020  Length of Stay: 4 days  Attending Physician: Samara Murray MD  Primary Care Provider: Primary Doctor No        Subjective:     Principal Problem:Diarrhea        HPI:  70-year-old male hypertension, hyperlipidemia, diabetes, CAD, significant peripheral vascular disease, s/p mesenteric artery stenting, s/p left iliac artery, aorto-bifem bypass in 2008, history of ischemic colitis requiring right-sided colectomy in 2018 comes in for persistent diarrhea and occasional emesis.  Patient was recently discharge from [facility] on 12/07 with similar issues.  Patient reports that after being discharged patient was doing well.  Patient picked up his medications from GI office, Dr. Loya and started taking medications.  Reports that about 3 days ago he started having profuse watery diarrhea.  Then he started having episodes of bilious emesis.  Only able to hold down Gatorade.  Denies any fever, chills.  Complains of abdominal pain due to persistent emesis.  Denies hematochezia, melena, hematemesis.  Patient came into the ED today for further evaluation.    In the ED, patient was hypotensive 92/56.  Patient received bolus IV fluids with significant improvement of blood pressure.  Found to have emesis of yellow watery fluid during my interview.    Overview/Hospital Course:  Assumed care of this patient on 12/16/20.  Patient with a history of chronic diarrhea.  Recent [facility] admission and discharge on 12/07 with severe diarrhea with associated acute kidney injury.  Known history of ischemic colitis status post prior bowel surgery including mesenteric artery stenting, right colectomy, previous duodenal biopsies negative for celiac, colonoscopy negative for IBD, biopsy negative for microscopic colitis, stool studies negative.  States he was doing well on cholestyramine  however 3 days following presentation developed recurrent severe diarrhea, quantified 10 episodes per day associated with generalized weakness.  On admission he was hypotensive with BP 66/37, labs with significant acute kidney injury with creatinine 4.7, low TSH with normal free T4, CT abdomen and pelvis without any acute pathology.  He was admitted, started on IV fluid hydration holding of antihypertensive with gastroenterology consultation.  On 12/16, no further episodes of nausea or vomiting, some mild epigastric discomfort, states he has had 4 small volume liquid stool, dark in color, overall feeling better.  Seen by Gastroenterology with plans for stool gap, elastase, calprotectin, fecal fat with small bowel follow through.  On 12/17, anemia labs checked, B12 level low at 286, had small-bowel follow-through with multiple loops of gas-filled small bowel, time of 15 minutes.  On 12/19 he has had 3 bowel movements yesterday, no bowel movement as of now, increasing his diet, monitoring number bowel movements, possible discharge tomorrow.    Interval History:  Patient had a total of 3 bowel movements yesterday.  Ate breakfast and no bowel movement as of now.  States during prior [facility] discharge he was sent home with 1 box of questran and wishes to have this on discharge as he states this is more cost effective, will call in to pharmacy and get cost.  Also gastroenterology recommending Creon, patient is also concerned about costing and will get co-pay for patient.  Blood pressure running on the slightly higher side.  Labs with hemoglobin 8.9, magnesium 1.5, phosphorus 2.6, BUN/creatinine 8/0.8.  Plan of care discussed with patient, all questions were addressed.  No family members at bedside.  Review of Systems   Constitutional: Negative for chills and fever.   HENT: Negative for trouble swallowing.    Respiratory: Negative for shortness of breath.    Cardiovascular: Negative for chest pain.   Gastrointestinal:  Positive for diarrhea. Negative for abdominal pain, nausea and vomiting.   Genitourinary: Negative for difficulty urinating.   Psychiatric/Behavioral: Negative for confusion.     Objective:     Vital Signs (Most Recent):  Temp: 98.7 °F (37.1 °C) (12/19/20 0754)  Pulse: 75 (12/19/20 1011)  Resp: 18 (12/19/20 1011)  BP: (!) 156/67 (12/19/20 0754)  SpO2: 98 % (12/19/20 1011) Vital Signs (24h Range):  Temp:  [97.9 °F (36.6 °C)-98.7 °F (37.1 °C)] 98.7 °F (37.1 °C)  Pulse:  [65-76] 75  Resp:  [17-18] 18  SpO2:  [97 %-99 %] 98 %  BP: (148-161)/(60-75) 156/67     Weight: 79.8 kg (175 lb 14.8 oz)  Body mass index is 29.28 kg/m².    Intake/Output Summary (Last 24 hours) at 12/19/2020 1113  Last data filed at 12/19/2020 0800  Gross per 24 hour   Intake 938 ml   Output --   Net 938 ml      Physical Exam  Vitals signs and nursing note reviewed.   Constitutional:       General: He is not in acute distress.     Appearance: Normal appearance. He is normal weight. He is not ill-appearing, toxic-appearing or diaphoretic.      Comments: Sitting side of bed   HENT:      Head: Normocephalic and atraumatic.      Mouth/Throat:      Mouth: Mucous membranes are moist.   Eyes:      General:         Right eye: No discharge.         Left eye: No discharge.      Conjunctiva/sclera: Conjunctivae normal.   Cardiovascular:      Rate and Rhythm: Normal rate and regular rhythm.      Comments: No significant lower extremity edema  Pulmonary:      Effort: Pulmonary effort is normal. No respiratory distress.      Breath sounds: Normal breath sounds. No stridor. No wheezing or rhonchi.      Comments: Not on supplemental oxygen  Abdominal:      Comments: Nondistended, healed midline surgical incision, soft and nontender, hyperactive bowel sounds   Genitourinary:     Comments: No suprapubic fullness or tenderness, no CVAT  Skin:     General: Skin is warm and dry.      Capillary Refill: Capillary refill takes 2 to 3 seconds.   Neurological:      Mental  Status: He is alert and oriented to person, place, and time.   Psychiatric:         Mood and Affect: Mood normal.         Significant Labs:   BMP:   Recent Labs   Lab 12/19/20  0705   *      K 4.4      CO2 25   BUN 8   CREATININE 0.8   CALCIUM 8.3*   MG 1.5*     CBC:   Recent Labs   Lab 12/18/20  0553 12/19/20  0705   WBC 4.76 4.48   HGB 8.7* 8.9*   HCT 26.4* 27.8*    218     CMP:   Recent Labs   Lab 12/18/20  0553 12/19/20  0705    139   K 4.0 4.4    107   CO2 22* 25    121*   BUN 11 8   CREATININE 0.8 0.8   CALCIUM 8.6* 8.3*   ANIONGAP 9 7*   EGFRNONAA >60.0 >60.0     Cardiac Markers: No results for input(s): CKMB, MYOGLOBIN, BNP, TROPISTAT in the last 48 hours.  Lactic Acid: No results for input(s): LACTATE in the last 48 hours.  Magnesium:   Recent Labs   Lab 12/18/20  0553 12/19/20  0705   MG 1.9 1.5*     POCT Glucose: No results for input(s): POCTGLUCOSE in the last 48 hours.  Troponin: No results for input(s): TROPONINI in the last 48 hours.  All pertinent labs within the past 24 hours have been reviewed.    Significant Imaging: I have reviewed all pertinent imaging results/findings within the past 24 hours.      Assessment/Plan:      * Acute on chronic diarrhea, recurrent  Known history of chronic diarrhea, recent admission for same a now removed recurrent associated with systemic hypotension and acute kidney injury  Significant prior got history, history of mesenteric ischemia status post stenting and ischemic colitis with right hemicolectomy  Celiac disease ruled out with negative duodenal biopsy, no evidence of IBD on colonoscopy, biopsy negative for microscopic colitis  Infectious workup negative to date  He was treated with rifaximin for possible small bowel bacterial overgrowth however denies any improvement  Suspect small-bowel pathology, possible short-gut syndrome versus other  Continue to monitor stool output--has been improving, only 3 stools yesterday,  renal function maintaining off IV fluids  Small-bowel follow-through with multiple loops of gas-filled small bowel, time to reach rectum 15 minutes, will discussed with Gastroenterology  Continue Bossman-- called in to pharmacy to get cost  A.m. labs ordered  Appreciate Gastroenterology input      Hypomagnesemia  Magnesium 1.5 today.  2 g IV repletion.  Repeat levels in a.m.      Anemia  H&H downtrending, suspect element of hemoconcentration which is now improving after IV fluid hydration.  Anemia labs ordered and checked today, B12 level low, starting supplementation  Monitor      B12 deficiency  Suspect due to malabsorptive state.  Will give subcutaneous B12 supplementation while hospitalized.    Hypophosphatemia  Phosphorus 2.6.  Ordered 30 millimoles IV repletion.  Repeat levels in a.m..      Diabetes mellitus with peripheral vascular disease  Holding oral hypoglycemics.  Moderate dose insulin sliding scale t.i.d. with meals and Accu-Cheks.  As needed hypoglycemic measures      Mixed hyperlipidemia  Continue Lipitor      PAD (peripheral artery disease)  Severe peripheral vascular disease status post prior aortofemoral bypass as well as stenting  Continue aspirin and plavix along with high-intensity statin      HTN (hypertension)  Hypotension now resolved and blood pressure stable  Continue home antihypertensives and monitoring        VTE Risk Mitigation (From admission, onward)         Ordered     enoxaparin injection 40 mg  Every 24 hours      12/19/20 1045     IP VTE HIGH RISK PATIENT  Once      12/15/20 1826     Place sequential compression device  Until discontinued      12/15/20 1826                Discharge Planning   UMANG:      Code Status: Full Code   Is the patient medically ready for discharge?:     Reason for patient still in hospital (select all that apply): Patient trending condition  Discharge Plan A: Home                  Samara Murray MD  Department of Hospital Medicine   [place]  [facility]

## 2020-12-20 VITALS
SYSTOLIC BLOOD PRESSURE: 174 MMHG | WEIGHT: 175.94 LBS | OXYGEN SATURATION: 99 % | DIASTOLIC BLOOD PRESSURE: 73 MMHG | HEART RATE: 70 BPM | HEIGHT: 65 IN | TEMPERATURE: 99 F | BODY MASS INDEX: 29.31 KG/M2 | RESPIRATION RATE: 18 BRPM

## 2020-12-20 PROBLEM — E83.42 HYPOMAGNESEMIA: Status: RESOLVED | Noted: 2018-12-04 | Resolved: 2020-12-20

## 2020-12-20 PROBLEM — R19.7 DIARRHEA: Status: RESOLVED | Noted: 2018-12-06 | Resolved: 2020-12-20

## 2020-12-20 PROBLEM — E83.39 HYPOPHOSPHATEMIA: Status: RESOLVED | Noted: 2020-12-17 | Resolved: 2020-12-20

## 2020-12-20 LAB
ANION GAP SERPL CALC-SCNC: 7 MMOL/L (ref 8–16)
BUN SERPL-MCNC: 8 MG/DL (ref 8–23)
CALCIUM SERPL-MCNC: 8.8 MG/DL (ref 8.7–10.5)
CHLORIDE SERPL-SCNC: 105 MMOL/L (ref 95–110)
CO2 SERPL-SCNC: 26 MMOL/L (ref 23–29)
CREAT SERPL-MCNC: 0.9 MG/DL (ref 0.5–1.4)
EST. GFR  (AFRICAN AMERICAN): >60 ML/MIN/1.73 M^2
EST. GFR  (NON AFRICAN AMERICAN): >60 ML/MIN/1.73 M^2
GLUCOSE SERPL-MCNC: 101 MG/DL (ref 70–110)
GLUCOSE SERPL-MCNC: 125 MG/DL (ref 70–110)
LABCORP MISC TEST CODE: 4390
LABCORP MISC TEST NAME: NORMAL
LABCORP MISCELLANEOUS TEST: NORMAL
MAGNESIUM SERPL-MCNC: 1.7 MG/DL (ref 1.6–2.6)
PHOSPHATE SERPL-MCNC: 3.5 MG/DL (ref 2.7–4.5)
POTASSIUM SERPL-SCNC: 3.9 MMOL/L (ref 3.5–5.1)
SODIUM SERPL-SCNC: 138 MMOL/L (ref 136–145)

## 2020-12-20 PROCEDURE — 83735 ASSAY OF MAGNESIUM: CPT

## 2020-12-20 PROCEDURE — 80048 BASIC METABOLIC PNL TOTAL CA: CPT

## 2020-12-20 PROCEDURE — 36415 COLL VENOUS BLD VENIPUNCTURE: CPT

## 2020-12-20 PROCEDURE — 84100 ASSAY OF PHOSPHORUS: CPT

## 2020-12-20 PROCEDURE — 94761 N-INVAS EAR/PLS OXIMETRY MLT: CPT

## 2020-12-20 PROCEDURE — 25000003 PHARM REV CODE 250: Performed by: INTERNAL MEDICINE

## 2020-12-20 PROCEDURE — 99900035 HC TECH TIME PER 15 MIN (STAT)

## 2020-12-20 RX ORDER — PANCRELIPASE 36000; 180000; 114000 [USP'U]/1; [USP'U]/1; [USP'U]/1
3 CAPSULE, DELAYED RELEASE PELLETS ORAL
Qty: 270 CAPSULE | Refills: 0 | Status: SHIPPED | OUTPATIENT
Start: 2020-12-20 | End: 2021-01-19

## 2020-12-20 RX ORDER — CHOLESTYRAMINE 4 G/4.8G
1 POWDER, FOR SUSPENSION ORAL
Qty: 60 PACKET | Refills: 1 | Status: ON HOLD | OUTPATIENT
Start: 2020-12-20 | End: 2021-09-02

## 2020-12-20 RX ADMIN — AMLODIPINE BESYLATE 10 MG: 5 TABLET ORAL at 08:12

## 2020-12-20 RX ADMIN — PANCRELIPASE 3 CAPSULE: 30000; 6000; 19000 CAPSULE, DELAYED RELEASE PELLETS ORAL at 08:12

## 2020-12-20 RX ADMIN — ATORVASTATIN CALCIUM 80 MG: 40 TABLET, FILM COATED ORAL at 08:12

## 2020-12-20 RX ADMIN — METOPROLOL SUCCINATE 50 MG: 50 TABLET, FILM COATED, EXTENDED RELEASE ORAL at 08:12

## 2020-12-20 RX ADMIN — CHOLESTYRAMINE 4 G: 4 POWDER, FOR SUSPENSION ORAL at 08:12

## 2020-12-20 RX ADMIN — PANTOPRAZOLE SODIUM 40 MG: 40 TABLET, DELAYED RELEASE ORAL at 08:12

## 2020-12-20 RX ADMIN — ASPIRIN 81 MG: 81 TABLET, DELAYED RELEASE ORAL at 08:12

## 2020-12-20 NOTE — PLAN OF CARE
12/20/20 0809   Patient Assessment/Suction   Level of Consciousness (AVPU) alert   Respiratory Effort Normal;Unlabored   Expansion/Accessory Muscles/Retractions no use of accessory muscles;no retractions;expansion symmetric   All Lung Fields Breath Sounds clear   Rhythm/Pattern, Respiratory unlabored;pattern regular;depth regular;chest wiggle adequate;no shortness of breath reported   Cough Frequency no cough   PRE-TX-O2   O2 Device (Oxygen Therapy) room air   SpO2 99 %   Pulse Oximetry Type Intermittent   $ Pulse Oximetry - Multiple Charge Pulse Oximetry - Multiple   Pulse 70   Resp 18   Aerosol Therapy   $ Aerosol Therapy Charges PRN treatment not required   Respiratory Evaluation   $ Care Plan Tech Time 15 min

## 2020-12-20 NOTE — DISCHARGE SUMMARY
[facility] Medicine  Discharge Summary      Patient Name: Jose English  MRN: [id number]  Patient Class: IP- Inpatient  Admission Date: 12/15/2020  Hospital Length of Stay: 5 days  Discharge Date and Time:  12/20/2020 9:38 AM  Attending Physician: Samara Murray MD   Discharging Provider: Samara Murray MD  Primary Care Provider: Primary Doctor No      HPI:   70-year-old male hypertension, hyperlipidemia, diabetes, CAD, significant peripheral vascular disease, s/p mesenteric artery stenting, s/p left iliac artery, aorto-bifem bypass in 2008, history of ischemic colitis requiring right-sided colectomy in 2018 comes in for persistent diarrhea and occasional emesis.  Patient was recently discharge from [facility] on 12/07 with similar issues.  Patient reports that after being discharged patient was doing well.  Patient picked up his medications from GI office, Dr. Loya and started taking medications.  Reports that about 3 days ago he started having profuse watery diarrhea.  Then he started having episodes of bilious emesis.  Only able to hold down Gatorade.  Denies any fever, chills.  Complains of abdominal pain due to persistent emesis.  Denies hematochezia, melena, hematemesis.  Patient came into the ED today for further evaluation.    In the ED, patient was hypotensive 92/56.  Patient received bolus IV fluids with significant improvement of blood pressure.  Found to have emesis of yellow watery fluid during my interview.    * No surgery found *      Hospital Course:   Assumed care of this patient on 12/16/20.  Patient with a history of chronic diarrhea.  Recent [facility] admission and discharge on 12/07 with severe diarrhea with associated acute kidney injury.  Known history of ischemic colitis status post prior bowel surgery including mesenteric artery stenting, right colectomy, previous duodenal biopsies negative for celiac, colonoscopy negative for IBD, biopsy negative for microscopic colitis,  stool studies negative.  States he was doing well on cholestyramine however 3 days following presentation developed recurrent severe diarrhea, quantified 10 episodes per day associated with generalized weakness.  On admission he was hypotensive with BP 66/37, labs with significant acute kidney injury with creatinine 4.7, low TSH with normal free T4, CT abdomen and pelvis without any acute pathology.  He was admitted, started on IV fluid hydration holding of antihypertensive with gastroenterology consultation.  On 12/16, no further episodes of nausea or vomiting, some mild epigastric discomfort, states he has had 4 small volume liquid stool, dark in color, overall feeling better.  Seen by Gastroenterology with plans for stool gap, elastase, calprotectin, fecal fat with small bowel follow through.  On 12/17, anemia labs checked, B12 level low at 286, had small-bowel follow-through with multiple loops of gas-filled small bowel, time of 15 minutes.  On 12/19 did not have any bowel movements and tolerating full diet well. On 12/20 one BM following breakfast and otherwise stable and feels ready for discharge.No electrolyte abnormalities and renal function back to his baseline.  Counseled patient on needing to make dietary changes at home in order to prevent recurrence.  Gastroenterology recommends to discharge on Questran and Creon supplementation, discontinue rifaximin, prescription sent to pharmacy and spoke with pharmacist, also patient given written printed prescriptions for these 2 medications.  Discharge plan including medication changes, follow-up as well as return precautions were reviewed.  No objection to discharge.  All questions were addressed.    Discharge examination  Sitting in chair, alert and oriented, regular rhythm, not on supplemental oxygen, abdomen soft and nontender       Consults:   Consults (From admission, onward)        Status Ordering Provider     Inpatient consult to Hospitalist  Once      Provider:  Dwight Haines MD    Acknowledged DWIGHT HAINES.          No new Assessment & Plan notes have been filed under this hospital service since the last note was generated.  Service: Hospital Medicine    Final Active Diagnoses:    Diagnosis Date Noted POA    PRINCIPAL PROBLEM:  Acute on chronic diarrhea, recurrent [R19.7] 12/06/2018 Yes    B12 deficiency [E53.8] 12/17/2020 Yes     Chronic    Anemia [D64.9] 12/17/2020 Yes     Chronic    Diabetes mellitus with peripheral vascular disease [E11.51] 10/24/2014 Yes     Chronic    Mixed hyperlipidemia [E78.2] 10/21/2014 Yes     Chronic    HTN (hypertension) [I10] 04/10/2014 Yes     Chronic    PAD (peripheral artery disease) [I73.9] 04/10/2014 Yes     Chronic      Problems Resolved During this Admission:    Diagnosis Date Noted Date Resolved POA    JEREMIAH (acute kidney injury) [N17.9] 12/02/2020 12/18/2020 Yes    Hypomagnesemia [E83.42] 12/04/2018 12/20/2020 No    Non anion gap metabolic acidosis [E87.2] 12/03/2020 12/17/2020 Yes    Hypophosphatemia [E83.39] 12/17/2020 12/20/2020 Yes    Leukocytosis [D72.829] 12/16/2020 12/16/2020 Yes    Hyponatremia [E87.1] 12/15/2020 12/18/2020 Yes    Hypotension [I95.9] 12/02/2020 12/16/2020 Yes       Discharged Condition: good    Disposition: Home or Self Care    Follow Up:  Follow-up Information     Andi Loya MD. Schedule an appointment as soon as possible for a visit in 1 week.    Specialty: Gastroenterology  Why: post hospital follow up  Contact information:  [address] [facility] [address]  [phone]                 Patient Instructions:      Diet Cardiac     Notify your health care provider if you experience any of the following:  temperature >100.4     Notify your health care provider if you experience any of the following:  persistent nausea and vomiting or diarrhea     Notify your health care provider if you experience any of the following:  increased confusion or weakness     Notify your  health care provider if you experience any of the following:  difficulty breathing or increased cough     Activity as tolerated       Significant Diagnostic Studies: Labs:   BMP:   Recent Labs   Lab 12/19/20  0705 12/20/20  0809   * 125*    138   K 4.4 3.9    105   CO2 25 26   BUN 8 8   CREATININE 0.8 0.9   CALCIUM 8.3* 8.8   MG 1.5* 1.7   , CMP   Recent Labs   Lab 12/19/20  0705 12/20/20  0809    138   K 4.4 3.9    105   CO2 25 26   * 125*   BUN 8 8   CREATININE 0.8 0.9   CALCIUM 8.3* 8.8   ANIONGAP 7* 7*   ESTGFRAFRICA >60.0 >60.0   EGFRNONAA >60.0 >60.0   , CBC   Recent Labs   Lab 12/19/20  0705   WBC 4.48   HGB 8.9*   HCT 27.8*      , INR   Lab Results   Component Value Date    INR 1.0 01/23/2019    INR 1.1 01/12/2019    INR 1.1 08/21/2018   , Lipid Panel   Lab Results   Component Value Date    CHOL 169 07/11/2014    HDL 37 (L) 07/11/2014    LDLCALC 90.4 07/11/2014    TRIG 208 (H) 07/11/2014    CHOLHDL 21.9 07/11/2014   , Troponin No results for input(s): TROPONINI in the last 168 hours., A1C:   Recent Labs   Lab 06/30/20  1453   HGBA1C 7.2*    and All labs within the past 24 hours have been reviewed    Pending Diagnostic Studies:     Procedure Component Value Units Date/Time    Pancreatic elastase, fecal [[id number]] Collected: 12/16/20 1225    Order Status: Sent Lab Status: In process Updated: 12/16/20 1237    Specimen: Stool        Fl Small Bowel Follow Through    Result Date: 12/17/2020  REASON: persistent diarrhea, abdominal pain TECHNIQUE: CMS MANDATED QUALITY DATA-FLUOROSCOPY 145 Fluoroscopy Time:  0.4 minutes. Number of Images:   Multiple exposure films Fluoroscopy Dose:  16.5 mGy COMPARISON: CT abdomen pelvis December 15, 2020. FINDINGS: Scattered image of the abdomen demonstrates multiple loops of gas-filled bowel in a nonobstructive pattern. Lung bases are clear. Vascular stents are noted of the bilateral common iliac arteries. Multifocal areas of  atherosclerosis noted. Oral contrast administered filling the lumen of the stomach and quickly extending into the small bowel. Oral contrast reaches the large bowel and 15 minutes. At 35 minutes. There is oral contrast within the rectum. No abnormal mucosal pattern identified of the small bowel. No filling defects. Large bowel is normal caliber with normal haustra identified. Spot compression views demonstrate a normal appearing ileocecal valve. IMPRESSION: 1.  No mucosal abnormalities identified in the small or large bowel. 2.  Nonobstructive bowel gas pattern 3.  Oral contrast reached the large bowel and 15 minutes as described. Correlate with patient's history of severe diarrhea. Electronically Signed by Hay Ramey on 12/17/2020 11:02 AM    X-ray Chest Ap Portable    Result Date: 12/2/2020  XR CHEST AP PORTABLE CLINICAL HISTORY: 70 years Male Chest Pain COMPARISON: None FINDINGS: Cardiac silhouette size is within normal limits. Atherosclerotic calcification of the liver. No confluent airspace disease. No pleural fluid or pneumothorax. No acute osseous abnormality. Surgical clips within the neck. IMPRESSION: No acute pulmonary process. Electronically Signed by Jesus MARTINEZ on 12/2/2020 5:07 PM    Us Kidney    Result Date: 12/2/2020  US KIDNEY CLINICAL HISTORY: 70 years Male olga COMPARISON: CT abdomen and pelvis same day FINDINGS: Right kidney measures 10.9 cm in length. No cystic or solid right renal lesion. No hydronephrosis. IVC is unremarkable. Aorta was not visualized due to bowel gas. Left kidney measures 8.3 cm in length. There is an echogenic focus at the lower pole the left kidney, corresponding to a rim calcified lesion described on the reference CT.  This lesion is not well evaluated sonographically due to calcification. There is no hydronephrosis of the left kidney. Urinary bladder is incompletely distended and otherwise unremarkable. Impression: Negative for hydronephrosis. Rim calcified  lesion seen on reference CT at the lower pole the left kidney is not well assessed sonographically. Follow-up renal protocol CT is again recommended. Electronically Signed by Jesus MARTINEZ on 12/2/2020 7:59 PM    Ct Abdomen Pelvis  Without Contrast    Result Date: 12/15/2020  CMS MANDATED QUALITY DATA - CT RADIATION  436 All CT scans at this facility utilize dose modulation, iterative reconstruction, and/or weight based dosing when appropriate to reduce radiation dose to as low as reasonably achievable. CT ABDOMEN PELVIS WITHOUT CONTRAST CLINICAL HISTORY: 70 years Male Nausea/vomiting COMPARISON: CT abdomen and pelvis December 2, 2020 FINDINGS: Lung bases are clear. Bone window images demonstrate lumbar spondylosis. No acute or aggressive osseous abnormality. On this unenhanced exam, no focal hepatic lesion. Calcified gallstones noted. Gallbladder is otherwise unremarkable. No bile duct dilation. Spleen and pancreas are unremarkable. Stable appearance of bilateral adrenal nodules. Bilateral perinephric stranding. Bilateral renal vascular calcifications. No hydronephrosis. Partially calcified lesion at the lower pole the left kidney is unchanged compared to the recent prior examination, again demonstrating postoperative soft tissue and fatty elements. Ureters are normal in caliber. Urinary bladder is unremarkable. Stomach is unremarkable. No evidence of small bowel obstruction. Postoperative changes of the colon. No evidence of acute colitis. Extensive atherosclerotic calcification of the abdominal aorta and its branch vessels. A stent is present within the celiac artery. Prior aortic bypass. No free fluid or free air within the abdomen or pelvis. No pathologically enlarged abdominopelvic lymph nodes. Right gluteal intramuscular lipoma again noted. Soft tissue mass of the anterior left thigh demonstrating fatty elements is again evident and stable. IMPRESSION: No noncontrast CT evidence of acute pathology  involving the abdomen or pelvis. Cholelithiasis. Stable appearance of exophytic lesion arising from the lower pole of the left kidney. Dedicated renal protocol follow-up is again recommended. Additional stable incidental findings as above. Electronically Signed by Jesus MARTINEZ on 12/15/2020 6:03 PM    Ct Abdomen Pelvis  Without Contrast    Result Date: 12/2/2020  EXAMINATION: CT ABDOMEN PELVIS WITHOUT CONTRAST CLINICAL HISTORY: Abdominal pain, acute, nonlocalized; TECHNIQUE: CMS Mandated Quality Data-CT Radiation Dose-436 All CT scans at this facility dose modulation, iterative reconstruction, and or weight-based dosing when appropriate to reduce radiation dose to as low as reasonably achievable. COMPARISON: CT abdomen and pelvis 01/23/2019 FINDINGS: Lung bases are clear.  Bone window images demonstrate lumbar spondylosis.  No acute or aggressive osseous abnormality. On this unenhanced exam, no focal hepatic lesion.  Small calcified gallstone appears to be present within the gallbladder neck.  The gallbladder is collapsed.  No bile duct dilation.  Spleen and pancreas are unremarkable.  Stable bilateral adrenal nodules consistent with benign adenomas.  Bilateral perinephric stranding.  Bilateral renal vascular calcifications.  No hydronephrosis.  At the lower pole the left kidney there is a calcified lesion demonstrating both soft tissue and fatty attenuation which has been present on multiple prior exams.  Ureters are normal in caliber.  Urinary bladder is collapsed. Stomach is unremarkable.  No evidence small-bowel obstruction.  Postoperative changes of the colon.  No evidence of acute colitis. Extensive atherosclerotic calcification of the abdominal aorta and its branch vessels.  A stent is present within the celiac artery.  Aortic bypass.  No free fluid or free air within the abdomen or pelvis.  No pathologically enlarged abdominopelvic lymph nodes.  Intramuscular lipoma involving the right gluteal  musculature.  Soft tissue mass of the anterior left thigh demonstrating fatty elements is also stable from prior.     No noncontrast CT evidence of acute pathology involving the abdomen or pelvis. Cholelithiasis. Exophytic lesion arising from the lower pole the left kidney demonstrating both soft tissue attenuation as well as fatty elements, with peripheral calcification.  A combination of fat necrosis/scarring, or angiomyolipoma are possibilities.  Correlation with surgical history as well as follow-up renal protocol CT is recommended. Extensive arterial vascular disease, status post aortic bypass. Additional incidental findings as above including postoperative changes of the colon and bilateral adrenal adenomas. Electronically signed by: Jesus Galeano MD Date:    12/02/2020 Time:    18:19    Medications:  Reconciled Home Medications:      Medication List      START taking these medications    cholestyramine-aspartame 4 gram Pwpk  Commonly known as: QUESTRAN LIGHT  Take 1 packet (4 g total) by mouth 4 (four) times daily with meals and nightly. for 30 doses     CREON 36,000-114,000- 180,000 unit Cpdr  Generic drug: lipase-protease-amylase  Take 3 capsules by mouth 3 (three) times daily before meals.        CHANGE how you take these medications    amLODIPine 10 MG tablet  Commonly known as: NORVASC  TAKE 1 TABLET ONE TIME DAILY  What changed: See the new instructions.        CONTINUE taking these medications    ACCU-CHEK CEASAR CONTROL SOLN Soln  Generic drug: blood glucose control high,low  USE ONE TIME DAILY     ACCU-CHEK CEASAR PLUS TEST STRP Strp  Generic drug: blood sugar diagnostic  TEST TWO TIMES DAILY     aspirin 81 MG EC tablet  Commonly known as: ECOTRIN  Take 81 mg by mouth once daily.     atorvastatin 80 MG tablet  Commonly known as: LIPITOR  Take 80 mg by mouth once daily.     b complex vitamins tablet  Take 1 tablet by mouth once daily.     BD ALCOHOL SWABS Padm  Generic drug: alcohol swabs  TEST TWO  TIMES DAILY     clopidogreL 75 mg tablet  Commonly known as: PLAVIX  TAKE 1 TABLET EVERY EVENING     cyanocobalamin 1000 MCG tablet  Commonly known as: VITAMIN B-12  Take 1,000 mcg by mouth once daily.     dicyclomine 20 mg tablet  Commonly known as: BENTYL  Take 20 mg by mouth every 6 (six) hours.     gabapentin 300 MG capsule  Commonly known as: NEURONTIN  Take 300 mg by mouth every evening.     glipiZIDE 5 MG Tr24  Commonly known as: GLUCOTROL  Take 5 mg by mouth 2 (two) times a day.     Kaopectate (BISMUTH subsalicy) 262 mg Tab  Generic drug: bismuth subsalicylate  Take by mouth.     * lancets 33 gauge Misc  Commonly known as: ONETOUCH DELICA LANCETS  2 lancets by Misc.(Non-Drug; Combo Route) route 2 (two) times daily.     * ACCU-CHEK SOFTCLIX LANCETS Misc  Generic drug: lancets  TEST TWO TIMES DAILY     loperamide 2 mg capsule  Commonly known as: IMODIUM  Take 2 mg by mouth 4 (four) times daily as needed for Diarrhea.     metoprolol succinate 50 MG 24 hr tablet  Commonly known as: TOPROL-XL  Take 1 tablet (50 mg total) by mouth once daily.     pantoprazole 40 MG tablet  Commonly known as: PROTONIX  Take 40 mg by mouth once daily.     temazepam 30 mg capsule  Commonly known as: RESTORIL  Take 30 mg by mouth nightly as needed.         * This list has 2 medication(s) that are the same as other medications prescribed for you. Read the directions carefully, and ask your doctor or other care provider to review them with you.            STOP taking these medications    donepeziL 5 MG tablet  Commonly known as: ARICEPT     rifAXIMin 550 mg Tab  Commonly known as: XIFAXAN            Indwelling Lines/Drains at time of discharge:   Lines/Drains/Airways     None                 Time spent on the discharge of patient: 33 minutes  Patient was seen and examined on the date of discharge and determined to be suitable for discharge.         Samara Murray MD  Department of Hospital Medicine  [facility]

## 2020-12-20 NOTE — PLAN OF CARE
Chart/Discharge orders reviewed. Discharge home with no needs.      12/20/20 1137   Final Note   Assessment Type Final Discharge Note   Anticipated Discharge Disposition Home     Patient voiced concerns re: cost of Creon and Questran. Call placed to [facility] [phone]; spoke with pharmacist; Creon $45 for 90 day supply; unable to give cost of Questran d/t not eligible for refill until tomorrow. Both have a co-pay program; however pt not eligible - restricted for Medicare/Managed Medicare plans. Discussed with patient and is okay with the Creon cost and understands this CM unable to get cost on Questran. Questran will be ready for  tomorrow after 2PM.

## 2020-12-20 NOTE — NURSING
Discharged for home in good condition.  Verbalized understanding of instructions.   No c/o pain or discomfort.   Discharged via ambulation with wife and  at his side

## 2020-12-20 NOTE — PLAN OF CARE
"   12/20/20 0954   Medicare Message   Important Message from Medicare regarding Discharge Appeal Rights Given to patient/caregiver;Explained to patient/caregiver;Signed/date by patient/caregiver  (D/C IMM)   Date IMM was signed 12/20/20   Time IMM was signed 0940     Patient signed with understanding verbalized. States, "I am ready to go home".   "

## 2020-12-24 LAB — ELASTASE PANC STL-MCNT: NORMAL UG ELAST./G

## 2021-01-10 ENCOUNTER — IMMUNIZATION (OUTPATIENT)
Dept: PRIMARY CARE CLINIC | Facility: CLINIC | Age: 71
End: 2021-01-10
Payer: MEDICARE

## 2021-01-10 DIAGNOSIS — Z23 NEED FOR VACCINATION: ICD-10-CM

## 2021-01-10 PROCEDURE — 91300 COVID-19, MRNA, LNP-S, PF, 30 MCG/0.3 ML DOSE VACCINE: CPT | Mod: PBBFAC | Performed by: FAMILY MEDICINE

## 2021-01-19 PROBLEM — D50.0 ANEMIA DUE TO CHRONIC BLOOD LOSS: Status: ACTIVE | Noted: 2021-01-19

## 2021-01-19 PROBLEM — D64.89 ANEMIA DUE TO MULTIPLE MECHANISMS: Status: ACTIVE | Noted: 2021-01-19

## 2021-01-19 PROBLEM — D64.9 ANEMIA, UNSPECIFIED: Status: ACTIVE | Noted: 2021-01-19

## 2021-01-19 PROBLEM — D64.9 NORMOCHROMIC NORMOCYTIC ANEMIA: Status: ACTIVE | Noted: 2021-01-19

## 2021-01-19 PROBLEM — D64.9 ANEMIA: Chronic | Status: RESOLVED | Noted: 2020-12-17 | Resolved: 2021-01-19

## 2021-01-19 PROBLEM — D63.8 ANEMIA, CHRONIC DISEASE: Status: ACTIVE | Noted: 2021-01-19

## 2021-01-20 ENCOUNTER — OFFICE VISIT (OUTPATIENT)
Dept: HEMATOLOGY/ONCOLOGY | Facility: CLINIC | Age: 71
End: 2021-01-20
Payer: MEDICARE

## 2021-01-20 VITALS
DIASTOLIC BLOOD PRESSURE: 61 MMHG | BODY MASS INDEX: 31.1 KG/M2 | SYSTOLIC BLOOD PRESSURE: 135 MMHG | HEIGHT: 65 IN | TEMPERATURE: 99 F | RESPIRATION RATE: 18 BRPM | HEART RATE: 69 BPM | WEIGHT: 186.69 LBS

## 2021-01-20 DIAGNOSIS — D64.9 ANEMIA, UNSPECIFIED TYPE: ICD-10-CM

## 2021-01-20 DIAGNOSIS — D50.0 ANEMIA DUE TO CHRONIC BLOOD LOSS: ICD-10-CM

## 2021-01-20 DIAGNOSIS — D63.8 ANEMIA, CHRONIC DISEASE: ICD-10-CM

## 2021-01-20 DIAGNOSIS — D64.9 NORMOCHROMIC NORMOCYTIC ANEMIA: ICD-10-CM

## 2021-01-20 DIAGNOSIS — C67.9 MALIGNANT NEOPLASM OF URINARY BLADDER, UNSPECIFIED SITE: Primary | ICD-10-CM

## 2021-01-20 DIAGNOSIS — D50.0 IRON DEFICIENCY ANEMIA DUE TO CHRONIC BLOOD LOSS: ICD-10-CM

## 2021-01-20 DIAGNOSIS — C64.2 MALIGNANT NEOPLASM OF LEFT KIDNEY: ICD-10-CM

## 2021-01-20 DIAGNOSIS — E53.8 B12 DEFICIENCY: Chronic | ICD-10-CM

## 2021-01-20 DIAGNOSIS — D64.89 ANEMIA DUE TO MULTIPLE MECHANISMS: ICD-10-CM

## 2021-01-20 PROCEDURE — 3008F PR BODY MASS INDEX (BMI) DOCUMENTED: ICD-10-PCS | Mod: S$GLB,,, | Performed by: INTERNAL MEDICINE

## 2021-01-20 PROCEDURE — 99203 PR OFFICE/OUTPT VISIT, NEW, LEVL III, 30-44 MIN: ICD-10-PCS | Mod: S$GLB,,, | Performed by: INTERNAL MEDICINE

## 2021-01-20 PROCEDURE — 3288F PR FALLS RISK ASSESSMENT DOCUMENTED: ICD-10-PCS | Mod: S$GLB,,, | Performed by: INTERNAL MEDICINE

## 2021-01-20 PROCEDURE — 3075F SYST BP GE 130 - 139MM HG: CPT | Mod: S$GLB,,, | Performed by: INTERNAL MEDICINE

## 2021-01-20 PROCEDURE — 99203 OFFICE O/P NEW LOW 30 MIN: CPT | Mod: S$GLB,,, | Performed by: INTERNAL MEDICINE

## 2021-01-20 PROCEDURE — 1157F ADVNC CARE PLAN IN RCRD: CPT | Mod: S$GLB,,, | Performed by: INTERNAL MEDICINE

## 2021-01-20 PROCEDURE — 3078F DIAST BP <80 MM HG: CPT | Mod: S$GLB,,, | Performed by: INTERNAL MEDICINE

## 2021-01-20 PROCEDURE — 3075F PR MOST RECENT SYSTOLIC BLOOD PRESS GE 130-139MM HG: ICD-10-PCS | Mod: S$GLB,,, | Performed by: INTERNAL MEDICINE

## 2021-01-20 PROCEDURE — 1100F PR PT FALLS ASSESS DOC 2+ FALLS/FALL W/INJURY/YR: ICD-10-PCS | Mod: S$GLB,,, | Performed by: INTERNAL MEDICINE

## 2021-01-20 PROCEDURE — 1157F PR ADVANCE CARE PLAN OR EQUIV PRESENT IN MEDICAL RECORD: ICD-10-PCS | Mod: S$GLB,,, | Performed by: INTERNAL MEDICINE

## 2021-01-20 PROCEDURE — 3288F FALL RISK ASSESSMENT DOCD: CPT | Mod: S$GLB,,, | Performed by: INTERNAL MEDICINE

## 2021-01-20 PROCEDURE — 1159F PR MEDICATION LIST DOCUMENTED IN MEDICAL RECORD: ICD-10-PCS | Mod: S$GLB,,, | Performed by: INTERNAL MEDICINE

## 2021-01-20 PROCEDURE — 1126F AMNT PAIN NOTED NONE PRSNT: CPT | Mod: S$GLB,,, | Performed by: INTERNAL MEDICINE

## 2021-01-20 PROCEDURE — 3078F PR MOST RECENT DIASTOLIC BLOOD PRESSURE < 80 MM HG: ICD-10-PCS | Mod: S$GLB,,, | Performed by: INTERNAL MEDICINE

## 2021-01-20 PROCEDURE — 1126F PR PAIN SEVERITY QUANTIFIED, NO PAIN PRESENT: ICD-10-PCS | Mod: S$GLB,,, | Performed by: INTERNAL MEDICINE

## 2021-01-20 PROCEDURE — 3008F BODY MASS INDEX DOCD: CPT | Mod: S$GLB,,, | Performed by: INTERNAL MEDICINE

## 2021-01-20 PROCEDURE — 1100F PTFALLS ASSESS-DOCD GE2>/YR: CPT | Mod: S$GLB,,, | Performed by: INTERNAL MEDICINE

## 2021-01-20 PROCEDURE — 1159F MED LIST DOCD IN RCRD: CPT | Mod: S$GLB,,, | Performed by: INTERNAL MEDICINE

## 2021-01-20 RX ORDER — DIPHENHYDRAMINE HYDROCHLORIDE 50 MG/ML
25 INJECTION INTRAMUSCULAR; INTRAVENOUS
Status: CANCELLED
Start: 2021-01-25

## 2021-01-20 RX ORDER — METHYLPREDNISOLONE SOD SUCC 125 MG
125 VIAL (EA) INJECTION ONCE AS NEEDED
Status: CANCELLED | OUTPATIENT
Start: 2021-01-25

## 2021-01-20 RX ORDER — EPINEPHRINE 0.3 MG/.3ML
0.3 INJECTION SUBCUTANEOUS ONCE AS NEEDED
Status: CANCELLED | OUTPATIENT
Start: 2021-01-25

## 2021-01-20 RX ORDER — DIPHENHYDRAMINE HYDROCHLORIDE 50 MG/ML
50 INJECTION INTRAMUSCULAR; INTRAVENOUS ONCE AS NEEDED
Status: CANCELLED | OUTPATIENT
Start: 2021-01-25

## 2021-01-20 RX ORDER — SODIUM CHLORIDE 0.9 % (FLUSH) 0.9 %
10 SYRINGE (ML) INJECTION
Status: CANCELLED | OUTPATIENT
Start: 2021-01-25

## 2021-01-20 RX ORDER — HEPARIN 100 UNIT/ML
5 SYRINGE INTRAVENOUS
Status: CANCELLED | OUTPATIENT
Start: 2021-01-25

## 2021-01-20 RX ORDER — SODIUM CHLORIDE 9 MG/ML
INJECTION, SOLUTION INTRAVENOUS CONTINUOUS
Status: CANCELLED | OUTPATIENT
Start: 2021-01-25

## 2021-01-22 ENCOUNTER — INFUSION (OUTPATIENT)
Dept: INFUSION THERAPY | Facility: HOSPITAL | Age: 71
End: 2021-01-22
Attending: INTERNAL MEDICINE
Payer: MEDICARE

## 2021-01-22 VITALS
BODY MASS INDEX: 31.28 KG/M2 | WEIGHT: 188 LBS | SYSTOLIC BLOOD PRESSURE: 131 MMHG | OXYGEN SATURATION: 99 % | DIASTOLIC BLOOD PRESSURE: 70 MMHG | HEART RATE: 67 BPM | RESPIRATION RATE: 18 BRPM | TEMPERATURE: 98 F

## 2021-01-22 DIAGNOSIS — D50.0 IRON DEFICIENCY ANEMIA DUE TO CHRONIC BLOOD LOSS: Primary | ICD-10-CM

## 2021-01-22 PROCEDURE — 96365 THER/PROPH/DIAG IV INF INIT: CPT

## 2021-01-22 PROCEDURE — 63600175 PHARM REV CODE 636 W HCPCS: Mod: JG | Performed by: INTERNAL MEDICINE

## 2021-01-22 PROCEDURE — 96367 TX/PROPH/DG ADDL SEQ IV INF: CPT

## 2021-01-22 PROCEDURE — 25000003 PHARM REV CODE 250: Performed by: INTERNAL MEDICINE

## 2021-01-22 RX ORDER — HEPARIN 100 UNIT/ML
5 SYRINGE INTRAVENOUS
Status: DISCONTINUED | OUTPATIENT
Start: 2021-01-22 | End: 2021-01-22 | Stop reason: HOSPADM

## 2021-01-22 RX ORDER — SODIUM CHLORIDE 9 MG/ML
INJECTION, SOLUTION INTRAVENOUS CONTINUOUS
Status: DISCONTINUED | OUTPATIENT
Start: 2021-01-22 | End: 2021-01-22 | Stop reason: HOSPADM

## 2021-01-22 RX ORDER — EPINEPHRINE 0.3 MG/.3ML
0.3 INJECTION SUBCUTANEOUS ONCE AS NEEDED
Status: CANCELLED | OUTPATIENT
Start: 2021-01-29

## 2021-01-22 RX ORDER — HEPARIN 100 UNIT/ML
5 SYRINGE INTRAVENOUS
Status: CANCELLED | OUTPATIENT
Start: 2021-01-29

## 2021-01-22 RX ORDER — SODIUM CHLORIDE 9 MG/ML
INJECTION, SOLUTION INTRAVENOUS CONTINUOUS
Status: CANCELLED | OUTPATIENT
Start: 2021-01-29

## 2021-01-22 RX ORDER — SODIUM CHLORIDE 0.9 % (FLUSH) 0.9 %
10 SYRINGE (ML) INJECTION
Status: CANCELLED | OUTPATIENT
Start: 2021-01-29

## 2021-01-22 RX ORDER — DIPHENHYDRAMINE HYDROCHLORIDE 50 MG/ML
50 INJECTION INTRAMUSCULAR; INTRAVENOUS ONCE AS NEEDED
Status: CANCELLED | OUTPATIENT
Start: 2021-01-29

## 2021-01-22 RX ORDER — SODIUM CHLORIDE 0.9 % (FLUSH) 0.9 %
10 SYRINGE (ML) INJECTION
Status: DISCONTINUED | OUTPATIENT
Start: 2021-01-22 | End: 2021-01-22 | Stop reason: HOSPADM

## 2021-01-22 RX ORDER — DIPHENHYDRAMINE HYDROCHLORIDE 50 MG/ML
25 INJECTION INTRAMUSCULAR; INTRAVENOUS
Status: CANCELLED
Start: 2021-01-29

## 2021-01-22 RX ORDER — METHYLPREDNISOLONE SOD SUCC 125 MG
125 VIAL (EA) INJECTION ONCE AS NEEDED
Status: CANCELLED | OUTPATIENT
Start: 2021-01-29

## 2021-01-22 RX ADMIN — DIPHENHYDRAMINE HYDROCHLORIDE 25 MG: 50 INJECTION INTRAMUSCULAR; INTRAVENOUS at 09:01

## 2021-01-22 RX ADMIN — SODIUM CHLORIDE: 0.9 INJECTION, SOLUTION INTRAVENOUS at 09:01

## 2021-01-22 RX ADMIN — FERRIC CARBOXYMALTOSE INJECTION 750 MG: 50 INJECTION, SOLUTION INTRAVENOUS at 10:01

## 2021-01-27 LAB
ALBUMIN SERPL ELPH-MCNC: 3.8 G/DL (ref 3.8–4.8)
ALBUMIN SERPL-MCNC: 3.9 G/DL (ref 3.6–5.1)
ALBUMIN/GLOB SERPL: 1.8 (CALC) (ref 1–2.5)
ALP SERPL-CCNC: 75 U/L (ref 35–144)
ALPHA1 GLOB SERPL ELPH-MCNC: 0.3 G/DL (ref 0.2–0.3)
ALPHA2 GLOB SERPL ELPH-MCNC: 0.7 G/DL (ref 0.5–0.9)
ALT SERPL-CCNC: 21 U/L (ref 9–46)
AST SERPL-CCNC: 13 U/L (ref 10–35)
BASOPHILS # BLD AUTO: 68 CELLS/UL (ref 0–200)
BASOPHILS NFR BLD AUTO: 0.8 %
BETA1 GLOB SERPL ELPH-MCNC: 0.4 G/DL (ref 0.4–0.6)
BETA2 GLOB SERPL ELPH-MCNC: 0.4 G/DL (ref 0.2–0.5)
BILIRUB SERPL-MCNC: 0.3 MG/DL (ref 0.2–1.2)
BUN SERPL-MCNC: 20 MG/DL (ref 7–25)
BUN/CREAT SERPL: NORMAL (CALC) (ref 6–22)
CALCIUM SERPL-MCNC: 9.1 MG/DL (ref 8.6–10.3)
CHLORIDE SERPL-SCNC: 108 MMOL/L (ref 98–110)
CO2 SERPL-SCNC: 28 MMOL/L (ref 20–32)
CREAT SERPL-MCNC: 0.89 MG/DL (ref 0.7–1.25)
EOSINOPHIL # BLD AUTO: 510 CELLS/UL (ref 15–500)
EOSINOPHIL NFR BLD AUTO: 6 %
ERYTHROCYTE [DISTWIDTH] IN BLOOD BY AUTOMATED COUNT: 14.2 % (ref 11–15)
FERRITIN SERPL-MCNC: 163 NG/ML (ref 24–380)
FOLATE SERPL-MCNC: 21.9 NG/ML
GAMMA GLOB SERPL ELPH-MCNC: 0.7 G/DL (ref 0.8–1.7)
GFRSERPLBLD MDRD-ARVRAT: 88 ML/MIN/1.73M2
GLOBULIN SER CALC-MCNC: 2.2 G/DL (CALC) (ref 1.9–3.7)
GLUCOSE SERPL-MCNC: 103 MG/DL (ref 65–139)
HCT VFR BLD AUTO: 31.9 % (ref 38.5–50)
HGB A2 MFR BLD: 1.8 % (ref 1.8–3.5)
HGB BLD-MCNC: 10.5 G/DL (ref 13.2–17.1)
IRON SATN MFR SERPL: 24 % (CALC) (ref 20–48)
IRON SERPL-MCNC: 73 MCG/DL (ref 50–180)
LYMPHOCYTES # BLD AUTO: 1624 CELLS/UL (ref 850–3900)
LYMPHOCYTES NFR BLD AUTO: 19.1 %
MCH RBC QN AUTO: 31.5 PG (ref 27–33)
MCHC RBC AUTO-ENTMCNC: 32.9 G/DL (ref 32–36)
MCV RBC AUTO: 95.8 FL (ref 80–100)
MONOCYTES # BLD AUTO: 1037 CELLS/UL (ref 200–950)
MONOCYTES NFR BLD AUTO: 12.2 %
NEUTROPHILS # BLD AUTO: 5262 CELLS/UL (ref 1500–7800)
NEUTROPHILS NFR BLD AUTO: 61.9 %
PLATELET # BLD AUTO: 230 THOUSAND/UL (ref 140–400)
PMV BLD REES-ECKER: 9.9 FL (ref 7.5–12.5)
POTASSIUM SERPL-SCNC: 4.8 MMOL/L (ref 3.5–5.3)
PROT PATTERN SERPL ELPH-IMP: ABNORMAL
PROT SERPL-MCNC: 6.1 G/DL (ref 6.1–8.1)
PROT SERPL-MCNC: 6.3 G/DL (ref 6.1–8.1)
RBC # BLD AUTO: 3.33 MILLION/UL (ref 4.2–5.8)
RETICS #: ABNORMAL CELLS/UL (ref 25000–90000)
RETICS/RBC NFR AUTO: 4.3 %
SODIUM SERPL-SCNC: 142 MMOL/L (ref 135–146)
TIBC SERPL-MCNC: 310 MCG/DL (CALC) (ref 250–425)
VIT B12 SERPL-MCNC: 315 PG/ML (ref 200–1100)
WBC # BLD AUTO: 8.5 THOUSAND/UL (ref 3.8–10.8)

## 2021-01-27 NOTE — ED NOTES
Patient identifiers verified and correct for Jose English.     LOC: The patient is awake, alert and aware of environment with an appropriate affect, the patient is oriented x 3 and speaking appropriately.   APPEARANCE: Patient appears comfortable and in no acute distress, patient is clean and well groomed.  SKIN: The skin is warm and dry, color consistent with ethnicity, patient has normal skin turgor and moist mucus membranes, skin intact, no breakdown or bruising noted.   MUSCULOSKELETAL: Patient moving all extremities spontaneously, no swelling noted.  RESPIRATORY: Airway is open and patent, respirations are spontaneous, patient has a normal effort and rate, no accessory muscle use noted, pt placed on continuous pulse ox with O2 sats noted at 98% on room air.  CARDIAC: Pt placed on cardiac monitor. Patient has a normal rate and regular rhythm, no edema noted, capillary refill < 3 seconds.   GASTRO: Soft but tender to palpation to RLQ, distention noted. Pt reports chronic diarrhea but states it it worse.   : Pt denies any pain or frequency with urination.  NEURO: Pt opens eyes spontaneously, behavior appropriate to situation, follows commands, facial expression symmetrical, bilateral hand grasp equal and even, purposeful motor response noted, normal sensation in all extremities when touched with a finger.     Albendazole Counseling:  I discussed with the patient the risks of albendazole including but not limited to cytopenia, kidney damage, nausea/vomiting and severe allergy.  The patient understands that this medication is being used in an off-label manner.

## 2021-01-29 ENCOUNTER — INFUSION (OUTPATIENT)
Dept: INFUSION THERAPY | Facility: HOSPITAL | Age: 71
End: 2021-01-29
Attending: INTERNAL MEDICINE
Payer: MEDICARE

## 2021-01-29 ENCOUNTER — TELEPHONE (OUTPATIENT)
Dept: HEMATOLOGY/ONCOLOGY | Facility: CLINIC | Age: 71
End: 2021-01-29

## 2021-01-29 VITALS
SYSTOLIC BLOOD PRESSURE: 172 MMHG | BODY MASS INDEX: 31.22 KG/M2 | HEART RATE: 60 BPM | TEMPERATURE: 98 F | WEIGHT: 187.63 LBS | DIASTOLIC BLOOD PRESSURE: 72 MMHG | RESPIRATION RATE: 17 BRPM | OXYGEN SATURATION: 97 %

## 2021-01-29 DIAGNOSIS — D50.0 IRON DEFICIENCY ANEMIA DUE TO CHRONIC BLOOD LOSS: Primary | ICD-10-CM

## 2021-01-29 PROCEDURE — 63600175 PHARM REV CODE 636 W HCPCS: Mod: JG | Performed by: INTERNAL MEDICINE

## 2021-01-29 PROCEDURE — 96365 THER/PROPH/DIAG IV INF INIT: CPT

## 2021-01-29 PROCEDURE — 96367 TX/PROPH/DG ADDL SEQ IV INF: CPT

## 2021-01-29 PROCEDURE — 25000003 PHARM REV CODE 250: Performed by: INTERNAL MEDICINE

## 2021-01-29 RX ORDER — HEPARIN 100 UNIT/ML
5 SYRINGE INTRAVENOUS
Status: CANCELLED | OUTPATIENT
Start: 2021-01-29

## 2021-01-29 RX ORDER — DIPHENHYDRAMINE HYDROCHLORIDE 50 MG/ML
50 INJECTION INTRAMUSCULAR; INTRAVENOUS ONCE AS NEEDED
OUTPATIENT
Start: 2021-01-29

## 2021-01-29 RX ORDER — SODIUM CHLORIDE 0.9 % (FLUSH) 0.9 %
10 SYRINGE (ML) INJECTION
Status: DISCONTINUED | OUTPATIENT
Start: 2021-01-29 | End: 2021-01-29 | Stop reason: HOSPADM

## 2021-01-29 RX ORDER — SODIUM CHLORIDE 9 MG/ML
INJECTION, SOLUTION INTRAVENOUS CONTINUOUS
Status: DISCONTINUED | OUTPATIENT
Start: 2021-01-29 | End: 2021-01-29 | Stop reason: HOSPADM

## 2021-01-29 RX ORDER — DIPHENHYDRAMINE HYDROCHLORIDE 50 MG/ML
25 INJECTION INTRAMUSCULAR; INTRAVENOUS
Start: 2021-01-29

## 2021-01-29 RX ORDER — HEPARIN 100 UNIT/ML
5 SYRINGE INTRAVENOUS
Status: DISCONTINUED | OUTPATIENT
Start: 2021-01-29 | End: 2021-01-29 | Stop reason: HOSPADM

## 2021-01-29 RX ORDER — SODIUM CHLORIDE 0.9 % (FLUSH) 0.9 %
10 SYRINGE (ML) INJECTION
Status: CANCELLED | OUTPATIENT
Start: 2021-01-29

## 2021-01-29 RX ORDER — SODIUM CHLORIDE 9 MG/ML
INJECTION, SOLUTION INTRAVENOUS CONTINUOUS
Status: CANCELLED | OUTPATIENT
Start: 2021-01-29

## 2021-01-29 RX ORDER — EPINEPHRINE 0.3 MG/.3ML
0.3 INJECTION SUBCUTANEOUS ONCE AS NEEDED
OUTPATIENT
Start: 2021-01-29

## 2021-01-29 RX ORDER — METHYLPREDNISOLONE SOD SUCC 125 MG
125 VIAL (EA) INJECTION ONCE AS NEEDED
OUTPATIENT
Start: 2021-01-29

## 2021-01-29 RX ADMIN — DIPHENHYDRAMINE HYDROCHLORIDE 25 MG: 50 INJECTION INTRAMUSCULAR; INTRAVENOUS at 02:01

## 2021-01-29 RX ADMIN — FERRIC CARBOXYMALTOSE INJECTION 750 MG: 50 INJECTION, SOLUTION INTRAVENOUS at 03:01

## 2021-01-29 RX ADMIN — SODIUM CHLORIDE: 0.9 INJECTION, SOLUTION INTRAVENOUS at 02:01

## 2021-01-31 ENCOUNTER — IMMUNIZATION (OUTPATIENT)
Dept: PRIMARY CARE CLINIC | Facility: CLINIC | Age: 71
End: 2021-01-31
Payer: MEDICARE

## 2021-01-31 DIAGNOSIS — Z23 NEED FOR VACCINATION: Primary | ICD-10-CM

## 2021-01-31 PROCEDURE — 0002A COVID-19, MRNA, LNP-S, PF, 30 MCG/0.3 ML DOSE VACCINE: CPT | Mod: CV19,,, | Performed by: EMERGENCY MEDICINE

## 2021-01-31 PROCEDURE — 0002A COVID-19, MRNA, LNP-S, PF, 30 MCG/0.3 ML DOSE VACCINE: ICD-10-PCS | Mod: CV19,,, | Performed by: EMERGENCY MEDICINE

## 2021-01-31 PROCEDURE — 91300 COVID-19, MRNA, LNP-S, PF, 30 MCG/0.3 ML DOSE VACCINE: CPT | Mod: ,,, | Performed by: EMERGENCY MEDICINE

## 2021-01-31 PROCEDURE — 91300 COVID-19, MRNA, LNP-S, PF, 30 MCG/0.3 ML DOSE VACCINE: ICD-10-PCS | Mod: ,,, | Performed by: EMERGENCY MEDICINE

## 2021-02-02 ENCOUNTER — TELEPHONE (OUTPATIENT)
Dept: HEMATOLOGY/ONCOLOGY | Facility: CLINIC | Age: 71
End: 2021-02-02

## 2021-03-16 ENCOUNTER — HOSPITAL ENCOUNTER (EMERGENCY)
Facility: HOSPITAL | Age: 71
Discharge: HOME | End: 2021-03-16
Attending: EMERGENCY MEDICINE
Payer: MEDICARE

## 2021-03-16 ENCOUNTER — TELEPHONE (OUTPATIENT)
Dept: HEMATOLOGY/ONCOLOGY | Facility: CLINIC | Age: 71
End: 2021-03-16

## 2021-03-16 VITALS
OXYGEN SATURATION: 100 % | BODY MASS INDEX: 31.65 KG/M2 | DIASTOLIC BLOOD PRESSURE: 60 MMHG | HEIGHT: 65 IN | TEMPERATURE: 98 F | WEIGHT: 190 LBS | RESPIRATION RATE: 18 BRPM | SYSTOLIC BLOOD PRESSURE: 125 MMHG | HEART RATE: 61 BPM

## 2021-03-16 DIAGNOSIS — K62.5 RECTAL BLEEDING: Primary | ICD-10-CM

## 2021-03-16 LAB
ALBUMIN SERPL BCP-MCNC: 4 G/DL (ref 3.5–5.2)
ALP SERPL-CCNC: 60 U/L (ref 55–135)
ALT SERPL W/O P-5'-P-CCNC: 19 U/L (ref 10–44)
ANION GAP SERPL CALC-SCNC: 6 MMOL/L (ref 8–16)
AST SERPL-CCNC: 16 U/L (ref 10–40)
BASOPHILS # BLD AUTO: 0.07 K/UL (ref 0–0.2)
BASOPHILS NFR BLD: 0.9 % (ref 0–1.9)
BILIRUB SERPL-MCNC: 0.8 MG/DL (ref 0.1–1)
BUN SERPL-MCNC: 22 MG/DL (ref 8–23)
CALCIUM SERPL-MCNC: 8.5 MG/DL (ref 8.7–10.5)
CHLORIDE SERPL-SCNC: 107 MMOL/L (ref 95–110)
CO2 SERPL-SCNC: 25 MMOL/L (ref 23–29)
CREAT SERPL-MCNC: 1.2 MG/DL (ref 0.5–1.4)
DIFFERENTIAL METHOD: ABNORMAL
EOSINOPHIL # BLD AUTO: 0.3 K/UL (ref 0–0.5)
EOSINOPHIL NFR BLD: 4 % (ref 0–8)
ERYTHROCYTE [DISTWIDTH] IN BLOOD BY AUTOMATED COUNT: 13.7 % (ref 11.5–14.5)
EST. GFR  (AFRICAN AMERICAN): >60 ML/MIN/1.73 M^2
EST. GFR  (NON AFRICAN AMERICAN): >60 ML/MIN/1.73 M^2
GLUCOSE SERPL-MCNC: 86 MG/DL (ref 70–110)
HCT VFR BLD AUTO: 32 % (ref 40–54)
HGB BLD-MCNC: 10.1 G/DL (ref 14–18)
IMM GRANULOCYTES # BLD AUTO: 0.12 K/UL (ref 0–0.04)
IMM GRANULOCYTES NFR BLD AUTO: 1.5 % (ref 0–0.5)
INR PPP: 1.1
LYMPHOCYTES # BLD AUTO: 1.6 K/UL (ref 1–4.8)
LYMPHOCYTES NFR BLD: 20.5 % (ref 18–48)
MCH RBC QN AUTO: 32.9 PG (ref 27–31)
MCHC RBC AUTO-ENTMCNC: 31.6 G/DL (ref 32–36)
MCV RBC AUTO: 104 FL (ref 82–98)
MONOCYTES # BLD AUTO: 0.8 K/UL (ref 0.3–1)
MONOCYTES NFR BLD: 9.6 % (ref 4–15)
NEUTROPHILS # BLD AUTO: 5.1 K/UL (ref 1.8–7.7)
NEUTROPHILS NFR BLD: 63.5 % (ref 38–73)
NRBC BLD-RTO: 0 /100 WBC
OB PNL STL: POSITIVE
PLATELET # BLD AUTO: 223 K/UL (ref 150–350)
PMV BLD AUTO: 9.5 FL (ref 9.2–12.9)
POTASSIUM SERPL-SCNC: 4.5 MMOL/L (ref 3.5–5.1)
PROT SERPL-MCNC: 6.8 G/DL (ref 6–8.4)
PROTHROMBIN TIME: 13.3 SEC (ref 10.6–14.8)
RBC # BLD AUTO: 3.07 M/UL (ref 4.6–6.2)
SODIUM SERPL-SCNC: 138 MMOL/L (ref 136–145)
WBC # BLD AUTO: 7.99 K/UL (ref 3.9–12.7)

## 2021-03-16 PROCEDURE — 85025 COMPLETE CBC W/AUTO DIFF WBC: CPT | Performed by: STUDENT IN AN ORGANIZED HEALTH CARE EDUCATION/TRAINING PROGRAM

## 2021-03-16 PROCEDURE — 80053 COMPREHEN METABOLIC PANEL: CPT | Performed by: STUDENT IN AN ORGANIZED HEALTH CARE EDUCATION/TRAINING PROGRAM

## 2021-03-16 PROCEDURE — 82272 OCCULT BLD FECES 1-3 TESTS: CPT | Performed by: EMERGENCY MEDICINE

## 2021-03-16 PROCEDURE — 99283 EMERGENCY DEPT VISIT LOW MDM: CPT

## 2021-03-16 PROCEDURE — 85610 PROTHROMBIN TIME: CPT | Performed by: STUDENT IN AN ORGANIZED HEALTH CARE EDUCATION/TRAINING PROGRAM

## 2021-03-17 ENCOUNTER — TELEPHONE (OUTPATIENT)
Dept: HEMATOLOGY/ONCOLOGY | Facility: CLINIC | Age: 71
End: 2021-03-17

## 2021-07-01 ENCOUNTER — PATIENT MESSAGE (OUTPATIENT)
Dept: ADMINISTRATIVE | Facility: OTHER | Age: 71
End: 2021-07-01

## 2021-09-01 ENCOUNTER — HOSPITAL ENCOUNTER (INPATIENT)
Facility: HOSPITAL | Age: 71
LOS: 1 days | Discharge: HOME | DRG: 684 | End: 2021-09-02
Attending: EMERGENCY MEDICINE | Admitting: INTERNAL MEDICINE
Payer: MEDICARE

## 2021-09-01 DIAGNOSIS — R19.7 DIARRHEA, UNSPECIFIED TYPE: ICD-10-CM

## 2021-09-01 DIAGNOSIS — N17.9 ACUTE KIDNEY INJURY (NONTRAUMATIC): ICD-10-CM

## 2021-09-01 DIAGNOSIS — N17.9 AKI (ACUTE KIDNEY INJURY): ICD-10-CM

## 2021-09-01 DIAGNOSIS — E86.0 DEHYDRATION: Primary | ICD-10-CM

## 2021-09-01 DIAGNOSIS — R10.9 ABDOMINAL PAIN: ICD-10-CM

## 2021-09-01 LAB
ALBUMIN SERPL BCP-MCNC: 4.8 G/DL (ref 3.5–5.2)
ALP SERPL-CCNC: 110 U/L (ref 55–135)
ALT SERPL W/O P-5'-P-CCNC: 44 U/L (ref 10–44)
ANION GAP SERPL CALC-SCNC: 12 MMOL/L (ref 8–16)
APTT PPP: 26.9 SEC (ref 25.6–35.8)
AST SERPL-CCNC: 22 U/L (ref 10–40)
BASOPHILS # BLD AUTO: 0.06 K/UL (ref 0–0.2)
BASOPHILS NFR BLD: 0.5 % (ref 0–1.9)
BILIRUB SERPL-MCNC: 1.2 MG/DL (ref 0.1–1)
BILIRUB UR QL STRIP: NEGATIVE
BUN SERPL-MCNC: 68 MG/DL (ref 8–23)
CALCIUM SERPL-MCNC: 9.4 MG/DL (ref 8.7–10.5)
CHLORIDE SERPL-SCNC: 107 MMOL/L (ref 95–110)
CLARITY UR: CLEAR
CO2 SERPL-SCNC: 18 MMOL/L (ref 23–29)
COLOR UR: YELLOW
CREAT SERPL-MCNC: 2.9 MG/DL (ref 0.5–1.4)
DIFFERENTIAL METHOD: ABNORMAL
EOSINOPHIL # BLD AUTO: 0 K/UL (ref 0–0.5)
EOSINOPHIL NFR BLD: 0.2 % (ref 0–8)
ERYTHROCYTE [DISTWIDTH] IN BLOOD BY AUTOMATED COUNT: 15.5 % (ref 11.5–14.5)
EST. GFR  (AFRICAN AMERICAN): 24.4 ML/MIN/1.73 M^2
EST. GFR  (NON AFRICAN AMERICAN): 21.1 ML/MIN/1.73 M^2
GLUCOSE SERPL-MCNC: 147 MG/DL (ref 70–110)
GLUCOSE SERPL-MCNC: 92 MG/DL (ref 70–110)
GLUCOSE UR QL STRIP: NEGATIVE
HCT VFR BLD AUTO: 41.2 % (ref 40–54)
HGB BLD-MCNC: 13.5 G/DL (ref 14–18)
HGB UR QL STRIP: NEGATIVE
IMM GRANULOCYTES # BLD AUTO: 0.07 K/UL (ref 0–0.04)
IMM GRANULOCYTES NFR BLD AUTO: 0.6 % (ref 0–0.5)
INR PPP: 1.2
KETONES UR QL STRIP: NEGATIVE
LEUKOCYTE ESTERASE UR QL STRIP: NEGATIVE
LIPASE SERPL-CCNC: 27 U/L (ref 4–60)
LYMPHOCYTES # BLD AUTO: 1.1 K/UL (ref 1–4.8)
LYMPHOCYTES NFR BLD: 9.6 % (ref 18–48)
MCH RBC QN AUTO: 29.8 PG (ref 27–31)
MCHC RBC AUTO-ENTMCNC: 32.8 G/DL (ref 32–36)
MCV RBC AUTO: 91 FL (ref 82–98)
MONOCYTES # BLD AUTO: 0.9 K/UL (ref 0.3–1)
MONOCYTES NFR BLD: 8.5 % (ref 4–15)
NEUTROPHILS # BLD AUTO: 8.9 K/UL (ref 1.8–7.7)
NEUTROPHILS NFR BLD: 80.6 % (ref 38–73)
NITRITE UR QL STRIP: NEGATIVE
NRBC BLD-RTO: 0 /100 WBC
PH UR STRIP: 5 [PH] (ref 5–8)
PLATELET # BLD AUTO: 354 K/UL (ref 150–450)
PMV BLD AUTO: 9.7 FL (ref 9.2–12.9)
POTASSIUM SERPL-SCNC: 4 MMOL/L (ref 3.5–5.1)
PROT SERPL-MCNC: 8.2 G/DL (ref 6–8.4)
PROT UR QL STRIP: ABNORMAL
PROTHROMBIN TIME: 14.4 SEC (ref 11.8–14.3)
RBC # BLD AUTO: 4.53 M/UL (ref 4.6–6.2)
SARS-COV-2 RDRP RESP QL NAA+PROBE: NEGATIVE
SODIUM SERPL-SCNC: 137 MMOL/L (ref 136–145)
SP GR UR STRIP: 1.02 (ref 1–1.03)
URN SPEC COLLECT METH UR: ABNORMAL
UROBILINOGEN UR STRIP-ACNC: NEGATIVE EU/DL
WBC # BLD AUTO: 11.06 K/UL (ref 3.9–12.7)

## 2021-09-01 PROCEDURE — 96360 HYDRATION IV INFUSION INIT: CPT

## 2021-09-01 PROCEDURE — 93010 EKG 12-LEAD: ICD-10-PCS | Mod: ,,, | Performed by: INTERNAL MEDICINE

## 2021-09-01 PROCEDURE — 85610 PROTHROMBIN TIME: CPT | Performed by: EMERGENCY MEDICINE

## 2021-09-01 PROCEDURE — 25000003 PHARM REV CODE 250: Performed by: EMERGENCY MEDICINE

## 2021-09-01 PROCEDURE — 82962 GLUCOSE BLOOD TEST: CPT

## 2021-09-01 PROCEDURE — 80053 COMPREHEN METABOLIC PANEL: CPT | Performed by: NURSE PRACTITIONER

## 2021-09-01 PROCEDURE — 85730 THROMBOPLASTIN TIME PARTIAL: CPT | Performed by: EMERGENCY MEDICINE

## 2021-09-01 PROCEDURE — 25000003 PHARM REV CODE 250: Performed by: INTERNAL MEDICINE

## 2021-09-01 PROCEDURE — 12000002 HC ACUTE/MED SURGE SEMI-PRIVATE ROOM

## 2021-09-01 PROCEDURE — 81003 URINALYSIS AUTO W/O SCOPE: CPT | Performed by: INTERNAL MEDICINE

## 2021-09-01 PROCEDURE — 85025 COMPLETE CBC W/AUTO DIFF WBC: CPT | Performed by: NURSE PRACTITIONER

## 2021-09-01 PROCEDURE — U0002 COVID-19 LAB TEST NON-CDC: HCPCS | Performed by: EMERGENCY MEDICINE

## 2021-09-01 PROCEDURE — 99285 EMERGENCY DEPT VISIT HI MDM: CPT | Mod: 25

## 2021-09-01 PROCEDURE — 63600175 PHARM REV CODE 636 W HCPCS: Performed by: INTERNAL MEDICINE

## 2021-09-01 PROCEDURE — 83690 ASSAY OF LIPASE: CPT | Performed by: NURSE PRACTITIONER

## 2021-09-01 PROCEDURE — 93010 ELECTROCARDIOGRAM REPORT: CPT | Mod: ,,, | Performed by: INTERNAL MEDICINE

## 2021-09-01 PROCEDURE — 93005 ELECTROCARDIOGRAM TRACING: CPT | Performed by: INTERNAL MEDICINE

## 2021-09-01 RX ORDER — CHOLESTYRAMINE 4 G/4.8G
1 POWDER, FOR SUSPENSION ORAL
Status: DISCONTINUED | OUTPATIENT
Start: 2021-09-01 | End: 2021-09-02 | Stop reason: HOSPADM

## 2021-09-01 RX ORDER — HYDROCHLOROTHIAZIDE 25 MG/1
25 TABLET ORAL DAILY
COMMUNITY
End: 2024-01-08

## 2021-09-01 RX ORDER — METOPROLOL SUCCINATE 25 MG/1
50 TABLET, EXTENDED RELEASE ORAL DAILY
Status: DISCONTINUED | OUTPATIENT
Start: 2021-09-02 | End: 2021-09-02 | Stop reason: HOSPADM

## 2021-09-01 RX ORDER — GABAPENTIN 400 MG/1
400 CAPSULE ORAL DAILY
COMMUNITY
End: 2021-09-11

## 2021-09-01 RX ORDER — MAGNESIUM SULFATE 1 G/100ML
1 INJECTION INTRAVENOUS
Status: DISCONTINUED | OUTPATIENT
Start: 2021-09-01 | End: 2021-09-02 | Stop reason: HOSPADM

## 2021-09-01 RX ORDER — SODIUM CHLORIDE 9 MG/ML
INJECTION, SOLUTION INTRAVENOUS CONTINUOUS
Status: DISCONTINUED | OUTPATIENT
Start: 2021-09-01 | End: 2021-09-02

## 2021-09-01 RX ORDER — CLOPIDOGREL BISULFATE 75 MG/1
75 TABLET ORAL NIGHTLY
Status: DISCONTINUED | OUTPATIENT
Start: 2021-09-01 | End: 2021-09-02 | Stop reason: HOSPADM

## 2021-09-01 RX ORDER — MAGNESIUM SULFATE HEPTAHYDRATE 40 MG/ML
2 INJECTION, SOLUTION INTRAVENOUS
Status: DISCONTINUED | OUTPATIENT
Start: 2021-09-01 | End: 2021-09-02 | Stop reason: HOSPADM

## 2021-09-01 RX ORDER — POTASSIUM CHLORIDE 7.45 MG/ML
20 INJECTION INTRAVENOUS
Status: DISCONTINUED | OUTPATIENT
Start: 2021-09-01 | End: 2021-09-02 | Stop reason: HOSPADM

## 2021-09-01 RX ORDER — IBUPROFEN 200 MG
16 TABLET ORAL
Status: DISCONTINUED | OUTPATIENT
Start: 2021-09-01 | End: 2021-09-02 | Stop reason: HOSPADM

## 2021-09-01 RX ORDER — POTASSIUM CHLORIDE 20 MEQ/1
20 TABLET, EXTENDED RELEASE ORAL
Status: DISCONTINUED | OUTPATIENT
Start: 2021-09-01 | End: 2021-09-02 | Stop reason: HOSPADM

## 2021-09-01 RX ORDER — POTASSIUM CHLORIDE 20 MEQ/1
40 TABLET, EXTENDED RELEASE ORAL
Status: DISCONTINUED | OUTPATIENT
Start: 2021-09-01 | End: 2021-09-02 | Stop reason: HOSPADM

## 2021-09-01 RX ORDER — POTASSIUM CHLORIDE 7.45 MG/ML
40 INJECTION INTRAVENOUS
Status: DISCONTINUED | OUTPATIENT
Start: 2021-09-01 | End: 2021-09-02 | Stop reason: HOSPADM

## 2021-09-01 RX ORDER — ACETAMINOPHEN 325 MG/1
650 TABLET ORAL EVERY 4 HOURS PRN
Status: DISCONTINUED | OUTPATIENT
Start: 2021-09-01 | End: 2021-09-02 | Stop reason: HOSPADM

## 2021-09-01 RX ORDER — ENOXAPARIN SODIUM 100 MG/ML
30 INJECTION SUBCUTANEOUS EVERY 24 HOURS
Status: DISCONTINUED | OUTPATIENT
Start: 2021-09-01 | End: 2021-09-02 | Stop reason: HOSPADM

## 2021-09-01 RX ORDER — GLUCAGON 1 MG
1 KIT INJECTION
Status: DISCONTINUED | OUTPATIENT
Start: 2021-09-01 | End: 2021-09-02 | Stop reason: HOSPADM

## 2021-09-01 RX ORDER — ONDANSETRON 2 MG/ML
4 INJECTION INTRAMUSCULAR; INTRAVENOUS EVERY 6 HOURS PRN
Status: DISCONTINUED | OUTPATIENT
Start: 2021-09-01 | End: 2021-09-02 | Stop reason: HOSPADM

## 2021-09-01 RX ORDER — MAGNESIUM SULFATE HEPTAHYDRATE 40 MG/ML
4 INJECTION, SOLUTION INTRAVENOUS
Status: DISCONTINUED | OUTPATIENT
Start: 2021-09-01 | End: 2021-09-02 | Stop reason: HOSPADM

## 2021-09-01 RX ORDER — ATORVASTATIN CALCIUM 40 MG/1
80 TABLET, FILM COATED ORAL DAILY
Status: DISCONTINUED | OUTPATIENT
Start: 2021-09-02 | End: 2021-09-02 | Stop reason: HOSPADM

## 2021-09-01 RX ORDER — LOPERAMIDE HYDROCHLORIDE 2 MG/1
2 CAPSULE ORAL 4 TIMES DAILY PRN
Status: DISCONTINUED | OUTPATIENT
Start: 2021-09-01 | End: 2021-09-02 | Stop reason: HOSPADM

## 2021-09-01 RX ORDER — LANOLIN ALCOHOL/MO/W.PET/CERES
800 CREAM (GRAM) TOPICAL
Status: DISCONTINUED | OUTPATIENT
Start: 2021-09-01 | End: 2021-09-02 | Stop reason: HOSPADM

## 2021-09-01 RX ORDER — IBUPROFEN 200 MG
24 TABLET ORAL
Status: DISCONTINUED | OUTPATIENT
Start: 2021-09-01 | End: 2021-09-02 | Stop reason: HOSPADM

## 2021-09-01 RX ORDER — AMLODIPINE BESYLATE 5 MG/1
10 TABLET ORAL DAILY
Status: DISCONTINUED | OUTPATIENT
Start: 2021-09-02 | End: 2021-09-02 | Stop reason: HOSPADM

## 2021-09-01 RX ORDER — HYDRALAZINE HYDROCHLORIDE 20 MG/ML
10 INJECTION INTRAMUSCULAR; INTRAVENOUS EVERY 4 HOURS PRN
Status: DISCONTINUED | OUTPATIENT
Start: 2021-09-01 | End: 2021-09-02 | Stop reason: HOSPADM

## 2021-09-01 RX ORDER — ASPIRIN 81 MG/1
81 TABLET ORAL DAILY
Status: DISCONTINUED | OUTPATIENT
Start: 2021-09-02 | End: 2021-09-02 | Stop reason: HOSPADM

## 2021-09-01 RX ORDER — TALC
6 POWDER (GRAM) TOPICAL NIGHTLY PRN
Status: DISCONTINUED | OUTPATIENT
Start: 2021-09-01 | End: 2021-09-02 | Stop reason: HOSPADM

## 2021-09-01 RX ORDER — PANTOPRAZOLE SODIUM 40 MG/1
40 TABLET, DELAYED RELEASE ORAL
Status: DISCONTINUED | OUTPATIENT
Start: 2021-09-02 | End: 2021-09-02 | Stop reason: HOSPADM

## 2021-09-01 RX ORDER — INSULIN ASPART 100 [IU]/ML
1-10 INJECTION, SOLUTION INTRAVENOUS; SUBCUTANEOUS
Status: DISCONTINUED | OUTPATIENT
Start: 2021-09-01 | End: 2021-09-02 | Stop reason: HOSPADM

## 2021-09-01 RX ADMIN — MELATONIN 6 MG: at 09:09

## 2021-09-01 RX ADMIN — CHOLESTYRAMINE 4 G: 4 POWDER, FOR SUSPENSION ORAL at 09:09

## 2021-09-01 RX ADMIN — HYDRALAZINE HYDROCHLORIDE 10 MG: 20 INJECTION, SOLUTION INTRAMUSCULAR; INTRAVENOUS at 05:09

## 2021-09-01 RX ADMIN — SODIUM CHLORIDE: 0.9 INJECTION, SOLUTION INTRAVENOUS at 05:09

## 2021-09-01 RX ADMIN — ENOXAPARIN SODIUM 30 MG: 30 INJECTION SUBCUTANEOUS at 07:09

## 2021-09-01 RX ADMIN — CLOPIDOGREL BISULFATE 75 MG: 75 TABLET, FILM COATED ORAL at 09:09

## 2021-09-01 RX ADMIN — SODIUM CHLORIDE 500 ML: 0.9 INJECTION, SOLUTION INTRAVENOUS at 03:09

## 2021-09-02 VITALS
TEMPERATURE: 98 F | RESPIRATION RATE: 18 BRPM | HEART RATE: 84 BPM | SYSTOLIC BLOOD PRESSURE: 164 MMHG | HEIGHT: 65 IN | OXYGEN SATURATION: 96 % | BODY MASS INDEX: 30.16 KG/M2 | DIASTOLIC BLOOD PRESSURE: 66 MMHG | WEIGHT: 181 LBS

## 2021-09-02 PROBLEM — E86.0 DEHYDRATION: Status: RESOLVED | Noted: 2021-09-01 | Resolved: 2021-09-02

## 2021-09-02 PROBLEM — N17.9 AKI (ACUTE KIDNEY INJURY): Status: RESOLVED | Noted: 2021-09-01 | Resolved: 2021-09-02

## 2021-09-02 PROBLEM — R19.7 DIARRHEA: Status: RESOLVED | Noted: 2018-12-06 | Resolved: 2021-09-02

## 2021-09-02 LAB
ALBUMIN SERPL BCP-MCNC: 4.1 G/DL (ref 3.5–5.2)
ALP SERPL-CCNC: 92 U/L (ref 55–135)
ALT SERPL W/O P-5'-P-CCNC: 35 U/L (ref 10–44)
ANION GAP SERPL CALC-SCNC: 13 MMOL/L (ref 8–16)
AST SERPL-CCNC: 16 U/L (ref 10–40)
BASOPHILS # BLD AUTO: 0.07 K/UL (ref 0–0.2)
BASOPHILS NFR BLD: 0.7 % (ref 0–1.9)
BILIRUB SERPL-MCNC: 0.7 MG/DL (ref 0.1–1)
BUN SERPL-MCNC: 61 MG/DL (ref 8–23)
CALCIUM SERPL-MCNC: 9.1 MG/DL (ref 8.7–10.5)
CHLORIDE SERPL-SCNC: 110 MMOL/L (ref 95–110)
CO2 SERPL-SCNC: 16 MMOL/L (ref 23–29)
CREAT SERPL-MCNC: 1.9 MG/DL (ref 0.5–1.4)
DIFFERENTIAL METHOD: ABNORMAL
EOSINOPHIL # BLD AUTO: 0.1 K/UL (ref 0–0.5)
EOSINOPHIL NFR BLD: 0.8 % (ref 0–8)
ERYTHROCYTE [DISTWIDTH] IN BLOOD BY AUTOMATED COUNT: 15.2 % (ref 11.5–14.5)
EST. GFR  (AFRICAN AMERICAN): 40.7 ML/MIN/1.73 M^2
EST. GFR  (NON AFRICAN AMERICAN): 35.2 ML/MIN/1.73 M^2
GLUCOSE SERPL-MCNC: 110 MG/DL (ref 70–110)
GLUCOSE SERPL-MCNC: 136 MG/DL (ref 70–110)
GLUCOSE SERPL-MCNC: 152 MG/DL (ref 70–110)
HCT VFR BLD AUTO: 38.9 % (ref 40–54)
HGB BLD-MCNC: 12.6 G/DL (ref 14–18)
IMM GRANULOCYTES # BLD AUTO: 0.06 K/UL (ref 0–0.04)
IMM GRANULOCYTES NFR BLD AUTO: 0.6 % (ref 0–0.5)
LYMPHOCYTES # BLD AUTO: 0.8 K/UL (ref 1–4.8)
LYMPHOCYTES NFR BLD: 8.6 % (ref 18–48)
MAGNESIUM SERPL-MCNC: 1.9 MG/DL (ref 1.6–2.6)
MCH RBC QN AUTO: 29.2 PG (ref 27–31)
MCHC RBC AUTO-ENTMCNC: 32.4 G/DL (ref 32–36)
MCV RBC AUTO: 90 FL (ref 82–98)
MONOCYTES # BLD AUTO: 1.1 K/UL (ref 0.3–1)
MONOCYTES NFR BLD: 11.7 % (ref 4–15)
NEUTROPHILS # BLD AUTO: 7.5 K/UL (ref 1.8–7.7)
NEUTROPHILS NFR BLD: 77.6 % (ref 38–73)
NRBC BLD-RTO: 0 /100 WBC
PLATELET # BLD AUTO: 278 K/UL (ref 150–450)
PMV BLD AUTO: 9.8 FL (ref 9.2–12.9)
POTASSIUM SERPL-SCNC: 4.1 MMOL/L (ref 3.5–5.1)
PROT SERPL-MCNC: 7.1 G/DL (ref 6–8.4)
RBC # BLD AUTO: 4.32 M/UL (ref 4.6–6.2)
SODIUM SERPL-SCNC: 139 MMOL/L (ref 136–145)
WBC # BLD AUTO: 9.65 K/UL (ref 3.9–12.7)

## 2021-09-02 PROCEDURE — 36415 COLL VENOUS BLD VENIPUNCTURE: CPT | Performed by: INTERNAL MEDICINE

## 2021-09-02 PROCEDURE — 63600175 PHARM REV CODE 636 W HCPCS: Performed by: INTERNAL MEDICINE

## 2021-09-02 PROCEDURE — 85025 COMPLETE CBC W/AUTO DIFF WBC: CPT | Performed by: INTERNAL MEDICINE

## 2021-09-02 PROCEDURE — 25000003 PHARM REV CODE 250: Performed by: INTERNAL MEDICINE

## 2021-09-02 PROCEDURE — 83735 ASSAY OF MAGNESIUM: CPT | Performed by: INTERNAL MEDICINE

## 2021-09-02 PROCEDURE — 80053 COMPREHEN METABOLIC PANEL: CPT | Performed by: INTERNAL MEDICINE

## 2021-09-02 RX ORDER — DICYCLOMINE HYDROCHLORIDE 20 MG/1
20 TABLET ORAL EVERY 6 HOURS
Qty: 28 TABLET | Refills: 0 | Status: SHIPPED | OUTPATIENT
Start: 2021-09-02 | End: 2021-09-09

## 2021-09-02 RX ORDER — LOPERAMIDE HYDROCHLORIDE 2 MG/1
2 CAPSULE ORAL 4 TIMES DAILY PRN
Qty: 12 CAPSULE | Refills: 0 | Status: SHIPPED | OUTPATIENT
Start: 2021-09-02 | End: 2021-09-11

## 2021-09-02 RX ORDER — CHOLESTYRAMINE 4 G/4.8G
1 POWDER, FOR SUSPENSION ORAL 2 TIMES DAILY
Qty: 60 PACKET | Refills: 0 | Status: SHIPPED | OUTPATIENT
Start: 2021-09-02 | End: 2024-01-08

## 2021-09-02 RX ADMIN — ONDANSETRON 4 MG: 2 INJECTION INTRAMUSCULAR; INTRAVENOUS at 09:09

## 2021-09-02 RX ADMIN — METOPROLOL SUCCINATE 50 MG: 25 TABLET, FILM COATED, EXTENDED RELEASE ORAL at 09:09

## 2021-09-02 RX ADMIN — PANTOPRAZOLE SODIUM 40 MG: 40 TABLET, DELAYED RELEASE ORAL at 09:09

## 2021-09-02 RX ADMIN — ACETAMINOPHEN 650 MG: 325 TABLET, FILM COATED ORAL at 09:09

## 2021-09-02 RX ADMIN — ASPIRIN 81 MG: 81 TABLET, DELAYED RELEASE ORAL at 09:09

## 2021-09-02 RX ADMIN — AMLODIPINE BESYLATE 10 MG: 5 TABLET ORAL at 09:09

## 2021-09-02 RX ADMIN — CHOLESTYRAMINE 4 G: 4 POWDER, FOR SUSPENSION ORAL at 08:09

## 2021-09-02 RX ADMIN — ATORVASTATIN CALCIUM 80 MG: 40 TABLET, FILM COATED ORAL at 09:09

## 2021-09-11 ENCOUNTER — HOSPITAL ENCOUNTER (INPATIENT)
Facility: HOSPITAL | Age: 71
LOS: 1 days | Discharge: HOME | DRG: 684 | End: 2021-09-13
Attending: EMERGENCY MEDICINE | Admitting: INTERNAL MEDICINE
Payer: MEDICARE

## 2021-09-11 DIAGNOSIS — E78.2 MIXED HYPERLIPIDEMIA: Chronic | ICD-10-CM

## 2021-09-11 DIAGNOSIS — E86.0 DEHYDRATION: ICD-10-CM

## 2021-09-11 DIAGNOSIS — K52.9 CHRONIC DIARRHEA: ICD-10-CM

## 2021-09-11 DIAGNOSIS — R19.7 INTRACTABLE DIARRHEA: ICD-10-CM

## 2021-09-11 DIAGNOSIS — E11.51 DIABETES MELLITUS WITH PERIPHERAL VASCULAR DISEASE: Chronic | ICD-10-CM

## 2021-09-11 DIAGNOSIS — R19.7 VOMITING AND DIARRHEA: ICD-10-CM

## 2021-09-11 DIAGNOSIS — R11.10 VOMITING: ICD-10-CM

## 2021-09-11 DIAGNOSIS — N17.9 AKI (ACUTE KIDNEY INJURY): Primary | ICD-10-CM

## 2021-09-11 DIAGNOSIS — R11.10 VOMITING AND DIARRHEA: ICD-10-CM

## 2021-09-11 DIAGNOSIS — R07.9 CHEST PAIN: ICD-10-CM

## 2021-09-11 LAB
ALBUMIN SERPL BCP-MCNC: 4.3 G/DL (ref 3.5–5.2)
ALP SERPL-CCNC: 95 U/L (ref 55–135)
ALT SERPL W/O P-5'-P-CCNC: 36 U/L (ref 10–44)
ANION GAP SERPL CALC-SCNC: 14 MMOL/L (ref 8–16)
AST SERPL-CCNC: 22 U/L (ref 10–40)
BACTERIA #/AREA URNS HPF: NEGATIVE /HPF
BASOPHILS # BLD AUTO: 0.08 K/UL (ref 0–0.2)
BASOPHILS NFR BLD: 0.7 % (ref 0–1.9)
BILIRUB SERPL-MCNC: 0.7 MG/DL (ref 0.1–1)
BILIRUB UR QL STRIP: NEGATIVE
BUN SERPL-MCNC: 32 MG/DL (ref 8–23)
CALCIUM SERPL-MCNC: 9.2 MG/DL (ref 8.7–10.5)
CHLORIDE SERPL-SCNC: 111 MMOL/L (ref 95–110)
CLARITY UR: ABNORMAL
CO2 SERPL-SCNC: 17 MMOL/L (ref 23–29)
COLOR UR: YELLOW
CREAT SERPL-MCNC: 1.9 MG/DL (ref 0.5–1.4)
DIFFERENTIAL METHOD: ABNORMAL
EOSINOPHIL # BLD AUTO: 0.1 K/UL (ref 0–0.5)
EOSINOPHIL NFR BLD: 0.9 % (ref 0–8)
ERYTHROCYTE [DISTWIDTH] IN BLOOD BY AUTOMATED COUNT: 15.6 % (ref 11.5–14.5)
EST. GFR  (AFRICAN AMERICAN): 40.7 ML/MIN/1.73 M^2
EST. GFR  (NON AFRICAN AMERICAN): 35.2 ML/MIN/1.73 M^2
GLUCOSE SERPL-MCNC: 134 MG/DL (ref 70–110)
GLUCOSE SERPL-MCNC: 81 MG/DL (ref 70–110)
GLUCOSE UR QL STRIP: NEGATIVE
HCT VFR BLD AUTO: 42.1 % (ref 40–54)
HGB BLD-MCNC: 13.9 G/DL (ref 14–18)
HGB UR QL STRIP: NEGATIVE
HYALINE CASTS #/AREA URNS LPF: 38 /LPF
IMM GRANULOCYTES # BLD AUTO: 0.1 K/UL (ref 0–0.04)
IMM GRANULOCYTES NFR BLD AUTO: 0.8 % (ref 0–0.5)
KETONES UR QL STRIP: NEGATIVE
LEUKOCYTE ESTERASE UR QL STRIP: NEGATIVE
LIPASE SERPL-CCNC: 45 U/L (ref 4–60)
LYMPHOCYTES # BLD AUTO: 1.4 K/UL (ref 1–4.8)
LYMPHOCYTES NFR BLD: 12.1 % (ref 18–48)
MAGNESIUM SERPL-MCNC: 1.6 MG/DL (ref 1.6–2.6)
MCH RBC QN AUTO: 30 PG (ref 27–31)
MCHC RBC AUTO-ENTMCNC: 33 G/DL (ref 32–36)
MCV RBC AUTO: 91 FL (ref 82–98)
MICROSCOPIC COMMENT: ABNORMAL
MONOCYTES # BLD AUTO: 0.9 K/UL (ref 0.3–1)
MONOCYTES NFR BLD: 8 % (ref 4–15)
NEUTROPHILS # BLD AUTO: 9.2 K/UL (ref 1.8–7.7)
NEUTROPHILS NFR BLD: 77.5 % (ref 38–73)
NITRITE UR QL STRIP: NEGATIVE
NRBC BLD-RTO: 0 /100 WBC
PH UR STRIP: 6 [PH] (ref 5–8)
PLATELET # BLD AUTO: 301 K/UL (ref 150–450)
PMV BLD AUTO: 10 FL (ref 9.2–12.9)
POTASSIUM SERPL-SCNC: 4 MMOL/L (ref 3.5–5.1)
PROT SERPL-MCNC: 7.8 G/DL (ref 6–8.4)
PROT UR QL STRIP: ABNORMAL
RBC # BLD AUTO: 4.63 M/UL (ref 4.6–6.2)
RBC #/AREA URNS HPF: 7 /HPF (ref 0–4)
SARS-COV-2 RDRP RESP QL NAA+PROBE: NEGATIVE
SODIUM SERPL-SCNC: 142 MMOL/L (ref 136–145)
SP GR UR STRIP: 1.02 (ref 1–1.03)
SQUAMOUS #/AREA URNS HPF: 2 /HPF
TROPONIN I SERPL DL<=0.01 NG/ML-MCNC: <0.03 NG/ML
URN SPEC COLLECT METH UR: ABNORMAL
UROBILINOGEN UR STRIP-ACNC: NEGATIVE EU/DL
WBC # BLD AUTO: 11.81 K/UL (ref 3.9–12.7)
WBC #/AREA URNS HPF: 2 /HPF (ref 0–5)

## 2021-09-11 PROCEDURE — 25000003 PHARM REV CODE 250: Performed by: INTERNAL MEDICINE

## 2021-09-11 PROCEDURE — G0378 HOSPITAL OBSERVATION PER HR: HCPCS

## 2021-09-11 PROCEDURE — 93010 ELECTROCARDIOGRAM REPORT: CPT | Mod: ,,, | Performed by: INTERNAL MEDICINE

## 2021-09-11 PROCEDURE — 96374 THER/PROPH/DIAG INJ IV PUSH: CPT

## 2021-09-11 PROCEDURE — 93005 ELECTROCARDIOGRAM TRACING: CPT | Performed by: INTERNAL MEDICINE

## 2021-09-11 PROCEDURE — 25000003 PHARM REV CODE 250: Performed by: EMERGENCY MEDICINE

## 2021-09-11 PROCEDURE — 93010 EKG 12-LEAD: ICD-10-PCS | Mod: ,,, | Performed by: INTERNAL MEDICINE

## 2021-09-11 PROCEDURE — 63600175 PHARM REV CODE 636 W HCPCS: Performed by: FAMILY MEDICINE

## 2021-09-11 PROCEDURE — 83690 ASSAY OF LIPASE: CPT | Performed by: EMERGENCY MEDICINE

## 2021-09-11 PROCEDURE — 85025 COMPLETE CBC W/AUTO DIFF WBC: CPT | Performed by: EMERGENCY MEDICINE

## 2021-09-11 PROCEDURE — 99285 EMERGENCY DEPT VISIT HI MDM: CPT | Mod: 25

## 2021-09-11 PROCEDURE — 63600175 PHARM REV CODE 636 W HCPCS: Performed by: EMERGENCY MEDICINE

## 2021-09-11 PROCEDURE — 81001 URINALYSIS AUTO W/SCOPE: CPT | Performed by: EMERGENCY MEDICINE

## 2021-09-11 PROCEDURE — 96361 HYDRATE IV INFUSION ADD-ON: CPT

## 2021-09-11 PROCEDURE — U0002 COVID-19 LAB TEST NON-CDC: HCPCS | Performed by: EMERGENCY MEDICINE

## 2021-09-11 PROCEDURE — 84484 ASSAY OF TROPONIN QUANT: CPT | Performed by: EMERGENCY MEDICINE

## 2021-09-11 PROCEDURE — 96376 TX/PRO/DX INJ SAME DRUG ADON: CPT

## 2021-09-11 PROCEDURE — 83735 ASSAY OF MAGNESIUM: CPT | Performed by: EMERGENCY MEDICINE

## 2021-09-11 PROCEDURE — 96372 THER/PROPH/DIAG INJ SC/IM: CPT

## 2021-09-11 PROCEDURE — 80053 COMPREHEN METABOLIC PANEL: CPT | Performed by: EMERGENCY MEDICINE

## 2021-09-11 RX ORDER — DICYCLOMINE HYDROCHLORIDE 10 MG/ML
20 INJECTION INTRAMUSCULAR
Status: COMPLETED | OUTPATIENT
Start: 2021-09-11 | End: 2021-09-11

## 2021-09-11 RX ORDER — CLOPIDOGREL BISULFATE 75 MG/1
75 TABLET ORAL NIGHTLY
Status: DISCONTINUED | OUTPATIENT
Start: 2021-09-11 | End: 2021-09-13 | Stop reason: HOSPADM

## 2021-09-11 RX ORDER — ONDANSETRON 2 MG/ML
4 INJECTION INTRAMUSCULAR; INTRAVENOUS
Status: COMPLETED | OUTPATIENT
Start: 2021-09-11 | End: 2021-09-11

## 2021-09-11 RX ORDER — ZOSTER VACCINE RECOMBINANT, ADJUVANTED 50 MCG/0.5
KIT INTRAMUSCULAR
COMMUNITY
End: 2024-01-08

## 2021-09-11 RX ORDER — AMLODIPINE BESYLATE 5 MG/1
10 TABLET ORAL DAILY
Status: DISCONTINUED | OUTPATIENT
Start: 2021-09-11 | End: 2021-09-13 | Stop reason: HOSPADM

## 2021-09-11 RX ORDER — AMLODIPINE BESYLATE 5 MG/1
10 TABLET ORAL DAILY
Status: DISCONTINUED | OUTPATIENT
Start: 2021-09-12 | End: 2021-09-11

## 2021-09-11 RX ORDER — DONEPEZIL HYDROCHLORIDE 10 MG/1
TABLET, FILM COATED ORAL
COMMUNITY
Start: 2020-11-02 | End: 2021-09-11

## 2021-09-11 RX ORDER — GLUCAGON 1 MG
1 KIT INJECTION
Status: DISCONTINUED | OUTPATIENT
Start: 2021-09-11 | End: 2021-09-13 | Stop reason: HOSPADM

## 2021-09-11 RX ORDER — ATORVASTATIN CALCIUM 20 MG/1
80 TABLET, FILM COATED ORAL DAILY
Status: DISCONTINUED | OUTPATIENT
Start: 2021-09-12 | End: 2021-09-13 | Stop reason: HOSPADM

## 2021-09-11 RX ORDER — DICYCLOMINE HYDROCHLORIDE 20 MG/1
20 TABLET ORAL EVERY 6 HOURS
COMMUNITY
End: 2021-11-04

## 2021-09-11 RX ORDER — LACTULOSE 10 G/15ML
SOLUTION ORAL; RECTAL
COMMUNITY
End: 2021-09-11

## 2021-09-11 RX ORDER — ASPIRIN 81 MG/1
81 TABLET ORAL DAILY
Status: DISCONTINUED | OUTPATIENT
Start: 2021-09-12 | End: 2021-09-13 | Stop reason: HOSPADM

## 2021-09-11 RX ORDER — METOCLOPRAMIDE 10 MG/1
10 TABLET ORAL
Status: DISCONTINUED | OUTPATIENT
Start: 2021-09-12 | End: 2021-09-13 | Stop reason: HOSPADM

## 2021-09-11 RX ORDER — LOSARTAN POTASSIUM 25 MG/1
25 TABLET ORAL DAILY
Status: ON HOLD | COMMUNITY
End: 2021-09-13 | Stop reason: HOSPADM

## 2021-09-11 RX ORDER — VALSARTAN 320 MG/1
320 TABLET ORAL DAILY
COMMUNITY
Start: 2018-07-10 | End: 2024-01-08

## 2021-09-11 RX ORDER — CHOLESTYRAMINE 4 G/4.8G
1 POWDER, FOR SUSPENSION ORAL 2 TIMES DAILY
Status: DISCONTINUED | OUTPATIENT
Start: 2021-09-11 | End: 2021-09-13 | Stop reason: HOSPADM

## 2021-09-11 RX ORDER — METOPROLOL TARTRATE 50 MG/1
50 TABLET ORAL DAILY
COMMUNITY
Start: 2018-07-10 | End: 2021-09-11

## 2021-09-11 RX ORDER — IBUPROFEN 200 MG
16 TABLET ORAL
Status: DISCONTINUED | OUTPATIENT
Start: 2021-09-11 | End: 2021-09-13 | Stop reason: HOSPADM

## 2021-09-11 RX ORDER — SODIUM CHLORIDE 9 MG/ML
INJECTION, SOLUTION INTRAVENOUS CONTINUOUS
Status: DISCONTINUED | OUTPATIENT
Start: 2021-09-11 | End: 2021-09-13

## 2021-09-11 RX ORDER — PANCRELIPASE 36000; 180000; 114000 [USP'U]/1; [USP'U]/1; [USP'U]/1
CAPSULE, DELAYED RELEASE PELLETS ORAL
COMMUNITY
End: 2021-09-11

## 2021-09-11 RX ORDER — CHOLESTYRAMINE 4 G/9G
POWDER, FOR SUSPENSION ORAL
COMMUNITY
End: 2021-09-11

## 2021-09-11 RX ORDER — MORPHINE SULFATE 4 MG/ML
4 INJECTION, SOLUTION INTRAMUSCULAR; INTRAVENOUS EVERY 4 HOURS PRN
Status: DISCONTINUED | OUTPATIENT
Start: 2021-09-11 | End: 2021-09-13 | Stop reason: HOSPADM

## 2021-09-11 RX ORDER — SODIUM CHLORIDE 0.9 % (FLUSH) 0.9 %
10 SYRINGE (ML) INJECTION
Status: DISCONTINUED | OUTPATIENT
Start: 2021-09-11 | End: 2021-09-13 | Stop reason: HOSPADM

## 2021-09-11 RX ORDER — MORPHINE SULFATE 4 MG/ML
4 INJECTION, SOLUTION INTRAMUSCULAR; INTRAVENOUS
Status: COMPLETED | OUTPATIENT
Start: 2021-09-11 | End: 2021-09-11

## 2021-09-11 RX ORDER — IBUPROFEN 200 MG
24 TABLET ORAL
Status: DISCONTINUED | OUTPATIENT
Start: 2021-09-11 | End: 2021-09-13 | Stop reason: HOSPADM

## 2021-09-11 RX ADMIN — ONDANSETRON 4 MG: 2 INJECTION INTRAMUSCULAR; INTRAVENOUS at 02:09

## 2021-09-11 RX ADMIN — SODIUM CHLORIDE 1000 ML: 0.9 INJECTION, SOLUTION INTRAVENOUS at 05:09

## 2021-09-11 RX ADMIN — CHOLESTYRAMINE 4 G: 4 POWDER, FOR SUSPENSION ORAL at 09:09

## 2021-09-11 RX ADMIN — AMLODIPINE BESYLATE 10 MG: 5 TABLET ORAL at 09:09

## 2021-09-11 RX ADMIN — DICYCLOMINE HYDROCHLORIDE 20 MG: 10 INJECTION INTRAMUSCULAR at 05:09

## 2021-09-11 RX ADMIN — MORPHINE SULFATE 4 MG: 4 INJECTION, SOLUTION INTRAMUSCULAR; INTRAVENOUS at 02:09

## 2021-09-11 RX ADMIN — SODIUM CHLORIDE: 0.9 INJECTION, SOLUTION INTRAVENOUS at 07:09

## 2021-09-11 RX ADMIN — MORPHINE SULFATE 4 MG: 4 INJECTION, SOLUTION INTRAMUSCULAR; INTRAVENOUS at 10:09

## 2021-09-11 RX ADMIN — SODIUM CHLORIDE 1000 ML: 0.9 INJECTION, SOLUTION INTRAVENOUS at 02:09

## 2021-09-11 RX ADMIN — CLOPIDOGREL BISULFATE 75 MG: 75 TABLET, FILM COATED ORAL at 09:09

## 2021-09-12 PROBLEM — R19.7 INTRACTABLE DIARRHEA: Status: ACTIVE | Noted: 2021-09-12

## 2021-09-12 LAB
ANION GAP SERPL CALC-SCNC: 11 MMOL/L (ref 8–16)
BUN SERPL-MCNC: 31 MG/DL (ref 8–23)
C DIFF GDH STL QL: NEGATIVE
C DIFF TOX A+B STL QL IA: NEGATIVE
CALCIUM SERPL-MCNC: 8.6 MG/DL (ref 8.7–10.5)
CHLORIDE SERPL-SCNC: 113 MMOL/L (ref 95–110)
CO2 SERPL-SCNC: 20 MMOL/L (ref 23–29)
CREAT SERPL-MCNC: 1.4 MG/DL (ref 0.5–1.4)
EST. GFR  (AFRICAN AMERICAN): 58.8 ML/MIN/1.73 M^2
EST. GFR  (NON AFRICAN AMERICAN): 50.9 ML/MIN/1.73 M^2
GLUCOSE SERPL-MCNC: 126 MG/DL (ref 70–110)
GLUCOSE SERPL-MCNC: 135 MG/DL (ref 70–110)
GLUCOSE SERPL-MCNC: 137 MG/DL (ref 70–110)
GLUCOSE SERPL-MCNC: 94 MG/DL (ref 70–110)
POTASSIUM SERPL-SCNC: 4.6 MMOL/L (ref 3.5–5.1)
SODIUM SERPL-SCNC: 144 MMOL/L (ref 136–145)
WBC #/AREA STL HPF: NORMAL /[HPF]

## 2021-09-12 PROCEDURE — 87046 STOOL CULTR AEROBIC BACT EA: CPT | Mod: 59 | Performed by: INTERNAL MEDICINE

## 2021-09-12 PROCEDURE — 96372 THER/PROPH/DIAG INJ SC/IM: CPT | Mod: 59

## 2021-09-12 PROCEDURE — 80048 BASIC METABOLIC PNL TOTAL CA: CPT | Performed by: INTERNAL MEDICINE

## 2021-09-12 PROCEDURE — 25000003 PHARM REV CODE 250: Performed by: INTERNAL MEDICINE

## 2021-09-12 PROCEDURE — 63600175 PHARM REV CODE 636 W HCPCS: Performed by: FAMILY MEDICINE

## 2021-09-12 PROCEDURE — 87045 FECES CULTURE AEROBIC BACT: CPT | Performed by: INTERNAL MEDICINE

## 2021-09-12 PROCEDURE — 87324 CLOSTRIDIUM AG IA: CPT | Performed by: INTERNAL MEDICINE

## 2021-09-12 PROCEDURE — 89055 LEUKOCYTE ASSESSMENT FECAL: CPT | Performed by: INTERNAL MEDICINE

## 2021-09-12 PROCEDURE — 36415 COLL VENOUS BLD VENIPUNCTURE: CPT | Performed by: INTERNAL MEDICINE

## 2021-09-12 PROCEDURE — 12000002 HC ACUTE/MED SURGE SEMI-PRIVATE ROOM

## 2021-09-12 PROCEDURE — 96375 TX/PRO/DX INJ NEW DRUG ADDON: CPT

## 2021-09-12 PROCEDURE — 87449 NOS EACH ORGANISM AG IA: CPT | Performed by: INTERNAL MEDICINE

## 2021-09-12 PROCEDURE — 63600175 PHARM REV CODE 636 W HCPCS: Performed by: NURSE PRACTITIONER

## 2021-09-12 PROCEDURE — 63600175 PHARM REV CODE 636 W HCPCS: Performed by: INTERNAL MEDICINE

## 2021-09-12 PROCEDURE — C9113 INJ PANTOPRAZOLE SODIUM, VIA: HCPCS | Performed by: NURSE PRACTITIONER

## 2021-09-12 PROCEDURE — 96376 TX/PRO/DX INJ SAME DRUG ADON: CPT

## 2021-09-12 RX ORDER — PANTOPRAZOLE SODIUM 40 MG/10ML
40 INJECTION, POWDER, LYOPHILIZED, FOR SOLUTION INTRAVENOUS DAILY
Status: DISCONTINUED | OUTPATIENT
Start: 2021-09-12 | End: 2021-09-13 | Stop reason: HOSPADM

## 2021-09-12 RX ORDER — DICYCLOMINE HYDROCHLORIDE 10 MG/ML
20 INJECTION INTRAMUSCULAR 4 TIMES DAILY
Status: DISCONTINUED | OUTPATIENT
Start: 2021-09-12 | End: 2021-09-13 | Stop reason: HOSPADM

## 2021-09-12 RX ORDER — ONDANSETRON 2 MG/ML
4 INJECTION INTRAMUSCULAR; INTRAVENOUS EVERY 6 HOURS PRN
Status: DISCONTINUED | OUTPATIENT
Start: 2021-09-12 | End: 2021-09-13 | Stop reason: HOSPADM

## 2021-09-12 RX ADMIN — MORPHINE SULFATE 4 MG: 4 INJECTION, SOLUTION INTRAMUSCULAR; INTRAVENOUS at 03:09

## 2021-09-12 RX ADMIN — ONDANSETRON 4 MG: 2 INJECTION INTRAMUSCULAR; INTRAVENOUS at 07:09

## 2021-09-12 RX ADMIN — ATORVASTATIN CALCIUM 80 MG: 20 TABLET, FILM COATED ORAL at 08:09

## 2021-09-12 RX ADMIN — DICYCLOMINE HYDROCHLORIDE 20 MG: 10 INJECTION INTRAMUSCULAR at 08:09

## 2021-09-12 RX ADMIN — METOCLOPRAMIDE 10 MG: 10 TABLET ORAL at 11:09

## 2021-09-12 RX ADMIN — METOCLOPRAMIDE 10 MG: 10 TABLET ORAL at 08:09

## 2021-09-12 RX ADMIN — SODIUM CHLORIDE: 0.9 INJECTION, SOLUTION INTRAVENOUS at 11:09

## 2021-09-12 RX ADMIN — ASPIRIN 81 MG: 81 TABLET, DELAYED RELEASE ORAL at 08:09

## 2021-09-12 RX ADMIN — CLOPIDOGREL BISULFATE 75 MG: 75 TABLET, FILM COATED ORAL at 08:09

## 2021-09-12 RX ADMIN — CHOLESTYRAMINE 4 G: 4 POWDER, FOR SUSPENSION ORAL at 08:09

## 2021-09-12 RX ADMIN — DICYCLOMINE HYDROCHLORIDE 20 MG: 10 INJECTION INTRAMUSCULAR at 01:09

## 2021-09-12 RX ADMIN — AMLODIPINE BESYLATE 10 MG: 5 TABLET ORAL at 08:09

## 2021-09-12 RX ADMIN — METOCLOPRAMIDE 10 MG: 10 TABLET ORAL at 04:09

## 2021-09-12 RX ADMIN — PANTOPRAZOLE SODIUM 40 MG: 40 INJECTION, POWDER, FOR SOLUTION INTRAVENOUS at 11:09

## 2021-09-12 RX ADMIN — MORPHINE SULFATE 4 MG: 4 INJECTION, SOLUTION INTRAMUSCULAR; INTRAVENOUS at 08:09

## 2021-09-13 VITALS
RESPIRATION RATE: 18 BRPM | TEMPERATURE: 98 F | OXYGEN SATURATION: 94 % | WEIGHT: 187.81 LBS | DIASTOLIC BLOOD PRESSURE: 65 MMHG | BODY MASS INDEX: 31.29 KG/M2 | HEIGHT: 65 IN | HEART RATE: 72 BPM | SYSTOLIC BLOOD PRESSURE: 163 MMHG

## 2021-09-13 PROBLEM — R19.7 INTRACTABLE DIARRHEA: Status: RESOLVED | Noted: 2021-09-12 | Resolved: 2021-09-13

## 2021-09-13 PROBLEM — R19.7 VOMITING AND DIARRHEA: Status: RESOLVED | Noted: 2021-09-11 | Resolved: 2021-09-13

## 2021-09-13 PROBLEM — R10.13 DYSPEPSIA: Status: RESOLVED | Noted: 2019-02-20 | Resolved: 2021-09-13

## 2021-09-13 PROBLEM — N17.9 AKI (ACUTE KIDNEY INJURY): Status: RESOLVED | Noted: 2021-09-01 | Resolved: 2021-09-13

## 2021-09-13 PROBLEM — R11.10 VOMITING AND DIARRHEA: Status: RESOLVED | Noted: 2021-09-11 | Resolved: 2021-09-13

## 2021-09-13 LAB
ANION GAP SERPL CALC-SCNC: 8 MMOL/L (ref 8–16)
ANION GAP SERPL CALC-SCNC: 8 MMOL/L (ref 8–16)
BUN SERPL-MCNC: 20 MG/DL (ref 8–23)
BUN SERPL-MCNC: 20 MG/DL (ref 8–23)
CALCIUM SERPL-MCNC: 8.3 MG/DL (ref 8.7–10.5)
CALCIUM SERPL-MCNC: 8.3 MG/DL (ref 8.7–10.5)
CHLORIDE SERPL-SCNC: 115 MMOL/L (ref 95–110)
CHLORIDE SERPL-SCNC: 115 MMOL/L (ref 95–110)
CO2 SERPL-SCNC: 20 MMOL/L (ref 23–29)
CO2 SERPL-SCNC: 20 MMOL/L (ref 23–29)
CREAT SERPL-MCNC: 1.1 MG/DL (ref 0.5–1.4)
CREAT SERPL-MCNC: 1.1 MG/DL (ref 0.5–1.4)
EST. GFR  (AFRICAN AMERICAN): >60 ML/MIN/1.73 M^2
EST. GFR  (AFRICAN AMERICAN): >60 ML/MIN/1.73 M^2
EST. GFR  (NON AFRICAN AMERICAN): >60 ML/MIN/1.73 M^2
EST. GFR  (NON AFRICAN AMERICAN): >60 ML/MIN/1.73 M^2
GLUCOSE SERPL-MCNC: 105 MG/DL (ref 70–110)
POTASSIUM SERPL-SCNC: 3.7 MMOL/L (ref 3.5–5.1)
POTASSIUM SERPL-SCNC: 3.7 MMOL/L (ref 3.5–5.1)
SODIUM SERPL-SCNC: 143 MMOL/L (ref 136–145)
SODIUM SERPL-SCNC: 143 MMOL/L (ref 136–145)

## 2021-09-13 PROCEDURE — C9113 INJ PANTOPRAZOLE SODIUM, VIA: HCPCS | Performed by: NURSE PRACTITIONER

## 2021-09-13 PROCEDURE — 80048 BASIC METABOLIC PNL TOTAL CA: CPT | Performed by: INTERNAL MEDICINE

## 2021-09-13 PROCEDURE — 63600175 PHARM REV CODE 636 W HCPCS: Performed by: NURSE PRACTITIONER

## 2021-09-13 PROCEDURE — 36415 COLL VENOUS BLD VENIPUNCTURE: CPT | Performed by: INTERNAL MEDICINE

## 2021-09-13 PROCEDURE — 25000003 PHARM REV CODE 250: Performed by: INTERNAL MEDICINE

## 2021-09-13 RX ADMIN — CHOLESTYRAMINE 4 G: 4 POWDER, FOR SUSPENSION ORAL at 08:09

## 2021-09-13 RX ADMIN — AMLODIPINE BESYLATE 10 MG: 5 TABLET ORAL at 08:09

## 2021-09-13 RX ADMIN — METOCLOPRAMIDE 10 MG: 10 TABLET ORAL at 05:09

## 2021-09-13 RX ADMIN — PANTOPRAZOLE SODIUM 40 MG: 40 INJECTION, POWDER, FOR SOLUTION INTRAVENOUS at 05:09

## 2021-09-13 RX ADMIN — ASPIRIN 81 MG: 81 TABLET, DELAYED RELEASE ORAL at 08:09

## 2021-09-13 RX ADMIN — ATORVASTATIN CALCIUM 80 MG: 20 TABLET, FILM COATED ORAL at 08:09

## 2021-09-13 RX ADMIN — DICYCLOMINE HYDROCHLORIDE 20 MG: 10 INJECTION INTRAMUSCULAR at 08:09

## 2021-09-14 LAB
STOOL CULTURE: NORMAL
STOOL CULTURE: NORMAL

## 2021-10-15 ENCOUNTER — IMMUNIZATION (OUTPATIENT)
Dept: PRIMARY CARE CLINIC | Facility: CLINIC | Age: 71
End: 2021-10-15
Payer: MEDICARE

## 2021-10-15 DIAGNOSIS — Z23 NEED FOR VACCINATION: Primary | ICD-10-CM

## 2021-10-15 PROCEDURE — 0003A COVID-19, MRNA, LNP-S, PF, 30 MCG/0.3 ML DOSE VACCINE: CPT | Mod: PBBFAC | Performed by: EMERGENCY MEDICINE

## 2021-10-15 PROCEDURE — 91300 COVID-19, MRNA, LNP-S, PF, 30 MCG/0.3 ML DOSE VACCINE: CPT | Mod: PBBFAC | Performed by: EMERGENCY MEDICINE

## 2022-05-12 ENCOUNTER — OFFICE VISIT (OUTPATIENT)
Dept: GASTROENTEROLOGY | Facility: CLINIC | Age: 72
End: 2022-05-12
Payer: MEDICARE

## 2022-05-12 ENCOUNTER — LAB VISIT (OUTPATIENT)
Dept: LAB | Facility: HOSPITAL | Age: 72
End: 2022-05-12
Attending: INTERNAL MEDICINE
Payer: MEDICARE

## 2022-05-12 VITALS
DIASTOLIC BLOOD PRESSURE: 62 MMHG | HEIGHT: 65 IN | HEART RATE: 69 BPM | WEIGHT: 150.13 LBS | SYSTOLIC BLOOD PRESSURE: 98 MMHG | BODY MASS INDEX: 25.01 KG/M2

## 2022-05-12 DIAGNOSIS — R11.0 NAUSEA: ICD-10-CM

## 2022-05-12 DIAGNOSIS — D50.9 IRON DEFICIENCY ANEMIA, UNSPECIFIED IRON DEFICIENCY ANEMIA TYPE: ICD-10-CM

## 2022-05-12 DIAGNOSIS — R19.7 DIARRHEA, UNSPECIFIED TYPE: ICD-10-CM

## 2022-05-12 DIAGNOSIS — R19.7 DIARRHEA, UNSPECIFIED TYPE: Primary | ICD-10-CM

## 2022-05-12 LAB
ALBUMIN SERPL BCP-MCNC: 3.6 G/DL (ref 3.5–5.2)
ALP SERPL-CCNC: 95 U/L (ref 55–135)
ALT SERPL W/O P-5'-P-CCNC: 13 U/L (ref 10–44)
ANION GAP SERPL CALC-SCNC: 12 MMOL/L (ref 8–16)
AST SERPL-CCNC: 11 U/L (ref 10–40)
BASOPHILS # BLD AUTO: 0.08 K/UL (ref 0–0.2)
BASOPHILS NFR BLD: 0.7 % (ref 0–1.9)
BILIRUB SERPL-MCNC: 0.3 MG/DL (ref 0.1–1)
BUN SERPL-MCNC: 35 MG/DL (ref 8–23)
CALCIUM SERPL-MCNC: 9.1 MG/DL (ref 8.7–10.5)
CHLORIDE SERPL-SCNC: 110 MMOL/L (ref 95–110)
CO2 SERPL-SCNC: 15 MMOL/L (ref 23–29)
CREAT SERPL-MCNC: 1.5 MG/DL (ref 0.5–1.4)
CRP SERPL-MCNC: 6.9 MG/L (ref 0–8.2)
DIFFERENTIAL METHOD: ABNORMAL
EOSINOPHIL # BLD AUTO: 0.3 K/UL (ref 0–0.5)
EOSINOPHIL NFR BLD: 2.7 % (ref 0–8)
ERYTHROCYTE [DISTWIDTH] IN BLOOD BY AUTOMATED COUNT: 18.1 % (ref 11.5–14.5)
EST. GFR  (AFRICAN AMERICAN): 53.8 ML/MIN/1.73 M^2
EST. GFR  (NON AFRICAN AMERICAN): 46.5 ML/MIN/1.73 M^2
FERRITIN SERPL-MCNC: 229 NG/ML (ref 20–300)
GLUCOSE SERPL-MCNC: 105 MG/DL (ref 70–110)
HCT VFR BLD AUTO: 30 % (ref 40–54)
HGB BLD-MCNC: 9.1 G/DL (ref 14–18)
IMM GRANULOCYTES # BLD AUTO: 0.29 K/UL (ref 0–0.04)
IMM GRANULOCYTES NFR BLD AUTO: 2.7 % (ref 0–0.5)
IRON SERPL-MCNC: 61 UG/DL (ref 45–160)
LYMPHOCYTES # BLD AUTO: 1.4 K/UL (ref 1–4.8)
LYMPHOCYTES NFR BLD: 12.6 % (ref 18–48)
MCH RBC QN AUTO: 31.1 PG (ref 27–31)
MCHC RBC AUTO-ENTMCNC: 30.3 G/DL (ref 32–36)
MCV RBC AUTO: 102 FL (ref 82–98)
MONOCYTES # BLD AUTO: 0.9 K/UL (ref 0.3–1)
MONOCYTES NFR BLD: 8.1 % (ref 4–15)
NEUTROPHILS # BLD AUTO: 8 K/UL (ref 1.8–7.7)
NEUTROPHILS NFR BLD: 73.2 % (ref 38–73)
NRBC BLD-RTO: 0 /100 WBC
PLATELET # BLD AUTO: 314 K/UL (ref 150–450)
PMV BLD AUTO: 9 FL (ref 9.2–12.9)
POTASSIUM SERPL-SCNC: 4.6 MMOL/L (ref 3.5–5.1)
PROT SERPL-MCNC: 7 G/DL (ref 6–8.4)
RBC # BLD AUTO: 2.93 M/UL (ref 4.6–6.2)
SATURATED IRON: 17 % (ref 20–50)
SODIUM SERPL-SCNC: 137 MMOL/L (ref 136–145)
TOTAL IRON BINDING CAPACITY: 354 UG/DL (ref 250–450)
TRANSFERRIN SERPL-MCNC: 239 MG/DL (ref 200–375)
TSH SERPL DL<=0.005 MIU/L-ACNC: 1.87 UIU/ML (ref 0.4–4)
WBC # BLD AUTO: 10.93 K/UL (ref 3.9–12.7)

## 2022-05-12 PROCEDURE — 3074F PR MOST RECENT SYSTOLIC BLOOD PRESSURE < 130 MM HG: ICD-10-PCS | Mod: CPTII,S$GLB,, | Performed by: INTERNAL MEDICINE

## 2022-05-12 PROCEDURE — 1157F ADVNC CARE PLAN IN RCRD: CPT | Mod: CPTII,S$GLB,, | Performed by: INTERNAL MEDICINE

## 2022-05-12 PROCEDURE — 3074F SYST BP LT 130 MM HG: CPT | Mod: CPTII,S$GLB,, | Performed by: INTERNAL MEDICINE

## 2022-05-12 PROCEDURE — 99204 OFFICE O/P NEW MOD 45 MIN: CPT | Mod: S$GLB,,, | Performed by: INTERNAL MEDICINE

## 2022-05-12 PROCEDURE — 82728 ASSAY OF FERRITIN: CPT | Performed by: INTERNAL MEDICINE

## 2022-05-12 PROCEDURE — 3288F FALL RISK ASSESSMENT DOCD: CPT | Mod: CPTII,S$GLB,, | Performed by: INTERNAL MEDICINE

## 2022-05-12 PROCEDURE — 1126F AMNT PAIN NOTED NONE PRSNT: CPT | Mod: CPTII,S$GLB,, | Performed by: INTERNAL MEDICINE

## 2022-05-12 PROCEDURE — 36415 COLL VENOUS BLD VENIPUNCTURE: CPT | Performed by: INTERNAL MEDICINE

## 2022-05-12 PROCEDURE — 99999 PR PBB SHADOW E&M-EST. PATIENT-LVL IV: CPT | Mod: PBBFAC,,, | Performed by: INTERNAL MEDICINE

## 2022-05-12 PROCEDURE — 85025 COMPLETE CBC W/AUTO DIFF WBC: CPT | Performed by: INTERNAL MEDICINE

## 2022-05-12 PROCEDURE — 83516 IMMUNOASSAY NONANTIBODY: CPT | Performed by: INTERNAL MEDICINE

## 2022-05-12 PROCEDURE — 1159F PR MEDICATION LIST DOCUMENTED IN MEDICAL RECORD: ICD-10-PCS | Mod: CPTII,S$GLB,, | Performed by: INTERNAL MEDICINE

## 2022-05-12 PROCEDURE — 1160F RVW MEDS BY RX/DR IN RCRD: CPT | Mod: CPTII,S$GLB,, | Performed by: INTERNAL MEDICINE

## 2022-05-12 PROCEDURE — 1159F MED LIST DOCD IN RCRD: CPT | Mod: CPTII,S$GLB,, | Performed by: INTERNAL MEDICINE

## 2022-05-12 PROCEDURE — 3288F PR FALLS RISK ASSESSMENT DOCUMENTED: ICD-10-PCS | Mod: CPTII,S$GLB,, | Performed by: INTERNAL MEDICINE

## 2022-05-12 PROCEDURE — 1157F PR ADVANCE CARE PLAN OR EQUIV PRESENT IN MEDICAL RECORD: ICD-10-PCS | Mod: CPTII,S$GLB,, | Performed by: INTERNAL MEDICINE

## 2022-05-12 PROCEDURE — 84443 ASSAY THYROID STIM HORMONE: CPT | Performed by: INTERNAL MEDICINE

## 2022-05-12 PROCEDURE — 80053 COMPREHEN METABOLIC PANEL: CPT | Performed by: INTERNAL MEDICINE

## 2022-05-12 PROCEDURE — 3078F DIAST BP <80 MM HG: CPT | Mod: CPTII,S$GLB,, | Performed by: INTERNAL MEDICINE

## 2022-05-12 PROCEDURE — 86140 C-REACTIVE PROTEIN: CPT | Performed by: INTERNAL MEDICINE

## 2022-05-12 PROCEDURE — 3008F PR BODY MASS INDEX (BMI) DOCUMENTED: ICD-10-PCS | Mod: CPTII,S$GLB,, | Performed by: INTERNAL MEDICINE

## 2022-05-12 PROCEDURE — 1101F PR PT FALLS ASSESS DOC 0-1 FALLS W/OUT INJ PAST YR: ICD-10-PCS | Mod: CPTII,S$GLB,, | Performed by: INTERNAL MEDICINE

## 2022-05-12 PROCEDURE — 3008F BODY MASS INDEX DOCD: CPT | Mod: CPTII,S$GLB,, | Performed by: INTERNAL MEDICINE

## 2022-05-12 PROCEDURE — 99204 PR OFFICE/OUTPT VISIT, NEW, LEVL IV, 45-59 MIN: ICD-10-PCS | Mod: S$GLB,,, | Performed by: INTERNAL MEDICINE

## 2022-05-12 PROCEDURE — 99999 PR PBB SHADOW E&M-EST. PATIENT-LVL IV: ICD-10-PCS | Mod: PBBFAC,,, | Performed by: INTERNAL MEDICINE

## 2022-05-12 PROCEDURE — 1126F PR PAIN SEVERITY QUANTIFIED, NO PAIN PRESENT: ICD-10-PCS | Mod: CPTII,S$GLB,, | Performed by: INTERNAL MEDICINE

## 2022-05-12 PROCEDURE — 3078F PR MOST RECENT DIASTOLIC BLOOD PRESSURE < 80 MM HG: ICD-10-PCS | Mod: CPTII,S$GLB,, | Performed by: INTERNAL MEDICINE

## 2022-05-12 PROCEDURE — 1101F PT FALLS ASSESS-DOCD LE1/YR: CPT | Mod: CPTII,S$GLB,, | Performed by: INTERNAL MEDICINE

## 2022-05-12 PROCEDURE — 84466 ASSAY OF TRANSFERRIN: CPT | Performed by: INTERNAL MEDICINE

## 2022-05-12 PROCEDURE — 1160F PR REVIEW ALL MEDS BY PRESCRIBER/CLIN PHARMACIST DOCUMENTED: ICD-10-PCS | Mod: CPTII,S$GLB,, | Performed by: INTERNAL MEDICINE

## 2022-05-12 RX ORDER — METOPROLOL SUCCINATE 50 MG/1
50 TABLET, EXTENDED RELEASE ORAL 2 TIMES DAILY
COMMUNITY
End: 2024-01-08

## 2022-05-12 RX ORDER — ONDANSETRON 4 MG/1
4 TABLET, ORALLY DISINTEGRATING ORAL EVERY 6 HOURS PRN
Qty: 30 TABLET | Refills: 6 | Status: SHIPPED | OUTPATIENT
Start: 2022-05-12 | End: 2022-11-01

## 2022-05-12 NOTE — PROGRESS NOTES
"[facility]    CC: diarrhea    HPI 72 y.o. male with past medical history of CAD s/p stent placement on Plavix, HTN, DM2, SBO with ileocecectomy who presents with 3 years of chronic, daily, worsening diarrhea with associated weight loss.  The patient reports he usually has 6 liquid BM's daily.  He has FI.  He takes 6-8 Lomotil per day with some imodium.  He stopped cholestyramine because he felt it made symptoms worse.  He has a lot of dairy products in his diet.    He has seen blood in his stool previously.    He also has frequent nausea and vomiting about 3 times a week.    He has had multiple hospital admission at  since Nov 2021 including recent endoscopy and surgeries but the patient and his daughter are uncertain what these were.    Past Medical History  Past Medical History:   Diagnosis Date    Anemia due to chronic blood loss 1/19/2021    Anemia due to multiple mechanisms 1/19/2021    Bladder cancer     2 times    BPH (benign prostatic hypertrophy)     Cancer of kidney     1/2    Colon polyp     Coronary artery disease     DM (diabetes mellitus)     GERD (gastroesophageal reflux disease)     Hypertension     Iron deficiency anemia due to chronic blood loss 1/20/2021    Normochromic normocytic anemia 1/19/2021    PAD (peripheral artery disease)     Peripheral vascular disease          Review of Systems  General ROS: negative for chills, fever.  Positive for weight loss  Cardiovascular ROS: no chest pain or dyspnea on exertion  Gastrointestinal ROS: positive for nausea, vomiting, diarrhea    Physical Examination  BP 98/62   Pulse 69   Ht 5' 5" (1.651 m)   Wt 68.1 kg (150 lb 2.1 oz)   BMI 24.98 kg/m²   General appearance: alert, cooperative, no distress  HENT: Normocephalic, atraumatic, neck symmetrical, no nasal discharge   Lungs: clear to auscultation bilaterally, no dullness to percussion bilaterally  Heart: regular rate and rhythm without rub; no displacement of the PMI "   Abdomen: soft, non-tender; bowel sounds normoactive; no organomegaly  Extremities: extremities symmetric; no clubbing, cyanosis, or edema  Neurologic: Alert and oriented X 3, normal strength, normal coordination and gait    Labs:  Lab Results   Component Value Date    WBC 14.39 (H) 11/04/2021    HGB 11.6 (L) 11/04/2021    HCT 37.4 (L) 11/04/2021    MCV 92 11/04/2021     11/04/2021         CMP  Sodium   Date Value Ref Range Status   11/04/2021 137 136 - 145 mmol/L Final     Potassium   Date Value Ref Range Status   11/04/2021 4.8 3.5 - 5.1 mmol/L Final     Chloride   Date Value Ref Range Status   11/04/2021 109 95 - 110 mmol/L Final     CO2   Date Value Ref Range Status   11/04/2021 15 (L) 23 - 29 mmol/L Final     Glucose   Date Value Ref Range Status   11/04/2021 161 (H) 70 - 110 mg/dL Final     BUN   Date Value Ref Range Status   11/04/2021 36 (H) 8 - 23 mg/dL Final     Creatinine   Date Value Ref Range Status   11/04/2021 1.8 (H) 0.5 - 1.4 mg/dL Final     Calcium   Date Value Ref Range Status   11/04/2021 9.5 8.7 - 10.5 mg/dL Final     Total Protein   Date Value Ref Range Status   11/04/2021 7.3 6.0 - 8.4 g/dL Final     Albumin   Date Value Ref Range Status   11/04/2021 3.9 3.5 - 5.2 g/dL Final     Total Bilirubin   Date Value Ref Range Status   11/04/2021 0.5 0.1 - 1.0 mg/dL Final     Comment:     For infants and newborns, interpretation of results should be based  on gestational age, weight and in agreement with clinical  observations.    Premature Infant recommended reference ranges:  Up to 24 hours.............<8.0 mg/dL  Up to 48 hours............<12.0 mg/dL  3-5 days..................<15.0 mg/dL  6-29 days.................<15.0 mg/dL       Alkaline Phosphatase   Date Value Ref Range Status   11/04/2021 93 55 - 135 U/L Final     AST   Date Value Ref Range Status   11/04/2021 12 10 - 40 U/L Final     ALT   Date Value Ref Range Status   11/04/2021 31 10 - 44 U/L Final     Anion Gap   Date Value Ref  Range Status   11/04/2021 13 8 - 16 mmol/L Final     eGFR if    Date Value Ref Range Status   11/04/2021 43.4 (A) >60 mL/min/1.73 m^2 Final     eGFR if non    Date Value Ref Range Status   11/04/2021 37.6 (A) >60 mL/min/1.73 m^2 Final     Comment:     Calculation used to obtain the estimated glomerular filtration  rate (eGFR) is the CKD-EPI equation.              Assessment:   1. Diarrhea, unspecified type    2. Nausea    3. Iron deficiency anemia, unspecified iron deficiency anemia type        Plan:  -Request outside records from [facility].  -Zofran prescribed for nausea.  -Creon TID prescribed for diarrhea possibly due to pancreatic exocrine insufficiency.  He had evidence of chronic pancreatitis noted on previous EUS.  -Check labs-CBC, CMP, celiac panel, TSH, CRP and stool-fecal rosaura (he reports C diff has been ruled out many times)  -Schedule gastric emptying study.  -Consider a future MR enterography and trial of tincture of opium if needed.  -Stop all dairy products for at least 2 weeks and evaluate for improvement in symptoms.  -Return to clinic in 3 months.    Rosita Gipson MD

## 2022-05-16 LAB
GLIADIN PEPTIDE IGA SER-ACNC: 5 UNITS
GLIADIN PEPTIDE IGG SER-ACNC: 2 UNITS
IGA SERPL-MCNC: 319 MG/DL (ref 70–400)
TTG IGA SER-ACNC: 6 UNITS
TTG IGG SER-ACNC: 3 UNITS

## 2022-05-18 ENCOUNTER — TELEPHONE (OUTPATIENT)
Dept: GASTROENTEROLOGY | Facility: CLINIC | Age: 72
End: 2022-05-18
Payer: MEDICARE

## 2022-05-18 NOTE — TELEPHONE ENCOUNTER
Spoke with patients wife, Ele.  Patient scheduled for gastric emptying study on 5/30 for 8:30.  Verbal instructions reviewed and Ele expressed understanding.   Confirmation mailed.   Samra

## 2022-05-18 NOTE — TELEPHONE ENCOUNTER
----- Message from Dorinda Koenig sent at 5/18/2022 10:48 AM CDT -----      wife of Jose English MRN [id number] returning your call to schedule procedure    Patient contact @# [phone]

## 2022-05-19 ENCOUNTER — TELEPHONE (OUTPATIENT)
Dept: GASTROENTEROLOGY | Facility: CLINIC | Age: 72
End: 2022-05-19
Payer: MEDICARE

## 2022-05-19 DIAGNOSIS — N17.9 AKI (ACUTE KIDNEY INJURY): Primary | ICD-10-CM

## 2022-05-19 NOTE — TELEPHONE ENCOUNTER
[facility]    Labs discussed with the patient's daughter.  Creatinine was 1.5.  We will repeat a BMP tomorrow.    Rosita Gipson MD

## 2022-05-20 ENCOUNTER — LAB VISIT (OUTPATIENT)
Dept: LAB | Facility: HOSPITAL | Age: 72
End: 2022-05-20
Attending: INTERNAL MEDICINE
Payer: MEDICARE

## 2022-05-20 DIAGNOSIS — N17.9 AKI (ACUTE KIDNEY INJURY): ICD-10-CM

## 2022-05-20 LAB
ANION GAP SERPL CALC-SCNC: 12 MMOL/L (ref 8–16)
BUN SERPL-MCNC: 13 MG/DL (ref 8–23)
CALCIUM SERPL-MCNC: 8.8 MG/DL (ref 8.7–10.5)
CHLORIDE SERPL-SCNC: 108 MMOL/L (ref 95–110)
CO2 SERPL-SCNC: 21 MMOL/L (ref 23–29)
CREAT SERPL-MCNC: 1.5 MG/DL (ref 0.5–1.4)
EST. GFR  (AFRICAN AMERICAN): 53.8 ML/MIN/1.73 M^2
EST. GFR  (NON AFRICAN AMERICAN): 46.5 ML/MIN/1.73 M^2
GLUCOSE SERPL-MCNC: 111 MG/DL (ref 70–110)
POTASSIUM SERPL-SCNC: 4.1 MMOL/L (ref 3.5–5.1)
SODIUM SERPL-SCNC: 141 MMOL/L (ref 136–145)

## 2022-05-20 PROCEDURE — 36415 COLL VENOUS BLD VENIPUNCTURE: CPT | Performed by: INTERNAL MEDICINE

## 2022-05-20 PROCEDURE — 80048 BASIC METABOLIC PNL TOTAL CA: CPT | Performed by: INTERNAL MEDICINE

## 2022-05-23 ENCOUNTER — TELEPHONE (OUTPATIENT)
Dept: GASTROENTEROLOGY | Facility: CLINIC | Age: 72
End: 2022-05-23
Payer: MEDICARE

## 2022-05-23 NOTE — TELEPHONE ENCOUNTER
----- Message from Lissa Bone sent at 5/23/2022  2:51 PM CDT -----  Regarding: call back [phone] of [phone]  Type: Patient Call Back    Who called: wife Orville    What is the request in detail: Was calling in to get the results from last week.     Can the clinic reply by [facility]? no    Would the patient rather a call back or a response via [facility]?  Call back    Best call back number:[phone] or [phone]

## 2022-05-24 ENCOUNTER — TELEPHONE (OUTPATIENT)
Dept: ENDOSCOPY | Facility: HOSPITAL | Age: 72
End: 2022-05-24
Payer: MEDICARE

## 2022-05-24 NOTE — TELEPHONE ENCOUNTER
----- Message from Ciara Lamb MA sent at 5/24/2022  1:21 PM CDT -----    ----- Message -----  From: Arabella Lopez  Sent: 5/24/2022   1:05 PM CDT  To: [facility] Staff    Type: Patient Call Back    Who called: Wife     What is the request in detail: pt wife is calling in to get results     Can the clinic reply by [facility]?    Would the patient rather a call back or a response via [facility]? Call     Best call back number: [phone] (home)

## 2022-05-25 ENCOUNTER — TELEPHONE (OUTPATIENT)
Dept: GASTROENTEROLOGY | Facility: CLINIC | Age: 72
End: 2022-05-25
Payer: MEDICARE

## 2022-05-25 NOTE — TELEPHONE ENCOUNTER
[facility]    BMP results discussed with the patient and his wife.  His creatinine has been stable at 1.5.  He will follow up with his PCP and nephrologist.    Rosita Gipson MD

## 2022-05-30 ENCOUNTER — HOSPITAL ENCOUNTER (OUTPATIENT)
Dept: RADIOLOGY | Facility: HOSPITAL | Age: 72
Discharge: HOME | End: 2022-05-30
Attending: INTERNAL MEDICINE
Payer: MEDICARE

## 2022-05-30 DIAGNOSIS — R11.0 NAUSEA: ICD-10-CM

## 2022-05-30 PROCEDURE — 78264 GASTRIC EMPTYING IMG STUDY: CPT | Mod: 26,,, | Performed by: RADIOLOGY

## 2022-05-30 PROCEDURE — 78264 NM GASTRIC EMPTYING: ICD-10-PCS | Mod: 26,,, | Performed by: RADIOLOGY

## 2022-05-30 PROCEDURE — A9541 TC99M SULFUR COLLOID: HCPCS

## 2022-06-01 ENCOUNTER — TELEPHONE (OUTPATIENT)
Dept: GASTROENTEROLOGY | Facility: CLINIC | Age: 72
End: 2022-06-01
Payer: MEDICARE

## 2022-06-01 NOTE — TELEPHONE ENCOUNTER
----- Message from Jay Elizabeth sent at 6/1/2022  2:59 PM CDT -----  Contact: darinel ( julissa ) @ [phone]  Caller requesting a return phone call to discuss test results, pls call        [facility] Progress Note    Chas Mahmood Patient Status:  Inpatient    1957 MRN [id number]   Location [facility] [address] Attending Claudia Rooney MD   Hosp Day # 2 PCP David Martinez DO     Subjective:  Patient seen and examined,   Afebrile,   Previous entries noted,   No cough no SOB  sister at the bed side,   Had breakfast,   Feels well, sitting up,       Objective:    Allergies:  ALLERGIES:  Cefazolin    Medications:  Current Facility-Administered Medications   Medication Dose Route Frequency Provider Last Rate Last Admin   • sodium chloride 0.9 % flush bag 25 mL  25 mL Intravenous PRN Claudia Rooney MD       • Potassium Standard Replacement Protocol   Does not apply See Admin Instructions Claudia Rooney MD       • Magnesium Standard Replacement Protocol   Does not apply See Admin Instructions Claudia Rooney MD       • folic acid (FOLATE) tablet 1 mg  1 mg Oral Daily Giovany Hogan MD   1 mg at 21 1038   • tamsulosin (FLOMAX) capsule 0.4 mg  0.4 mg Oral Daily PC Giovany Hogan MD   0.4 mg at 21 1038   • thiamine (VITAMIN B1) tablet 100 mg  100 mg Oral Daily Giovany Hogan MD   100 mg at 21 1038   • QUEtiapine (SEROquel) tablet 37.5 mg  37.5 mg Oral Nightly Giovany Hogan MD   37.5 mg at 21 2156   • QUEtiapine (SEROquel) tablet 25 mg  25 mg Oral Daily Giovany Hogan MD   25 mg at 21 0905   • sodium chloride 0.9 % flush bag 25 mL  25 mL Intravenous PRN Giovany Hogan MD       • sodium chloride (PF) 0.9 % injection 2 mL  2 mL Intracatheter 2 times per day Giovany Hogan MD   2 mL at 21 1042   • enoxaparin (LOVENOX) injection 40 mg  40 mg Subcutaneous Daily Giovany Hogan MD   40 mg at 21 1039   • sodium chloride 0.9% infusion   Intravenous Continuous Giovany Hogan MD 75 mL/hr at 21 0536 New Bag at 21 0536   • fluconazole (DIFLUCAN) IVPB 400 mg  400 mg Intravenous Daily Jayde  MD John 100 mL/hr at 11/02/21 1039 400 mg at 11/02/21 1039   • acetaminophen (TYLENOL) tablet 650 mg  650 mg Oral Q6H PRN Claudia Rooney MD       • docusate sodium-sennosides (SENOKOT S) 50-8.6 MG 1 tablet  1 tablet Oral BID Claudia Rooney MD   1 tablet at 11/01/21 2156   • melatonin tablet 9 mg  9 mg Oral Nightly Claudia Rooney MD   9 mg at 11/01/21 2157   • psyllium (METAMUCIL MULTIHEALTH FIBER) 58.12 % packet 1 packet  1 packet Oral BID Claudia Rooney MD   1 packet at 11/01/21 2157       Review of Systems:   Constitutional: Negative for anorexia, chills, fatigue, fevers, malaise, night sweats and weight loss.  Eyes: Negative for visual disturbance, irritation and redness.  Ears, nose, mouth, throat, and face: Negative for hearing loss, tinnitus, nasal congestion, snoring, sore throat, hoarseness and voice change.  Respiratory: Negative for cough, sputum, hemoptysis, chest pain, wheezing, dyspnea on exertion, or stridor.  Cardiovascular: Negative for chest pain, palpitations, irregular heart beats, syncope, fatigue, orthopnea, paroxysmal nocturnal dyspnea, lower extremity edema.  Gastrointestinal: Negative for dysphagia, odynophagia, reflux symptoms, nausea, vomiting, change in bowel habits, diarrhea, constipation and abdominal pain.  Integument/breast: Negative for rash, skin lesions, and pruritus.  Hematologic/lymphatic: Negative for easy bruising, bleeding, and lymphadenopathy.  Musculoskeletal: Negative for myalgias, arthralgias, muscle weakness.  Neurological: Negative for headaches, dizziness, seizures, memory problems, trouble swallowing, speech problems, gait problems and weakness.  Behavioral/Psych: Negative for active tobacco use.  Endocrine: No history of of diabetes, thyroid disorder.  All other review of systems are negative.      Physical Exam:  General: Alert, orientated x3.  Cooperative.  No apparent distress.  Vital Signs:    Visit Vitals  BP (!) 145/86 (BP Location: [facility]  Left upper extremity, Patient Position: Supine)   Pulse 62   Temp 98.2 °F (36.8 °C)   Resp 16   Ht 6' 2\" (1.88 m)   Wt 66.5 kg (146 lb 9.7 oz)   SpO2 97%   BMI 18.82 kg/m²      Temp (24hrs), Av.1 °F (36.7 °C), Min:97.2 °F (36.2 °C), Max:99.1 °F (37.3 °C)      HEENT: Exam is unremarkable.  Without scleral icterus.  Mucous membranes are moist. PERRLA.  Oropharynx is clear.clean incision,   Neck: No tenderness to palpitation.  Full range of motion to flexion and extension, lateral rotation and lateral flexion of cervical spine.  No JVD. Supple.   Lungs: Clear to auscultation bilaterally.  Cardiac: Regular rate and rhythm. No murmur.  Abdomen:  Soft, non-distended, non-tender, with no rebound or guarding.  No peritoneal signs. No ascites.  Liver is within normal limits.  Spleen is not palpable.    Extremities:  No lower extremity edema noted.  Without clubbing or cyanosis.    Skin: Normal texture and turgor.  Neurologic: Cranial nerves are grossly intact.  Motor strength and sensory examination is grossly normal.  No focal neurologic deficit.    Labs:  Recent Labs   Lab 21  0745 10/31/21  0630 10/30/21  2318   WBC 4.3 5.7 7.9   HGB 11.2* 11.4* 12.4*   HCT 33.0* 33.8* 35.7*    189 208   BUN 7 8 7   CO2 27 28 28   AST  --  10 16   CRP  --   --  <0.3   MG  --  2.4  --        Radiology:  XR CHEST PA OR AP 1 VIEW   Final Result       No focal infiltrate or evidence of congestive failure.      Electronically Signed by: DORI GARNER M.D.    Signed on: 10/31/2021 12:24 AM          CT HEAD WO CONTRAST   Final Result      1.  Left parietal approach ventriculostomy catheter, stable in position.        2.  Ventricular configuration is stable compared to previous examination,   without new hydrocephalus.      3.  No evidence of acute intracranial hemorrhage.        4.  Chronic findings as above.                  Electronically Signed by: DORI GARNER M.D.    Signed on: 10/31/2021 12:58 AM                Cultures  Microbiology Results     None           Assessment/Plan:       1.  Syndrome of fever, chills, N/V: for 48 hrs PTA  - some runny nose, No sick contacts, No flu vaccine,   - more alert, no headache and repeat LP on 10/27 is improved,   - Less likely to be introduction of infection during LP as mentally more awake, alert, interactive,   - Rule out Viral infection,UA is clean, Blood cul neg,   - RVP with rhino virus,   - Procal is negative too,   - On Fluconazole,Off of IV Vanc and Cefepime,      2.   Hx of malfunctioning  shunt with Candida infection,   - CT AP,6/2021 with moderate inflammatory process within the abdominal  shunt catheter with small longitudinal fluid collection at the center of the tip within the anterior abdomen, which may represent an abscess/infected CSF pseudocyst. Mildly inflamed appendix.  - S/P laparoscopic appendectomy, G-tube removal, externalization of  shunt on 6/ 21/21.  - Fluid from shunt, shunt tip and Valve fluid with Candida parapsilosis.   - CSF collected from Ventricle grew C Parapsilosis too,  - S/P removal of  shunt on 6/25. Cul of tip NGTD,   - S/P IV Ceftriaxone, Flagyl-14 dys and later Liposomal Ampho for 7d.     3.   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   Hx of Renal tumour: can be treated as required, will avoid Chemo,      5.   Disposition: S/P Amphotericin x 7 days and now PO Fluconazole 400 mg Daily since 7/01/21, repeat LP on 7/01 and 7/06, 8/11 and now 10/27 is improving, Overall this all can be rhino virus too, OK to dc on PO Fluconazole  as OP for two more months, stable per ID, Discharge planning per PCP, Follow with ID in two months, call to make appt, All questions answered. Will follow,     If you have any questions or concerns please call [facility] at [phone].     Jayde Lopez MD  11/2/2021  3:08 PM

## 2022-06-02 ENCOUNTER — TELEPHONE (OUTPATIENT)
Dept: GASTROENTEROLOGY | Facility: CLINIC | Age: 72
End: 2022-06-02
Payer: MEDICARE

## 2022-06-02 DIAGNOSIS — N18.9 CHRONIC KIDNEY DISEASE, UNSPECIFIED CKD STAGE: ICD-10-CM

## 2022-06-02 DIAGNOSIS — K31.84 GASTROPARESIS: ICD-10-CM

## 2022-06-02 DIAGNOSIS — R19.7 DIARRHEA, UNSPECIFIED TYPE: Primary | ICD-10-CM

## 2022-06-02 RX ORDER — METOCLOPRAMIDE 5 MG/1
5 TABLET ORAL
Qty: 90 TABLET | Refills: 3 | Status: SHIPPED | OUTPATIENT
Start: 2022-06-02 | End: 2024-01-08

## 2022-06-02 NOTE — TELEPHONE ENCOUNTER
[facility]    Results discussed with the patient's daughter, the patient was in the background of the phone call.    We will repeat a fecal rosaura protectin in 1 month due to his borderline level.    We will start Reglan 5mg TID before meals for his gastroparesis.  We discussed the risk of tardive dyskinesia with this medication and if it occurs it can be irreversible.  He would like to proceed.    Start oral iron daily OTC.    I will try to move up his appt with me.    I have placed a referral to Nephrology for his CKD.    Rosita Gipson MD

## 2022-06-02 NOTE — TELEPHONE ENCOUNTER
----- Message from Eliud Moncada sent at 6/2/2022  1:33 PM CDT -----  Contact: [phone]/suzanne  Who Called: PT daughter  Suzanne    Regarding: she would like to get clarification on blood work      Would the patient rather a call back or a response via [facility]? Call back    Best Call Back Number: [phone]    Additional Information: n/a

## 2022-06-10 ENCOUNTER — PATIENT MESSAGE (OUTPATIENT)
Dept: GASTROENTEROLOGY | Facility: CLINIC | Age: 72
End: 2022-06-10
Payer: MEDICARE

## 2022-07-02 ENCOUNTER — TELEPHONE (OUTPATIENT)
Dept: ENDOSCOPY | Facility: HOSPITAL | Age: 72
End: 2022-07-02
Payer: MEDICARE

## 2022-07-04 ENCOUNTER — PATIENT MESSAGE (OUTPATIENT)
Dept: ENDOSCOPY | Facility: HOSPITAL | Age: 72
End: 2022-07-04
Payer: MEDICARE

## 2022-09-01 ENCOUNTER — TELEPHONE (OUTPATIENT)
Dept: PHARMACY | Facility: CLINIC | Age: 72
End: 2022-09-01
Payer: MEDICARE

## 2022-09-01 NOTE — TELEPHONE ENCOUNTER
Attempted to contact patient concerning referral for medication (Creon).  I could not leave a message because his voicemail is not set up. I am mailing out a letter to the patient.

## 2022-09-13 ENCOUNTER — OFFICE VISIT (OUTPATIENT)
Dept: GASTROENTEROLOGY | Facility: CLINIC | Age: 72
End: 2022-09-13
Payer: MEDICARE

## 2022-09-13 VITALS
SYSTOLIC BLOOD PRESSURE: 137 MMHG | WEIGHT: 178.88 LBS | HEART RATE: 51 BPM | BODY MASS INDEX: 29.8 KG/M2 | DIASTOLIC BLOOD PRESSURE: 72 MMHG | HEIGHT: 65 IN

## 2022-09-13 DIAGNOSIS — R19.7 DIARRHEA, UNSPECIFIED TYPE: Primary | ICD-10-CM

## 2022-09-13 PROCEDURE — 3288F FALL RISK ASSESSMENT DOCD: CPT | Mod: CPTII,S$GLB,, | Performed by: INTERNAL MEDICINE

## 2022-09-13 PROCEDURE — 1157F PR ADVANCE CARE PLAN OR EQUIV PRESENT IN MEDICAL RECORD: ICD-10-PCS | Mod: CPTII,S$GLB,, | Performed by: INTERNAL MEDICINE

## 2022-09-13 PROCEDURE — 3078F DIAST BP <80 MM HG: CPT | Mod: CPTII,S$GLB,, | Performed by: INTERNAL MEDICINE

## 2022-09-13 PROCEDURE — 3075F PR MOST RECENT SYSTOLIC BLOOD PRESS GE 130-139MM HG: ICD-10-PCS | Mod: CPTII,S$GLB,, | Performed by: INTERNAL MEDICINE

## 2022-09-13 PROCEDURE — 1126F PR PAIN SEVERITY QUANTIFIED, NO PAIN PRESENT: ICD-10-PCS | Mod: CPTII,S$GLB,, | Performed by: INTERNAL MEDICINE

## 2022-09-13 PROCEDURE — 1160F PR REVIEW ALL MEDS BY PRESCRIBER/CLIN PHARMACIST DOCUMENTED: ICD-10-PCS | Mod: CPTII,S$GLB,, | Performed by: INTERNAL MEDICINE

## 2022-09-13 PROCEDURE — 1101F PR PT FALLS ASSESS DOC 0-1 FALLS W/OUT INJ PAST YR: ICD-10-PCS | Mod: CPTII,S$GLB,, | Performed by: INTERNAL MEDICINE

## 2022-09-13 PROCEDURE — 99213 PR OFFICE/OUTPT VISIT, EST, LEVL III, 20-29 MIN: ICD-10-PCS | Mod: S$GLB,,, | Performed by: INTERNAL MEDICINE

## 2022-09-13 PROCEDURE — 3288F PR FALLS RISK ASSESSMENT DOCUMENTED: ICD-10-PCS | Mod: CPTII,S$GLB,, | Performed by: INTERNAL MEDICINE

## 2022-09-13 PROCEDURE — 1101F PT FALLS ASSESS-DOCD LE1/YR: CPT | Mod: CPTII,S$GLB,, | Performed by: INTERNAL MEDICINE

## 2022-09-13 PROCEDURE — 1160F RVW MEDS BY RX/DR IN RCRD: CPT | Mod: CPTII,S$GLB,, | Performed by: INTERNAL MEDICINE

## 2022-09-13 PROCEDURE — 1159F MED LIST DOCD IN RCRD: CPT | Mod: CPTII,S$GLB,, | Performed by: INTERNAL MEDICINE

## 2022-09-13 PROCEDURE — 99213 OFFICE O/P EST LOW 20 MIN: CPT | Mod: S$GLB,,, | Performed by: INTERNAL MEDICINE

## 2022-09-13 PROCEDURE — 3075F SYST BP GE 130 - 139MM HG: CPT | Mod: CPTII,S$GLB,, | Performed by: INTERNAL MEDICINE

## 2022-09-13 PROCEDURE — 4010F PR ACE/ARB THEARPY RXD/TAKEN: ICD-10-PCS | Mod: CPTII,S$GLB,, | Performed by: INTERNAL MEDICINE

## 2022-09-13 PROCEDURE — 4010F ACE/ARB THERAPY RXD/TAKEN: CPT | Mod: CPTII,S$GLB,, | Performed by: INTERNAL MEDICINE

## 2022-09-13 PROCEDURE — 99999 PR PBB SHADOW E&M-EST. PATIENT-LVL V: ICD-10-PCS | Mod: PBBFAC,,, | Performed by: INTERNAL MEDICINE

## 2022-09-13 PROCEDURE — 3008F PR BODY MASS INDEX (BMI) DOCUMENTED: ICD-10-PCS | Mod: CPTII,S$GLB,, | Performed by: INTERNAL MEDICINE

## 2022-09-13 PROCEDURE — 1126F AMNT PAIN NOTED NONE PRSNT: CPT | Mod: CPTII,S$GLB,, | Performed by: INTERNAL MEDICINE

## 2022-09-13 PROCEDURE — 3078F PR MOST RECENT DIASTOLIC BLOOD PRESSURE < 80 MM HG: ICD-10-PCS | Mod: CPTII,S$GLB,, | Performed by: INTERNAL MEDICINE

## 2022-09-13 PROCEDURE — 1157F ADVNC CARE PLAN IN RCRD: CPT | Mod: CPTII,S$GLB,, | Performed by: INTERNAL MEDICINE

## 2022-09-13 PROCEDURE — 1159F PR MEDICATION LIST DOCUMENTED IN MEDICAL RECORD: ICD-10-PCS | Mod: CPTII,S$GLB,, | Performed by: INTERNAL MEDICINE

## 2022-09-13 PROCEDURE — 99999 PR PBB SHADOW E&M-EST. PATIENT-LVL V: CPT | Mod: PBBFAC,,, | Performed by: INTERNAL MEDICINE

## 2022-09-13 PROCEDURE — 3008F BODY MASS INDEX DOCD: CPT | Mod: CPTII,S$GLB,, | Performed by: INTERNAL MEDICINE

## 2022-09-13 NOTE — PROGRESS NOTES
"[facility]    CC: diarrhea    HPI 72 y.o. male with past medical history of CAD s/p stent placement on Plavix, HTN, DM2, SBO with ileocecectomy who presents for follow up of chronic, improved, painful diarrhea with associated nausea and vomiting.    Overall his diarrhea has improved with Creon TID, iron daily and one imodium daily.  On a good day he has 2 BM's.  On bad days he has 6-7 BM's.  His bad days start with abdominal cramping.      His nausea and vomiting has improved with Reglan for his gastroparesis.  He now vomiting once every few days or once per week.    He is eating well, has his appetite back and has put his weight back on.    Past Medical History  Past Medical History:   Diagnosis Date    Anemia due to chronic blood loss 1/19/2021    Anemia due to multiple mechanisms 1/19/2021    Bladder cancer     2 times    BPH (benign prostatic hypertrophy)     Cancer of kidney     1/2    Colon polyp     Coronary artery disease     DM (diabetes mellitus)     GERD (gastroesophageal reflux disease)     Hypertension     Iron deficiency anemia due to chronic blood loss 1/20/2021    Normochromic normocytic anemia 1/19/2021    PAD (peripheral artery disease)     Peripheral vascular disease          Review of Systems  General ROS: negative for chills, fever or weight loss  Cardiovascular ROS: no chest pain or dyspnea on exertion  Gastrointestinal ROS: positive for intermittent, but improved diarrhea, abdominal cramping and vomiting    Physical Examination  /72   Pulse (!) 51   Ht 5' 5" (1.651 m)   Wt 81.1 kg (178 lb 14.5 oz)   BMI 29.77 kg/m²   General appearance: alert, cooperative, no distress  HENT: Normocephalic, atraumatic, neck symmetrical, no nasal discharge   Lungs: clear to auscultation bilaterally, no dullness to percussion bilaterally  Heart: regular rate and rhythm without rub; no displacement of the PMI   Abdomen: soft, non-tender; bowel sounds normoactive; no " organomegaly  Extremities: extremities symmetric; no clubbing, cyanosis, or edema  Neurologic: Alert and oriented X 3, normal strength, normal coordination and gait    Labs:  Lab Results   Component Value Date    WBC 10.93 05/12/2022    HGB 9.1 (L) 05/12/2022    HCT 30.0 (L) 05/12/2022     (H) 05/12/2022     05/12/2022         CMP  Sodium   Date Value Ref Range Status   05/20/2022 141 136 - 145 mmol/L Final     Potassium   Date Value Ref Range Status   05/20/2022 4.1 3.5 - 5.1 mmol/L Final     Chloride   Date Value Ref Range Status   05/20/2022 108 95 - 110 mmol/L Final     CO2   Date Value Ref Range Status   05/20/2022 21 (L) 23 - 29 mmol/L Final     Glucose   Date Value Ref Range Status   05/20/2022 111 (H) 70 - 110 mg/dL Final     BUN   Date Value Ref Range Status   05/20/2022 13 8 - 23 mg/dL Final     Creatinine   Date Value Ref Range Status   05/20/2022 1.5 (H) 0.5 - 1.4 mg/dL Final     Calcium   Date Value Ref Range Status   05/20/2022 8.8 8.7 - 10.5 mg/dL Final     Total Protein   Date Value Ref Range Status   05/12/2022 7.0 6.0 - 8.4 g/dL Final     Albumin   Date Value Ref Range Status   05/12/2022 3.6 3.5 - 5.2 g/dL Final     Total Bilirubin   Date Value Ref Range Status   05/12/2022 0.3 0.1 - 1.0 mg/dL Final     Comment:     For infants and newborns, interpretation of results should be based  on gestational age, weight and in agreement with clinical  observations.    Premature Infant recommended reference ranges:  Up to 24 hours.............<8.0 mg/dL  Up to 48 hours............<12.0 mg/dL  3-5 days..................<15.0 mg/dL  6-29 days.................<15.0 mg/dL       Alkaline Phosphatase   Date Value Ref Range Status   05/12/2022 95 55 - 135 U/L Final     AST   Date Value Ref Range Status   05/12/2022 11 10 - 40 U/L Final     ALT   Date Value Ref Range Status   05/12/2022 13 10 - 44 U/L Final     Anion Gap   Date Value Ref Range Status   05/20/2022 12 8 - 16 mmol/L Final     eGFR if     Date Value Ref Range Status   05/20/2022 53.8 (A) >60 mL/min/1.73 m^2 Final     eGFR if non    Date Value Ref Range Status   05/20/2022 46.5 (A) >60 mL/min/1.73 m^2 Final     Comment:     Calculation used to obtain the estimated glomerular filtration  rate (eGFR) is the CKD-EPI equation.            Assessment:   The patient is a 73 yo man with past medical history of CAD s/p stent placement on Plavix, HTN, DM2, SBO with ileocecectomy who presents for follow up of his improved diarrhea.  He has been diagnosed with gastroparesis and his nausea and vomiting has improved with Reglan.    Plan:  -Continue Creon, iron pills and imodium as needed.  The patient is currently in the donut hole and his creon has been more expensive.  He will try to stretch his Creon by taking it once or twice daily instead of three times daily.  He can also take up to 16 mg of imodium.  There is a question of whether he is taking Lomotil or Imodium and they will check when they get home.  They will also check to see if they have bentyl at home and try this as needed for abdominal cramping.  -Continue Reglan 5mg TID.  The patient and family are aware of the risk of TD and are monitoring for symptoms.    Rosita Gipson MD

## 2022-09-21 ENCOUNTER — PATIENT MESSAGE (OUTPATIENT)
Dept: GASTROENTEROLOGY | Facility: CLINIC | Age: 72
End: 2022-09-21
Payer: MEDICARE

## 2022-10-28 ENCOUNTER — TELEPHONE (OUTPATIENT)
Dept: ELECTROPHYSIOLOGY | Facility: CLINIC | Age: 72
End: 2022-10-28
Payer: MEDICARE

## 2022-10-28 DIAGNOSIS — I48.91 ATRIAL FIBRILLATION, UNSPECIFIED TYPE: Primary | ICD-10-CM

## 2022-10-31 ENCOUNTER — TELEPHONE (OUTPATIENT)
Dept: NEUROLOGY | Facility: CLINIC | Age: 72
End: 2022-10-31
Payer: MEDICARE

## 2022-10-31 NOTE — H&P (VIEW-ONLY)
Subjective:    Patient ID:  Jose English is a 72 y.o. male who presents for follow-up of Atrial Fibrillation      72 yoM here for arrhythmia management. He has history of AF remotely and was not on AF therapy leading up to his hospitalization for GI bleeding. He was on aspirin and plavix for PCI. He suffered a significant GI bleed 9/28/22-10/4/22, hgb to 6.6. EGD with gastric clot, no bleeding. Transfused up to 10.6 10/2/22. AF/RVR on ECG 10/1/22 however my interpretation is sinus tach or AT (ST only possible if he is on chronotropic agents). There was comment of atrial flutter. He was not placed back on plavix.     Past Medical History:  1/19/2021: Anemia due to chronic blood loss  1/19/2021: Anemia due to multiple mechanisms  No date: Bladder cancer      Comment:  2 times  No date: BPH (benign prostatic hypertrophy)  No date: Cancer of kidney      Comment:  1/2  No date: Colon polyp  No date: Coronary artery disease  No date: DM (diabetes mellitus)  No date: GERD (gastroesophageal reflux disease)  No date: Hypertension  1/20/2021: Iron deficiency anemia due to chronic blood loss  1/19/2021: Normochromic normocytic anemia  No date: PAD (peripheral artery disease)  No date: Peripheral vascular disease    Past Surgical History:  No date: abdominal aorta bypass by fem  No date: ABDOMINAL SURGERY  No date: BLADDER SURGERY  12/06/2018: COLONOSCOPY  12/6/2018: COLONOSCOPY; N/A      Comment:  Procedure: COLONOSCOPY;  Surgeon: Familia Dickson MD;  Location: [facility] ENDO;  Service: General;  Laterality:               N/A;  12/7/2020: COLONOSCOPY; N/A      Comment:  Procedure: COLONOSCOPY;  Surgeon: Andi Loya MD;                 Location: [facility] ENDO;  Service: Endoscopy;  Laterality:                N/A;  No date: CYSTOSCOPY  2/20/2019: ENDOSCOPIC ULTRASOUND OF UPPER GASTROINTESTINAL TRACT; N/A      Comment:  Procedure: ULTRASOUND, UPPER GI TRACT, ENDOSCOPIC;                 Surgeon: Govind Hassan  "MD;  [facility]  Service: Endoscopy;  Laterality: N/A;  Please                schedule EGD and EUS, for celiac sprue biopsies, and                evaluation of pancreatitis.5 day hold Plavix, Dr Jarret Mojica prep  2019: ESOPHAGOGASTRODUODENOSCOPY; N/A      Comment:  Procedure: EGD (ESOPHAGOGASTRODUODENOSCOPY);  Surgeon:                Govind Hassan MD;  [facility]                 Service: Endoscopy;  Laterality: N/A;  Please schedule                EGD and EUS, for celiac sprue biopsies, and evaluation of               pancreatitis.5 day hold Plavix, Dr Jarret Mojica                prep  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  No date: NEPHRECTOMY      Comment:  partial nephrectomy left  2018: PERCUTANEOUS TRANSLUMINAL ANGIOPLASTY (PTA) OF PERIPHERAL   VESSEL; Bilateral      Comment:  Procedure: PTA, PERIPHERAL VESSEL;  Surgeon: Patrick Love MD;  Location: [facility] CATH;  Service: Cardiology;                 Laterality: Bilateral;  Please schedule Left common iliac               artery intervention and b/l LE angio via left brachial on                   Social History    Socioeconomic History      Marital status:     Tobacco Use      Smoking status: Former        Packs/day: 1.50        Years: 40.00        Pack years: 60        Types: Cigarettes        Quit date: 2008        Years since quittin.7      Smokeless tobacco: Never    Substance and Sexual Activity      Alcohol use: Not Currently        Alcohol/week: 2.0 standard drinks        Types: 2 Cans of beer per week        Comment: quit 2 years ago      Sexual activity: Yes        Partners: Female    Social History Narrative      SOCIAL HISTORY:      And he goes by "Jorge".      2014.      The patient lives with his wife.  Recently his daughter moved in with her 4 children, 2 of whom have pervasive developmental delay and it seems as though " his daughter will not be able to reconcile with her .      He is taking the children to school in the morning, picking him up from school in the afternoon.      He quit cigarette smoking in 2008 when he came down with bladder cancer.       He likes to drink beer.      Review of patient's family history indicates:      Current Outpatient Medications:  amiodarone (PACERONE) 200 MG Tab, Take 200 mg by mouth 2 (two) times daily., Disp: , Rfl:   amlodipine (NORVASC) 10 MG tablet, TAKE 1 TABLET ONE TIME DAILY (Patient taking differently: Take 10 mg by mouth once daily.), Disp: 90 tablet, Rfl: 3  aspirin (ECOTRIN) 81 MG EC tablet, Take 81 mg by mouth once daily., Disp: , Rfl:   atorvastatin (LIPITOR) 40 MG tablet, Take 40 mg by mouth once daily., Disp: , Rfl:   dicyclomine (BENTYL) 10 MG capsule, Take 10 mg by mouth 3 (three) times daily as needed., Disp: , Rfl:   famotidine (PEPCID) 40 MG tablet, Take 40 mg by mouth once daily., Disp: , Rfl:   gabapentin (NEURONTIN) 300 MG capsule, Take 300 mg by mouth every evening., Disp: , Rfl:   metoclopramide HCl (REGLAN) 5 MG tablet, Take 1 tablet (5 mg total) by mouth 3 (three) times daily before meals., Disp: 90 tablet, Rfl: 3  metoprolol succinate (TOPROL-XL) 50 MG 24 hr tablet, Take 50 mg by mouth 2 (two) times daily., Disp: , Rfl:   pantoprazole (PROTONIX) 40 MG tablet, Take 40 mg by mouth once daily., Disp: , Rfl:   valsartan (DIOVAN) 320 MG tablet, Take 320 mg by mouth once daily. , Disp: , Rfl:   zolpidem (AMBIEN) 10 mg Tab, Take 10 mg by mouth nightly as needed., Disp: , Rfl:   ACCU-CHEK CEASAR CONTROL SOLN Soln, USE ONE TIME DAILY, Disp: 1 each, Rfl: 3  ACCU-CHEK CEASAR PLUS TEST STRP Strp, TEST TWO TIMES DAILY, Disp: 200 strip, Rfl: 3  ACCU-CHEK SOFTCLIX LANCETS Misc, TEST TWO TIMES DAILY, Disp: 200 each, Rfl: 3  b complex vitamins tablet, Take 1 tablet by mouth once daily., Disp: , Rfl:    BD ALCOHOL SWABS PadM, TEST TWO TIMES DAILY, Disp: 200 each, Rfl:  4  cholestyramine-aspartame (QUESTRAN LIGHT) 4 gram PwPk, Take 1 packet (4 g total) by mouth 2 (two) times daily., Disp: 60 packet, Rfl: 0  clopidogrel (PLAVIX) 75 mg tablet, TAKE 1 TABLET EVERY EVENING (Patient not taking: Reported on 11/1/2022), Disp: 90 tablet, Rfl: 3  cyanocobalamin (VITAMIN B-12) 1000 MCG tablet, Take 1,000 mcg by mouth once daily. , Disp: , Rfl:   diphenoxylate-atropine 2.5-0.025 mg (LOMOTIL) 2.5-0.025 mg per tablet, Take 1 tablet by mouth 4 (four) times daily., Disp: , Rfl:   folic acid/multivit-min/lutein (CENTRUM SILVER ORAL), Take 1 tablet by mouth once daily. , Disp: , Rfl:   glipiZIDE (GLUCOTROL) 5 MG TR24, Take 5 mg by mouth 2 (two) times a day. , Disp: , Rfl:   hydroCHLOROthiazide (HYDRODIURIL) 25 MG tablet, Take 25 mg by mouth once daily., Disp: , Rfl:   lipase-protease-amylase 24,000-76,000-120,000 units (PANLIPASE) 24,000-76,000 -120,000 unit capsule, Take 1 capsule by mouth 3 (three) times daily with meals. (Patient not taking: Reported on 11/1/2022), Disp: 90 capsule, Rfl: 11  ondansetron (ZOFRAN-ODT) 4 MG TbDL, Take 1 tablet (4 mg total) by mouth every 6 (six) hours as needed. (Patient not taking: Reported on 11/1/2022), Disp: 15 tablet, Rfl: 0  promethazine (PHENERGAN) 25 MG tablet, Take by mouth., Disp: , Rfl:   temazepam (RESTORIL) 30 mg capsule, Take 30 mg by mouth nightly as needed. , Disp: , Rfl:   traMADoL (ULTRAM) 50 mg tablet, Take 1 tablet (50 mg total) by mouth every 6 (six) hours as needed for Pain., Disp: 12 tablet, Rfl: 0  varicella-zoster gE-AS01B, PF, (SHINGRIX, PF,) 50 mcg/0.5 mL injection, Shingrix (PF) 50 mcg/0.5 mL intramuscular suspension, kit ADM 0.5ML IM UTD, Disp: , Rfl:     No current facility-administered medications for this visit.            Review of Systems   Constitutional: Negative.   HENT: Negative.     Eyes: Negative.    Cardiovascular:  Negative for chest pain, dyspnea on exertion, leg swelling, near-syncope, palpitations and syncope.    Respiratory: Negative.  Negative for shortness of breath.    Endocrine: Negative.    Hematologic/Lymphatic: Negative.    Skin: Negative.    Musculoskeletal: Negative.    Gastrointestinal: Negative.    Genitourinary: Negative.    Neurological: Negative.  Negative for dizziness and light-headedness.   Psychiatric/Behavioral: Negative.     Allergic/Immunologic: Negative.       Objective:    Physical Exam  Vitals reviewed.   Constitutional:       General: He is not in acute distress.     Appearance: He is well-developed.   HENT:      Head: Normocephalic and atraumatic.   Eyes:      Pupils: Pupils are equal, round, and reactive to light.   Neck:      Thyroid: No thyromegaly.      Vascular: No JVD.   Cardiovascular:      Rate and Rhythm: Normal rate and regular rhythm.      Chest Wall: PMI is not displaced.      Heart sounds: Normal heart sounds, S1 normal and S2 normal. No murmur heard.    No friction rub. No gallop.   Pulmonary:      Effort: Pulmonary effort is normal. No respiratory distress.      Breath sounds: Normal breath sounds. No wheezing or rales.   Abdominal:      General: Bowel sounds are normal. There is no distension.      Palpations: Abdomen is soft.      Tenderness: There is no abdominal tenderness. There is no guarding or rebound.   Musculoskeletal:         General: No tenderness. Normal range of motion.      Cervical back: Normal range of motion.   Skin:     General: Skin is warm and dry.      Findings: No erythema or rash.   Neurological:      Mental Status: He is alert and oriented to person, place, and time.      Cranial Nerves: No cranial nerve deficit.   Psychiatric:         Behavior: Behavior normal.         Thought Content: Thought content normal.         Judgment: Judgment normal.       Assessment:       1. PAD (peripheral artery disease)    2. Typical atrial flutter    3. Primary hypertension    4. Diabetes mellitus with peripheral vascular disease         Plan:       72 yoM here for  arrhythmia management and consideration of LAAO. He has remote history of AF and the AF ECG during his admission was more consistent with AT. At this time, I would not recommend LAAO. I did offer CTI ablation to eliminate AFL. Will also implant ILR to provide surveillance for AF.     Of note, he has developed some short term memory issues since his GI bleed admission. Would prefer to avoid long sedation cases if possible.

## 2022-10-31 NOTE — TELEPHONE ENCOUNTER
----- Message from Shreyas Lamb sent at 10/28/2022 12:36 PM CDT -----  Regarding: appt-pt getting a pace maker  Contact: Ele@ [phone] and [phone]  Caller, Ele (wife) is calling to speak to someone in the office to schedule an appt for pt to be seen. Caller states that pt was in the hospital on 9/28/22 and while there, pt seemed really confused.     Caller states that pt is in the process of having a pacemaker put in and would like to have pt evaluated before having pacemaker done, if possible. Thanks.     
Unable to leave . No mailbox set up.   
0

## 2022-10-31 NOTE — PROGRESS NOTES
Subjective:    Patient ID:  Jose English is a 72 y.o. male who presents for follow-up of Atrial Fibrillation      72 yoM here for arrhythmia management. He has history of AF remotely and was not on AF therapy leading up to his hospitalization for GI bleeding. He was on aspirin and plavix for PCI. He suffered a significant GI bleed 9/28/22-10/4/22, hgb to 6.6. EGD with gastric clot, no bleeding. Transfused up to 10.6 10/2/22. AF/RVR on ECG 10/1/22 however my interpretation is sinus tach or AT (ST only possible if he is on chronotropic agents). There was comment of atrial flutter. He was not placed back on plavix.     Past Medical History:  1/19/2021: Anemia due to chronic blood loss  1/19/2021: Anemia due to multiple mechanisms  No date: Bladder cancer      Comment:  2 times  No date: BPH (benign prostatic hypertrophy)  No date: Cancer of kidney      Comment:  1/2  No date: Colon polyp  No date: Coronary artery disease  No date: DM (diabetes mellitus)  No date: GERD (gastroesophageal reflux disease)  No date: Hypertension  1/20/2021: Iron deficiency anemia due to chronic blood loss  1/19/2021: Normochromic normocytic anemia  No date: PAD (peripheral artery disease)  No date: Peripheral vascular disease    Past Surgical History:  No date: abdominal aorta bypass by fem  No date: ABDOMINAL SURGERY  No date: BLADDER SURGERY  12/06/2018: COLONOSCOPY  12/6/2018: COLONOSCOPY; N/A      Comment:  Procedure: COLONOSCOPY;  Surgeon: Familia Dickson MD;  Location[facility];  Service: General;  Laterality:               N/A;  12/7/2020: COLONOSCOPY; N/A      Comment:  Procedure: COLONOSCOPY;  Surgeon: Andi Loya MD;                 Location: [facility] ENDO;  Service: Endoscopy;  Laterality:                N/A;  No date: CYSTOSCOPY  2/20/2019: ENDOSCOPIC ULTRASOUND OF UPPER GASTROINTESTINAL TRACT; N/A      Comment:  Procedure: ULTRASOUND, UPPER GI TRACT, ENDOSCOPIC;                 Surgeon: Govind Hassan  "MD;  [facility]  Service: Endoscopy;  Laterality: N/A;  Please                schedule EGD and EUS, for celiac sprue biopsies, and                evaluation of pancreatitis.5 day hold Plavix, Dr Jarret Mojica prep  2019: ESOPHAGOGASTRODUODENOSCOPY; N/A      Comment:  Procedure: EGD (ESOPHAGOGASTRODUODENOSCOPY);  Surgeon:                Govind Hassan MD;  [facility]                 Service: Endoscopy;  Laterality: N/A;  Please schedule                EGD and EUS, for celiac sprue biopsies, and evaluation of               pancreatitis.5 day hold Plavix, Dr Jarret Mojica                prep  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  No date: NEPHRECTOMY      Comment:  partial nephrectomy left  2018: PERCUTANEOUS TRANSLUMINAL ANGIOPLASTY (PTA) OF PERIPHERAL   VESSEL; Bilateral      Comment:  Procedure: PTA, PERIPHERAL VESSEL;  Surgeon: Patrick Love MD;  Location: [facility] CATH;  Service: Cardiology;                 Laterality: Bilateral;  Please schedule Left common iliac               artery intervention and b/l LE angio via left brachial on                   Social History    Socioeconomic History      Marital status:     Tobacco Use      Smoking status: Former        Packs/day: 1.50        Years: 40.00        Pack years: 60        Types: Cigarettes        Quit date: 2008        Years since quittin.7      Smokeless tobacco: Never    Substance and Sexual Activity      Alcohol use: Not Currently        Alcohol/week: 2.0 standard drinks        Types: 2 Cans of beer per week        Comment: quit 2 years ago      Sexual activity: Yes        Partners: Female    Social History Narrative      SOCIAL HISTORY:      And he goes by "Jorge".      2014.      The patient lives with his wife.  Recently his daughter moved in with her 4 children, 2 of whom have pervasive developmental delay and it seems as though " his daughter will not be able to reconcile with her .      He is taking the children to school in the morning, picking him up from school in the afternoon.      He quit cigarette smoking in 2008 when he came down with bladder cancer.       He likes to drink beer.      Review of patient's family history indicates:      Current Outpatient Medications:  amiodarone (PACERONE) 200 MG Tab, Take 200 mg by mouth 2 (two) times daily., Disp: , Rfl:   amlodipine (NORVASC) 10 MG tablet, TAKE 1 TABLET ONE TIME DAILY (Patient taking differently: Take 10 mg by mouth once daily.), Disp: 90 tablet, Rfl: 3  aspirin (ECOTRIN) 81 MG EC tablet, Take 81 mg by mouth once daily., Disp: , Rfl:   atorvastatin (LIPITOR) 40 MG tablet, Take 40 mg by mouth once daily., Disp: , Rfl:   dicyclomine (BENTYL) 10 MG capsule, Take 10 mg by mouth 3 (three) times daily as needed., Disp: , Rfl:   famotidine (PEPCID) 40 MG tablet, Take 40 mg by mouth once daily., Disp: , Rfl:   gabapentin (NEURONTIN) 300 MG capsule, Take 300 mg by mouth every evening., Disp: , Rfl:   metoclopramide HCl (REGLAN) 5 MG tablet, Take 1 tablet (5 mg total) by mouth 3 (three) times daily before meals., Disp: 90 tablet, Rfl: 3  metoprolol succinate (TOPROL-XL) 50 MG 24 hr tablet, Take 50 mg by mouth 2 (two) times daily., Disp: , Rfl:   pantoprazole (PROTONIX) 40 MG tablet, Take 40 mg by mouth once daily., Disp: , Rfl:   valsartan (DIOVAN) 320 MG tablet, Take 320 mg by mouth once daily. , Disp: , Rfl:   zolpidem (AMBIEN) 10 mg Tab, Take 10 mg by mouth nightly as needed., Disp: , Rfl:   ACCU-CHEK CEASAR CONTROL SOLN Soln, USE ONE TIME DAILY, Disp: 1 each, Rfl: 3  ACCU-CHEK CEASAR PLUS TEST STRP Strp, TEST TWO TIMES DAILY, Disp: 200 strip, Rfl: 3  ACCU-CHEK SOFTCLIX LANCETS Misc, TEST TWO TIMES DAILY, Disp: 200 each, Rfl: 3  b complex vitamins tablet, Take 1 tablet by mouth once daily., Disp: , Rfl:    BD ALCOHOL SWABS PadM, TEST TWO TIMES DAILY, Disp: 200 each, Rfl:  4  cholestyramine-aspartame (QUESTRAN LIGHT) 4 gram PwPk, Take 1 packet (4 g total) by mouth 2 (two) times daily., Disp: 60 packet, Rfl: 0  clopidogrel (PLAVIX) 75 mg tablet, TAKE 1 TABLET EVERY EVENING (Patient not taking: Reported on 11/1/2022), Disp: 90 tablet, Rfl: 3  cyanocobalamin (VITAMIN B-12) 1000 MCG tablet, Take 1,000 mcg by mouth once daily. , Disp: , Rfl:   diphenoxylate-atropine 2.5-0.025 mg (LOMOTIL) 2.5-0.025 mg per tablet, Take 1 tablet by mouth 4 (four) times daily., Disp: , Rfl:   folic acid/multivit-min/lutein (CENTRUM SILVER ORAL), Take 1 tablet by mouth once daily. , Disp: , Rfl:   glipiZIDE (GLUCOTROL) 5 MG TR24, Take 5 mg by mouth 2 (two) times a day. , Disp: , Rfl:   hydroCHLOROthiazide (HYDRODIURIL) 25 MG tablet, Take 25 mg by mouth once daily., Disp: , Rfl:   lipase-protease-amylase 24,000-76,000-120,000 units (PANLIPASE) 24,000-76,000 -120,000 unit capsule, Take 1 capsule by mouth 3 (three) times daily with meals. (Patient not taking: Reported on 11/1/2022), Disp: 90 capsule, Rfl: 11  ondansetron (ZOFRAN-ODT) 4 MG TbDL, Take 1 tablet (4 mg total) by mouth every 6 (six) hours as needed. (Patient not taking: Reported on 11/1/2022), Disp: 15 tablet, Rfl: 0  promethazine (PHENERGAN) 25 MG tablet, Take by mouth., Disp: , Rfl:   temazepam (RESTORIL) 30 mg capsule, Take 30 mg by mouth nightly as needed. , Disp: , Rfl:   traMADoL (ULTRAM) 50 mg tablet, Take 1 tablet (50 mg total) by mouth every 6 (six) hours as needed for Pain., Disp: 12 tablet, Rfl: 0  varicella-zoster gE-AS01B, PF, (SHINGRIX, PF,) 50 mcg/0.5 mL injection, Shingrix (PF) 50 mcg/0.5 mL intramuscular suspension, kit ADM 0.5ML IM UTD, Disp: , Rfl:     No current facility-administered medications for this visit.            Review of Systems   Constitutional: Negative.   HENT: Negative.     Eyes: Negative.    Cardiovascular:  Negative for chest pain, dyspnea on exertion, leg swelling, near-syncope, palpitations and syncope.    Respiratory: Negative.  Negative for shortness of breath.    Endocrine: Negative.    Hematologic/Lymphatic: Negative.    Skin: Negative.    Musculoskeletal: Negative.    Gastrointestinal: Negative.    Genitourinary: Negative.    Neurological: Negative.  Negative for dizziness and light-headedness.   Psychiatric/Behavioral: Negative.     Allergic/Immunologic: Negative.       Objective:    Physical Exam  Vitals reviewed.   Constitutional:       General: He is not in acute distress.     Appearance: He is well-developed.   HENT:      Head: Normocephalic and atraumatic.   Eyes:      Pupils: Pupils are equal, round, and reactive to light.   Neck:      Thyroid: No thyromegaly.      Vascular: No JVD.   Cardiovascular:      Rate and Rhythm: Normal rate and regular rhythm.      Chest Wall: PMI is not displaced.      Heart sounds: Normal heart sounds, S1 normal and S2 normal. No murmur heard.    No friction rub. No gallop.   Pulmonary:      Effort: Pulmonary effort is normal. No respiratory distress.      Breath sounds: Normal breath sounds. No wheezing or rales.   Abdominal:      General: Bowel sounds are normal. There is no distension.      Palpations: Abdomen is soft.      Tenderness: There is no abdominal tenderness. There is no guarding or rebound.   Musculoskeletal:         General: No tenderness. Normal range of motion.      Cervical back: Normal range of motion.   Skin:     General: Skin is warm and dry.      Findings: No erythema or rash.   Neurological:      Mental Status: He is alert and oriented to person, place, and time.      Cranial Nerves: No cranial nerve deficit.   Psychiatric:         Behavior: Behavior normal.         Thought Content: Thought content normal.         Judgment: Judgment normal.       Assessment:       1. PAD (peripheral artery disease)    2. Typical atrial flutter    3. Primary hypertension    4. Diabetes mellitus with peripheral vascular disease         Plan:       72 yoM here for  arrhythmia management and consideration of LAAO. He has remote history of AF and the AF ECG during his admission was more consistent with AT. At this time, I would not recommend LAAO. I did offer CTI ablation to eliminate AFL. Will also implant ILR to provide surveillance for AF.     Of note, he has developed some short term memory issues since his GI bleed admission. Would prefer to avoid long sedation cases if possible.

## 2022-11-01 ENCOUNTER — OFFICE VISIT (OUTPATIENT)
Dept: ELECTROPHYSIOLOGY | Facility: CLINIC | Age: 72
End: 2022-11-01
Payer: MEDICARE

## 2022-11-01 ENCOUNTER — HOSPITAL ENCOUNTER (OUTPATIENT)
Dept: CARDIOLOGY | Facility: CLINIC | Age: 72
Discharge: HOME | End: 2022-11-01
Payer: MEDICARE

## 2022-11-01 VITALS
SYSTOLIC BLOOD PRESSURE: 132 MMHG | HEIGHT: 65 IN | HEART RATE: 59 BPM | BODY MASS INDEX: 29.53 KG/M2 | DIASTOLIC BLOOD PRESSURE: 66 MMHG | WEIGHT: 177.25 LBS

## 2022-11-01 DIAGNOSIS — I48.3 TYPICAL ATRIAL FLUTTER: ICD-10-CM

## 2022-11-01 DIAGNOSIS — I48.91 ATRIAL FIBRILLATION, UNSPECIFIED TYPE: Primary | ICD-10-CM

## 2022-11-01 DIAGNOSIS — E11.51 DIABETES MELLITUS WITH PERIPHERAL VASCULAR DISEASE: Chronic | ICD-10-CM

## 2022-11-01 DIAGNOSIS — I10 PRIMARY HYPERTENSION: Chronic | ICD-10-CM

## 2022-11-01 DIAGNOSIS — I48.91 ATRIAL FIBRILLATION, UNSPECIFIED TYPE: ICD-10-CM

## 2022-11-01 DIAGNOSIS — I73.9 PAD (PERIPHERAL ARTERY DISEASE): Primary | Chronic | ICD-10-CM

## 2022-11-01 DIAGNOSIS — Z01.89 ENCOUNTER FOR OTHER SPECIFIED SPECIAL EXAMINATIONS: ICD-10-CM

## 2022-11-01 PROCEDURE — 3288F FALL RISK ASSESSMENT DOCD: CPT | Mod: CPTII,S$GLB,, | Performed by: INTERNAL MEDICINE

## 2022-11-01 PROCEDURE — 99999 PR PBB SHADOW E&M-EST. PATIENT-LVL IV: CPT | Mod: PBBFAC,,, | Performed by: INTERNAL MEDICINE

## 2022-11-01 PROCEDURE — 99205 OFFICE O/P NEW HI 60 MIN: CPT | Mod: S$GLB,,, | Performed by: INTERNAL MEDICINE

## 2022-11-01 PROCEDURE — 3288F PR FALLS RISK ASSESSMENT DOCUMENTED: ICD-10-PCS | Mod: CPTII,S$GLB,, | Performed by: INTERNAL MEDICINE

## 2022-11-01 PROCEDURE — 1157F ADVNC CARE PLAN IN RCRD: CPT | Mod: CPTII,S$GLB,, | Performed by: INTERNAL MEDICINE

## 2022-11-01 PROCEDURE — 1159F MED LIST DOCD IN RCRD: CPT | Mod: CPTII,S$GLB,, | Performed by: INTERNAL MEDICINE

## 2022-11-01 PROCEDURE — 1157F PR ADVANCE CARE PLAN OR EQUIV PRESENT IN MEDICAL RECORD: ICD-10-PCS | Mod: CPTII,S$GLB,, | Performed by: INTERNAL MEDICINE

## 2022-11-01 PROCEDURE — 93010 ELECTROCARDIOGRAM REPORT: CPT | Mod: S$GLB,,, | Performed by: INTERNAL MEDICINE

## 2022-11-01 PROCEDURE — 99205 PR OFFICE/OUTPT VISIT, NEW, LEVL V, 60-74 MIN: ICD-10-PCS | Mod: S$GLB,,, | Performed by: INTERNAL MEDICINE

## 2022-11-01 PROCEDURE — 1159F PR MEDICATION LIST DOCUMENTED IN MEDICAL RECORD: ICD-10-PCS | Mod: CPTII,S$GLB,, | Performed by: INTERNAL MEDICINE

## 2022-11-01 PROCEDURE — 4010F PR ACE/ARB THEARPY RXD/TAKEN: ICD-10-PCS | Mod: CPTII,S$GLB,, | Performed by: INTERNAL MEDICINE

## 2022-11-01 PROCEDURE — 1101F PR PT FALLS ASSESS DOC 0-1 FALLS W/OUT INJ PAST YR: ICD-10-PCS | Mod: CPTII,S$GLB,, | Performed by: INTERNAL MEDICINE

## 2022-11-01 PROCEDURE — 93010 RHYTHM STRIP: ICD-10-PCS | Mod: S$GLB,,, | Performed by: INTERNAL MEDICINE

## 2022-11-01 PROCEDURE — 4010F ACE/ARB THERAPY RXD/TAKEN: CPT | Mod: CPTII,S$GLB,, | Performed by: INTERNAL MEDICINE

## 2022-11-01 PROCEDURE — 1126F PR PAIN SEVERITY QUANTIFIED, NO PAIN PRESENT: ICD-10-PCS | Mod: CPTII,S$GLB,, | Performed by: INTERNAL MEDICINE

## 2022-11-01 PROCEDURE — 1101F PT FALLS ASSESS-DOCD LE1/YR: CPT | Mod: CPTII,S$GLB,, | Performed by: INTERNAL MEDICINE

## 2022-11-01 PROCEDURE — 93005 ELECTROCARDIOGRAM TRACING: CPT | Mod: S$GLB,,, | Performed by: INTERNAL MEDICINE

## 2022-11-01 PROCEDURE — 99999 PR PBB SHADOW E&M-EST. PATIENT-LVL IV: ICD-10-PCS | Mod: PBBFAC,,, | Performed by: INTERNAL MEDICINE

## 2022-11-01 PROCEDURE — 3075F SYST BP GE 130 - 139MM HG: CPT | Mod: CPTII,S$GLB,, | Performed by: INTERNAL MEDICINE

## 2022-11-01 PROCEDURE — 3078F PR MOST RECENT DIASTOLIC BLOOD PRESSURE < 80 MM HG: ICD-10-PCS | Mod: CPTII,S$GLB,, | Performed by: INTERNAL MEDICINE

## 2022-11-01 PROCEDURE — 3075F PR MOST RECENT SYSTOLIC BLOOD PRESS GE 130-139MM HG: ICD-10-PCS | Mod: CPTII,S$GLB,, | Performed by: INTERNAL MEDICINE

## 2022-11-01 PROCEDURE — 93005 RHYTHM STRIP: ICD-10-PCS | Mod: S$GLB,,, | Performed by: INTERNAL MEDICINE

## 2022-11-01 PROCEDURE — 1126F AMNT PAIN NOTED NONE PRSNT: CPT | Mod: CPTII,S$GLB,, | Performed by: INTERNAL MEDICINE

## 2022-11-01 PROCEDURE — 3078F DIAST BP <80 MM HG: CPT | Mod: CPTII,S$GLB,, | Performed by: INTERNAL MEDICINE

## 2022-11-01 RX ORDER — DICYCLOMINE HYDROCHLORIDE 10 MG/1
10 CAPSULE ORAL 3 TIMES DAILY PRN
COMMUNITY
Start: 2022-06-15 | End: 2024-01-08

## 2022-11-01 RX ORDER — FAMOTIDINE 40 MG/1
40 TABLET, FILM COATED ORAL DAILY
COMMUNITY
Start: 2022-10-04

## 2022-11-01 RX ORDER — PROMETHAZINE HYDROCHLORIDE 25 MG/1
TABLET ORAL
COMMUNITY
Start: 2022-08-10 | End: 2024-01-08

## 2022-11-01 RX ORDER — AMIODARONE HYDROCHLORIDE 200 MG/1
200 TABLET ORAL 2 TIMES DAILY
Status: ON HOLD | COMMUNITY
Start: 2022-10-04 | End: 2022-11-21 | Stop reason: HOSPADM

## 2022-11-01 RX ORDER — ZOLPIDEM TARTRATE 10 MG/1
10 TABLET ORAL NIGHTLY PRN
COMMUNITY
Start: 2022-10-11

## 2022-11-01 RX ORDER — DIPHENOXYLATE HYDROCHLORIDE AND ATROPINE SULFATE 2.5; .025 MG/1; MG/1
1 TABLET ORAL 4 TIMES DAILY
COMMUNITY
Start: 2022-07-11 | End: 2024-01-08

## 2022-11-04 ENCOUNTER — PATIENT MESSAGE (OUTPATIENT)
Dept: ELECTROPHYSIOLOGY | Facility: CLINIC | Age: 72
End: 2022-11-04
Payer: MEDICARE

## 2022-11-13 ENCOUNTER — PATIENT MESSAGE (OUTPATIENT)
Dept: ELECTROPHYSIOLOGY | Facility: CLINIC | Age: 72
End: 2022-11-13
Payer: MEDICARE

## 2022-11-18 ENCOUNTER — TELEPHONE (OUTPATIENT)
Dept: ELECTROPHYSIOLOGY | Facility: CLINIC | Age: 72
End: 2022-11-18
Payer: MEDICARE

## 2022-11-18 NOTE — TELEPHONE ENCOUNTER
Spoke to patient's wife.    CONFIRMED procedure arrival time of 12pm on 11/21/22 for ablation with Dr Loaiza    Reiterated instructions including:  -Directions to check in desk  -NPO after midnight night prior to procedure  -High importance of HOLDING Metoprolol for 3 days prior to the procedure. She confirms that yesterday evening was his last dose.   -will hold Glipizide on the day of the procedure  -Pre-procedure LABS reviewed, no alerts noted  -Confirmed no fever, cough, or shortness of breath in the past 30 days  -Confirmed no redness, rash, irritation, or yeast infection to groin area.   -Reviewed current visitor policy    Patient's wife verbalized understanding of above and appreciated the call.

## 2022-11-21 ENCOUNTER — HOSPITAL ENCOUNTER (OUTPATIENT)
Facility: HOSPITAL | Age: 72
Discharge: HOME | End: 2022-11-21
Attending: INTERNAL MEDICINE | Admitting: INTERNAL MEDICINE
Payer: MEDICARE

## 2022-11-21 ENCOUNTER — ANESTHESIA EVENT (OUTPATIENT)
Dept: MEDSURG UNIT | Facility: HOSPITAL | Age: 72
End: 2022-11-21
Payer: MEDICARE

## 2022-11-21 ENCOUNTER — ANESTHESIA (OUTPATIENT)
Dept: MEDSURG UNIT | Facility: HOSPITAL | Age: 72
End: 2022-11-21
Payer: MEDICARE

## 2022-11-21 VITALS
OXYGEN SATURATION: 100 % | DIASTOLIC BLOOD PRESSURE: 65 MMHG | BODY MASS INDEX: 29.16 KG/M2 | SYSTOLIC BLOOD PRESSURE: 143 MMHG | HEIGHT: 65 IN | TEMPERATURE: 98 F | RESPIRATION RATE: 18 BRPM | WEIGHT: 175 LBS | HEART RATE: 77 BPM

## 2022-11-21 DIAGNOSIS — I49.9 ARRHYTHMIA: ICD-10-CM

## 2022-11-21 DIAGNOSIS — Z86.79 STATUS POST ABLATION OPERATION FOR ARRHYTHMIA: ICD-10-CM

## 2022-11-21 DIAGNOSIS — I48.91 ATRIAL FIBRILLATION, UNSPECIFIED TYPE: ICD-10-CM

## 2022-11-21 DIAGNOSIS — I48.92 ATRIAL FLUTTER: ICD-10-CM

## 2022-11-21 DIAGNOSIS — Z98.890 STATUS POST ABLATION OPERATION FOR ARRHYTHMIA: ICD-10-CM

## 2022-11-21 LAB
POCT GLUCOSE: 112 MG/DL (ref 70–110)
POCT GLUCOSE: 124 MG/DL (ref 70–110)

## 2022-11-21 PROCEDURE — 93010 EKG 12-LEAD: ICD-10-PCS | Mod: ,,, | Performed by: INTERNAL MEDICINE

## 2022-11-21 PROCEDURE — 93653 COMPRE EP EVAL TX SVT: CPT | Mod: ,,, | Performed by: INTERNAL MEDICINE

## 2022-11-21 PROCEDURE — 63600175 PHARM REV CODE 636 W HCPCS: Performed by: NURSE ANESTHETIST, CERTIFIED REGISTERED

## 2022-11-21 PROCEDURE — 25000003 PHARM REV CODE 250: Performed by: NURSE ANESTHETIST, CERTIFIED REGISTERED

## 2022-11-21 PROCEDURE — 25000003 PHARM REV CODE 250: Performed by: INTERNAL MEDICINE

## 2022-11-21 PROCEDURE — 37000009 HC ANESTHESIA EA ADD 15 MINS: Performed by: INTERNAL MEDICINE

## 2022-11-21 PROCEDURE — 93653 PR ELECTROPHYS EVAL, COMPREHEN, W/SUPRAVENT TACHYCARD TRMT: ICD-10-PCS | Mod: ,,, | Performed by: INTERNAL MEDICINE

## 2022-11-21 PROCEDURE — 93010 ELECTROCARDIOGRAM REPORT: CPT | Mod: ,,, | Performed by: INTERNAL MEDICINE

## 2022-11-21 PROCEDURE — 27201423 OPTIME MED/SURG SUP & DEVICES STERILE SUPPLY: Performed by: INTERNAL MEDICINE

## 2022-11-21 PROCEDURE — C1730 CATH, EP, 19 OR FEW ELECT: HCPCS | Performed by: INTERNAL MEDICINE

## 2022-11-21 PROCEDURE — 93653 COMPRE EP EVAL TX SVT: CPT | Performed by: INTERNAL MEDICINE

## 2022-11-21 PROCEDURE — 33285 PR INSERTION,SUBQ CARDIAC RHYTHM MONITOR, W/PRGRMG: ICD-10-PCS | Mod: ,,, | Performed by: INTERNAL MEDICINE

## 2022-11-21 PROCEDURE — 37000008 HC ANESTHESIA 1ST 15 MINUTES: Performed by: INTERNAL MEDICINE

## 2022-11-21 PROCEDURE — D9220A PRA ANESTHESIA: ICD-10-PCS | Mod: CRNA,,, | Performed by: NURSE ANESTHETIST, CERTIFIED REGISTERED

## 2022-11-21 PROCEDURE — 33285 INSJ SUBQ CAR RHYTHM MNTR: CPT | Performed by: INTERNAL MEDICINE

## 2022-11-21 PROCEDURE — 33285 INSJ SUBQ CAR RHYTHM MNTR: CPT | Mod: ,,, | Performed by: INTERNAL MEDICINE

## 2022-11-21 PROCEDURE — C1894 INTRO/SHEATH, NON-LASER: HCPCS | Performed by: INTERNAL MEDICINE

## 2022-11-21 PROCEDURE — 93005 ELECTROCARDIOGRAM TRACING: CPT

## 2022-11-21 PROCEDURE — 63600175 PHARM REV CODE 636 W HCPCS: Performed by: NURSE PRACTITIONER

## 2022-11-21 PROCEDURE — D9220A PRA ANESTHESIA: Mod: ANES,,, | Performed by: ANESTHESIOLOGY

## 2022-11-21 PROCEDURE — D9220A PRA ANESTHESIA: ICD-10-PCS | Mod: ANES,,, | Performed by: ANESTHESIOLOGY

## 2022-11-21 PROCEDURE — D9220A PRA ANESTHESIA: Mod: CRNA,,, | Performed by: NURSE ANESTHETIST, CERTIFIED REGISTERED

## 2022-11-21 PROCEDURE — 82962 GLUCOSE BLOOD TEST: CPT | Performed by: INTERNAL MEDICINE

## 2022-11-21 PROCEDURE — C1764 EVENT RECORDER, CARDIAC: HCPCS | Performed by: INTERNAL MEDICINE

## 2022-11-21 PROCEDURE — C1733 CATH, EP, OTHR THAN COOL-TIP: HCPCS | Performed by: INTERNAL MEDICINE

## 2022-11-21 PROCEDURE — 63600175 PHARM REV CODE 636 W HCPCS: Performed by: INTERNAL MEDICINE

## 2022-11-21 DEVICE — LUX-DX™ INSERTABLE CARDIAC MONITOR
Type: IMPLANTABLE DEVICE | Site: CHEST | Status: FUNCTIONAL
Brand: LUX-DX™ INSERTABLE CARDIAC MONITOR

## 2022-11-21 RX ORDER — LIDOCAINE HYDROCHLORIDE 20 MG/ML
INJECTION, SOLUTION EPIDURAL; INFILTRATION; INTRACAUDAL; PERINEURAL
Status: DISCONTINUED | OUTPATIENT
Start: 2022-11-21 | End: 2022-11-21

## 2022-11-21 RX ORDER — PHENYLEPHRINE HCL IN 0.9% NACL 1 MG/10 ML
SYRINGE (ML) INTRAVENOUS
Status: DISCONTINUED | OUTPATIENT
Start: 2022-11-21 | End: 2022-11-21

## 2022-11-21 RX ORDER — VANCOMYCIN HCL IN 5 % DEXTROSE 1G/250ML
1000 PLASTIC BAG, INJECTION (ML) INTRAVENOUS
Status: DISPENSED | OUTPATIENT
Start: 2022-11-21

## 2022-11-21 RX ORDER — SODIUM CHLORIDE 0.9 % (FLUSH) 0.9 %
3 SYRINGE (ML) INJECTION
Status: DISCONTINUED | OUTPATIENT
Start: 2022-11-21 | End: 2022-11-21 | Stop reason: HOSPADM

## 2022-11-21 RX ORDER — PROPOFOL 10 MG/ML
VIAL (ML) INTRAVENOUS
Status: DISCONTINUED | OUTPATIENT
Start: 2022-11-21 | End: 2022-11-21

## 2022-11-21 RX ORDER — FENTANYL CITRATE 50 UG/ML
INJECTION, SOLUTION INTRAMUSCULAR; INTRAVENOUS
Status: DISCONTINUED | OUTPATIENT
Start: 2022-11-21 | End: 2022-11-21

## 2022-11-21 RX ORDER — LIDOCAINE HYDROCHLORIDE 10 MG/ML
INJECTION INFILTRATION; PERINEURAL
Status: DISCONTINUED | OUTPATIENT
Start: 2022-11-21 | End: 2022-11-21 | Stop reason: HOSPADM

## 2022-11-21 RX ORDER — DEXAMETHASONE SODIUM PHOSPHATE 4 MG/ML
INJECTION, SOLUTION INTRA-ARTICULAR; INTRALESIONAL; INTRAMUSCULAR; INTRAVENOUS; SOFT TISSUE
Status: DISCONTINUED | OUTPATIENT
Start: 2022-11-21 | End: 2022-11-21

## 2022-11-21 RX ORDER — LIDOCAINE HYDROCHLORIDE AND EPINEPHRINE 20; 10 MG/ML; UG/ML
INJECTION, SOLUTION INFILTRATION; PERINEURAL
Status: DISCONTINUED | OUTPATIENT
Start: 2022-11-21 | End: 2022-11-21 | Stop reason: HOSPADM

## 2022-11-21 RX ORDER — EPHEDRINE SULFATE 50 MG/ML
INJECTION, SOLUTION INTRAVENOUS
Status: DISCONTINUED | OUTPATIENT
Start: 2022-11-21 | End: 2022-11-21

## 2022-11-21 RX ORDER — PROPOFOL 10 MG/ML
VIAL (ML) INTRAVENOUS CONTINUOUS PRN
Status: DISCONTINUED | OUTPATIENT
Start: 2022-11-21 | End: 2022-11-21

## 2022-11-21 RX ORDER — ACETAMINOPHEN 325 MG/1
650 TABLET ORAL EVERY 4 HOURS PRN
Status: DISCONTINUED | OUTPATIENT
Start: 2022-11-21 | End: 2022-11-21 | Stop reason: HOSPADM

## 2022-11-21 RX ORDER — ONDANSETRON 2 MG/ML
INJECTION INTRAMUSCULAR; INTRAVENOUS
Status: DISCONTINUED | OUTPATIENT
Start: 2022-11-21 | End: 2022-11-21

## 2022-11-21 RX ORDER — HEPARIN SOD,PORCINE/0.9 % NACL 1000/500ML
INTRAVENOUS SOLUTION INTRAVENOUS
Status: DISCONTINUED | OUTPATIENT
Start: 2022-11-21 | End: 2022-11-21 | Stop reason: HOSPADM

## 2022-11-21 RX ADMIN — ONDANSETRON 4 MG: 2 INJECTION INTRAMUSCULAR; INTRAVENOUS at 02:11

## 2022-11-21 RX ADMIN — PROPOFOL 60 MG: 10 INJECTION, EMULSION INTRAVENOUS at 01:11

## 2022-11-21 RX ADMIN — Medication 100 MCG: at 02:11

## 2022-11-21 RX ADMIN — DEXAMETHASONE SODIUM PHOSPHATE 4 MG: 4 INJECTION INTRA-ARTICULAR; INTRALESIONAL; INTRAMUSCULAR; INTRAVENOUS; SOFT TISSUE at 02:11

## 2022-11-21 RX ADMIN — FENTANYL CITRATE 25 MCG: 50 INJECTION INTRAMUSCULAR; INTRAVENOUS at 01:11

## 2022-11-21 RX ADMIN — VANCOMYCIN HYDROCHLORIDE 1000 MG: 1 INJECTION, POWDER, LYOPHILIZED, FOR SOLUTION INTRAVENOUS at 01:11

## 2022-11-21 RX ADMIN — EPHEDRINE SULFATE 5 MG: 50 INJECTION INTRAVENOUS at 02:11

## 2022-11-21 RX ADMIN — LIDOCAINE HYDROCHLORIDE 60 MG: 20 INJECTION, SOLUTION EPIDURAL; INFILTRATION; INTRACAUDAL; PERINEURAL at 01:11

## 2022-11-21 RX ADMIN — EPHEDRINE SULFATE 5 MG: 50 INJECTION INTRAVENOUS at 01:11

## 2022-11-21 RX ADMIN — SODIUM CHLORIDE: 0.9 INJECTION, SOLUTION INTRAVENOUS at 01:11

## 2022-11-21 RX ADMIN — FENTANYL CITRATE 25 MCG: 50 INJECTION INTRAMUSCULAR; INTRAVENOUS at 02:11

## 2022-11-21 RX ADMIN — Medication 100 MCG/KG/MIN: at 01:11

## 2022-11-21 RX ADMIN — FENTANYL CITRATE 25 MCG: 50 INJECTION INTRAMUSCULAR; INTRAVENOUS at 03:11

## 2022-11-21 NOTE — Clinical Note
The left chest was prepped. The site was prepped with ChloraPrep. The site was clipped. The patient was draped. The patient was positioned supine. The patient was secured using safety straps, with ulnar pads and to an armboard.

## 2022-11-21 NOTE — HPI
71 yo M here for arrhythmia management. He has history of AF remotely and was not on AF therapy leading up to his hospitalization for GI bleeding. He was on aspirin and plavix for PCI. He suffered a significant GI bleed 9/28/22-10/4/22, hgb to 6.6. EGD with gastric clot, no bleeding. Transfused up to 10.6 10/2/22. AF/RVR on ECG 10/1/22 however my interpretation is sinus tach or AT (ST only possible if he is on chronotropic agents). There was comment of atrial flutter. He was not placed back on plavix.     Presents today for ALANA prior to RFA    6/14/13 Stress ECHO   CONCLUSIONS     1 - Normal biventricular systolic function (LVEF 60%).     2 - Normal LV diastolic function.     Dysphagia or odynophagia:  No  Liver Disease, esophageal disease, or known varices:  No  Upper GI Bleeding: No  Snoring:  No  Sleep Apnea:  No  Prior neck surgery or radiation:  No  History of anesthetic difficulties:  No  Family history of anesthetic difficulties:  No  Last oral intake:  12 hours ago  Able to move neck in all directions:  Yes

## 2022-11-21 NOTE — TRANSFER OF CARE
"Anesthesia Transfer of Care Note    Patient: Jose English    Procedure(s) Performed: Procedure(s) (LRB):  Ablation, Atrial Flutter, Typical (N/A)  INSERTION, IMPLANTABLE LOOP RECORDER (N/A)    Patient location: PACU    Anesthesia Type: MAC    Transport from OR: Transported from OR on 2-3 L/min O2 by NC with adequate spontaneous ventilation    Post pain: adequate analgesia    Post assessment: no apparent anesthetic complications    Post vital signs: stable    Level of consciousness: awake    Nausea/Vomiting: no nausea/vomiting    Complications: none    Transfer of care protocol was followed      Last vitals:   Visit Vitals  /66   Pulse 76   Temp 36.2 °C (97.2 °F) (Tympanic)   Resp 17   Ht 5' 5" (1.651 m)   Wt 79.4 kg (175 lb)   SpO2 99%   BMI 29.12 kg/m²     "

## 2022-11-21 NOTE — NURSING TRANSFER
Nursing Transfer Note      11/21/2022     Reason patient is being transferred: to SSCU    Transfer to S[facility]U 6    Transfer via stetcher    Transfer with telemetry monitoring    Transported by [facility]    Chart send with patient: Yes    Notified: Ele    Patient reassessed at: 11/21/22 @ 1700    Upon arrival to floor: Transfer of care to [facility]

## 2022-11-21 NOTE — PATIENT INSTRUCTIONS
Do not strain or lift anything greater than 5 lb for 1 week.  Do not drive or operate any dangerous machinery for 24 hours.   Clean the area with mild soap and water and then cover it with a bandage.   Once the skin has healed, bathing in a tub or swimming is allowed.   Inspect the groin site daily and report to the physician any swelling at the site that   cannot be controlled with manual pressure for 10 minutes, unusual pain at the   access site or affected extremity, unusual swelling at the access site, or signs or   symptoms of infection such as redness, pain, or fever.   Call 911 if you have:   Bleeding from the puncture site that you cannot stop by doing the following:   Relax and lie down right away. Keep your leg flat and apply firm pressure to the site using your fingers and a gauze pad. Keep the pressure on for 20 minutes. Continue this until the bleeding stops. This may take awhile. When bleeding stops, cover the site with a sterile bandage and keep your leg still as much as possible.       FOR LOOP RECORDER SITE ON CHEST:  1. Follow up in device clinic in 1 week for device and wound checks.   2. Patient can shower tomorrow. Stand with back to shower head. Do not scrub incision. There is glue over the incision. Let the glue fall off naturally.

## 2022-11-21 NOTE — BRIEF OP NOTE
: Dwight Loaiza MD  Date of procedure: 11/21/2022  Post-operative Diagnosis: possible atrial fibrillation    Procedure Performed: loop recorder implant    Description of Procedure: The patient was brought to the EP lab in the fasting state. Prepped and draped in sterile fashion. Safety timeout was performed. Prophylactic antibiotics given. Lidocaine used for local anesthetic. ILR implanted over left chest.    EBL: <10 mL    Specimens Removed: None  Complications: no immediate    1. Follow up in device clinic in 1 week for device and wound checks.   2. Patient can shower tomorrow. Stand with back to shower head. Do not scrub incision. There is glue over the incision. Let the glue fall off naturally.    Dr Loaiza, the attending electrophysiologist, was present for the entirety of this procedure.    Toy Connor MD PGY8

## 2022-11-21 NOTE — ANESTHESIA PREPROCEDURE EVALUATION
11/21/2022  Jose English is a 72 y.o., male.      Pre-op Assessment    I have reviewed the Patient Summary Reports.       I have reviewed the Medications.     Review of Systems  Anesthesia Hx:  History of prior surgery of interest to airway management or planning:  Denies Personal Hx of Anesthesia complications.   Hematology/Oncology:         -- Cancer in past history:   Cardiovascular:   Hypertension CAD  Dysrhythmias  NL BiV Fxn 2013   Pulmonary:   Sleep Apnea    Hepatic/GI:   GERD    Endocrine:   Diabetes    Psych:   Psychiatric History          Physical Exam  General: Well nourished    Airway:  Mallampati: III / II  Mouth Opening: Normal  TM Distance: Normal  Tongue: Normal  Neck ROM: Normal ROM    Dental:  Periodontal disease    Chest/Lungs:  Normal Respiratory Rate    Heart:  Rate: Normal        Anesthesia Plan  Type of Anesthesia, risks & benefits discussed:    Anesthesia Type: Gen Natural Airway  Intra-op Monitoring Plan: Standard ASA Monitors  Post Op Pain Control Plan: multimodal analgesia  Induction:  IV  Informed Consent: Informed consent signed with the Patient and all parties understand the risks and agree with anesthesia plan.  All questions answered.   ASA Score: 3  Day of Surgery Review of History & Physical: H&P Update referred to the surgeon/provider.  Anesthesia Plan Notes: NPO confirmed.  No history of anesthesia problems.     Ready For Surgery From Anesthesia Perspective.     .

## 2022-11-21 NOTE — BRIEF OP NOTE
: Dwight Loaiza MD    Post-operative Diagnosis: AFlutter    Procedure Performed: Radiofrequency ablation of the CTI.     Description of Procedure: The patient was brought to the EP lab in the fasting state. Prepped and draped in sterile fashion. Safety timeout was performed. Sedation administered by anesthesia staff. Ultrasound guided venous access of the left femoral vein was performed x3. HALO, CS, and ablation catheters placed. RFA to the CTI with confirmation of bidirectional block.     EBL: <10 mL    Specimens Removed: None  Complications: no immediate    Toy Connor MD PGY-8

## 2022-11-21 NOTE — SUBJECTIVE & OBJECTIVE
Past Medical History:   Diagnosis Date    Anemia due to chronic blood loss 1/19/2021    Anemia due to multiple mechanisms 1/19/2021    Bladder cancer     2 times    BPH (benign prostatic hypertrophy)     Cancer of kidney     1/2    Colon polyp     Coronary artery disease     DM (diabetes mellitus)     GERD (gastroesophageal reflux disease)     Hypertension     Iron deficiency anemia due to chronic blood loss 1/20/2021    Normochromic normocytic anemia 1/19/2021    PAD (peripheral artery disease)     Peripheral vascular disease        Past Surgical History:   Procedure Laterality Date    abdominal aorta bypass by [facility]      ABDOMINAL SURGERY      BLADDER SURGERY      COLONOSCOPY  12/06/2018    COLONOSCOPY N/A 12/6/2018    Procedure: COLONOSCOPY;  Surgeon: Familia Dickson MD;  Location: [facility];  Service: General;  Laterality: N/A;    COLONOSCOPY N/A 12/7/2020    Procedure: COLONOSCOPY;  Surgeon: Andi Loya MD;  Location: [facility];  Service: Endoscopy;  Laterality: N/A;    CYSTOSCOPY      ENDOSCOPIC ULTRASOUND OF UPPER GASTROINTESTINAL TRACT N/A 2/20/2019    Procedure: ULTRASOUND, UPPER GI TRACT, ENDOSCOPIC;  Surgeon: Govind Hassan MD;  Location: [facility] ([address] [place] [address]);  Service: Endoscopy;  Laterality: N/A;  Please schedule EGD and EUS, for celiac sprue biopsies, and evaluation of pancreatitis.  5 day hold Plavix, Dr Jarret Calle  PM prep    ESOPHAGOGASTRODUODENOSCOPY N/A 2/20/2019    Procedure: EGD (ESOPHAGOGASTRODUODENOSCOPY);  Surgeon: Govind Hassan MD;  Location: [facility] ([address] [place] [address]);  Service: Endoscopy;  Laterality: N/A;  Please schedule EGD and EUS, for celiac sprue biopsies, and evaluation of pancreatitis.  5 day hold Plavix, Dr Jarret Calle  PM prep    multiple angiosplastia      NEPHRECTOMY      partial nephrectomy left    PERCUTANEOUS TRANSLUMINAL ANGIOPLASTY (PTA) OF PERIPHERAL VESSEL Bilateral 8/21/2018    Procedure: PTA, PERIPHERAL VESSEL;  Surgeon: Patrick Love MD;  Location: [facility]  CATH;  Service: Cardiology;  Laterality: Bilateral;  Please schedule Left common iliac artery intervention and b/l LE angio via left brachial on August 21       Review of patient's allergies indicates:   Allergen Reactions    Amoxicillin Hives    Zolpidem tartrate Hallucinations       No current facility-administered medications on file prior to encounter.     Current Outpatient Medications on File Prior to Encounter   Medication Sig    ACCU-CHEK CEASAR CONTROL SOLN Soln USE ONE TIME DAILY    ACCU-CHEK CEASAR PLUS TEST STRP Strp TEST TWO TIMES DAILY    ACCU-CHEK SOFTCLIX LANCETS Misc TEST TWO TIMES DAILY    amiodarone (PACERONE) 200 MG Tab Take 200 mg by mouth 2 (two) times daily.    amlodipine (NORVASC) 10 MG tablet TAKE 1 TABLET ONE TIME DAILY (Patient taking differently: Take 10 mg by mouth once daily.)    aspirin (ECOTRIN) 81 MG EC tablet Take 81 mg by mouth once daily.    atorvastatin (LIPITOR) 40 MG tablet Take 40 mg by mouth once daily.    b complex vitamins tablet Take 1 tablet by mouth once daily.    BD ALCOHOL SWABS PadM TEST TWO TIMES DAILY    cholestyramine-aspartame (QUESTRAN LIGHT) 4 gram PwPk Take 1 packet (4 g total) by mouth 2 (two) times daily.    clopidogrel (PLAVIX) 75 mg tablet TAKE 1 TABLET EVERY EVENING (Patient not taking: Reported on 11/1/2022)    cyanocobalamin (VITAMIN B-12) 1000 MCG tablet Take 1,000 mcg by mouth once daily.     dicyclomine (BENTYL) 10 MG capsule Take 10 mg by mouth 3 (three) times daily as needed.    diphenoxylate-atropine 2.5-0.025 mg (LOMOTIL) 2.5-0.025 mg per tablet Take 1 tablet by mouth 4 (four) times daily.    famotidine (PEPCID) 40 MG tablet Take 40 mg by mouth once daily.    folic acid/multivit-min/lutein (CENTRUM SILVER ORAL) Take 1 tablet by mouth once daily.     gabapentin (NEURONTIN) 300 MG capsule Take 300 mg by mouth every evening.    glipiZIDE (GLUCOTROL) 5 MG TR24 Take 5 mg by mouth 2 (two) times a day.     hydroCHLOROthiazide (HYDRODIURIL) 25 MG tablet  Take 25 mg by mouth once daily.    lipase-protease-amylase 24,000-76,000-120,000 units (PANLIPASE) 24,000-76,000 -120,000 unit capsule Take 1 capsule by mouth 3 (three) times daily with meals. (Patient not taking: Reported on 2022)    metoclopramide HCl (REGLAN) 5 MG tablet Take 1 tablet (5 mg total) by mouth 3 (three) times daily before meals.    metoprolol succinate (TOPROL-XL) 50 MG 24 hr tablet Take 50 mg by mouth 2 (two) times daily.    ondansetron (ZOFRAN-ODT) 4 MG TbDL Take 1 tablet (4 mg total) by mouth every 6 (six) hours as needed. (Patient not taking: Reported on 2022)    pantoprazole (PROTONIX) 40 MG tablet Take 40 mg by mouth once daily.    promethazine (PHENERGAN) 25 MG tablet Take by mouth.    temazepam (RESTORIL) 30 mg capsule Take 30 mg by mouth nightly as needed.     traMADoL (ULTRAM) 50 mg tablet Take 1 tablet (50 mg total) by mouth every 6 (six) hours as needed for Pain.    valsartan (DIOVAN) 320 MG tablet Take 320 mg by mouth once daily.     varicella-zoster gE-AS01B, PF, (SHINGRIX, PF,) 50 mcg/0.5 mL injection Shingrix (PF) 50 mcg/0.5 mL intramuscular suspension, kit   ADM 0.5ML IM UTD    zolpidem (AMBIEN) 10 mg Tab Take 10 mg by mouth nightly as needed.     Family History       Problem Relation (Age of Onset)    Cancer Father, Maternal Uncle          Tobacco Use    Smoking status: Former     Packs/day: 1.50     Years: 40.00     Pack years: 60.00     Types: Cigarettes     Quit date: 2008     Years since quittin.7    Smokeless tobacco: Never   Substance and Sexual Activity    Alcohol use: Not Currently     Alcohol/week: 2.0 standard drinks     Types: 2 Cans of beer per week     Comment: quit 2 years ago    Drug use: Not on file    Sexual activity: Yes     Partners: Female     Review of Systems   Constitutional: Negative for fever and malaise/fatigue.   Eyes:  Negative for blurred vision and pain.   Cardiovascular:  Negative for chest pain, dyspnea on exertion, leg swelling,  orthopnea, palpitations and paroxysmal nocturnal dyspnea.   Respiratory:  Negative for cough, shortness of breath, sputum production and wheezing.    Hematologic/Lymphatic: Negative for adenopathy and bleeding problem.   Skin:  Negative for rash.   Musculoskeletal:  Negative for back pain and neck pain.   Gastrointestinal:  Negative for abdominal pain, constipation, diarrhea, nausea and vomiting.   Genitourinary:  Negative for dysuria.   Neurological:  Negative for dizziness, headaches, light-headedness and weakness.   Objective:     Vital Signs (Most Recent):    Vital Signs (24h Range):  BP: ()/()   Arterial Line BP: ()/()         There is no height or weight on file to calculate BMI.            No intake or output data in the 24 hours ending 11/21/22 0900    Lines/Drains/Airways       None                   Physical Exam  Constitutional:       General: He is not in acute distress.     Appearance: Normal appearance. He is not ill-appearing, toxic-appearing or diaphoretic.   HENT:      Head: Normocephalic and atraumatic.      Nose: Nose normal.   Eyes:      Extraocular Movements: Extraocular movements intact.      Pupils: Pupils are equal, round, and reactive to light.   Cardiovascular:      Rate and Rhythm: Normal rate and regular rhythm.      Heart sounds: No murmur heard.    No friction rub. No gallop.   Pulmonary:      Effort: Pulmonary effort is normal. No respiratory distress.      Breath sounds: Normal breath sounds. No wheezing or rales.   Abdominal:      General: Abdomen is flat. There is no distension.      Palpations: Abdomen is soft. There is no mass.      Tenderness: There is no abdominal tenderness.   Musculoskeletal:         General: No swelling. Normal range of motion.      Cervical back: Normal range of motion. No rigidity.      Right lower leg: No edema.      Left lower leg: No edema.   Skin:     General: Skin is warm and dry.      Coloration: Skin is not jaundiced.      Findings: No bruising.    Neurological:      General: No focal deficit present.      Mental Status: He is alert and oriented to person, place, and time.      Cranial Nerves: No cranial nerve deficit.      Motor: No weakness.       Significant Labs: BMP: No results for input(s): GLU, NA, K, CL, CO2, BUN, CREATININE, CALCIUM, MG in the last 48 hours. and CBC No results for input(s): WBC, HGB, HCT, PLT in the last 48 hours.    Significant Imaging: Reviewed

## 2022-11-21 NOTE — ASSESSMENT & PLAN NOTE
1. ALANA for evaluation of CADEN prior to DCCV   -No absolute contraindications of esophageal stricture, tumor, perforation, laceration,or diverticulum and/or active GI bleed  -The risks, benefits & alternatives of the procedure were explained to the patient.   -The risks of transesophageal echo include but are not limited to:  Dental trauma, esophageal trauma/perforation, bleeding, laryngospasm/brochospasm, aspiration, sore throat/hoarseness, & dislodgement of the endotracheal tube/nasogastric tube (where applicable).    -The risks of moderate sedation include hypotension, respiratory depression, arrhythmias, bronchospasm, & death.    -Informed consent was obtained. The patient is agreeable to proceed with the procedure and all questions and concerns addressed.    Case discussed with an attending in echocardiography lab.     Further recommendations per attending addendum

## 2022-11-21 NOTE — Clinical Note
Assessment and Plan:     Problem List Items Addressed This Visit     None      Visit Diagnoses     Medicare annual wellness visit, subsequent    -  Primary           Preventive health issues were discussed with patient, and age appropriate screening tests were ordered as noted in patient's After Visit Summary  Personalized health advice and appropriate referrals for health education or preventive services given if needed, as noted in patient's After Visit Summary  History of Present Illness:     Patient presents for Medicare Annual Wellness visit    Patient Care Team:  Ann Marie Mcconnell MD as PCP - General     Problem List:     Patient Active Problem List   Diagnosis    DDD (degenerative disc disease), lumbar    Chronic pancreatitis ([address] )    Mixed hyperlipidemia    Allergic rhinitis    Impaired fasting glucose    Essential hypertension    Tobacco use    Osteoporosis    Reactive depression    Leukocytosis      Past Medical and Surgical History:     Past Medical History:   Diagnosis Date    Deviated nasal septum     H/O colonoscopy     6/7/10    Impaired fasting glucose      Past Surgical History:   Procedure Laterality Date    APPENDECTOMY      CHOLECYSTECTOMY      EXPLORATORY LAPAROTOMY      TUBAL LIGATION        Family History:     Family History   Problem Relation Age of Onset    Multiple myeloma Mother     Coronary artery disease Father     Stroke Father         CVA      Social History:     Social History     Socioeconomic History    Marital status: /Civil Union     Spouse name: None    Number of children: None    Years of education: None    Highest education level: None   Occupational History    None   Tobacco Use    Smoking status: Current Every Day Smoker     Packs/day: 0 50     Types: Cigarettes    Smokeless tobacco: Never Used   Vaping Use    Vaping Use: Never used   Substance and Sexual Activity    Alcohol use:  Yes    Drug use: No    Sexual activity: None   Other Manual pressure was applied to the left groin. Topics Concern    None   Social History Narrative    None     Social Determinants of Health     Financial Resource Strain:     Difficulty of Paying Living Expenses:    Food Insecurity:     Worried About Running Out of Food in the Last Year:     [address] in the Last Year:    Transportation Needs:     Lack of Transportation (Medical):  Lack of Transportation (Non-Medical):    Physical Activity:     Days of Exercise per Week:     Minutes of Exercise per Session:    Stress:     Feeling of Stress :    Social Connections:     Frequency of Communication with Friends and Family:     Frequency of Social Gatherings with Friends and Family:     Attends [employer] Services:     Active Member of Clubs or Organizations:     Attends Club or Organization Meetings:     Marital Status:    Intimate Partner Violence:     Fear of Current or Ex-Partner:     Emotionally Abused:     Physically Abused:     Sexually Abused:       Medications and Allergies:     Current Outpatient Medications   Medication Sig Dispense Refill    atorvastatin (LIPITOR) 20 mg tablet TAKE 1 TABLET EVERY DAY 90 tablet 3    escitalopram (LEXAPRO) 10 mg tablet Take 1 tablet (10 mg total) by mouth daily 30 tablet 5    fluticasone (FLONASE) 50 mcg/act nasal spray 2 sprays into each nostril daily      HYDROcodone-acetaminophen (NORCO) 7 5-325 mg per tablet 2  Tid for pain 180 tablet 0     MG tablet TAKE 1 TABLET (600 MG TOTAL) BY MOUTH 3 (THREE) TIMES A  tablet 3    losartan (COZAAR) 50 mg tablet TAKE 1 TABLET (50 MG TOTAL) BY MOUTH DAILY 90 tablet 3    pancrelipase, Lip-Prot-Amyl, (CREON) 12,000 units capsule Take 12,000 units of lipase by mouth 3 (three) times a day with meals 270 capsule 3    traMADol (ULTRAM) 50 mg tablet 1-2 q 6 prn pain 100 tablet 5     No current facility-administered medications for this visit       Allergies   Allergen Reactions    Morphine       Immunizations:     Immunization History Administered Date(s) Administered    INFLUENZA 11/06/2018    Influenza Split High Dose Preservative Free IM 11/01/2016    Influenza, high dose seasonal 0 7 mL 11/06/2018, 11/05/2019, 12/18/2020    Pneumococcal Conjugate 13-Valent 11/06/2018    Pneumococcal Polysaccharide PPV23 11/01/2016    SARS-CoV-2 / COVID-19 mRNA IM ([employer]) 01/29/2021, 03/06/2021      Health Maintenance:         Topic Date Due    Hepatitis C Screening  Never done         Topic Date Due    Influenza Vaccine (1) 09/01/2021      Medicare Health Risk Assessment:     /76 (BP Location: Left arm, Patient Position: Sitting, Cuff Size: Adult) Comment: has not taken BP medication yet  Pulse 86   Temp 97 7 °F (36 5 °C) (Tympanic)   Ht 5' 7" (1 702 m)   Wt 55 1 kg (121 lb 6 4 oz)   SpO2 97%   BMI 19 01 kg/m²      Pia Rausch is here for her Subsequent Wellness visit  Last Medicare Wellness visit information reviewed, patient interviewed and updates made to the record today  Health Risk Assessment:   Patient rates overall health as fair  Patient feels that their physical health rating is same  Patient is dissatisfied with their life  Eyesight was rated as slightly worse  Hearing was rated as slightly worse  Patient feels that their emotional and mental health rating is same  Patients states they are never, rarely angry  Patient states they are often unusually tired/fatigued  Pain experienced in the last 7 days has been a lot  Patient's pain rating has been 10/10  Patient states that she has experienced weight loss or gain in last 6 months  Depression Screening:   PHQ-2 Score: 2  PHQ-9 Score: 14      Fall Risk Screening: In the past year, patient has experienced: no history of falling in past year      Urinary Incontinence Screening:   Patient has not leaked urine accidently in the last six months  Home Safety:  Patient has trouble with stairs inside or outside of their home   Patient has working smoke alarms and has working carbon monoxide detector  Home safety hazards include: none  Nutrition:   Current diet is Regular  Medications:   Patient is currently taking over-the-counter supplements  OTC medications include: see medication list  Patient is able to manage medications  Activities of Daily Living (ADLs)/Instrumental Activities of Daily Living (IADLs):   Walk and transfer into and out of bed and chair?: Yes  Dress and groom yourself?: Yes    Bathe or shower yourself?: Yes    Feed yourself?  Yes  Do your laundry/housekeeping?: Yes  Manage your money, pay your bills and track your expenses?: Yes  Make your own meals?: Yes    Do your own shopping?: Yes    Previous Hospitalizations:   Any hospitalizations or ED visits within the last 12 months?: No      Advance Care Planning:   Living will: No    Durable POA for healthcare: No    Advanced directive: No    Advanced directive counseling given: Yes    Five wishes given: Yes    End of Life Decisions reviewed with patient: Yes    Provider agrees with end of life decisions: Yes      PREVENTIVE SCREENINGS      Cardiovascular Screening:    General: Screening Not Indicated and History Lipid Disorder      Diabetes Screening:     General: Screening Current      Colorectal Cancer Screening:     General: Risks and Benefits Discussed    Due for: Colonoscopy - Low Risk      Breast Cancer Screening:     General: Risks and Benefits Discussed    Due for: Mammogram        Cervical Cancer Screening:    General: Screening Not Indicated      Osteoporosis Screening:    General: Screening Not Indicated and History Osteoporosis      Abdominal Aortic Aneurysm (AAA) Screening:        General: Risks and Benefits Discussed    Due for: Screening AAA Ultrasound      Lung Cancer Screening:     General: Screening Not Indicated      Hepatitis C Screening:    General: Risks and Benefits Discussed    Hep C Screening Accepted: Yes      Screening, Brief Intervention, and Referral to Treatment (SBIRT)    Screening  Typical number of drinks in a day: 2  Typical number of drinks in a week: 14  Interpretation: Low risk drinking behavior  Single Item Drug Screening:  How often have you used an illegal drug (including marijuana) or a prescription medication for non-medical reasons in the past year? never    Single Item Drug Screen Score: 0  Interpretation: Negative screen for possible drug use disorder    Review of Current Opioid Use    Opioid Risk Tool (ORT) Interpretation: Complete Opioid Risk Tool (ORT)    Other Counseling Topics:   Car/seat belt/driving safety         Jean Vegas MD

## 2022-11-21 NOTE — INTERVAL H&P NOTE
Mr. English is a 72 year old male who presents today to [facility] for scheduled EPS/AFL RFA with ILR implant with Dr. Loaiza. He denies any chest pain, palpitations, SOB, DOYLE, dizziness, light headedness, weakness, LE swelling, syncope, or near syncopal episodes. He does have a hx of memory deficit. He denies any bleeding, infections, fevers, rashes, or surgeries in the past 30 days. He is currently not on any OAC. He is currently taking amiodarone 200 mg BID (last dose this AM) and metoprolol 50 mg BID (last dose 5 days ago). ALANA deferred per Dr. Loaiza.     ECG today shows sinus rhythm at 72 bpm.     The patient has been examined and the H&P has been reviewed:     I concur with the findings and no changes have occurred since H&P was written.      Review of Systems   Constitution: Negative. Negative for fevers/chills, weakness and malaise/fatigue.   HENT: Negative.  Negative for ear pain and tinnitus.    Eyes: Negative for blurred vision.   Cardiovascular: Negative. Negative for chest pain, dyspnea on exertion, leg swelling, near-syncope, palpitations and syncope.   Respiratory: Negative. Negative for shortness of breath.    Endocrine: Negative.  Negative for polyuria.   Hematologic/Lymphatic: Positive for bruise/bleed easily. Negative for significant bleeding.  Skin: Negative.  Negative for rash.   Musculoskeletal: Negative.  Negative for joint pain and muscle weakness.   Gastrointestinal: Negative.  Negative for abdominal pain, hematemesis, and change in bowel habit.   Genitourinary: Negative for frequency or hematuria.   Neurological: Negative. Negative for dizziness. Hx of memory deficit  Psychiatric/Behavioral: Negative. Negative for depression. The patient is not nervous/anxious.       Physical Exam   Constitutional: Oriented to person, place, and time. Appears well-developed and well-nourished.   HENT:   Head: Normocephalic and atraumatic.   Eyes: Conjunctivae, EOM and lids are normal. No scleral icterus.    Neck: Normal range of motion.  Cardiovascular: Normal rate and intact distal pulses. Regular rhythm present. PMI is not displaced. Exam reveals no gallop, murmur, or friction rub.  Pulses:       Radial pulses are 2+ on the right side, and 2+ on the left side.        Dorsalis pedis pulses are 2+ on the right side, and 2+ on the left side.   Pulmonary/Chest: Effort normal and breath sounds normal. No accessory muscle usage. No tachypnea. No respiratory distress. No wheezes.   Abdominal: Soft. Bowel sounds are normal. No distension. There is no tenderness.   Musculoskeletal: Normal range of motion. No edema. No focal numbness or weakness.  Neurological: Alert and oriented to person, place, and time. Normal muscle tone. Hx of memory deficit  Skin: Skin is warm and dry. No rash noted.   Psychiatric: Normal mood and affect. Behavior is normal.   Nursing note and vitals reviewed.    Labs: Labs from 11/14/22 through today reviewed.     Significant Studies: ECG today reveals sinus rhythm at 72 BPM.     Active Hospital Problems    Diagnosis  POA    Typical atrial flutter [I48.3]  Yes      Resolved Hospital Problems   No resolved problems to display.     Plan:  -EP study with AFL RFA.   -ILR implant  -ALANA deferred per Dr. Loaiza.   -Anesthesia for sedation.     Prior to procedure, Dr. Loaiza at bedside. Extensive discussion with patient regarding risks and benefits of ablation and ILR implant, patient would like to proceed. Risks of procedure include but are not limited to the risk of infection, bleeding, stroke, paralysis, MI, death, embolism, perforation requiring emergency draining or surgery, AV block, possibility for need for pacemaker implantation. The patient voices understanding and all questions have been answered. Procedure consent obtained.    ESTHER Méndez-YEIMY  Cardiology Electrophysiology NP   [facility]    Attending: Dwight Loaiza MD

## 2022-11-22 ENCOUNTER — DOCUMENTATION ONLY (OUTPATIENT)
Dept: CARDIOLOGY | Facility: HOSPITAL | Age: 72
End: 2022-11-22
Payer: MEDICARE

## 2022-11-22 NOTE — DISCHARGE INSTRUCTIONS
Discharge Instructions and Care of Your Groin      Catheter Insertion Site  The morning after your procedure, you may take the dressing off. The easiest way to do this is when you are showering, get the tape and dressing wet and remove it.  After the bandage is removed, cover the area with a small adhesive bandage. It is normal for the catheter insertion site to be black and blue for a couple of days. The site may also be slightly swollen and pink, and there may be a small lump (about the size of a quarter) at the site.  Wash the catheter insertion site at least once daily with soap and water. Place soapy water on your hand or washcloth and gently wash the insertion site; do not rub.  Keep the area clean and dry when you are not showering.  Do not use creams, lotions or ointment on the wound site.  Wear loose clothes and loose underwear.  Do not take a bath, tub soak, go in a Jacuzzi, or swim in a pool or lake for one week after the procedure.    Activity Instructions  Do not strain during bowel movements for the first 3 to 4 days after the procedure to prevent bleeding from the catheter insertion site.  Avoid heavy lifting (more than 10 pounds) and pushing or pulling heavy objects for the first 5 to 7 days after the procedure.  Do not participate in strenuous activities for 5 days after the procedure. This includes most sports - jogging, golfing, play tennis, and bowling.  You may climb stairs if needed, but walk up and down the stairs more slowly than usual.  Gradually increase your activities until you reach your normal activity level within one week after the procedure.      If bleeding should occur following discharge:  Sit down and apply firm pressure to the puncture site with your fingers for 10 minutes   If the bleeding stops, continue to sit quietly, keeping your leg straight for 2 hours. Notify your physician as soon as possible   If bleeding does not stop after 10 minutes or if there is a large amount of  bleeding or spurting, call [phone] immediately.  Do not drive yourself to the hospital.     Notify your physician if these symptoms persist or if you experience:  Change in color or temperature of the leg  Redness, heat, or pus at the puncture site  Chills or fever greater than 101.0 F         Post-Procedure Patient Discharge Instructions  Loop Recorders     Wound Care  You are discharged with a standard dressing over the incision, you may remove the dressing after 24 hours.   There is Dermabond (clear glue) over your incision, do not scrub it off. It acts as a barrier and will eventually disappear.  Do not get the incision wet for 48 hours following the procedure. You may sponge bath during this period, working around the incision. After 48 hours, you may shower, but you should still try to keep this area as dry as possible, and avoid direct water contact to the incision (allow the water to hit back of your shoulder rather than directly on the incision). Gently pat the incision dry if it does get wet.  You may take regular showers after 2 weeks, unless otherwise indicated at your follow-up visit.  Do not submerge the incision in a tub, pool, or body of water for 6 weeks.  If you notice unusual swelling, redness, drainage, have more device site pain, chest pain, shortness of breath, or have a fever greater than 100 degrees, call our device clinic immediately: [phone] or [phone] during normal office hours. You may call [phone] after-hours or on weekends and ask for the electrophysiologist on call.     Any need to reschedule or cancel procedures, or any questions regarding your procedures should be addressed directly with the Arrhythmia Department Nurses at the following phone number: [phone].

## 2022-11-22 NOTE — ANESTHESIA POSTPROCEDURE EVALUATION
Anesthesia Post Evaluation    Patient: Jose English    Procedure(s) Performed: Procedure(s) (LRB):  Ablation, Atrial Flutter, Typical (N/A)  INSERTION, IMPLANTABLE LOOP RECORDER (N/A)    Final Anesthesia Type: general      Patient location during evaluation: PACU  Patient participation: Yes- Able to Participate  Level of consciousness: awake and alert  Post-procedure vital signs: reviewed and stable  Pain management: adequate  Airway patency: patent    PONV status at discharge: No PONV  Anesthetic complications: no      Cardiovascular status: stable  Respiratory status: unassisted and spontaneous ventilation  Hydration status: euvolemic  Follow-up not needed.          Vitals Value Taken Time   /65 11/21/22 1930   Temp 36.7 °C (98 °F) 11/21/22 1700   Pulse 77 11/21/22 1930   Resp 18 11/21/22 1930   SpO2 100 % 11/21/22 1705         No case tracking events are documented in the log.      Pain/Kermit Score: Kermit Score: 10 (11/21/2022  4:15 PM)

## 2022-11-22 NOTE — PROGRESS NOTES
Patient has ambulated and voided without any issues;  discharge instructions explained, questions answered, waiting on transport

## 2022-11-25 DIAGNOSIS — I49.8 OTHER SPECIFIED CARDIAC ARRHYTHMIAS: Primary | ICD-10-CM

## 2022-11-28 ENCOUNTER — CLINICAL SUPPORT (OUTPATIENT)
Dept: CARDIOLOGY | Facility: HOSPITAL | Age: 72
End: 2022-11-28
Attending: INTERNAL MEDICINE
Payer: MEDICARE

## 2022-11-28 ENCOUNTER — TELEPHONE (OUTPATIENT)
Dept: ELECTROPHYSIOLOGY | Facility: CLINIC | Age: 72
End: 2022-11-28
Payer: MEDICARE

## 2022-11-28 DIAGNOSIS — I49.8 OTHER SPECIFIED CARDIAC ARRHYTHMIAS: ICD-10-CM

## 2022-11-28 PROCEDURE — 93285 CARDIAC DEVICE CHECK - IN CLINIC & HOSPITAL: ICD-10-PCS | Mod: 26,,, | Performed by: INTERNAL MEDICINE

## 2022-11-28 PROCEDURE — 93285 PRGRMG DEV EVAL SCRMS IP: CPT | Mod: 26,,, | Performed by: INTERNAL MEDICINE

## 2022-11-28 NOTE — TELEPHONE ENCOUNTER
Spoke to patient regarding post op care. Patient states he is doing well, denies any complaints of chest pain or SOB. He still has a dressing to his groin site.  Instructed this dressing can come off usually within 24 hours and site can be cleaned with soap and water.   Wound site dry and intact without redness, swelling, hematoma or drainage. Patient denies any fever or pain. He is going today to device clinic to have his Loop checked.   He still has this dressing on as well.    Patient  has resumed medications, there were no added medications or changes made at discharge. Patient verbalizes understanding of all post op instructions.

## 2022-12-07 ENCOUNTER — TELEPHONE (OUTPATIENT)
Dept: ELECTROPHYSIOLOGY | Facility: CLINIC | Age: 72
End: 2022-12-07
Payer: MEDICARE

## 2022-12-21 ENCOUNTER — CLINICAL SUPPORT (OUTPATIENT)
Dept: CARDIOLOGY | Facility: HOSPITAL | Age: 72
End: 2022-12-21
Payer: MEDICARE

## 2022-12-21 DIAGNOSIS — Z95.818 PRESENCE OF OTHER CARDIAC IMPLANTS AND GRAFTS: ICD-10-CM

## 2022-12-21 PROCEDURE — G2066 INTER DEVC REMOTE 30D: HCPCS | Performed by: INTERNAL MEDICINE

## 2022-12-21 PROCEDURE — 93298 REM INTERROG DEV EVAL SCRMS: CPT | Mod: ,,, | Performed by: INTERNAL MEDICINE

## 2022-12-21 PROCEDURE — 93298 CARDIAC DEVICE CHECK - REMOTE: ICD-10-PCS | Mod: ,,, | Performed by: INTERNAL MEDICINE

## 2023-01-12 ENCOUNTER — PATIENT MESSAGE (OUTPATIENT)
Dept: ELECTROPHYSIOLOGY | Facility: CLINIC | Age: 73
End: 2023-01-12
Payer: MEDICARE

## 2023-01-20 ENCOUNTER — CLINICAL SUPPORT (OUTPATIENT)
Dept: CARDIOLOGY | Facility: HOSPITAL | Age: 73
End: 2023-01-20
Payer: MEDICARE

## 2023-01-20 DIAGNOSIS — Z95.818 PRESENCE OF OTHER CARDIAC IMPLANTS AND GRAFTS: ICD-10-CM

## 2023-01-20 PROCEDURE — G2066 INTER DEVC REMOTE 30D: HCPCS | Performed by: INTERNAL MEDICINE

## 2023-01-21 ENCOUNTER — PATIENT MESSAGE (OUTPATIENT)
Dept: ELECTROPHYSIOLOGY | Facility: CLINIC | Age: 73
End: 2023-01-21
Payer: MEDICARE

## 2023-01-23 NOTE — TELEPHONE ENCOUNTER
Called patient per his request for a doctor that deals with blockages. I spoke with Dr. Loaiza. Explained to patient that the blockage was in his vein and not in his artery like back in 2018. Patient states he does not have pain in his legs so no need to refer to anyone. Patient will call if they want to be referred.

## 2023-01-30 NOTE — TELEPHONE ENCOUNTER
I spoke with Ms. Marlow, patient's wife, who said that the patient's blood pressure and heart rate have been low and he is lethargic and currently sleeping. She said they have been running low and he has been symptomatic for 2 days. She said that he is not taking Metoprolol and they have recently taken him off of Plavix. She said that when he has gotten like this in the past he has had bleeding issues. I let her know that I sent a message to the device team to check for any alerts on his loop recorder but advised that they go to the ED to be evaluated. She verbalized understanding and appreciated the call.

## 2023-02-19 ENCOUNTER — CLINICAL SUPPORT (OUTPATIENT)
Dept: CARDIOLOGY | Facility: HOSPITAL | Age: 73
End: 2023-02-19
Payer: MEDICARE

## 2023-02-19 DIAGNOSIS — Z95.818 PRESENCE OF OTHER CARDIAC IMPLANTS AND GRAFTS: ICD-10-CM

## 2023-02-19 PROCEDURE — G2066 INTER DEVC REMOTE 30D: HCPCS | Performed by: INTERNAL MEDICINE

## 2023-02-19 PROCEDURE — 93298 REM INTERROG DEV EVAL SCRMS: CPT | Mod: ,,, | Performed by: INTERNAL MEDICINE

## 2023-02-19 PROCEDURE — 93298 CARDIAC DEVICE CHECK - REMOTE: ICD-10-PCS | Mod: ,,, | Performed by: INTERNAL MEDICINE

## 2023-03-01 ENCOUNTER — TELEPHONE (OUTPATIENT)
Dept: ELECTROPHYSIOLOGY | Facility: CLINIC | Age: 73
End: 2023-03-01
Payer: MEDICARE

## 2023-03-02 ENCOUNTER — PATIENT MESSAGE (OUTPATIENT)
Dept: ELECTROPHYSIOLOGY | Facility: CLINIC | Age: 73
End: 2023-03-02
Payer: MEDICARE

## 2023-03-03 ENCOUNTER — TELEPHONE (OUTPATIENT)
Dept: ELECTROPHYSIOLOGY | Facility: CLINIC | Age: 73
End: 2023-03-03
Payer: MEDICARE

## 2023-03-21 ENCOUNTER — CLINICAL SUPPORT (OUTPATIENT)
Dept: CARDIOLOGY | Facility: HOSPITAL | Age: 73
End: 2023-03-21
Payer: MEDICARE

## 2023-03-21 DIAGNOSIS — Z95.818 PRESENCE OF OTHER CARDIAC IMPLANTS AND GRAFTS: ICD-10-CM

## 2023-03-21 PROCEDURE — G2066 INTER DEVC REMOTE 30D: HCPCS | Performed by: INTERNAL MEDICINE

## 2023-04-20 ENCOUNTER — CLINICAL SUPPORT (OUTPATIENT)
Dept: CARDIOLOGY | Facility: HOSPITAL | Age: 73
End: 2023-04-20
Payer: MEDICARE

## 2023-04-20 DIAGNOSIS — Z95.818 PRESENCE OF OTHER CARDIAC IMPLANTS AND GRAFTS: ICD-10-CM

## 2023-04-20 PROCEDURE — G2066 INTER DEVC REMOTE 30D: HCPCS | Performed by: INTERNAL MEDICINE

## 2023-04-20 PROCEDURE — 93298 CARDIAC DEVICE CHECK - REMOTE: ICD-10-PCS | Mod: ,,, | Performed by: INTERNAL MEDICINE

## 2023-04-20 PROCEDURE — 93298 REM INTERROG DEV EVAL SCRMS: CPT | Mod: ,,, | Performed by: INTERNAL MEDICINE

## 2023-05-16 ENCOUNTER — OFFICE VISIT (OUTPATIENT)
Dept: ELECTROPHYSIOLOGY | Facility: CLINIC | Age: 73
End: 2023-05-16
Payer: MEDICARE

## 2023-05-16 VITALS
HEART RATE: 86 BPM | WEIGHT: 201.25 LBS | BODY MASS INDEX: 33.53 KG/M2 | HEIGHT: 65 IN | DIASTOLIC BLOOD PRESSURE: 60 MMHG | SYSTOLIC BLOOD PRESSURE: 128 MMHG

## 2023-05-16 DIAGNOSIS — I48.91 ATRIAL FIBRILLATION, UNSPECIFIED TYPE: ICD-10-CM

## 2023-05-16 DIAGNOSIS — I73.9 PAD (PERIPHERAL ARTERY DISEASE): ICD-10-CM

## 2023-05-16 DIAGNOSIS — I48.3 TYPICAL ATRIAL FLUTTER: ICD-10-CM

## 2023-05-16 DIAGNOSIS — I20.1 ANGINA PECTORIS WITH DOCUMENTED SPASM: Primary | ICD-10-CM

## 2023-05-16 PROCEDURE — 3074F SYST BP LT 130 MM HG: CPT | Mod: CPTII,,, | Performed by: INTERNAL MEDICINE

## 2023-05-16 PROCEDURE — 1101F PT FALLS ASSESS-DOCD LE1/YR: CPT | Mod: CPTII,,, | Performed by: INTERNAL MEDICINE

## 2023-05-16 PROCEDURE — 99999 PR PBB SHADOW E&M-EST. PATIENT-LVL IV: ICD-10-PCS | Mod: PBBFAC,,, | Performed by: INTERNAL MEDICINE

## 2023-05-16 PROCEDURE — 1157F PR ADVANCE CARE PLAN OR EQUIV PRESENT IN MEDICAL RECORD: ICD-10-PCS | Mod: CPTII,,, | Performed by: INTERNAL MEDICINE

## 2023-05-16 PROCEDURE — 1159F MED LIST DOCD IN RCRD: CPT | Mod: CPTII,,, | Performed by: INTERNAL MEDICINE

## 2023-05-16 PROCEDURE — 1159F PR MEDICATION LIST DOCUMENTED IN MEDICAL RECORD: ICD-10-PCS | Mod: CPTII,,, | Performed by: INTERNAL MEDICINE

## 2023-05-16 PROCEDURE — 3288F PR FALLS RISK ASSESSMENT DOCUMENTED: ICD-10-PCS | Mod: CPTII,,, | Performed by: INTERNAL MEDICINE

## 2023-05-16 PROCEDURE — 93000 ELECTROCARDIOGRAM COMPLETE: CPT | Mod: ,,, | Performed by: INTERNAL MEDICINE

## 2023-05-16 PROCEDURE — 3008F PR BODY MASS INDEX (BMI) DOCUMENTED: ICD-10-PCS | Mod: CPTII,,, | Performed by: INTERNAL MEDICINE

## 2023-05-16 PROCEDURE — 3078F PR MOST RECENT DIASTOLIC BLOOD PRESSURE < 80 MM HG: ICD-10-PCS | Mod: CPTII,,, | Performed by: INTERNAL MEDICINE

## 2023-05-16 PROCEDURE — 1126F AMNT PAIN NOTED NONE PRSNT: CPT | Mod: CPTII,,, | Performed by: INTERNAL MEDICINE

## 2023-05-16 PROCEDURE — 93000 RHYTHM STRIP: ICD-10-PCS | Mod: ,,, | Performed by: INTERNAL MEDICINE

## 2023-05-16 PROCEDURE — 99214 PR OFFICE/OUTPT VISIT, EST, LEVL IV, 30-39 MIN: ICD-10-PCS | Mod: ,,, | Performed by: INTERNAL MEDICINE

## 2023-05-16 PROCEDURE — 3078F DIAST BP <80 MM HG: CPT | Mod: CPTII,,, | Performed by: INTERNAL MEDICINE

## 2023-05-16 PROCEDURE — 99214 OFFICE O/P EST MOD 30 MIN: CPT | Mod: ,,, | Performed by: INTERNAL MEDICINE

## 2023-05-16 PROCEDURE — 99999 PR PBB SHADOW E&M-EST. PATIENT-LVL IV: CPT | Mod: PBBFAC,,, | Performed by: INTERNAL MEDICINE

## 2023-05-16 PROCEDURE — 3288F FALL RISK ASSESSMENT DOCD: CPT | Mod: CPTII,,, | Performed by: INTERNAL MEDICINE

## 2023-05-16 PROCEDURE — 1157F ADVNC CARE PLAN IN RCRD: CPT | Mod: CPTII,,, | Performed by: INTERNAL MEDICINE

## 2023-05-16 PROCEDURE — 3008F BODY MASS INDEX DOCD: CPT | Mod: CPTII,,, | Performed by: INTERNAL MEDICINE

## 2023-05-16 PROCEDURE — 1101F PR PT FALLS ASSESS DOC 0-1 FALLS W/OUT INJ PAST YR: ICD-10-PCS | Mod: CPTII,,, | Performed by: INTERNAL MEDICINE

## 2023-05-16 PROCEDURE — 1126F PR PAIN SEVERITY QUANTIFIED, NO PAIN PRESENT: ICD-10-PCS | Mod: CPTII,,, | Performed by: INTERNAL MEDICINE

## 2023-05-16 PROCEDURE — 3074F PR MOST RECENT SYSTOLIC BLOOD PRESSURE < 130 MM HG: ICD-10-PCS | Mod: CPTII,,, | Performed by: INTERNAL MEDICINE

## 2023-05-16 NOTE — PROGRESS NOTES
Subjective:   Patient ID:  Jose English is a 73 y.o. male who presents for follow-up of Atrial Flutter      72 yoM here for arrhythmia management.     11/22: He has history of AF remotely and was not on AF therapy leading up to his hospitalization for GI bleeding. He was on aspirin and plavix for PCI. He suffered a significant GI bleed 9/28/22-10/4/22, hgb to 6.6. EGD with gastric clot, no bleeding. Transfused up to 10.6 10/2/22. AF/RVR on ECG 10/1/22 however my interpretation is sinus tach or AT (ST only possible if he is on chronotropic agents). There was comment of atrial flutter. He was not placed back on plavix.     Interval history: CTI ablation 11/22 with ILR implantation. He is not taking OAC. No AF since implantation    Past Medical History:  1/19/2021: Anemia due to chronic blood loss  1/19/2021: Anemia due to multiple mechanisms  No date: Bladder cancer      Comment:  2 times  No date: BPH (benign prostatic hypertrophy)  No date: Cancer of kidney      Comment:  1/2  No date: Colon polyp  No date: Coronary artery disease  No date: DM (diabetes mellitus)  No date: GERD (gastroesophageal reflux disease)  No date: Hypertension  1/20/2021: Iron deficiency anemia due to chronic blood loss  1/19/2021: Normochromic normocytic anemia  No date: PAD (peripheral artery disease)  No date: Peripheral vascular disease    Past Surgical History:  No date: abdominal aorta bypass by fem  No date: ABDOMINAL SURGERY  No date: BLADDER SURGERY  12/06/2018: COLONOSCOPY  12/6/2018: COLONOSCOPY; N/A      Comment:  Procedure: COLONOSCOPY;  Surgeon: Familia Dickson MD;  Location: [facility] ENDO;  Service: General;  Laterality:               N/A;  12/7/2020: COLONOSCOPY; N/A      Comment:  Procedure: COLONOSCOPY;  Surgeon: Andi Loya MD;                 Location: [facility] ENDO;  Service: Endoscopy;  Laterality:                N/A;  No date: CYSTOSCOPY  2/20/2019: ENDOSCOPIC ULTRASOUND OF UPPER GASTROINTESTINAL  TRACT; N/A      Comment:  Procedure: ULTRASOUND, UPPER GI TRACT, ENDOSCOPIC;                 Surgeon: Govind Hassan MD;  Location: [facility] ENDO (2ND                FLR);  Service: Endoscopy;  Laterality: N/A;  Please                schedule EGD and EUS, for celiac sprue biopsies, and                evaluation of pancreatitis.5 day hold Plavix, Dr Jarret Mojica prep  2/20/2019: ESOPHAGOGASTRODUODENOSCOPY; N/A      Comment:  Procedure: EGD (ESOPHAGOGASTRODUODENOSCOPY);  Surgeon:                Govind Hassan MD;  Location: [facility] ENDO (2ND FLR);                 Service: Endoscopy;  Laterality: N/A;  Please schedule                EGD and EUS, for celiac sprue biopsies, and evaluation of               pancreatitis.5 day hold Plavix, Dr Jarret Mojica                prep  11/21/2022: INSERTION OF IMPLANTABLE LOOP RECORDER; N/A      Comment:  Procedure: INSERTION, IMPLANTABLE LOOP RECORDER;                 Surgeon: Dwight Loaiaz MD;  Location: [facility] EP LAB;                Service: Cardiology;  Laterality: N/A;  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  No date: NEPHRECTOMY      Comment:  partial nephrectomy left  8/21/2018: PERCUTANEOUS TRANSLUMINAL ANGIOPLASTY (PTA) OF PERIPHERAL   VESSEL; Bilateral      Comment:  Procedure: PTA, PERIPHERAL VESSEL;  Surgeon: Patrick Love MD;  [facility] C[facility];  Service: Cardiology;                 Laterality: Bilateral;  Please schedule Left common iliac               artery intervention and b/l LE angio via left brachial on               August 21    Social History    Socioeconomic History      Marital status:     Tobacco Use      Smoking status: Former        Packs/day: 1.50        Years: 40.00        Pack years: 60        Types: Cigarettes        Quit date: 2/9/2008        Years since quitting: 15.2      Smokeless tobacco: Never    Substance and Sexual Activity      Alcohol use: Not Currently        Alcohol/week: 2.0 standard drinks         "Types: 2 Cans of beer per week        Comment: quit 2 years ago      Sexual activity: Yes        Partners: Female    Social History Narrative      SOCIAL HISTORY:      And he goes by "Jorge".      April 2014.      The patient lives with his wife.  Recently his daughter moved in with her 4 children, 2 of whom have pervasive developmental delay and it seems as though his daughter will not be able to reconcile with her .      He is taking the children to school in the morning, picking him up from school in the afternoon.      He quit cigarette smoking in 2008 when he came down with bladder cancer.       He likes to drink beer.      Review of patient's family history indicates:      Current Outpatient Medications:  ACCU-CHEK CEASAR CONTROL SOLN Soln, USE ONE TIME DAILY, Disp: 1 each, Rfl: 3  ACCU-CHEK CEASAR PLUS TEST STRP Strp, TEST TWO TIMES DAILY, Disp: 200 strip, Rfl: 3  ACCU-CHEK SOFTCLIX LANCETS Misc, TEST TWO TIMES DAILY, Disp: 200 each, Rfl: 3  albuterol (PROVENTIL/VENTOLIN HFA) 90 mcg/actuation inhaler, Inhale 1-2 puffs into the lungs every 6 (six) hours as needed for Wheezing. Rescue, Disp: 8 g, Rfl: 0  amlodipine (NORVASC) 10 MG tablet, TAKE 1 TABLET ONE TIME DAILY (Patient taking differently: Take 10 mg by mouth once daily.), Disp: 90 tablet, Rfl: 3  aspirin (ECOTRIN) 81 MG EC tablet, Take 81 mg by mouth once daily., Disp: , Rfl:   atorvastatin (LIPITOR) 40 MG tablet, Take 40 mg by mouth once daily., Disp: , Rfl:   b complex vitamins tablet, Take 1 tablet by mouth once daily., Disp: , Rfl:    BD ALCOHOL SWABS PadM, TEST TWO TIMES DAILY, Disp: 200 each, Rfl: 4  cholestyramine-aspartame (QUESTRAN LIGHT) 4 gram PwPk, Take 1 packet (4 g total) by mouth 2 (two) times daily., Disp: 60 packet, Rfl: 0  clopidogrel (PLAVIX) 75 mg tablet, TAKE 1 TABLET EVERY EVENING (Patient not taking: Reported on 11/1/2022), Disp: 90 tablet, Rfl: 3  cyanocobalamin (VITAMIN B-12) 1000 MCG tablet, Take 1,000 mcg by mouth once " daily. , Disp: , Rfl:   dicyclomine (BENTYL) 10 MG capsule, Take 10 mg by mouth 3 (three) times daily as needed., Disp: , Rfl:   diphenoxylate-atropine 2.5-0.025 mg (LOMOTIL) 2.5-0.025 mg per tablet, Take 1 tablet by mouth 4 (four) times daily., Disp: , Rfl:   famotidine (PEPCID) 40 MG tablet, Take 40 mg by mouth once daily., Disp: , Rfl:   folic acid/multivit-min/lutein (CENTRUM SILVER ORAL), Take 1 tablet by mouth once daily. , Disp: , Rfl:   gabapentin (NEURONTIN) 300 MG capsule, Take 300 mg by mouth every evening., Disp: , Rfl:   glipiZIDE (GLUCOTROL) 5 MG TR24, Take 5 mg by mouth 2 (two) times a day. , Disp: , Rfl:   hydroCHLOROthiazide (HYDRODIURIL) 25 MG tablet, Take 25 mg by mouth once daily., Disp: , Rfl:   lipase-protease-amylase 24,000-76,000-120,000 units (PANLIPASE) 24,000-76,000 -120,000 unit capsule, Take 1 capsule by mouth 3 (three) times daily with meals. (Patient not taking: Reported on 11/1/2022), Disp: 90 capsule, Rfl: 11  metoclopramide HCl (REGLAN) 5 MG tablet, Take 1 tablet (5 mg total) by mouth 3 (three) times daily before meals., Disp: 90 tablet, Rfl: 3  metoprolol succinate (TOPROL-XL) 50 MG 24 hr tablet, Take 50 mg by mouth 2 (two) times daily., Disp: , Rfl:   ondansetron (ZOFRAN-ODT) 4 MG TbDL, Take 1 tablet (4 mg total) by mouth every 6 (six) hours as needed., Disp: 15 tablet, Rfl: 0  pantoprazole (PROTONIX) 40 MG tablet, Take 40 mg by mouth once daily., Disp: , Rfl:   promethazine (PHENERGAN) 25 MG tablet, Take by mouth., Disp: , Rfl:   temazepam (RESTORIL) 30 mg capsule, Take 30 mg by mouth nightly as needed. , Disp: , Rfl:   traMADoL (ULTRAM) 50 mg tablet, Take 1 tablet (50 mg total) by mouth every 6 (six) hours as needed for Pain., Disp: 12 tablet, Rfl: 0  valsartan (DIOVAN) 320 MG tablet, Take 320 mg by mouth once daily. , Disp: , Rfl:   varicella-zoster [id number], PF, (SHINGRIX, PF,) 50 mcg/0.5 mL injection, Shingrix (PF) 50 mcg/0.5 mL intramuscular suspension, kit ADM 0.5ML IM UTD,  Disp: , Rfl:   zolpidem (AMBIEN) 10 mg Tab, Take 10 mg by mouth nightly as needed., Disp: , Rfl:     No current facility-administered medications for this visit.  Facility-Administered Medications Ordered in Other Visits:  vancomycin in dextrose 5 % 1 gram/250 mL IVPB 1,000 mg, 1,000 mg, Intravenous, On Call Procedure, Rebekah Beatty, NP, 1,000 mg at 11/21/22 1356          Review of Systems   Constitutional: Negative.   HENT: Negative.     Eyes: Negative.    Cardiovascular:  Negative for chest pain, dyspnea on exertion, leg swelling, near-syncope, palpitations and syncope.   Respiratory: Negative.  Negative for shortness of breath.    Endocrine: Negative.    Hematologic/Lymphatic: Negative.    Skin: Negative.    Musculoskeletal: Negative.    Gastrointestinal: Negative.    Genitourinary: Negative.    Neurological: Negative.  Negative for dizziness and light-headedness.   Psychiatric/Behavioral: Negative.     Allergic/Immunologic: Negative.      Objective:   Physical Exam  Vitals reviewed.   Constitutional:       General: He is not in acute distress.     Appearance: He is well-developed.   HENT:      Head: Normocephalic and atraumatic.   Eyes:      Pupils: Pupils are equal, round, and reactive to light.   Neck:      Thyroid: No thyromegaly.      Vascular: No JVD.   Cardiovascular:      Rate and Rhythm: Normal rate and regular rhythm.      Chest Wall: PMI is not displaced.      Heart sounds: Normal heart sounds, S1 normal and S2 normal. No murmur heard.    No friction rub. No gallop.   Pulmonary:      Effort: Pulmonary effort is normal. No respiratory distress.      Breath sounds: Normal breath sounds. No wheezing or rales.   Abdominal:      General: Bowel sounds are normal. There is no distension.      Palpations: Abdomen is soft.      Tenderness: There is no abdominal tenderness. There is no guarding or rebound.   Musculoskeletal:         General: No tenderness. Normal range of motion.      Cervical back:  Normal range of motion.   Skin:     General: Skin is warm and dry.      Findings: No erythema or rash.   Neurological:      Mental Status: He is alert and oriented to person, place, and time.      Cranial Nerves: No cranial nerve deficit.   Psychiatric:         Behavior: Behavior normal.         Thought Content: Thought content normal.         Judgment: Judgment normal.       Assessment:      1. Angina pectoris with documented spasm    2. Atrial fibrillation, unspecified type    3. PAD (peripheral artery disease)    4. Typical atrial flutter        Plan:   73 yoM here for arrhythmia evaluation. No arrhythmia since his ILR was implanted. RTC 1y

## 2023-05-20 ENCOUNTER — CLINICAL SUPPORT (OUTPATIENT)
Dept: CARDIOLOGY | Facility: HOSPITAL | Age: 73
End: 2023-05-20
Payer: MEDICARE

## 2023-05-20 DIAGNOSIS — Z95.818 PRESENCE OF OTHER CARDIAC IMPLANTS AND GRAFTS: ICD-10-CM

## 2023-05-20 PROCEDURE — G2066 INTER DEVC REMOTE 30D: HCPCS | Performed by: INTERNAL MEDICINE

## 2023-06-19 ENCOUNTER — CLINICAL SUPPORT (OUTPATIENT)
Dept: CARDIOLOGY | Facility: HOSPITAL | Age: 73
End: 2023-06-19
Payer: MEDICARE

## 2023-06-19 DIAGNOSIS — Z95.818 PRESENCE OF OTHER CARDIAC IMPLANTS AND GRAFTS: ICD-10-CM

## 2023-06-19 PROCEDURE — 93298 REM INTERROG DEV EVAL SCRMS: CPT | Mod: ,,, | Performed by: INTERNAL MEDICINE

## 2023-06-19 PROCEDURE — 93298 CARDIAC DEVICE CHECK - REMOTE: ICD-10-PCS | Mod: ,,, | Performed by: INTERNAL MEDICINE

## 2023-06-19 PROCEDURE — G2066 INTER DEVC REMOTE 30D: HCPCS | Performed by: INTERNAL MEDICINE

## 2023-08-04 ENCOUNTER — CLINICAL SUPPORT (OUTPATIENT)
Dept: CARDIOLOGY | Facility: HOSPITAL | Age: 73
End: 2023-08-04
Payer: MEDICARE

## 2023-08-04 DIAGNOSIS — Z95.818 PRESENCE OF OTHER CARDIAC IMPLANTS AND GRAFTS: ICD-10-CM

## 2023-08-04 PROCEDURE — 93298 REM INTERROG DEV EVAL SCRMS: CPT | Mod: ,,, | Performed by: INTERNAL MEDICINE

## 2023-08-04 PROCEDURE — 93298 CARDIAC DEVICE CHECK - REMOTE: ICD-10-PCS | Mod: ,,, | Performed by: INTERNAL MEDICINE

## 2023-08-04 PROCEDURE — G2066 INTER DEVC REMOTE 30D: HCPCS | Performed by: INTERNAL MEDICINE

## 2023-09-06 ENCOUNTER — CLINICAL SUPPORT (OUTPATIENT)
Dept: CARDIOLOGY | Facility: HOSPITAL | Age: 73
End: 2023-09-06
Payer: MEDICARE

## 2023-09-06 DIAGNOSIS — Z95.818 PRESENCE OF OTHER CARDIAC IMPLANTS AND GRAFTS: ICD-10-CM

## 2023-09-06 PROCEDURE — 93298 CARDIAC DEVICE CHECK - REMOTE: ICD-10-PCS | Mod: ,,, | Performed by: INTERNAL MEDICINE

## 2023-09-06 PROCEDURE — 93298 REM INTERROG DEV EVAL SCRMS: CPT | Mod: ,,, | Performed by: INTERNAL MEDICINE

## 2023-09-06 PROCEDURE — G2066 INTER DEVC REMOTE 30D: HCPCS | Performed by: INTERNAL MEDICINE

## 2023-10-09 ENCOUNTER — CLINICAL SUPPORT (OUTPATIENT)
Dept: CARDIOLOGY | Facility: HOSPITAL | Age: 73
End: 2023-10-09
Attending: INTERNAL MEDICINE
Payer: MEDICARE

## 2023-10-09 ENCOUNTER — CLINICAL SUPPORT (OUTPATIENT)
Dept: CARDIOLOGY | Facility: HOSPITAL | Age: 73
End: 2023-10-09
Payer: MEDICARE

## 2023-10-09 DIAGNOSIS — I49.8 OTHER SPECIFIED CARDIAC ARRHYTHMIAS: ICD-10-CM

## 2023-10-09 PROCEDURE — 93298 REM INTERROG DEV EVAL SCRMS: CPT | Mod: ,,, | Performed by: INTERNAL MEDICINE

## 2023-10-09 PROCEDURE — G2066 INTER DEVC REMOTE 30D: HCPCS | Performed by: INTERNAL MEDICINE

## 2023-10-09 PROCEDURE — 93298 CARDIAC DEVICE CHECK - REMOTE: ICD-10-PCS | Mod: ,,, | Performed by: INTERNAL MEDICINE

## 2023-10-17 LAB
OHS CV AF BURDEN PERCENT: < 1
OHS CV DC REMOTE DEVICE TYPE: NORMAL

## 2023-11-11 NOTE — PROGRESS NOTES
[facility] Medicine  Progress Note    Patient Name: Jose English  MRN: [id number]  Patient Class: IP- Inpatient   Admission Date: 12/15/2020  Length of Stay: 2 days  Attending Physician: Samara Murray MD  Primary Care Provider: Primary Doctor No        Subjective:     Principal Problem:Diarrhea        HPI:  70-year-old male hypertension, hyperlipidemia, diabetes, CAD, significant peripheral vascular disease, s/p mesenteric artery stenting, s/p left iliac artery, aorto-bifem bypass in 2008, history of ischemic colitis requiring right-sided colectomy in 2018 comes in for persistent diarrhea and occasional emesis.  Patient was recently discharge from [facility] on 12/07 with similar issues.  Patient reports that after being discharged patient was doing well.  Patient picked up his medications from GI office, Dr. Loya and started taking medications.  Reports that about 3 days ago he started having profuse watery diarrhea.  Then he started having episodes of bilious emesis.  Only able to hold down Gatorade.  Denies any fever, chills.  Complains of abdominal pain due to persistent emesis.  Denies hematochezia, melena, hematemesis.  Patient came into the ED today for further evaluation.    In the ED, patient was hypotensive 92/56.  Patient received bolus IV fluids with significant improvement of blood pressure.  Found to have emesis of yellow watery fluid during my interview.    Overview/Hospital Course:  Assumed care of this patient on 12/16/20.  Patient with a history of chronic diarrhea.  Recent [facility] admission and discharge on 12/07 with severe diarrhea with associated acute kidney injury.  Known history of ischemic colitis status post prior bowel surgery including mesenteric artery stenting, right colectomy, previous duodenal biopsies negative for celiac, colonoscopy negative for IBD, biopsy negative for microscopic colitis, stool studies negative.  States he was doing well on cholestyramine  however 3 days following presentation developed recurrent severe diarrhea, quantified 10 episodes per day associated with generalized weakness.  On admission he was hypotensive with BP 66/37, labs with significant acute kidney injury with creatinine 4.7, low TSH with normal free T4, CT abdomen and pelvis without any acute pathology.  He was admitted, started on IV fluid hydration holding of antihypertensive with gastroenterology consultation.  On 12/16, no further episodes of nausea or vomiting, some mild epigastric discomfort, states he has had 4 small volume liquid stool, dark in color, overall feeling better.  Seen by Gastroenterology with plans for stool gap, elastase, calprotectin, fecal fat with small bowel follow through.  On 12/17, anemia labs checked, B12 level low at 286, had small-bowel follow-through with multiple loops of gas-filled small bowel, time of 15 minutes.    Interval History:  Patient had small bowel follow-through study done today, multiple loops of gas-filled small cut, time to reach the rectum 15 minutes.  Patient was given meal at around 1:00 p.m., at the time of my encounter has had 2 bowel movements.  Denies any nausea or vomiting.  Producing good urine.  Vital stable.  Labs with magnesium 1.6, phosphorus 2.2, B12 286, sodium 132, BUN/creatinine 24/1.3.  Plan of care discussed with patient.    Review of Systems   Constitutional: Negative for chills and fever.   HENT: Negative for trouble swallowing.    Respiratory: Negative for shortness of breath.    Cardiovascular: Negative for chest pain.   Gastrointestinal: Positive for diarrhea. Negative for abdominal pain, nausea and vomiting.   Genitourinary: Negative for difficulty urinating.   Psychiatric/Behavioral: Negative for confusion.     Objective:     Vital Signs (Most Recent):  Temp: 97.7 °F (36.5 °C) (12/17/20 1644)  Pulse: 65 (12/17/20 1644)  Resp: 18 (12/17/20 1644)  BP: 137/68 (12/17/20 1644)  SpO2: 97 % (12/17/20 1644) Vital Signs  (24h Range):  Temp:  [97.7 °F (36.5 °C)-98.5 °F (36.9 °C)] 97.7 °F (36.5 °C)  Pulse:  [64-70] 65  Resp:  [18] 18  SpO2:  [97 %-100 %] 97 %  BP: (108-139)/(50-69) 137/68     Weight: 79.8 kg (175 lb 14.8 oz)  Body mass index is 29.28 kg/m².    Intake/Output Summary (Last 24 hours) at 12/17/2020 1722  Last data filed at 12/16/2020 1940  Gross per 24 hour   Intake --   Output 500 ml   Net -500 ml      Physical Exam  Vitals signs and nursing note reviewed.   Constitutional:       General: He is not in acute distress.     Appearance: Normal appearance. He is normal weight. He is not ill-appearing, toxic-appearing or diaphoretic.   HENT:      Head: Normocephalic and atraumatic.      Mouth/Throat:      Mouth: Mucous membranes are moist.   Eyes:      General:         Right eye: No discharge.         Left eye: No discharge.      Conjunctiva/sclera: Conjunctivae normal.   Cardiovascular:      Rate and Rhythm: Normal rate and regular rhythm.      Comments: No significant lower extremity edema  Pulmonary:      Effort: Pulmonary effort is normal. No respiratory distress.      Breath sounds: Normal breath sounds. No stridor. No wheezing or rhonchi.      Comments: Not on supplemental oxygen  Abdominal:      Comments: Nondistended, healed midline surgical incision, soft and nontender, hyperactive bowel sounds   Genitourinary:     Comments: No suprapubic fullness or tenderness, no CVAT  Skin:     General: Skin is warm and dry.      Capillary Refill: Capillary refill takes 2 to 3 seconds.   Neurological:      Mental Status: He is alert and oriented to person, place, and time.   Psychiatric:         Mood and Affect: Mood normal.         Significant Labs:   BMP:   Recent Labs   Lab 12/17/20  0646   *   *   K 4.1      CO2 21*   BUN 24*   CREATININE 1.3   CALCIUM 8.3*   MG 1.6     CBC:   Recent Labs   Lab 12/16/20  0637 12/17/20  0646   WBC 8.87 6.70   HGB 10.0* 8.4*   HCT 30.4* 25.7*    208     CMP:   Recent  Labs   Lab 12/16/20  0637 12/17/20  0646    132*   K 4.1 4.1    105   CO2 17* 21*   * 126*   BUN 38* 24*   CREATININE 2.7* 1.3   CALCIUM 8.4* 8.3*   ANIONGAP 10 6*   EGFRNONAA 23.2* 56.1*     Cardiac Markers: No results for input(s): CKMB, MYOGLOBIN, BNP, TROPISTAT in the last 48 hours.  Lactic Acid: No results for input(s): LACTATE in the last 48 hours.  Lipase: No results for input(s): LIPASE in the last 48 hours.  Magnesium:   Recent Labs   Lab 12/16/20  0637 12/17/20  0646   MG 1.5* 1.6     POCT Glucose: No results for input(s): POCTGLUCOSE in the last 48 hours.  Troponin: No results for input(s): TROPONINI in the last 48 hours.  TSH:   Recent Labs   Lab 12/16/20  0637   TSH 0.320*     Urine Culture: No results for input(s): LABURIN in the last 48 hours.  Urine Studies: No results for input(s): COLORU, APPEARANCEUA, PHUR, SPECGRAV, PROTEINUA, GLUCUA, KETONESU, BILIRUBINUA, OCCULTUA, NITRITE, UROBILINOGEN, LEUKOCYTESUR, RBCUA, WBCUA, BACTERIA, SQUAMEPITHEL, HYALINECASTS in the last 48 hours.    Invalid input(s): WRIGHTSUR  All pertinent labs within the past 24 hours have been reviewed.    Significant Imaging: I have reviewed all pertinent imaging results/findings within the past 24 hours.      Assessment/Plan:      * Acute on chronic diarrhea, recurrent  Known history of chronic diarrhea, recent admission for same a now removed recurrent associated with systemic hypotension and acute kidney injury  Significant prior got history, history of mesenteric ischemia status post stenting and ischemic colitis with right hemicolectomy  Celiac disease ruled out with negative duodenal biopsy, no evidence of IBD on colonoscopy, biopsy negative for microscopic colitis  Infectious workup negative to date  He was treated with rifaximin for possible small bowel bacterial overgrowth however denies any improvement  Suspect small-bowel pathology, possible short-gut syndrome versus other  Continue to monitor stool  output  Pending stool gap, elastase, calprotectin, fecal elastase  Small-bowel follow-through with multiple loops of gas-filled small bowel, time to reach rectum 15 minutes, will discussed with Gastroenterology  A.m. labs ordered  Appreciate Gastroenterology input      JEERMIAH (acute kidney injury)  Severe acute kidney injury on presentation with BUN/creatinine 42/4.7.  No known history of CKD.  Creatinine has improved to 1.3 today.  Suspect prerenal due to the intravascular depletion/dehydration due to increased GI fluid loss  Does have history of prior bladder and renal cancer status post partial nephrectomy  Continue IV fluids, decreasing rate  Encourage oral intake and symptom control for diarrhea  Renally dosing all medications and avoid nephrotoxin drugs  Trending BMP      Hypomagnesemia  Magnesium 1.6 today.  1 g IV repletion.  Repeat levels in a.m.      Anemia  H&H downtrending, suspect element of hemoconcentration which is now improving after IV fluid hydration.  Anemia labs ordered and checked today, B12 level slow, starting supplementation  Monitor      B12 deficiency  Suspect due to malabsorptive state.  Will give subcutaneous B12 supplementation while hospitalized.    Hypophosphatemia  Phosphorus 2.2.  Ordered 20 millimoles IV repletion.  Repeat levels in a.m..      Hyponatremia  Sodium 132.  Decreased rate of IV fluids.  Oral diet.  Trending BMP    Diabetes mellitus with peripheral vascular disease  Holding oral hypoglycemics.  Moderate dose insulin sliding scale t.i.d. with meals and Accu-Cheks.  As needed hypoglycemic measures      Mixed hyperlipidemia  Continue Lipitor      PAD (peripheral artery disease)  Severe peripheral vascular disease status post prior aortofemoral bypass as well as stenting  Continue aspirin and Plavix along with high-intensity statin      HTN (hypertension)  Hypotension now resolved and blood pressure up trending  Continue home antihypertensives and monitoring        VTE Risk  Mitigation (From admission, onward)         Ordered     enoxaparin injection 30 mg  Every 24 hours      12/15/20 1826     IP VTE HIGH RISK PATIENT  Once      12/15/20 1826     Place sequential compression device  Until discontinued      12/15/20 1826                Discharge Planning   UMANG:      Code Status: Full Code   Is the patient medically ready for discharge?:     Reason for patient still in hospital (select all that apply): Patient trending condition  Discharge Plan A: Home                  Samara Murray MD  Department of Hospital Medicine   [facility]   12-Nov-2023 00:34

## 2023-11-13 ENCOUNTER — CLINICAL SUPPORT (OUTPATIENT)
Dept: CARDIOLOGY | Facility: HOSPITAL | Age: 73
End: 2023-11-13
Payer: MEDICARE

## 2023-11-13 ENCOUNTER — CLINICAL SUPPORT (OUTPATIENT)
Dept: CARDIOLOGY | Facility: HOSPITAL | Age: 73
End: 2023-11-13
Attending: INTERNAL MEDICINE
Payer: MEDICARE

## 2023-11-13 DIAGNOSIS — I49.8 OTHER SPECIFIED CARDIAC ARRHYTHMIAS: ICD-10-CM

## 2023-11-13 DIAGNOSIS — Z95.818 PRESENCE OF OTHER CARDIAC IMPLANTS AND GRAFTS: ICD-10-CM

## 2023-11-13 PROCEDURE — G2066 INTER DEVC REMOTE 30D: HCPCS | Performed by: INTERNAL MEDICINE

## 2023-11-13 PROCEDURE — 93298 CARDIAC DEVICE CHECK - REMOTE: ICD-10-PCS | Mod: ,,, | Performed by: INTERNAL MEDICINE

## 2023-11-13 PROCEDURE — 93298 REM INTERROG DEV EVAL SCRMS: CPT | Mod: ,,, | Performed by: INTERNAL MEDICINE

## 2023-11-14 LAB
OHS CV AF BURDEN PERCENT: < 1
OHS CV DC REMOTE DEVICE TYPE: NORMAL

## 2023-12-18 ENCOUNTER — CLINICAL SUPPORT (OUTPATIENT)
Dept: CARDIOLOGY | Facility: HOSPITAL | Age: 73
End: 2023-12-18
Payer: MEDICARE

## 2023-12-18 ENCOUNTER — CLINICAL SUPPORT (OUTPATIENT)
Dept: CARDIOLOGY | Facility: HOSPITAL | Age: 73
End: 2023-12-18
Attending: INTERNAL MEDICINE
Payer: MEDICARE

## 2023-12-18 DIAGNOSIS — I49.8 OTHER SPECIFIED CARDIAC ARRHYTHMIAS: ICD-10-CM

## 2023-12-18 DIAGNOSIS — Z95.818 PRESENCE OF OTHER CARDIAC IMPLANTS AND GRAFTS: ICD-10-CM

## 2023-12-18 PROCEDURE — G2066 INTER DEVC REMOTE 30D: HCPCS | Performed by: INTERNAL MEDICINE

## 2023-12-18 PROCEDURE — 93298 REM INTERROG DEV EVAL SCRMS: CPT | Mod: 26,,, | Performed by: INTERNAL MEDICINE

## 2024-01-04 ENCOUNTER — CLINICAL SUPPORT (OUTPATIENT)
Dept: CARDIOLOGY | Facility: HOSPITAL | Age: 74
End: 2024-01-04

## 2024-01-04 ENCOUNTER — CLINICAL SUPPORT (OUTPATIENT)
Dept: CARDIOLOGY | Facility: HOSPITAL | Age: 74
End: 2024-01-04
Attending: INTERNAL MEDICINE

## 2024-01-04 DIAGNOSIS — Z95.818 PRESENCE OF OTHER CARDIAC IMPLANTS AND GRAFTS: ICD-10-CM

## 2024-01-04 DIAGNOSIS — I49.8 OTHER SPECIFIED CARDIAC ARRHYTHMIAS: ICD-10-CM

## 2024-01-05 LAB
OHS CV AF BURDEN PERCENT: < 1
OHS CV AF BURDEN PERCENT: < 1
OHS CV DC REMOTE DEVICE TYPE: NORMAL
OHS CV DC REMOTE DEVICE TYPE: NORMAL
OHS CV ICD SHOCK: NO

## 2024-01-22 PROBLEM — N18.4 CKD (CHRONIC KIDNEY DISEASE), STAGE IV: Status: ACTIVE | Noted: 2024-01-22

## 2024-01-23 ENCOUNTER — CLINICAL SUPPORT (OUTPATIENT)
Dept: CARDIOLOGY | Facility: HOSPITAL | Age: 74
End: 2024-01-23
Attending: INTERNAL MEDICINE

## 2024-01-23 ENCOUNTER — TELEPHONE (OUTPATIENT)
Dept: GASTROENTEROLOGY | Facility: CLINIC | Age: 74
End: 2024-01-23

## 2024-01-23 ENCOUNTER — CLINICAL SUPPORT (OUTPATIENT)
Dept: CARDIOLOGY | Facility: HOSPITAL | Age: 74
End: 2024-01-23

## 2024-01-23 DIAGNOSIS — I49.8 OTHER SPECIFIED CARDIAC ARRHYTHMIAS: ICD-10-CM

## 2024-01-23 DIAGNOSIS — Z95.818 PRESENCE OF OTHER CARDIAC IMPLANTS AND GRAFTS: ICD-10-CM

## 2024-01-23 PROCEDURE — 93298 REM INTERROG DEV EVAL SCRMS: CPT | Mod: ,,, | Performed by: INTERNAL MEDICINE

## 2024-01-24 LAB
OHS CV AF BURDEN PERCENT: < 1
OHS CV DC REMOTE DEVICE TYPE: NORMAL

## 2024-02-26 ENCOUNTER — CLINICAL SUPPORT (OUTPATIENT)
Dept: CARDIOLOGY | Facility: HOSPITAL | Age: 74
End: 2024-02-26
Attending: INTERNAL MEDICINE

## 2024-02-26 ENCOUNTER — CLINICAL SUPPORT (OUTPATIENT)
Dept: CARDIOLOGY | Facility: HOSPITAL | Age: 74
End: 2024-02-26

## 2024-02-26 DIAGNOSIS — Z95.818 PRESENCE OF OTHER CARDIAC IMPLANTS AND GRAFTS: ICD-10-CM

## 2024-02-26 DIAGNOSIS — I49.8 OTHER SPECIFIED CARDIAC ARRHYTHMIAS: ICD-10-CM

## 2024-02-26 PROCEDURE — 93298 REM INTERROG DEV EVAL SCRMS: CPT | Mod: 26,,, | Performed by: INTERNAL MEDICINE

## 2024-03-22 LAB
OHS CV AF BURDEN PERCENT: < 1
OHS CV DC REMOTE DEVICE TYPE: NORMAL

## 2024-05-31 PROBLEM — J20.9 ACUTE BRONCHITIS: Status: ACTIVE | Noted: 2024-05-31

## 2024-05-31 PROBLEM — R55 SYNCOPE: Status: ACTIVE | Noted: 2024-05-31

## 2024-05-31 PROBLEM — N18.32 STAGE 3B CHRONIC KIDNEY DISEASE: Status: ACTIVE | Noted: 2024-05-31

## 2024-06-01 PROBLEM — E86.0 DEHYDRATION: Status: RESOLVED | Noted: 2021-09-01 | Resolved: 2024-06-01

## 2024-06-11 ENCOUNTER — CLINICAL SUPPORT (OUTPATIENT)
Dept: CARDIOLOGY | Facility: HOSPITAL | Age: 74
End: 2024-06-11
Attending: INTERNAL MEDICINE

## 2024-06-11 ENCOUNTER — CLINICAL SUPPORT (OUTPATIENT)
Dept: CARDIOLOGY | Facility: HOSPITAL | Age: 74
End: 2024-06-11

## 2024-06-11 DIAGNOSIS — I49.8 OTHER SPECIFIED CARDIAC ARRHYTHMIAS: ICD-10-CM

## 2024-06-11 DIAGNOSIS — I48.92 UNSPECIFIED ATRIAL FLUTTER: ICD-10-CM

## 2024-06-11 PROCEDURE — 93298 REM INTERROG DEV EVAL SCRMS: CPT | Mod: 26,,, | Performed by: INTERNAL MEDICINE

## 2024-06-11 PROCEDURE — 93298 REM INTERROG DEV EVAL SCRMS: CPT | Performed by: INTERNAL MEDICINE

## 2024-06-24 LAB
OHS CV AF BURDEN PERCENT: < 1
OHS CV DC REMOTE DEVICE TYPE: NORMAL

## 2024-07-15 ENCOUNTER — CLINICAL SUPPORT (OUTPATIENT)
Dept: CARDIOLOGY | Facility: HOSPITAL | Age: 74
End: 2024-07-15
Attending: INTERNAL MEDICINE

## 2024-07-15 ENCOUNTER — CLINICAL SUPPORT (OUTPATIENT)
Dept: CARDIOLOGY | Facility: HOSPITAL | Age: 74
End: 2024-07-15

## 2024-07-15 DIAGNOSIS — I49.8 OTHER SPECIFIED CARDIAC ARRHYTHMIAS: ICD-10-CM

## 2024-07-15 DIAGNOSIS — I48.92 UNSPECIFIED ATRIAL FLUTTER: ICD-10-CM

## 2024-07-15 PROCEDURE — 93298 REM INTERROG DEV EVAL SCRMS: CPT | Mod: 26,,, | Performed by: INTERNAL MEDICINE

## 2024-07-15 PROCEDURE — 93298 REM INTERROG DEV EVAL SCRMS: CPT | Performed by: INTERNAL MEDICINE

## 2024-07-26 LAB
OHS CV AF BURDEN PERCENT: < 1
OHS CV DC REMOTE DEVICE TYPE: NORMAL

## 2024-08-19 ENCOUNTER — CLINICAL SUPPORT (OUTPATIENT)
Dept: CARDIOLOGY | Facility: HOSPITAL | Age: 74
End: 2024-08-19

## 2024-08-19 ENCOUNTER — CLINICAL SUPPORT (OUTPATIENT)
Dept: CARDIOLOGY | Facility: HOSPITAL | Age: 74
End: 2024-08-19
Attending: INTERNAL MEDICINE

## 2024-08-19 DIAGNOSIS — I48.92 UNSPECIFIED ATRIAL FLUTTER: ICD-10-CM

## 2024-08-19 DIAGNOSIS — I49.8 OTHER SPECIFIED CARDIAC ARRHYTHMIAS: ICD-10-CM

## 2024-08-19 PROCEDURE — 93298 REM INTERROG DEV EVAL SCRMS: CPT | Performed by: INTERNAL MEDICINE

## 2024-08-19 PROCEDURE — 93298 REM INTERROG DEV EVAL SCRMS: CPT | Mod: 26,,, | Performed by: INTERNAL MEDICINE

## 2024-08-27 LAB
OHS CV AF BURDEN PERCENT: < 1
OHS CV DC REMOTE DEVICE TYPE: NORMAL

## 2024-09-23 ENCOUNTER — CLINICAL SUPPORT (OUTPATIENT)
Dept: CARDIOLOGY | Facility: HOSPITAL | Age: 74
End: 2024-09-23
Attending: INTERNAL MEDICINE

## 2024-09-23 ENCOUNTER — CLINICAL SUPPORT (OUTPATIENT)
Dept: CARDIOLOGY | Facility: HOSPITAL | Age: 74
End: 2024-09-23

## 2024-09-23 DIAGNOSIS — I48.92 UNSPECIFIED ATRIAL FLUTTER: ICD-10-CM

## 2024-09-23 DIAGNOSIS — I49.8 OTHER SPECIFIED CARDIAC ARRHYTHMIAS: ICD-10-CM

## 2024-09-23 PROCEDURE — 93298 REM INTERROG DEV EVAL SCRMS: CPT | Mod: 26,,, | Performed by: INTERNAL MEDICINE

## 2024-09-23 PROCEDURE — 93298 REM INTERROG DEV EVAL SCRMS: CPT | Performed by: INTERNAL MEDICINE

## 2024-09-27 LAB
OHS CV AF BURDEN PERCENT: < 1
OHS CV DC REMOTE DEVICE TYPE: NORMAL

## 2024-10-28 ENCOUNTER — CLINICAL SUPPORT (OUTPATIENT)
Dept: CARDIOLOGY | Facility: HOSPITAL | Age: 74
End: 2024-10-28

## 2024-10-28 ENCOUNTER — CLINICAL SUPPORT (OUTPATIENT)
Dept: CARDIOLOGY | Facility: HOSPITAL | Age: 74
End: 2024-10-28
Attending: INTERNAL MEDICINE

## 2024-10-28 DIAGNOSIS — I48.92 UNSPECIFIED ATRIAL FLUTTER: ICD-10-CM

## 2024-10-28 DIAGNOSIS — I49.8 OTHER SPECIFIED CARDIAC ARRHYTHMIAS: ICD-10-CM

## 2024-10-28 PROCEDURE — 93298 REM INTERROG DEV EVAL SCRMS: CPT | Performed by: INTERNAL MEDICINE

## 2024-10-28 PROCEDURE — 93298 REM INTERROG DEV EVAL SCRMS: CPT | Mod: 26,,, | Performed by: INTERNAL MEDICINE

## 2024-10-31 LAB
OHS CV AF BURDEN PERCENT: < 1
OHS CV DC REMOTE DEVICE TYPE: NORMAL

## 2024-12-04 ENCOUNTER — CLINICAL SUPPORT (OUTPATIENT)
Dept: CARDIOLOGY | Facility: HOSPITAL | Age: 74
End: 2024-12-04

## 2024-12-04 ENCOUNTER — CLINICAL SUPPORT (OUTPATIENT)
Dept: CARDIOLOGY | Facility: HOSPITAL | Age: 74
End: 2024-12-04
Attending: INTERNAL MEDICINE

## 2024-12-04 DIAGNOSIS — I49.8 OTHER SPECIFIED CARDIAC ARRHYTHMIAS: ICD-10-CM

## 2024-12-04 DIAGNOSIS — I48.92 UNSPECIFIED ATRIAL FLUTTER: ICD-10-CM

## 2024-12-04 PROCEDURE — 93298 REM INTERROG DEV EVAL SCRMS: CPT | Performed by: INTERNAL MEDICINE

## 2024-12-04 PROCEDURE — 93298 REM INTERROG DEV EVAL SCRMS: CPT | Mod: 26,,, | Performed by: INTERNAL MEDICINE

## 2024-12-12 LAB
OHS CV AF BURDEN PERCENT: < 1
OHS CV DC REMOTE DEVICE TYPE: NORMAL

## 2025-01-06 ENCOUNTER — CLINICAL SUPPORT (OUTPATIENT)
Dept: CARDIOLOGY | Facility: HOSPITAL | Age: 75
End: 2025-01-06
Attending: INTERNAL MEDICINE

## 2025-01-06 ENCOUNTER — CLINICAL SUPPORT (OUTPATIENT)
Dept: CARDIOLOGY | Facility: HOSPITAL | Age: 75
End: 2025-01-06

## 2025-01-06 DIAGNOSIS — I49.8 OTHER SPECIFIED CARDIAC ARRHYTHMIAS: ICD-10-CM

## 2025-01-06 DIAGNOSIS — I48.92 UNSPECIFIED ATRIAL FLUTTER: ICD-10-CM

## 2025-01-06 PROCEDURE — 93298 REM INTERROG DEV EVAL SCRMS: CPT | Mod: 26,,, | Performed by: INTERNAL MEDICINE

## 2025-01-06 PROCEDURE — 93298 REM INTERROG DEV EVAL SCRMS: CPT | Performed by: INTERNAL MEDICINE

## 2025-01-14 LAB
OHS CV AF BURDEN PERCENT: < 1
OHS CV DC REMOTE DEVICE TYPE: NORMAL

## 2025-02-10 ENCOUNTER — CLINICAL SUPPORT (OUTPATIENT)
Dept: CARDIOLOGY | Facility: HOSPITAL | Age: 75
End: 2025-02-10

## 2025-02-10 ENCOUNTER — CLINICAL SUPPORT (OUTPATIENT)
Dept: CARDIOLOGY | Facility: HOSPITAL | Age: 75
End: 2025-02-10
Attending: INTERNAL MEDICINE

## 2025-02-10 DIAGNOSIS — I48.92 UNSPECIFIED ATRIAL FLUTTER: ICD-10-CM

## 2025-02-10 DIAGNOSIS — I49.8 OTHER SPECIFIED CARDIAC ARRHYTHMIAS: ICD-10-CM

## 2025-02-10 PROCEDURE — 93298 REM INTERROG DEV EVAL SCRMS: CPT | Performed by: INTERNAL MEDICINE

## 2025-02-17 LAB
OHS CV AF BURDEN PERCENT: < 1
OHS CV DC REMOTE DEVICE TYPE: NORMAL

## 2025-03-17 ENCOUNTER — CLINICAL SUPPORT (OUTPATIENT)
Dept: CARDIOLOGY | Facility: HOSPITAL | Age: 75
End: 2025-03-17
Attending: INTERNAL MEDICINE

## 2025-03-17 ENCOUNTER — CLINICAL SUPPORT (OUTPATIENT)
Dept: CARDIOLOGY | Facility: HOSPITAL | Age: 75
End: 2025-03-17

## 2025-03-17 DIAGNOSIS — I48.92 UNSPECIFIED ATRIAL FLUTTER: ICD-10-CM

## 2025-03-17 DIAGNOSIS — I49.8 OTHER SPECIFIED CARDIAC ARRHYTHMIAS: ICD-10-CM

## 2025-03-17 PROCEDURE — 93298 REM INTERROG DEV EVAL SCRMS: CPT | Performed by: INTERNAL MEDICINE

## 2025-03-17 PROCEDURE — 93298 REM INTERROG DEV EVAL SCRMS: CPT | Mod: 26,,, | Performed by: INTERNAL MEDICINE

## 2025-03-22 LAB
OHS CV AF BURDEN PERCENT: < 1
OHS CV DC REMOTE DEVICE TYPE: NORMAL

## 2025-04-21 ENCOUNTER — CLINICAL SUPPORT (OUTPATIENT)
Dept: CARDIOLOGY | Facility: HOSPITAL | Age: 75
End: 2025-04-21
Attending: INTERNAL MEDICINE

## 2025-04-21 ENCOUNTER — CLINICAL SUPPORT (OUTPATIENT)
Dept: CARDIOLOGY | Facility: HOSPITAL | Age: 75
End: 2025-04-21

## 2025-04-21 DIAGNOSIS — I49.8 OTHER SPECIFIED CARDIAC ARRHYTHMIAS: ICD-10-CM

## 2025-04-21 DIAGNOSIS — I48.92 UNSPECIFIED ATRIAL FLUTTER: ICD-10-CM

## 2025-04-21 PROCEDURE — 93298 REM INTERROG DEV EVAL SCRMS: CPT | Performed by: INTERNAL MEDICINE

## 2025-04-21 PROCEDURE — 93298 REM INTERROG DEV EVAL SCRMS: CPT | Mod: 26,,, | Performed by: INTERNAL MEDICINE

## 2025-04-29 LAB
OHS CV AF BURDEN PERCENT: < 1
OHS CV DC REMOTE DEVICE TYPE: NORMAL

## 2025-05-26 ENCOUNTER — CLINICAL SUPPORT (OUTPATIENT)
Dept: CARDIOLOGY | Facility: HOSPITAL | Age: 75
End: 2025-05-26

## 2025-05-26 ENCOUNTER — CLINICAL SUPPORT (OUTPATIENT)
Dept: CARDIOLOGY | Facility: HOSPITAL | Age: 75
End: 2025-05-26
Attending: INTERNAL MEDICINE

## 2025-05-26 DIAGNOSIS — I49.8 OTHER SPECIFIED CARDIAC ARRHYTHMIAS: ICD-10-CM

## 2025-05-26 DIAGNOSIS — I48.92 UNSPECIFIED ATRIAL FLUTTER: ICD-10-CM

## 2025-05-26 PROCEDURE — 93298 REM INTERROG DEV EVAL SCRMS: CPT | Mod: 26,,, | Performed by: INTERNAL MEDICINE

## 2025-05-26 PROCEDURE — 93298 REM INTERROG DEV EVAL SCRMS: CPT | Performed by: INTERNAL MEDICINE

## 2025-05-29 LAB
OHS CV AF BURDEN PERCENT: < 1
OHS CV DC REMOTE DEVICE TYPE: NORMAL

## 2025-06-30 ENCOUNTER — CLINICAL SUPPORT (OUTPATIENT)
Dept: CARDIOLOGY | Facility: HOSPITAL | Age: 75
End: 2025-06-30
Attending: INTERNAL MEDICINE
Payer: MEDICARE

## 2025-06-30 ENCOUNTER — CLINICAL SUPPORT (OUTPATIENT)
Dept: CARDIOLOGY | Facility: HOSPITAL | Age: 75
End: 2025-06-30
Payer: MEDICARE

## 2025-06-30 DIAGNOSIS — I48.92 UNSPECIFIED ATRIAL FLUTTER: ICD-10-CM

## 2025-06-30 DIAGNOSIS — I49.8 OTHER SPECIFIED CARDIAC ARRHYTHMIAS: ICD-10-CM

## 2025-06-30 PROCEDURE — 93298 REM INTERROG DEV EVAL SCRMS: CPT | Mod: 26,,, | Performed by: INTERNAL MEDICINE

## 2025-06-30 PROCEDURE — 93298 REM INTERROG DEV EVAL SCRMS: CPT | Performed by: INTERNAL MEDICINE

## 2025-07-18 LAB
OHS CV AF BURDEN PERCENT: < 1
OHS CV DC REMOTE DEVICE TYPE: NORMAL

## 2025-08-04 ENCOUNTER — CLINICAL SUPPORT (OUTPATIENT)
Dept: CARDIOLOGY | Facility: HOSPITAL | Age: 75
End: 2025-08-04
Attending: INTERNAL MEDICINE
Payer: MEDICARE

## 2025-08-04 ENCOUNTER — CLINICAL SUPPORT (OUTPATIENT)
Dept: CARDIOLOGY | Facility: HOSPITAL | Age: 75
End: 2025-08-04
Payer: MEDICARE

## 2025-08-04 DIAGNOSIS — I49.8 OTHER SPECIFIED CARDIAC ARRHYTHMIAS: ICD-10-CM

## 2025-08-04 DIAGNOSIS — I48.92 UNSPECIFIED ATRIAL FLUTTER: ICD-10-CM

## 2025-08-04 LAB
OHS CV AF BURDEN PERCENT: < 1
OHS CV DC REMOTE DEVICE TYPE: NORMAL

## 2025-08-04 PROCEDURE — 93298 REM INTERROG DEV EVAL SCRMS: CPT | Mod: 26,,, | Performed by: INTERNAL MEDICINE

## 2025-08-04 PROCEDURE — 93298 REM INTERROG DEV EVAL SCRMS: CPT | Performed by: INTERNAL MEDICINE

## (undated) DEVICE — CATH SAFIRE BI-DIR 7FR LRG

## (undated) DEVICE — DRESSING MEPILEX FLEX 3X3IN

## (undated) DEVICE — PACK EP DRAPE OMC

## (undated) DEVICE — R CATH BIDIRECTIONL DF CRV 7FR

## (undated) DEVICE — INTRODUCER HEMOSTASIS 7.5F

## (undated) DEVICE — KIT ENSITE ELECTRODE SURFACE

## (undated) DEVICE — ADHESIVE DERMABOND ADVANCED

## (undated) DEVICE — KIT PROBE COVER WITH GEL

## (undated) DEVICE — R CATH TRICSPD HALO XP 7FRX110

## (undated) DEVICE — INTRO 8.5FR 63CM SRO

## (undated) DEVICE — PAD DEFIB CADENCE ADULT R2

## (undated) DEVICE — DRAPE PED LAP SURG 108X77IN

## (undated) DEVICE — PAD GROUND UNIV STYLE CORD 9IN